# Patient Record
Sex: FEMALE | Race: WHITE | Employment: OTHER | URBAN - NONMETROPOLITAN AREA
[De-identification: names, ages, dates, MRNs, and addresses within clinical notes are randomized per-mention and may not be internally consistent; named-entity substitution may affect disease eponyms.]

---

## 2017-05-28 ENCOUNTER — HOSPITAL ENCOUNTER (INPATIENT)
Age: 82
LOS: 4 days | Discharge: HOME | DRG: 191 | End: 2017-06-01
Attending: EMERGENCY MEDICINE | Admitting: FAMILY MEDICINE
Payer: MEDICARE

## 2017-05-28 ENCOUNTER — APPOINTMENT (OUTPATIENT)
Dept: GENERAL RADIOLOGY | Age: 82
DRG: 191 | End: 2017-05-28
Payer: MEDICARE

## 2017-05-28 DIAGNOSIS — N18.9 CHRONIC RENAL INSUFFICIENCY, UNSPECIFIED STAGE: ICD-10-CM

## 2017-05-28 DIAGNOSIS — J44.1 COPD WITH ACUTE EXACERBATION (HCC): Primary | ICD-10-CM

## 2017-05-28 LAB
ALBUMIN SERPL-MCNC: 3.8 G/DL (ref 3.5–5.2)
ALP BLD-CCNC: 57 U/L (ref 35–104)
ALT SERPL-CCNC: 8 U/L (ref 5–33)
ANION GAP SERPL CALCULATED.3IONS-SCNC: 12 MMOL/L (ref 7–19)
AST SERPL-CCNC: 12 U/L (ref 5–32)
BASOPHILS ABSOLUTE: 0.1 K/UL (ref 0–0.2)
BASOPHILS RELATIVE PERCENT: 0.6 % (ref 0–1)
BILIRUB SERPL-MCNC: 0.6 MG/DL (ref 0.2–1.2)
BUN BLDV-MCNC: 25 MG/DL (ref 8–23)
CALCIUM SERPL-MCNC: 8.8 MG/DL (ref 8.8–10.2)
CHLORIDE BLD-SCNC: 103 MMOL/L (ref 98–111)
CO2: 25 MMOL/L (ref 22–29)
CREAT SERPL-MCNC: 2.4 MG/DL (ref 0.5–0.9)
EOSINOPHILS ABSOLUTE: 0.3 K/UL (ref 0–0.6)
EOSINOPHILS RELATIVE PERCENT: 3 % (ref 0–5)
GFR NON-AFRICAN AMERICAN: 19
GLUCOSE BLD-MCNC: 122 MG/DL (ref 74–109)
HCT VFR BLD CALC: 34.8 % (ref 37–47)
HEMOGLOBIN: 10.5 G/DL (ref 12–16)
INR BLD: 2.04 (ref 0.88–1.18)
LYMPHOCYTES ABSOLUTE: 1 K/UL (ref 1.1–4.5)
LYMPHOCYTES RELATIVE PERCENT: 11.5 % (ref 20–40)
MCH RBC QN AUTO: 29.7 PG (ref 27–31)
MCHC RBC AUTO-ENTMCNC: 30.2 G/DL (ref 33–37)
MCV RBC AUTO: 98.3 FL (ref 81–99)
MONOCYTES ABSOLUTE: 0.5 K/UL (ref 0–0.9)
MONOCYTES RELATIVE PERCENT: 6 % (ref 0–10)
NEUTROPHILS ABSOLUTE: 6.5 K/UL (ref 1.5–7.5)
NEUTROPHILS RELATIVE PERCENT: 78.5 % (ref 50–65)
PDW BLD-RTO: 15.1 % (ref 11.5–14.5)
PERFORMED ON: NORMAL
PLATELET # BLD: 105 K/UL (ref 130–400)
PLATELET SLIDE REVIEW: ADEQUATE
PMV BLD AUTO: 13.8 FL (ref 7.4–10.4)
POC TROPONIN I: 0 NG/ML (ref 0–0.08)
POTASSIUM SERPL-SCNC: 4.1 MMOL/L (ref 3.5–5)
PRO-BNP: 2498 PG/ML (ref 0–1800)
PROTHROMBIN TIME: 23.2 SEC (ref 12–14.6)
RBC # BLD: 3.54 M/UL (ref 4.2–5.4)
SODIUM BLD-SCNC: 140 MMOL/L (ref 136–145)
TOTAL PROTEIN: 7.5 G/DL (ref 6.6–8.7)
WBC # BLD: 8.2 K/UL (ref 4.8–10.8)

## 2017-05-28 PROCEDURE — 93005 ELECTROCARDIOGRAM TRACING: CPT

## 2017-05-28 PROCEDURE — 85610 PROTHROMBIN TIME: CPT

## 2017-05-28 PROCEDURE — 84484 ASSAY OF TROPONIN QUANT: CPT

## 2017-05-28 PROCEDURE — 94640 AIRWAY INHALATION TREATMENT: CPT

## 2017-05-28 PROCEDURE — 99285 EMERGENCY DEPT VISIT HI MDM: CPT

## 2017-05-28 PROCEDURE — 99999 PR OFFICE/OUTPT VISIT,PROCEDURE ONLY: CPT | Performed by: EMERGENCY MEDICINE

## 2017-05-28 PROCEDURE — 80053 COMPREHEN METABOLIC PANEL: CPT

## 2017-05-28 PROCEDURE — 85025 COMPLETE CBC W/AUTO DIFF WBC: CPT

## 2017-05-28 PROCEDURE — 83880 ASSAY OF NATRIURETIC PEPTIDE: CPT

## 2017-05-28 PROCEDURE — 1210000000 HC MED SURG R&B

## 2017-05-28 PROCEDURE — 6360000002 HC RX W HCPCS: Performed by: EMERGENCY MEDICINE

## 2017-05-28 PROCEDURE — 71010 XR CHEST PORTABLE: CPT

## 2017-05-28 PROCEDURE — 36415 COLL VENOUS BLD VENIPUNCTURE: CPT

## 2017-05-28 RX ORDER — ALBUTEROL SULFATE 2.5 MG/3ML
2.5 SOLUTION RESPIRATORY (INHALATION) EVERY 4 HOURS PRN
Status: DISCONTINUED | OUTPATIENT
Start: 2017-05-28 | End: 2017-05-29 | Stop reason: SDUPTHER

## 2017-05-28 RX ORDER — METHYLPREDNISOLONE SODIUM SUCCINATE 125 MG/2ML
125 INJECTION, POWDER, LYOPHILIZED, FOR SOLUTION INTRAMUSCULAR; INTRAVENOUS ONCE
Status: COMPLETED | OUTPATIENT
Start: 2017-05-28 | End: 2017-05-28

## 2017-05-28 RX ADMIN — IPRATROPIUM BROMIDE 0.5 MG: 0.5 SOLUTION RESPIRATORY (INHALATION) at 22:39

## 2017-05-28 RX ADMIN — METHYLPREDNISOLONE SODIUM SUCCINATE 125 MG: 125 INJECTION, POWDER, FOR SOLUTION INTRAMUSCULAR; INTRAVENOUS at 22:23

## 2017-05-28 RX ADMIN — ALBUTEROL SULFATE 2.5 MG: 2.5 SOLUTION RESPIRATORY (INHALATION) at 22:39

## 2017-05-28 ASSESSMENT — ENCOUNTER SYMPTOMS
COUGH: 0
SHORTNESS OF BREATH: 1

## 2017-05-29 PROBLEM — N18.4 CKD STAGE 4 SECONDARY TO HYPERTENSION (HCC): Status: ACTIVE | Noted: 2017-05-29

## 2017-05-29 PROBLEM — I12.9 CKD STAGE 4 SECONDARY TO HYPERTENSION (HCC): Status: ACTIVE | Noted: 2017-05-29

## 2017-05-29 PROBLEM — J96.11 CHRONIC RESPIRATORY FAILURE WITH HYPOXIA (HCC): Status: ACTIVE | Noted: 2017-05-29

## 2017-05-29 PROBLEM — J44.1 COPD EXACERBATION (HCC): Status: ACTIVE | Noted: 2017-05-29

## 2017-05-29 PROBLEM — I10 ESSENTIAL HYPERTENSION: Status: ACTIVE | Noted: 2017-05-29

## 2017-05-29 LAB
ANION GAP SERPL CALCULATED.3IONS-SCNC: 16 MMOL/L (ref 7–19)
BASOPHILS ABSOLUTE: 0 K/UL (ref 0–0.2)
BASOPHILS RELATIVE PERCENT: 0.3 % (ref 0–1)
BUN BLDV-MCNC: 26 MG/DL (ref 8–23)
CALCIUM SERPL-MCNC: 8.7 MG/DL (ref 8.8–10.2)
CHLORIDE BLD-SCNC: 100 MMOL/L (ref 98–111)
CO2: 23 MMOL/L (ref 22–29)
CREAT SERPL-MCNC: 2.3 MG/DL (ref 0.5–0.9)
EOSINOPHILS ABSOLUTE: 0 K/UL (ref 0–0.6)
EOSINOPHILS RELATIVE PERCENT: 0.5 % (ref 0–5)
GFR NON-AFRICAN AMERICAN: 20
GLUCOSE BLD-MCNC: 167 MG/DL (ref 74–109)
HCT VFR BLD CALC: 32.5 % (ref 37–47)
HEMOGLOBIN: 9.9 G/DL (ref 12–16)
INR BLD: 2.14 (ref 0.88–1.18)
LYMPHOCYTES ABSOLUTE: 0.5 K/UL (ref 1.1–4.5)
LYMPHOCYTES RELATIVE PERCENT: 8.1 % (ref 20–40)
MCH RBC QN AUTO: 29.3 PG (ref 27–31)
MCHC RBC AUTO-ENTMCNC: 30.5 G/DL (ref 33–37)
MCV RBC AUTO: 96.2 FL (ref 81–99)
MONOCYTES ABSOLUTE: 0.1 K/UL (ref 0–0.9)
MONOCYTES RELATIVE PERCENT: 1.5 % (ref 0–10)
NEUTROPHILS ABSOLUTE: 5.2 K/UL (ref 1.5–7.5)
NEUTROPHILS RELATIVE PERCENT: 89.1 % (ref 50–65)
PDW BLD-RTO: 14.9 % (ref 11.5–14.5)
PLATELET # BLD: 113 K/UL (ref 130–400)
PMV BLD AUTO: 12.6 FL (ref 7.4–10.4)
POTASSIUM SERPL-SCNC: 4.1 MMOL/L (ref 3.5–5)
PROTHROMBIN TIME: 24.1 SEC (ref 12–14.6)
RBC # BLD: 3.38 M/UL (ref 4.2–5.4)
SODIUM BLD-SCNC: 139 MMOL/L (ref 136–145)
WBC # BLD: 5.9 K/UL (ref 4.8–10.8)

## 2017-05-29 PROCEDURE — 6370000000 HC RX 637 (ALT 250 FOR IP): Performed by: FAMILY MEDICINE

## 2017-05-29 PROCEDURE — 2700000000 HC OXYGEN THERAPY PER DAY

## 2017-05-29 PROCEDURE — 80048 BASIC METABOLIC PNL TOTAL CA: CPT

## 2017-05-29 PROCEDURE — 6360000002 HC RX W HCPCS: Performed by: INTERNAL MEDICINE

## 2017-05-29 PROCEDURE — 99223 1ST HOSP IP/OBS HIGH 75: CPT | Performed by: FAMILY MEDICINE

## 2017-05-29 PROCEDURE — 94660 CPAP INITIATION&MGMT: CPT

## 2017-05-29 PROCEDURE — 85025 COMPLETE CBC W/AUTO DIFF WBC: CPT

## 2017-05-29 PROCEDURE — 6360000002 HC RX W HCPCS: Performed by: FAMILY MEDICINE

## 2017-05-29 PROCEDURE — 94640 AIRWAY INHALATION TREATMENT: CPT

## 2017-05-29 PROCEDURE — 36415 COLL VENOUS BLD VENIPUNCTURE: CPT

## 2017-05-29 PROCEDURE — 85610 PROTHROMBIN TIME: CPT

## 2017-05-29 PROCEDURE — 1210000000 HC MED SURG R&B

## 2017-05-29 PROCEDURE — 2580000003 HC RX 258: Performed by: FAMILY MEDICINE

## 2017-05-29 RX ORDER — AZITHROMYCIN 250 MG/1
500 TABLET, FILM COATED ORAL DAILY
Status: DISCONTINUED | OUTPATIENT
Start: 2017-05-29 | End: 2017-05-29

## 2017-05-29 RX ORDER — DOXYCYCLINE HYCLATE 100 MG/1
100 CAPSULE ORAL EVERY 12 HOURS SCHEDULED
Status: DISCONTINUED | OUTPATIENT
Start: 2017-05-29 | End: 2017-06-01 | Stop reason: HOSPADM

## 2017-05-29 RX ORDER — IPRATROPIUM BROMIDE AND ALBUTEROL SULFATE 2.5; .5 MG/3ML; MG/3ML
1 SOLUTION RESPIRATORY (INHALATION) 4 TIMES DAILY
Status: DISCONTINUED | OUTPATIENT
Start: 2017-05-29 | End: 2017-06-01 | Stop reason: HOSPADM

## 2017-05-29 RX ORDER — WARFARIN SODIUM 2 MG/1
2 TABLET ORAL DAILY
Status: DISCONTINUED | OUTPATIENT
Start: 2017-05-29 | End: 2017-06-01 | Stop reason: HOSPADM

## 2017-05-29 RX ORDER — ASPIRIN 81 MG/1
81 TABLET, CHEWABLE ORAL DAILY
Status: DISCONTINUED | OUTPATIENT
Start: 2017-05-29 | End: 2017-06-01 | Stop reason: HOSPADM

## 2017-05-29 RX ORDER — BUMETANIDE 1 MG/1
1 TABLET ORAL DAILY
Status: ON HOLD | COMMUNITY
End: 2017-06-01 | Stop reason: HOSPADM

## 2017-05-29 RX ORDER — BUMETANIDE 1 MG/1
0.5 TABLET ORAL 2 TIMES DAILY
Status: DISCONTINUED | OUTPATIENT
Start: 2017-05-29 | End: 2017-05-31

## 2017-05-29 RX ORDER — METHYLPREDNISOLONE SODIUM SUCCINATE 40 MG/ML
40 INJECTION, POWDER, LYOPHILIZED, FOR SOLUTION INTRAMUSCULAR; INTRAVENOUS EVERY 12 HOURS
Status: DISCONTINUED | OUTPATIENT
Start: 2017-05-29 | End: 2017-05-31

## 2017-05-29 RX ORDER — AMLODIPINE BESYLATE 10 MG/1
10 TABLET ORAL DAILY
Status: DISCONTINUED | OUTPATIENT
Start: 2017-05-29 | End: 2017-06-01 | Stop reason: HOSPADM

## 2017-05-29 RX ORDER — ALBUTEROL SULFATE 2.5 MG/3ML
2.5 SOLUTION RESPIRATORY (INHALATION)
Status: DISCONTINUED | OUTPATIENT
Start: 2017-05-29 | End: 2017-06-01 | Stop reason: HOSPADM

## 2017-05-29 RX ORDER — AZITHROMYCIN 250 MG/1
250 TABLET, FILM COATED ORAL DAILY
Status: DISCONTINUED | OUTPATIENT
Start: 2017-05-30 | End: 2017-05-29

## 2017-05-29 RX ORDER — CALCITRIOL 0.25 UG/1
0.25 CAPSULE, LIQUID FILLED ORAL DAILY
Status: DISCONTINUED | OUTPATIENT
Start: 2017-05-29 | End: 2017-06-01 | Stop reason: HOSPADM

## 2017-05-29 RX ORDER — LEVOTHYROXINE SODIUM 0.1 MG/1
200 TABLET ORAL DAILY
Status: DISCONTINUED | OUTPATIENT
Start: 2017-05-29 | End: 2017-06-01 | Stop reason: HOSPADM

## 2017-05-29 RX ORDER — FORMOTEROL FUMARATE 20 UG/2ML
20 SOLUTION RESPIRATORY (INHALATION) 2 TIMES DAILY
Status: DISCONTINUED | OUTPATIENT
Start: 2017-05-29 | End: 2017-06-01 | Stop reason: HOSPADM

## 2017-05-29 RX ORDER — SODIUM CHLORIDE 0.9 % (FLUSH) 0.9 %
10 SYRINGE (ML) INJECTION PRN
Status: DISCONTINUED | OUTPATIENT
Start: 2017-05-29 | End: 2017-06-01 | Stop reason: HOSPADM

## 2017-05-29 RX ORDER — ACETAMINOPHEN 325 MG/1
650 TABLET ORAL EVERY 4 HOURS PRN
Status: DISCONTINUED | OUTPATIENT
Start: 2017-05-29 | End: 2017-06-01 | Stop reason: HOSPADM

## 2017-05-29 RX ORDER — LOSARTAN POTASSIUM 50 MG/1
50 TABLET ORAL DAILY
Status: DISCONTINUED | OUTPATIENT
Start: 2017-05-29 | End: 2017-06-01 | Stop reason: HOSPADM

## 2017-05-29 RX ORDER — ONDANSETRON 2 MG/ML
4 INJECTION INTRAMUSCULAR; INTRAVENOUS EVERY 6 HOURS PRN
Status: DISCONTINUED | OUTPATIENT
Start: 2017-05-29 | End: 2017-06-01 | Stop reason: HOSPADM

## 2017-05-29 RX ORDER — SODIUM CHLORIDE 0.9 % (FLUSH) 0.9 %
10 SYRINGE (ML) INJECTION EVERY 12 HOURS SCHEDULED
Status: DISCONTINUED | OUTPATIENT
Start: 2017-05-29 | End: 2017-06-01 | Stop reason: HOSPADM

## 2017-05-29 RX ADMIN — Medication 10 ML: at 20:13

## 2017-05-29 RX ADMIN — CALCITRIOL 0.25 MCG: 0.25 CAPSULE, LIQUID FILLED ORAL at 09:29

## 2017-05-29 RX ADMIN — IPRATROPIUM BROMIDE AND ALBUTEROL SULFATE 1 AMPULE: .5; 3 SOLUTION RESPIRATORY (INHALATION) at 09:53

## 2017-05-29 RX ADMIN — BUMETANIDE 0.5 MG: 1 TABLET ORAL at 02:24

## 2017-05-29 RX ADMIN — LEVOTHYROXINE SODIUM 200 MCG: 100 TABLET ORAL at 06:47

## 2017-05-29 RX ADMIN — IPRATROPIUM BROMIDE AND ALBUTEROL SULFATE 1 AMPULE: .5; 3 SOLUTION RESPIRATORY (INHALATION) at 06:23

## 2017-05-29 RX ADMIN — Medication 10 ML: at 09:30

## 2017-05-29 RX ADMIN — METOPROLOL TARTRATE 25 MG: 25 TABLET, FILM COATED ORAL at 02:25

## 2017-05-29 RX ADMIN — DOXYCYCLINE HYCLATE 100 MG: 100 CAPSULE, GELATIN COATED ORAL at 20:13

## 2017-05-29 RX ADMIN — IPRATROPIUM BROMIDE AND ALBUTEROL SULFATE 1 AMPULE: .5; 3 SOLUTION RESPIRATORY (INHALATION) at 19:32

## 2017-05-29 RX ADMIN — METHYLPREDNISOLONE SODIUM SUCCINATE 40 MG: 40 INJECTION, POWDER, FOR SOLUTION INTRAMUSCULAR; INTRAVENOUS at 02:25

## 2017-05-29 RX ADMIN — METHYLPREDNISOLONE SODIUM SUCCINATE 40 MG: 40 INJECTION, POWDER, FOR SOLUTION INTRAMUSCULAR; INTRAVENOUS at 14:54

## 2017-05-29 RX ADMIN — METOPROLOL TARTRATE 25 MG: 25 TABLET, FILM COATED ORAL at 09:29

## 2017-05-29 RX ADMIN — BUMETANIDE 0.5 MG: 1 TABLET ORAL at 20:13

## 2017-05-29 RX ADMIN — METOPROLOL TARTRATE 25 MG: 25 TABLET, FILM COATED ORAL at 20:13

## 2017-05-29 RX ADMIN — ASPIRIN 81 MG CHEWABLE TABLET 81 MG: 81 TABLET CHEWABLE at 09:30

## 2017-05-29 RX ADMIN — AMLODIPINE BESYLATE 10 MG: 10 TABLET ORAL at 09:29

## 2017-05-29 RX ADMIN — FORMOTEROL FUMARATE DIHYDRATE 20 MCG: 20 SOLUTION RESPIRATORY (INHALATION) at 19:37

## 2017-05-29 RX ADMIN — DOXYCYCLINE HYCLATE 100 MG: 100 CAPSULE, GELATIN COATED ORAL at 09:29

## 2017-05-29 RX ADMIN — BUMETANIDE 0.5 MG: 1 TABLET ORAL at 09:29

## 2017-05-29 RX ADMIN — LOSARTAN POTASSIUM 50 MG: 50 TABLET ORAL at 09:30

## 2017-05-29 RX ADMIN — IPRATROPIUM BROMIDE AND ALBUTEROL SULFATE 1 AMPULE: .5; 3 SOLUTION RESPIRATORY (INHALATION) at 14:53

## 2017-05-30 LAB
LV EF: 58 %
LVEF MODALITY: NORMAL

## 2017-05-30 PROCEDURE — 6360000002 HC RX W HCPCS: Performed by: INTERNAL MEDICINE

## 2017-05-30 PROCEDURE — 6370000000 HC RX 637 (ALT 250 FOR IP): Performed by: FAMILY MEDICINE

## 2017-05-30 PROCEDURE — 2580000003 HC RX 258: Performed by: FAMILY MEDICINE

## 2017-05-30 PROCEDURE — 2700000000 HC OXYGEN THERAPY PER DAY

## 2017-05-30 PROCEDURE — 6360000002 HC RX W HCPCS: Performed by: FAMILY MEDICINE

## 2017-05-30 PROCEDURE — 1210000000 HC MED SURG R&B

## 2017-05-30 PROCEDURE — 94660 CPAP INITIATION&MGMT: CPT

## 2017-05-30 PROCEDURE — 93306 TTE W/DOPPLER COMPLETE: CPT

## 2017-05-30 PROCEDURE — 99232 SBSQ HOSP IP/OBS MODERATE 35: CPT | Performed by: HOSPITALIST

## 2017-05-30 PROCEDURE — 94640 AIRWAY INHALATION TREATMENT: CPT

## 2017-05-30 RX ADMIN — IPRATROPIUM BROMIDE AND ALBUTEROL SULFATE 1 AMPULE: .5; 3 SOLUTION RESPIRATORY (INHALATION) at 19:37

## 2017-05-30 RX ADMIN — CALCITRIOL 0.25 MCG: 0.25 CAPSULE, LIQUID FILLED ORAL at 08:14

## 2017-05-30 RX ADMIN — Medication 10 ML: at 08:17

## 2017-05-30 RX ADMIN — IPRATROPIUM BROMIDE AND ALBUTEROL SULFATE 1 AMPULE: .5; 3 SOLUTION RESPIRATORY (INHALATION) at 07:16

## 2017-05-30 RX ADMIN — METOPROLOL TARTRATE 25 MG: 25 TABLET, FILM COATED ORAL at 08:14

## 2017-05-30 RX ADMIN — WARFARIN SODIUM 2 MG: 2 TABLET ORAL at 18:16

## 2017-05-30 RX ADMIN — BUMETANIDE 0.5 MG: 1 TABLET ORAL at 21:20

## 2017-05-30 RX ADMIN — ACETAMINOPHEN 650 MG: 325 TABLET, FILM COATED ORAL at 15:17

## 2017-05-30 RX ADMIN — DOXYCYCLINE HYCLATE 100 MG: 100 CAPSULE, GELATIN COATED ORAL at 21:20

## 2017-05-30 RX ADMIN — ASPIRIN 81 MG CHEWABLE TABLET 81 MG: 81 TABLET CHEWABLE at 08:14

## 2017-05-30 RX ADMIN — IPRATROPIUM BROMIDE AND ALBUTEROL SULFATE 1 AMPULE: .5; 3 SOLUTION RESPIRATORY (INHALATION) at 10:54

## 2017-05-30 RX ADMIN — ACETAMINOPHEN 650 MG: 325 TABLET, FILM COATED ORAL at 21:23

## 2017-05-30 RX ADMIN — FORMOTEROL FUMARATE DIHYDRATE 20 MCG: 20 SOLUTION RESPIRATORY (INHALATION) at 19:43

## 2017-05-30 RX ADMIN — ALBUTEROL SULFATE 2.5 MG: 2.5 SOLUTION RESPIRATORY (INHALATION) at 15:19

## 2017-05-30 RX ADMIN — FORMOTEROL FUMARATE DIHYDRATE 20 MCG: 20 SOLUTION RESPIRATORY (INHALATION) at 07:24

## 2017-05-30 RX ADMIN — DOXYCYCLINE HYCLATE 100 MG: 100 CAPSULE, GELATIN COATED ORAL at 08:14

## 2017-05-30 RX ADMIN — Medication 10 ML: at 21:20

## 2017-05-30 RX ADMIN — METHYLPREDNISOLONE SODIUM SUCCINATE 40 MG: 40 INJECTION, POWDER, FOR SOLUTION INTRAMUSCULAR; INTRAVENOUS at 16:09

## 2017-05-30 RX ADMIN — AMLODIPINE BESYLATE 10 MG: 10 TABLET ORAL at 08:16

## 2017-05-30 RX ADMIN — METHYLPREDNISOLONE SODIUM SUCCINATE 40 MG: 40 INJECTION, POWDER, FOR SOLUTION INTRAMUSCULAR; INTRAVENOUS at 02:55

## 2017-05-30 RX ADMIN — LOSARTAN POTASSIUM 50 MG: 50 TABLET ORAL at 08:16

## 2017-05-30 RX ADMIN — BUMETANIDE 0.5 MG: 1 TABLET ORAL at 08:13

## 2017-05-30 RX ADMIN — METOPROLOL TARTRATE 25 MG: 25 TABLET, FILM COATED ORAL at 21:20

## 2017-05-30 RX ADMIN — LEVOTHYROXINE SODIUM 200 MCG: 100 TABLET ORAL at 06:02

## 2017-05-30 ASSESSMENT — ENCOUNTER SYMPTOMS
SHORTNESS OF BREATH: 1
EYES NEGATIVE: 1
COUGH: 1
GASTROINTESTINAL NEGATIVE: 1

## 2017-05-30 ASSESSMENT — PAIN SCALES - GENERAL
PAINLEVEL_OUTOF10: 8
PAINLEVEL_OUTOF10: 5

## 2017-05-31 LAB
ANION GAP SERPL CALCULATED.3IONS-SCNC: 15 MMOL/L (ref 7–19)
BUN BLDV-MCNC: 53 MG/DL (ref 8–23)
CALCIUM SERPL-MCNC: 9.4 MG/DL (ref 8.8–10.2)
CHLORIDE BLD-SCNC: 101 MMOL/L (ref 98–111)
CO2: 23 MMOL/L (ref 22–29)
CREAT SERPL-MCNC: 2.8 MG/DL (ref 0.5–0.9)
GFR NON-AFRICAN AMERICAN: 16
GLUCOSE BLD-MCNC: 167 MG/DL (ref 74–109)
HCT VFR BLD CALC: 34.1 % (ref 37–47)
HEMOGLOBIN: 10.4 G/DL (ref 12–16)
INR BLD: 3.48 (ref 0.88–1.18)
MCH RBC QN AUTO: 30 PG (ref 27–31)
MCHC RBC AUTO-ENTMCNC: 30.5 G/DL (ref 33–37)
MCV RBC AUTO: 98.3 FL (ref 81–99)
PDW BLD-RTO: 15.3 % (ref 11.5–14.5)
PLATELET # BLD: 143 K/UL (ref 130–400)
PMV BLD AUTO: 12.4 FL (ref 7.4–10.4)
POTASSIUM SERPL-SCNC: 4.7 MMOL/L (ref 3.5–5)
PROTHROMBIN TIME: 35.5 SEC (ref 12–14.6)
RBC # BLD: 3.47 M/UL (ref 4.2–5.4)
SODIUM BLD-SCNC: 139 MMOL/L (ref 136–145)
WBC # BLD: 8.7 K/UL (ref 4.8–10.8)

## 2017-05-31 PROCEDURE — 2700000000 HC OXYGEN THERAPY PER DAY

## 2017-05-31 PROCEDURE — 80048 BASIC METABOLIC PNL TOTAL CA: CPT

## 2017-05-31 PROCEDURE — 6360000002 HC RX W HCPCS: Performed by: FAMILY MEDICINE

## 2017-05-31 PROCEDURE — 99232 SBSQ HOSP IP/OBS MODERATE 35: CPT | Performed by: HOSPITALIST

## 2017-05-31 PROCEDURE — 36415 COLL VENOUS BLD VENIPUNCTURE: CPT

## 2017-05-31 PROCEDURE — 2580000003 HC RX 258: Performed by: FAMILY MEDICINE

## 2017-05-31 PROCEDURE — 2580000003 HC RX 258: Performed by: CLINICAL NURSE SPECIALIST

## 2017-05-31 PROCEDURE — 1210000000 HC MED SURG R&B

## 2017-05-31 PROCEDURE — 85610 PROTHROMBIN TIME: CPT

## 2017-05-31 PROCEDURE — 6360000002 HC RX W HCPCS: Performed by: INTERNAL MEDICINE

## 2017-05-31 PROCEDURE — 85027 COMPLETE CBC AUTOMATED: CPT

## 2017-05-31 PROCEDURE — 6370000000 HC RX 637 (ALT 250 FOR IP): Performed by: FAMILY MEDICINE

## 2017-05-31 PROCEDURE — 94640 AIRWAY INHALATION TREATMENT: CPT

## 2017-05-31 PROCEDURE — 94660 CPAP INITIATION&MGMT: CPT

## 2017-05-31 RX ORDER — SODIUM CHLORIDE 9 MG/ML
INJECTION, SOLUTION INTRAVENOUS CONTINUOUS
Status: DISCONTINUED | OUTPATIENT
Start: 2017-05-31 | End: 2017-06-01 | Stop reason: HOSPADM

## 2017-05-31 RX ORDER — METHYLPREDNISOLONE SODIUM SUCCINATE 40 MG/ML
40 INJECTION, POWDER, LYOPHILIZED, FOR SOLUTION INTRAMUSCULAR; INTRAVENOUS DAILY
Status: DISCONTINUED | OUTPATIENT
Start: 2017-06-01 | End: 2017-06-01 | Stop reason: HOSPADM

## 2017-05-31 RX ADMIN — Medication 10 ML: at 08:46

## 2017-05-31 RX ADMIN — AMLODIPINE BESYLATE 10 MG: 10 TABLET ORAL at 08:43

## 2017-05-31 RX ADMIN — CALCITRIOL 0.25 MCG: 0.25 CAPSULE, LIQUID FILLED ORAL at 08:44

## 2017-05-31 RX ADMIN — BUMETANIDE 0.5 MG: 1 TABLET ORAL at 08:44

## 2017-05-31 RX ADMIN — FORMOTEROL FUMARATE DIHYDRATE 20 MCG: 20 SOLUTION RESPIRATORY (INHALATION) at 19:38

## 2017-05-31 RX ADMIN — SODIUM CHLORIDE: 9 INJECTION, SOLUTION INTRAVENOUS at 13:09

## 2017-05-31 RX ADMIN — LOSARTAN POTASSIUM 50 MG: 50 TABLET ORAL at 08:44

## 2017-05-31 RX ADMIN — LEVOTHYROXINE SODIUM 200 MCG: 100 TABLET ORAL at 06:22

## 2017-05-31 RX ADMIN — DOXYCYCLINE HYCLATE 100 MG: 100 CAPSULE, GELATIN COATED ORAL at 08:44

## 2017-05-31 RX ADMIN — IPRATROPIUM BROMIDE AND ALBUTEROL SULFATE 1 AMPULE: .5; 3 SOLUTION RESPIRATORY (INHALATION) at 06:53

## 2017-05-31 RX ADMIN — ASPIRIN 81 MG CHEWABLE TABLET 81 MG: 81 TABLET CHEWABLE at 08:43

## 2017-05-31 RX ADMIN — METOPROLOL TARTRATE 25 MG: 25 TABLET, FILM COATED ORAL at 20:39

## 2017-05-31 RX ADMIN — METHYLPREDNISOLONE SODIUM SUCCINATE 40 MG: 40 INJECTION, POWDER, FOR SOLUTION INTRAMUSCULAR; INTRAVENOUS at 02:56

## 2017-05-31 RX ADMIN — FORMOTEROL FUMARATE DIHYDRATE 20 MCG: 20 SOLUTION RESPIRATORY (INHALATION) at 07:04

## 2017-05-31 RX ADMIN — ACETAMINOPHEN 650 MG: 325 TABLET, FILM COATED ORAL at 13:07

## 2017-05-31 RX ADMIN — IPRATROPIUM BROMIDE AND ALBUTEROL SULFATE 1 AMPULE: .5; 3 SOLUTION RESPIRATORY (INHALATION) at 19:28

## 2017-05-31 RX ADMIN — DOXYCYCLINE HYCLATE 100 MG: 100 CAPSULE, GELATIN COATED ORAL at 20:39

## 2017-05-31 RX ADMIN — IPRATROPIUM BROMIDE AND ALBUTEROL SULFATE 1 AMPULE: .5; 3 SOLUTION RESPIRATORY (INHALATION) at 15:06

## 2017-05-31 RX ADMIN — METOPROLOL TARTRATE 25 MG: 25 TABLET, FILM COATED ORAL at 08:44

## 2017-05-31 RX ADMIN — IPRATROPIUM BROMIDE AND ALBUTEROL SULFATE 1 AMPULE: .5; 3 SOLUTION RESPIRATORY (INHALATION) at 11:14

## 2017-05-31 ASSESSMENT — ENCOUNTER SYMPTOMS
SHORTNESS OF BREATH: 1
EYES NEGATIVE: 1
COUGH: 1
GASTROINTESTINAL NEGATIVE: 1

## 2017-05-31 ASSESSMENT — PAIN SCALES - GENERAL: PAINLEVEL_OUTOF10: 5

## 2017-06-01 VITALS
SYSTOLIC BLOOD PRESSURE: 123 MMHG | HEART RATE: 61 BPM | RESPIRATION RATE: 16 BRPM | DIASTOLIC BLOOD PRESSURE: 60 MMHG | WEIGHT: 248.2 LBS | TEMPERATURE: 97.2 F | OXYGEN SATURATION: 96 % | HEIGHT: 67 IN | BODY MASS INDEX: 38.96 KG/M2

## 2017-06-01 LAB
INR BLD: 3.71 (ref 0.88–1.18)
PROTHROMBIN TIME: 37.4 SEC (ref 12–14.6)

## 2017-06-01 PROCEDURE — 6370000000 HC RX 637 (ALT 250 FOR IP): Performed by: FAMILY MEDICINE

## 2017-06-01 PROCEDURE — G8978 MOBILITY CURRENT STATUS: HCPCS

## 2017-06-01 PROCEDURE — 85610 PROTHROMBIN TIME: CPT

## 2017-06-01 PROCEDURE — 6360000002 HC RX W HCPCS: Performed by: INTERNAL MEDICINE

## 2017-06-01 PROCEDURE — 97161 PT EVAL LOW COMPLEX 20 MIN: CPT

## 2017-06-01 PROCEDURE — 6360000002 HC RX W HCPCS: Performed by: PHYSICIAN ASSISTANT

## 2017-06-01 PROCEDURE — 36415 COLL VENOUS BLD VENIPUNCTURE: CPT

## 2017-06-01 PROCEDURE — 6360000002 HC RX W HCPCS: Performed by: CLINICAL NURSE SPECIALIST

## 2017-06-01 PROCEDURE — G8979 MOBILITY GOAL STATUS: HCPCS

## 2017-06-01 PROCEDURE — 2700000000 HC OXYGEN THERAPY PER DAY

## 2017-06-01 PROCEDURE — 2580000003 HC RX 258: Performed by: CLINICAL NURSE SPECIALIST

## 2017-06-01 PROCEDURE — 99239 HOSP IP/OBS DSCHRG MGMT >30: CPT | Performed by: HOSPITALIST

## 2017-06-01 PROCEDURE — 2580000003 HC RX 258: Performed by: FAMILY MEDICINE

## 2017-06-01 PROCEDURE — 94660 CPAP INITIATION&MGMT: CPT

## 2017-06-01 PROCEDURE — 94640 AIRWAY INHALATION TREATMENT: CPT

## 2017-06-01 RX ORDER — LEVALBUTEROL INHALATION SOLUTION 0.63 MG/3ML
0.63 SOLUTION RESPIRATORY (INHALATION) ONCE
Status: COMPLETED | OUTPATIENT
Start: 2017-06-01 | End: 2017-06-01

## 2017-06-01 RX ORDER — LEVALBUTEROL INHALATION SOLUTION 0.63 MG/3ML
0.63 SOLUTION RESPIRATORY (INHALATION) EVERY 8 HOURS PRN
Qty: 90 VIAL | Refills: 1 | Status: SHIPPED | OUTPATIENT
Start: 2017-06-01 | End: 2017-07-18 | Stop reason: SDUPTHER

## 2017-06-01 RX ORDER — METHYLPREDNISOLONE 4 MG/1
TABLET ORAL
Qty: 1 KIT | Refills: 0 | Status: SHIPPED | OUTPATIENT
Start: 2017-06-01 | End: 2017-06-07

## 2017-06-01 RX ORDER — AZITHROMYCIN 250 MG/1
TABLET, FILM COATED ORAL
Qty: 1 PACKET | Refills: 0 | Status: SHIPPED | OUTPATIENT
Start: 2017-06-01 | End: 2017-06-11

## 2017-06-01 RX ORDER — PANTOPRAZOLE SODIUM 40 MG/1
40 TABLET, DELAYED RELEASE ORAL DAILY
Qty: 30 TABLET | Refills: 0 | Status: SHIPPED | OUTPATIENT
Start: 2017-06-01 | End: 2017-06-27 | Stop reason: SDUPTHER

## 2017-06-01 RX ADMIN — LOSARTAN POTASSIUM 50 MG: 50 TABLET ORAL at 08:02

## 2017-06-01 RX ADMIN — LEVALBUTEROL 0.63 MG: 0.63 SOLUTION RESPIRATORY (INHALATION) at 15:37

## 2017-06-01 RX ADMIN — IPRATROPIUM BROMIDE AND ALBUTEROL SULFATE 1 AMPULE: .5; 3 SOLUTION RESPIRATORY (INHALATION) at 10:03

## 2017-06-01 RX ADMIN — CALCITRIOL 0.25 MCG: 0.25 CAPSULE, LIQUID FILLED ORAL at 08:01

## 2017-06-01 RX ADMIN — ASPIRIN 81 MG CHEWABLE TABLET 81 MG: 81 TABLET CHEWABLE at 08:02

## 2017-06-01 RX ADMIN — ACETAMINOPHEN 650 MG: 325 TABLET, FILM COATED ORAL at 12:17

## 2017-06-01 RX ADMIN — Medication 10 ML: at 08:03

## 2017-06-01 RX ADMIN — METHYLPREDNISOLONE SODIUM SUCCINATE 40 MG: 40 INJECTION, POWDER, FOR SOLUTION INTRAMUSCULAR; INTRAVENOUS at 08:01

## 2017-06-01 RX ADMIN — DOXYCYCLINE HYCLATE 100 MG: 100 CAPSULE, GELATIN COATED ORAL at 08:30

## 2017-06-01 RX ADMIN — METOPROLOL TARTRATE 25 MG: 25 TABLET, FILM COATED ORAL at 08:02

## 2017-06-01 RX ADMIN — LEVOTHYROXINE SODIUM 200 MCG: 100 TABLET ORAL at 06:31

## 2017-06-01 RX ADMIN — FORMOTEROL FUMARATE DIHYDRATE 20 MCG: 20 SOLUTION RESPIRATORY (INHALATION) at 07:29

## 2017-06-01 RX ADMIN — SODIUM CHLORIDE: 9 INJECTION, SOLUTION INTRAVENOUS at 12:17

## 2017-06-01 RX ADMIN — IPRATROPIUM BROMIDE AND ALBUTEROL SULFATE 1 AMPULE: .5; 3 SOLUTION RESPIRATORY (INHALATION) at 07:19

## 2017-06-01 RX ADMIN — AMLODIPINE BESYLATE 10 MG: 10 TABLET ORAL at 08:02

## 2017-06-01 ASSESSMENT — PAIN SCALES - GENERAL: PAINLEVEL_OUTOF10: 6

## 2017-06-02 LAB
EKG P AXIS: NORMAL DEGREES
EKG P-R INTERVAL: NORMAL MS
EKG Q-T INTERVAL: 446 MS
EKG QRS DURATION: 140 MS
EKG QTC CALCULATION (BAZETT): 470 MS
EKG T AXIS: 58 DEGREES

## 2017-06-09 ENCOUNTER — TELEPHONE (OUTPATIENT)
Dept: INTERNAL MEDICINE CLINIC | Age: 82
End: 2017-06-09

## 2017-06-12 ENCOUNTER — TELEPHONE (OUTPATIENT)
Dept: INTERNAL MEDICINE CLINIC | Age: 82
End: 2017-06-12

## 2017-06-13 LAB
ALBUMIN SERPL-MCNC: 3.4 G/DL (ref 3.5–5.2)
ALP BLD-CCNC: 49 U/L (ref 35–104)
ALT SERPL-CCNC: 19 U/L (ref 5–33)
ANION GAP SERPL CALCULATED.3IONS-SCNC: 17 MMOL/L (ref 7–19)
AST SERPL-CCNC: 16 U/L (ref 5–32)
BASOPHILS ABSOLUTE: 0 K/UL (ref 0–0.2)
BASOPHILS RELATIVE PERCENT: 0.3 % (ref 0–1)
BILIRUB SERPL-MCNC: 1.7 MG/DL (ref 0.2–1.2)
BUN BLDV-MCNC: 26 MG/DL (ref 8–23)
CALCIUM SERPL-MCNC: 8.6 MG/DL (ref 8.8–10.2)
CHLORIDE BLD-SCNC: 103 MMOL/L (ref 98–111)
CHOLESTEROL, TOTAL: 123 MG/DL (ref 160–199)
CO2: 22 MMOL/L (ref 22–29)
CREAT SERPL-MCNC: 2.5 MG/DL (ref 0.5–0.9)
EOSINOPHILS ABSOLUTE: 0.2 K/UL (ref 0–0.6)
EOSINOPHILS RELATIVE PERCENT: 2.5 % (ref 0–5)
GFR NON-AFRICAN AMERICAN: 18
GLUCOSE BLD-MCNC: 97 MG/DL (ref 74–109)
HCT VFR BLD CALC: 33.1 % (ref 37–47)
HDLC SERPL-MCNC: 49 MG/DL (ref 65–121)
HEMOGLOBIN: 9.9 G/DL (ref 12–16)
LDL CHOLESTEROL CALCULATED: 58 MG/DL
LYMPHOCYTES ABSOLUTE: 0.9 K/UL (ref 1.1–4.5)
LYMPHOCYTES RELATIVE PERCENT: 13.5 % (ref 20–40)
MCH RBC QN AUTO: 29.1 PG (ref 27–31)
MCHC RBC AUTO-ENTMCNC: 29.9 G/DL (ref 33–37)
MCV RBC AUTO: 97.4 FL (ref 81–99)
MONOCYTES ABSOLUTE: 0.6 K/UL (ref 0–0.9)
MONOCYTES RELATIVE PERCENT: 9.4 % (ref 0–10)
NEUTROPHILS ABSOLUTE: 5 K/UL (ref 1.5–7.5)
NEUTROPHILS RELATIVE PERCENT: 73.6 % (ref 50–65)
PDW BLD-RTO: 15.9 % (ref 11.5–14.5)
PLATELET # BLD: 57 K/UL (ref 130–400)
PLATELET SLIDE REVIEW: ABNORMAL
PMV BLD AUTO: 14.6 FL (ref 9.4–12.3)
POTASSIUM SERPL-SCNC: 4.4 MMOL/L (ref 3.5–5)
RBC # BLD: 3.4 M/UL (ref 4.2–5.4)
SODIUM BLD-SCNC: 142 MMOL/L (ref 136–145)
T4 FREE: 1.2 NG/ML (ref 0.9–1.7)
TOTAL PROTEIN: 6.3 G/DL (ref 6.6–8.7)
TRIGL SERPL-MCNC: 78 MG/DL (ref 150–199)
TSH SERPL DL<=0.05 MIU/L-ACNC: 6.73 UIU/ML (ref 0.27–4.2)
VITAMIN D 25-HYDROXY: 16.2 NG/ML
WBC # BLD: 6.8 K/UL (ref 4.8–10.8)

## 2017-06-13 RX ORDER — ERGOCALCIFEROL (VITAMIN D2) 1250 MCG
50000 CAPSULE ORAL WEEKLY
Qty: 4 CAPSULE | Refills: 1 | Status: ON HOLD | OUTPATIENT
Start: 2017-06-13 | End: 2019-03-07

## 2017-06-15 ENCOUNTER — TELEPHONE (OUTPATIENT)
Dept: INTERNAL MEDICINE CLINIC | Age: 82
End: 2017-06-15

## 2017-06-16 ENCOUNTER — TELEPHONE (OUTPATIENT)
Dept: INTERNAL MEDICINE | Age: 82
End: 2017-06-16

## 2017-06-21 ENCOUNTER — ANTI-COAG VISIT (OUTPATIENT)
Dept: INTERNAL MEDICINE | Age: 82
End: 2017-06-21

## 2017-06-27 RX ORDER — PANTOPRAZOLE SODIUM 40 MG/1
40 TABLET, DELAYED RELEASE ORAL DAILY
Qty: 90 TABLET | Refills: 3 | Status: SHIPPED | OUTPATIENT
Start: 2017-06-27 | End: 2017-07-18 | Stop reason: SDUPTHER

## 2017-06-28 ENCOUNTER — TELEPHONE (OUTPATIENT)
Dept: INTERNAL MEDICINE | Age: 82
End: 2017-06-28

## 2017-07-05 ENCOUNTER — TELEPHONE (OUTPATIENT)
Dept: INTERNAL MEDICINE | Age: 82
End: 2017-07-05

## 2017-07-06 ENCOUNTER — TELEPHONE (OUTPATIENT)
Dept: INTERNAL MEDICINE | Age: 82
End: 2017-07-06

## 2017-07-06 ENCOUNTER — TELEPHONE (OUTPATIENT)
Dept: INTERNAL MEDICINE CLINIC | Age: 82
End: 2017-07-06

## 2017-07-11 ENCOUNTER — OFFICE VISIT (OUTPATIENT)
Dept: INTERNAL MEDICINE | Age: 82
End: 2017-07-11
Payer: MEDICARE

## 2017-07-11 VITALS
HEIGHT: 67 IN | WEIGHT: 250.1 LBS | BODY MASS INDEX: 39.25 KG/M2 | HEART RATE: 72 BPM | OXYGEN SATURATION: 97 % | DIASTOLIC BLOOD PRESSURE: 74 MMHG | SYSTOLIC BLOOD PRESSURE: 136 MMHG

## 2017-07-11 DIAGNOSIS — I48.91 ATRIAL FIBRILLATION, UNSPECIFIED TYPE (HCC): ICD-10-CM

## 2017-07-11 DIAGNOSIS — R79.89 LOW VITAMIN D LEVEL: ICD-10-CM

## 2017-07-11 DIAGNOSIS — I50.9 CHRONIC CONGESTIVE HEART FAILURE, UNSPECIFIED CONGESTIVE HEART FAILURE TYPE: ICD-10-CM

## 2017-07-11 DIAGNOSIS — I10 ESSENTIAL HYPERTENSION: ICD-10-CM

## 2017-07-11 DIAGNOSIS — D64.9 ANEMIA, UNSPECIFIED TYPE: ICD-10-CM

## 2017-07-11 DIAGNOSIS — Z00.00 ROUTINE GENERAL MEDICAL EXAMINATION AT A HEALTH CARE FACILITY: ICD-10-CM

## 2017-07-11 DIAGNOSIS — Z00.00 ROUTINE ADULT HEALTH MAINTENANCE: Primary | ICD-10-CM

## 2017-07-11 DIAGNOSIS — E03.9 HYPOTHYROIDISM, UNSPECIFIED TYPE: ICD-10-CM

## 2017-07-11 DIAGNOSIS — E78.5 HYPERLIPIDEMIA, UNSPECIFIED HYPERLIPIDEMIA TYPE: ICD-10-CM

## 2017-07-11 DIAGNOSIS — Z12.11 COLON CANCER SCREENING: ICD-10-CM

## 2017-07-11 PROCEDURE — 1036F TOBACCO NON-USER: CPT | Performed by: INTERNAL MEDICINE

## 2017-07-11 PROCEDURE — G0439 PPPS, SUBSEQ VISIT: HCPCS | Performed by: INTERNAL MEDICINE

## 2017-07-11 PROCEDURE — 4040F PNEUMOC VAC/ADMIN/RCVD: CPT | Performed by: INTERNAL MEDICINE

## 2017-07-11 PROCEDURE — G8417 CALC BMI ABV UP PARAM F/U: HCPCS | Performed by: INTERNAL MEDICINE

## 2017-07-11 PROCEDURE — 99214 OFFICE O/P EST MOD 30 MIN: CPT | Performed by: INTERNAL MEDICINE

## 2017-07-11 PROCEDURE — G8427 DOCREV CUR MEDS BY ELIG CLIN: HCPCS | Performed by: INTERNAL MEDICINE

## 2017-07-11 PROCEDURE — 1090F PRES/ABSN URINE INCON ASSESS: CPT | Performed by: INTERNAL MEDICINE

## 2017-07-11 PROCEDURE — G8400 PT W/DXA NO RESULTS DOC: HCPCS | Performed by: INTERNAL MEDICINE

## 2017-07-11 PROCEDURE — 1123F ACP DISCUSS/DSCN MKR DOCD: CPT | Performed by: INTERNAL MEDICINE

## 2017-07-11 RX ORDER — ALLOPURINOL 100 MG/1
100 TABLET ORAL DAILY
COMMUNITY
End: 2017-07-18 | Stop reason: SDUPTHER

## 2017-07-11 RX ORDER — BUMETANIDE 1 MG/1
1 TABLET ORAL 2 TIMES DAILY
COMMUNITY
End: 2017-07-18 | Stop reason: SDUPTHER

## 2017-07-11 RX ORDER — LEVOTHYROXINE SODIUM 0.2 MG/1
200 TABLET ORAL DAILY
Qty: 60 TABLET | Refills: 3 | Status: SHIPPED | OUTPATIENT
Start: 2017-07-11 | End: 2017-07-18 | Stop reason: SDUPTHER

## 2017-07-11 RX ORDER — WARFARIN SODIUM 1 MG/1
1 TABLET ORAL
COMMUNITY
End: 2017-07-18 | Stop reason: SDUPTHER

## 2017-07-11 ASSESSMENT — ANXIETY QUESTIONNAIRES: GAD7 TOTAL SCORE: 2

## 2017-07-13 ENCOUNTER — TELEPHONE (OUTPATIENT)
Dept: INTERNAL MEDICINE | Age: 82
End: 2017-07-13

## 2017-07-13 RX ORDER — COLESEVELAM 180 1/1
TABLET ORAL
Qty: 180 TABLET | Refills: 5 | Status: SHIPPED | OUTPATIENT
Start: 2017-07-13 | End: 2017-07-18 | Stop reason: SDUPTHER

## 2017-07-14 ASSESSMENT — ENCOUNTER SYMPTOMS
EYE ITCHING: 0
ABDOMINAL DISTENTION: 0
EYE PAIN: 0
SHORTNESS OF BREATH: 1
WHEEZING: 0
VOMITING: 0
CONSTIPATION: 0
PHOTOPHOBIA: 0
SINUS PRESSURE: 0
NAUSEA: 0
EYE REDNESS: 0
DIARRHEA: 0
BACK PAIN: 0
ABDOMINAL PAIN: 0
SORE THROAT: 0
COLOR CHANGE: 0
COUGH: 1
RHINORRHEA: 0
BLOOD IN STOOL: 0
EYE DISCHARGE: 0
CHEST TIGHTNESS: 0

## 2017-07-18 ENCOUNTER — TELEPHONE (OUTPATIENT)
Dept: INTERNAL MEDICINE | Age: 82
End: 2017-07-18

## 2017-07-18 RX ORDER — PANTOPRAZOLE SODIUM 40 MG/1
40 TABLET, DELAYED RELEASE ORAL DAILY
Qty: 90 TABLET | Refills: 3 | Status: ON HOLD | OUTPATIENT
Start: 2017-07-18 | End: 2019-01-20

## 2017-07-18 RX ORDER — METOPROLOL TARTRATE 50 MG/1
50 TABLET, FILM COATED ORAL DAILY
Qty: 90 TABLET | Refills: 3 | Status: SHIPPED | OUTPATIENT
Start: 2017-07-18 | End: 2018-07-03 | Stop reason: SDUPTHER

## 2017-07-18 RX ORDER — COLESEVELAM 180 1/1
TABLET ORAL
Qty: 270 TABLET | Refills: 5 | Status: SHIPPED | OUTPATIENT
Start: 2017-07-18 | End: 2018-04-21 | Stop reason: SDUPTHER

## 2017-07-18 RX ORDER — ALLOPURINOL 100 MG/1
100 TABLET ORAL DAILY
Qty: 90 TABLET | Refills: 3 | Status: SHIPPED | OUTPATIENT
Start: 2017-07-18 | End: 2018-07-19 | Stop reason: SDUPTHER

## 2017-07-18 RX ORDER — LEVALBUTEROL INHALATION SOLUTION 0.63 MG/3ML
0.63 SOLUTION RESPIRATORY (INHALATION) EVERY 8 HOURS PRN
Qty: 270 VIAL | Refills: 1 | Status: ON HOLD | OUTPATIENT
Start: 2017-07-18 | End: 2019-01-20

## 2017-07-18 RX ORDER — WARFARIN SODIUM 1 MG/1
1 TABLET ORAL DAILY
Qty: 90 TABLET | Refills: 3 | Status: SHIPPED | OUTPATIENT
Start: 2017-07-18 | End: 2017-07-18 | Stop reason: SDUPTHER

## 2017-07-18 RX ORDER — LEVOTHYROXINE SODIUM 0.2 MG/1
200 TABLET ORAL DAILY
Qty: 180 TABLET | Refills: 3 | Status: SHIPPED | OUTPATIENT
Start: 2017-07-18 | End: 2017-09-29 | Stop reason: SDUPTHER

## 2017-07-18 RX ORDER — LOSARTAN POTASSIUM 100 MG/1
100 TABLET ORAL DAILY
Qty: 90 TABLET | Refills: 3 | Status: SHIPPED | OUTPATIENT
Start: 2017-07-18 | End: 2017-09-29 | Stop reason: SDUPTHER

## 2017-07-18 RX ORDER — BUMETANIDE 1 MG/1
1 TABLET ORAL 2 TIMES DAILY
Qty: 180 TABLET | Refills: 3 | Status: SHIPPED | OUTPATIENT
Start: 2017-07-18 | End: 2018-09-16 | Stop reason: SDUPTHER

## 2017-07-18 RX ORDER — WARFARIN SODIUM 1 MG/1
1 TABLET ORAL DAILY
Qty: 90 TABLET | Refills: 3 | Status: SHIPPED | OUTPATIENT
Start: 2017-07-18 | End: 2017-08-25 | Stop reason: SDUPTHER

## 2017-07-19 ENCOUNTER — APPOINTMENT (OUTPATIENT)
Dept: CT IMAGING | Facility: HOSPITAL | Age: 82
End: 2017-07-19

## 2017-07-19 ENCOUNTER — HOSPITAL ENCOUNTER (INPATIENT)
Facility: HOSPITAL | Age: 82
LOS: 4 days | Discharge: HOME HEALTH | End: 2017-07-23
Attending: EMERGENCY MEDICINE | Admitting: FAMILY MEDICINE

## 2017-07-19 DIAGNOSIS — Z74.09 IMPAIRED MOBILITY AND ENDURANCE: ICD-10-CM

## 2017-07-19 DIAGNOSIS — N18.4 CKD (CHRONIC KIDNEY DISEASE) STAGE 4, GFR 15-29 ML/MIN (HCC): ICD-10-CM

## 2017-07-19 DIAGNOSIS — K85.90 ACUTE PANCREATITIS WITHOUT INFECTION OR NECROSIS, UNSPECIFIED PANCREATITIS TYPE: Primary | ICD-10-CM

## 2017-07-19 PROBLEM — J44.9 COPD (CHRONIC OBSTRUCTIVE PULMONARY DISEASE) (HCC): Status: ACTIVE | Noted: 2017-07-19

## 2017-07-19 PROBLEM — G47.30 SLEEP APNEA: Status: ACTIVE | Noted: 2017-07-19

## 2017-07-19 PROBLEM — R93.429 ABNORMAL CT SCAN, KIDNEY: Status: ACTIVE | Noted: 2017-07-19

## 2017-07-19 PROBLEM — I48.20 CHRONIC ATRIAL FIBRILLATION (HCC): Status: ACTIVE | Noted: 2017-07-19

## 2017-07-19 LAB
ALBUMIN SERPL-MCNC: 4.3 G/DL (ref 3.5–5)
ALBUMIN/GLOB SERPL: 1.3 G/DL (ref 1.1–2.5)
ALP SERPL-CCNC: 77 U/L (ref 24–120)
ALT SERPL W P-5'-P-CCNC: 38 U/L (ref 0–54)
AMYLASE SERPL-CCNC: 2139 U/L (ref 30–110)
ANION GAP SERPL CALCULATED.3IONS-SCNC: 12 MMOL/L (ref 4–13)
AST SERPL-CCNC: 33 U/L (ref 7–45)
BACTERIA UR QL AUTO: ABNORMAL /HPF
BASOPHILS # BLD AUTO: 0.04 10*3/MM3 (ref 0–0.2)
BASOPHILS NFR BLD AUTO: 0.5 % (ref 0–2)
BILIRUB SERPL-MCNC: 1.1 MG/DL (ref 0.1–1)
BILIRUB UR QL STRIP: NEGATIVE
BUN BLD-MCNC: 34 MG/DL (ref 5–21)
BUN/CREAT SERPL: 13.2 (ref 7–25)
CALCIUM SPEC-SCNC: 10 MG/DL (ref 8.4–10.4)
CHLORIDE SERPL-SCNC: 106 MMOL/L (ref 98–110)
CLARITY UR: CLEAR
CO2 SERPL-SCNC: 25 MMOL/L (ref 24–31)
COLOR UR: YELLOW
CREAT BLD-MCNC: 2.57 MG/DL (ref 0.5–1.4)
DEPRECATED RDW RBC AUTO: 54.5 FL (ref 40–54)
EOSINOPHIL # BLD AUTO: 0.11 10*3/MM3 (ref 0–0.7)
EOSINOPHIL NFR BLD AUTO: 1.3 % (ref 0–4)
ERYTHROCYTE [DISTWIDTH] IN BLOOD BY AUTOMATED COUNT: 15.9 % (ref 12–15)
GFR SERPL CREATININE-BSD FRML MDRD: 18 ML/MIN/1.73
GLOBULIN UR ELPH-MCNC: 3.3 GM/DL
GLUCOSE BLD-MCNC: 172 MG/DL (ref 70–100)
GLUCOSE UR STRIP-MCNC: NEGATIVE MG/DL
HCT VFR BLD AUTO: 34 % (ref 37–47)
HGB BLD-MCNC: 10.5 G/DL (ref 12–16)
HGB UR QL STRIP.AUTO: NEGATIVE
HOLD SPECIMEN: NORMAL
HYALINE CASTS UR QL AUTO: ABNORMAL /LPF
IMM GRANULOCYTES # BLD: 0.03 10*3/MM3 (ref 0–0.03)
IMM GRANULOCYTES NFR BLD: 0.4 % (ref 0–5)
INR PPP: 2.06 (ref 0.91–1.09)
KETONES UR QL STRIP: NEGATIVE
LEUKOCYTE ESTERASE UR QL STRIP.AUTO: NEGATIVE
LIPASE SERPL-CCNC: ABNORMAL U/L (ref 23–203)
LYMPHOCYTES # BLD AUTO: 0.8 10*3/MM3 (ref 0.72–4.86)
LYMPHOCYTES NFR BLD AUTO: 9.5 % (ref 15–45)
MCH RBC QN AUTO: 28.5 PG (ref 28–32)
MCHC RBC AUTO-ENTMCNC: 30.9 G/DL (ref 33–36)
MCV RBC AUTO: 92.4 FL (ref 82–98)
MONOCYTES # BLD AUTO: 0.28 10*3/MM3 (ref 0.19–1.3)
MONOCYTES NFR BLD AUTO: 3.3 % (ref 4–12)
NEUTROPHILS # BLD AUTO: 7.16 10*3/MM3 (ref 1.87–8.4)
NEUTROPHILS NFR BLD AUTO: 85 % (ref 39–78)
NITRITE UR QL STRIP: NEGATIVE
PH UR STRIP.AUTO: 7.5 [PH] (ref 5–8)
PLATELET # BLD AUTO: 114 10*3/MM3 (ref 130–400)
PMV BLD AUTO: 13.7 FL (ref 6–12)
POTASSIUM BLD-SCNC: 4.4 MMOL/L (ref 3.5–5.3)
PROT SERPL-MCNC: 7.6 G/DL (ref 6.3–8.7)
PROT UR QL STRIP: ABNORMAL
PROTHROMBIN TIME: 24 SECONDS (ref 11.9–14.6)
RBC # BLD AUTO: 3.68 10*6/MM3 (ref 4.2–5.4)
RBC # UR: ABNORMAL /HPF
REF LAB TEST METHOD: ABNORMAL
SODIUM BLD-SCNC: 143 MMOL/L (ref 135–145)
SP GR UR STRIP: 1.01 (ref 1–1.03)
SQUAMOUS #/AREA URNS HPF: ABNORMAL /HPF
UROBILINOGEN UR QL STRIP: ABNORMAL
WBC NRBC COR # BLD: 8.42 10*3/MM3 (ref 4.8–10.8)
WBC UR QL AUTO: ABNORMAL /HPF

## 2017-07-19 PROCEDURE — 85610 PROTHROMBIN TIME: CPT | Performed by: EMERGENCY MEDICINE

## 2017-07-19 PROCEDURE — 83690 ASSAY OF LIPASE: CPT | Performed by: EMERGENCY MEDICINE

## 2017-07-19 PROCEDURE — 74176 CT ABD & PELVIS W/O CONTRAST: CPT

## 2017-07-19 PROCEDURE — 85025 COMPLETE CBC W/AUTO DIFF WBC: CPT | Performed by: EMERGENCY MEDICINE

## 2017-07-19 PROCEDURE — 25010000002 MORPHINE PER 10 MG: Performed by: EMERGENCY MEDICINE

## 2017-07-19 PROCEDURE — 99285 EMERGENCY DEPT VISIT HI MDM: CPT

## 2017-07-19 PROCEDURE — 80053 COMPREHEN METABOLIC PANEL: CPT | Performed by: EMERGENCY MEDICINE

## 2017-07-19 PROCEDURE — 81001 URINALYSIS AUTO W/SCOPE: CPT | Performed by: EMERGENCY MEDICINE

## 2017-07-19 PROCEDURE — 85610 PROTHROMBIN TIME: CPT | Performed by: INTERNAL MEDICINE

## 2017-07-19 PROCEDURE — 25010000002 HYDROMORPHONE PER 4 MG: Performed by: EMERGENCY MEDICINE

## 2017-07-19 PROCEDURE — 80061 LIPID PANEL: CPT | Performed by: INTERNAL MEDICINE

## 2017-07-19 PROCEDURE — 25010000002 ONDANSETRON PER 1 MG: Performed by: EMERGENCY MEDICINE

## 2017-07-19 PROCEDURE — 82150 ASSAY OF AMYLASE: CPT | Performed by: EMERGENCY MEDICINE

## 2017-07-19 RX ORDER — ONDANSETRON 2 MG/ML
4 INJECTION INTRAMUSCULAR; INTRAVENOUS ONCE
Status: COMPLETED | OUTPATIENT
Start: 2017-07-19 | End: 2017-07-19

## 2017-07-19 RX ORDER — SODIUM CHLORIDE 450 MG/100ML
50 INJECTION, SOLUTION INTRAVENOUS CONTINUOUS
Status: DISCONTINUED | OUTPATIENT
Start: 2017-07-19 | End: 2017-07-21

## 2017-07-19 RX ORDER — ONDANSETRON 2 MG/ML
4 INJECTION INTRAMUSCULAR; INTRAVENOUS EVERY 4 HOURS PRN
Status: DISCONTINUED | OUTPATIENT
Start: 2017-07-19 | End: 2017-07-23 | Stop reason: HOSPADM

## 2017-07-19 RX ORDER — WARFARIN SODIUM 2 MG/1
2 TABLET ORAL
Status: COMPLETED | OUTPATIENT
Start: 2017-07-19 | End: 2017-07-19

## 2017-07-19 RX ORDER — MORPHINE SULFATE 4 MG/ML
4 INJECTION, SOLUTION INTRAMUSCULAR; INTRAVENOUS ONCE
Status: COMPLETED | OUTPATIENT
Start: 2017-07-19 | End: 2017-07-19

## 2017-07-19 RX ORDER — SODIUM CHLORIDE 9 MG/ML
125 INJECTION, SOLUTION INTRAVENOUS CONTINUOUS
Status: DISCONTINUED | OUTPATIENT
Start: 2017-07-19 | End: 2017-07-20

## 2017-07-19 RX ORDER — SODIUM CHLORIDE 0.9 % (FLUSH) 0.9 %
1-10 SYRINGE (ML) INJECTION AS NEEDED
Status: DISCONTINUED | OUTPATIENT
Start: 2017-07-19 | End: 2017-07-23 | Stop reason: HOSPADM

## 2017-07-19 RX ORDER — WARFARIN SODIUM 1 MG/1
1 TABLET ORAL 3 TIMES WEEKLY
COMMUNITY
Start: 2017-07-18

## 2017-07-19 RX ORDER — WARFARIN SODIUM 2 MG/1
2 TABLET ORAL
Status: DISCONTINUED | OUTPATIENT
Start: 2017-07-20 | End: 2017-07-23 | Stop reason: HOSPADM

## 2017-07-19 RX ORDER — SODIUM CHLORIDE 0.9 % (FLUSH) 0.9 %
10 SYRINGE (ML) INJECTION AS NEEDED
Status: DISCONTINUED | OUTPATIENT
Start: 2017-07-19 | End: 2017-07-23 | Stop reason: HOSPADM

## 2017-07-19 RX ORDER — WARFARIN SODIUM 1 MG/1
1 TABLET ORAL
Status: DISCONTINUED | OUTPATIENT
Start: 2017-07-22 | End: 2017-07-23 | Stop reason: HOSPADM

## 2017-07-19 RX ADMIN — ONDANSETRON 4 MG: 2 INJECTION, SOLUTION INTRAMUSCULAR; INTRAVENOUS at 20:25

## 2017-07-19 RX ADMIN — MORPHINE SULFATE 4 MG: 4 INJECTION, SOLUTION INTRAMUSCULAR; INTRAVENOUS at 20:49

## 2017-07-19 RX ADMIN — HYDROMORPHONE HYDROCHLORIDE 1 MG: 1 INJECTION, SOLUTION INTRAMUSCULAR; INTRAVENOUS; SUBCUTANEOUS at 21:59

## 2017-07-19 RX ADMIN — WARFARIN SODIUM 2 MG: 2 TABLET ORAL at 23:42

## 2017-07-19 RX ADMIN — SODIUM CHLORIDE 500 ML: 9 INJECTION, SOLUTION INTRAVENOUS at 20:26

## 2017-07-19 RX ADMIN — SODIUM CHLORIDE 125 ML/HR: 9 INJECTION, SOLUTION INTRAVENOUS at 23:44

## 2017-07-20 ENCOUNTER — TELEPHONE (OUTPATIENT)
Dept: INTERNAL MEDICINE | Age: 82
End: 2017-07-20

## 2017-07-20 ENCOUNTER — APPOINTMENT (OUTPATIENT)
Dept: ULTRASOUND IMAGING | Facility: HOSPITAL | Age: 82
End: 2017-07-20

## 2017-07-20 LAB
ALBUMIN SERPL-MCNC: 3.8 G/DL (ref 3.5–5)
ALBUMIN/GLOB SERPL: 1.2 G/DL (ref 1.1–2.5)
ALP SERPL-CCNC: 61 U/L (ref 24–120)
ALT SERPL W P-5'-P-CCNC: 31 U/L (ref 0–54)
AMYLASE SERPL-CCNC: 975 U/L (ref 30–110)
ANION GAP SERPL CALCULATED.3IONS-SCNC: 9 MMOL/L (ref 4–13)
ARTICHOKE IGE QN: 72 MG/DL (ref 0–99)
AST SERPL-CCNC: 27 U/L (ref 7–45)
BASOPHILS # BLD AUTO: 0.04 10*3/MM3 (ref 0–0.2)
BASOPHILS NFR BLD AUTO: 0.5 % (ref 0–2)
BILIRUB SERPL-MCNC: 0.7 MG/DL (ref 0.1–1)
BUN BLD-MCNC: 30 MG/DL (ref 5–21)
BUN/CREAT SERPL: 12.3 (ref 7–25)
CALCIUM SPEC-SCNC: 9 MG/DL (ref 8.4–10.4)
CHLORIDE SERPL-SCNC: 110 MMOL/L (ref 98–110)
CHOLEST SERPL-MCNC: 132 MG/DL (ref 130–200)
CO2 SERPL-SCNC: 27 MMOL/L (ref 24–31)
CREAT BLD-MCNC: 2.43 MG/DL (ref 0.5–1.4)
DEPRECATED RDW RBC AUTO: 55.9 FL (ref 40–54)
EOSINOPHIL # BLD AUTO: 0.05 10*3/MM3 (ref 0–0.7)
EOSINOPHIL NFR BLD AUTO: 0.7 % (ref 0–4)
ERYTHROCYTE [DISTWIDTH] IN BLOOD BY AUTOMATED COUNT: 16.1 % (ref 12–15)
GFR SERPL CREATININE-BSD FRML MDRD: 19 ML/MIN/1.73
GLOBULIN UR ELPH-MCNC: 3.2 GM/DL
GLUCOSE BLD-MCNC: 111 MG/DL (ref 70–100)
HCT VFR BLD AUTO: 33.4 % (ref 37–47)
HDLC SERPL-MCNC: 33 MG/DL
HGB BLD-MCNC: 9.7 G/DL (ref 12–16)
IMM GRANULOCYTES # BLD: 0.02 10*3/MM3 (ref 0–0.03)
IMM GRANULOCYTES NFR BLD: 0.3 % (ref 0–5)
INR PPP: 2.24 (ref 0.91–1.09)
INR PPP: 2.25 (ref 0.91–1.09)
LDLC/HDLC SERPL: 2.39 {RATIO}
LIPASE SERPL-CCNC: 8724 U/L (ref 23–203)
LYMPHOCYTES # BLD AUTO: 1 10*3/MM3 (ref 0.72–4.86)
LYMPHOCYTES NFR BLD AUTO: 13.5 % (ref 15–45)
MCH RBC QN AUTO: 27.4 PG (ref 28–32)
MCHC RBC AUTO-ENTMCNC: 29 G/DL (ref 33–36)
MCV RBC AUTO: 94.4 FL (ref 82–98)
MONOCYTES # BLD AUTO: 0.42 10*3/MM3 (ref 0.19–1.3)
MONOCYTES NFR BLD AUTO: 5.7 % (ref 4–12)
NEUTROPHILS # BLD AUTO: 5.87 10*3/MM3 (ref 1.87–8.4)
NEUTROPHILS NFR BLD AUTO: 79.3 % (ref 39–78)
PLATELET # BLD AUTO: 116 10*3/MM3 (ref 130–400)
PMV BLD AUTO: 14 FL (ref 6–12)
POTASSIUM BLD-SCNC: 4.2 MMOL/L (ref 3.5–5.3)
PROT SERPL-MCNC: 7 G/DL (ref 6.3–8.7)
PROTHROMBIN TIME: 25.6 SECONDS (ref 11.9–14.6)
PROTHROMBIN TIME: 25.7 SECONDS (ref 11.9–14.6)
RBC # BLD AUTO: 3.54 10*6/MM3 (ref 4.2–5.4)
SODIUM BLD-SCNC: 146 MMOL/L (ref 135–145)
TRIGL SERPL-MCNC: 100 MG/DL (ref 0–149)
TSH SERPL DL<=0.05 MIU/L-ACNC: 3.3 MIU/ML (ref 0.47–4.68)
WBC NRBC COR # BLD: 7.4 10*3/MM3 (ref 4.8–10.8)

## 2017-07-20 PROCEDURE — 25010000002 HYDROMORPHONE PER 4 MG: Performed by: INTERNAL MEDICINE

## 2017-07-20 PROCEDURE — 85610 PROTHROMBIN TIME: CPT | Performed by: INTERNAL MEDICINE

## 2017-07-20 PROCEDURE — 25010000002 ONDANSETRON PER 1 MG: Performed by: INTERNAL MEDICINE

## 2017-07-20 PROCEDURE — 85025 COMPLETE CBC W/AUTO DIFF WBC: CPT | Performed by: INTERNAL MEDICINE

## 2017-07-20 PROCEDURE — 82150 ASSAY OF AMYLASE: CPT | Performed by: NURSE PRACTITIONER

## 2017-07-20 PROCEDURE — G8978 MOBILITY CURRENT STATUS: HCPCS

## 2017-07-20 PROCEDURE — 80053 COMPREHEN METABOLIC PANEL: CPT | Performed by: INTERNAL MEDICINE

## 2017-07-20 PROCEDURE — 76775 US EXAM ABDO BACK WALL LIM: CPT

## 2017-07-20 PROCEDURE — 84443 ASSAY THYROID STIM HORMONE: CPT | Performed by: INTERNAL MEDICINE

## 2017-07-20 PROCEDURE — 97161 PT EVAL LOW COMPLEX 20 MIN: CPT

## 2017-07-20 PROCEDURE — G8979 MOBILITY GOAL STATUS: HCPCS

## 2017-07-20 PROCEDURE — 25010000002 LEVALBUTEROL PER 0.5 MG: Performed by: INTERNAL MEDICINE

## 2017-07-20 PROCEDURE — 94760 N-INVAS EAR/PLS OXIMETRY 1: CPT

## 2017-07-20 PROCEDURE — 94640 AIRWAY INHALATION TREATMENT: CPT

## 2017-07-20 PROCEDURE — 83690 ASSAY OF LIPASE: CPT | Performed by: INTERNAL MEDICINE

## 2017-07-20 PROCEDURE — 94799 UNLISTED PULMONARY SVC/PX: CPT

## 2017-07-20 RX ORDER — LEVALBUTEROL INHALATION SOLUTION 1.25 MG/3ML
1.25 SOLUTION RESPIRATORY (INHALATION) EVERY 6 HOURS PRN
Status: DISCONTINUED | OUTPATIENT
Start: 2017-07-20 | End: 2017-07-23 | Stop reason: HOSPADM

## 2017-07-20 RX ORDER — BUMETANIDE 1 MG/1
1 TABLET ORAL EVERY 12 HOURS
Status: ON HOLD | COMMUNITY
End: 2017-07-23

## 2017-07-20 RX ORDER — LOSARTAN POTASSIUM 100 MG/1
50 TABLET ORAL DAILY
COMMUNITY
End: 2017-07-23 | Stop reason: HOSPADM

## 2017-07-20 RX ORDER — CYANOCOBALAMIN (VITAMIN B-12) 500 MCG
500 TABLET ORAL DAILY
COMMUNITY
End: 2019-02-12

## 2017-07-20 RX ORDER — ALLOPURINOL 100 MG/1
100 TABLET ORAL DAILY
Status: DISCONTINUED | OUTPATIENT
Start: 2017-07-20 | End: 2017-07-23 | Stop reason: HOSPADM

## 2017-07-20 RX ORDER — LEVOTHYROXINE SODIUM 0.2 MG/1
400 TABLET ORAL 3 TIMES WEEKLY
COMMUNITY

## 2017-07-20 RX ORDER — COLESEVELAM 180 1/1
625 TABLET ORAL DAILY
Status: ON HOLD | COMMUNITY
End: 2017-07-23

## 2017-07-20 RX ORDER — ERGOCALCIFEROL 1.25 MG/1
50000 CAPSULE ORAL
COMMUNITY
End: 2019-02-12

## 2017-07-20 RX ORDER — PANTOPRAZOLE SODIUM 40 MG/1
40 TABLET, DELAYED RELEASE ORAL DAILY
COMMUNITY
End: 2018-11-29

## 2017-07-20 RX ORDER — PANTOPRAZOLE SODIUM 40 MG/1
40 TABLET, DELAYED RELEASE ORAL
Status: DISCONTINUED | OUTPATIENT
Start: 2017-07-20 | End: 2017-07-23 | Stop reason: HOSPADM

## 2017-07-20 RX ORDER — LEVALBUTEROL INHALATION SOLUTION 0.63 MG/3ML
1 SOLUTION RESPIRATORY (INHALATION) EVERY 8 HOURS SCHEDULED
COMMUNITY
End: 2018-11-29

## 2017-07-20 RX ORDER — ALLOPURINOL 100 MG/1
100 TABLET ORAL DAILY
COMMUNITY

## 2017-07-20 RX ORDER — AMLODIPINE BESYLATE 10 MG/1
10 TABLET ORAL DAILY
COMMUNITY

## 2017-07-20 RX ORDER — CHOLECALCIFEROL (VITAMIN D3) 125 MCG
1000 CAPSULE ORAL DAILY
Status: DISCONTINUED | OUTPATIENT
Start: 2017-07-20 | End: 2017-07-23 | Stop reason: HOSPADM

## 2017-07-20 RX ORDER — LEVOTHYROXINE SODIUM 0.2 MG/1
200 TABLET ORAL
Status: DISCONTINUED | OUTPATIENT
Start: 2017-07-20 | End: 2017-07-23 | Stop reason: HOSPADM

## 2017-07-20 RX ORDER — LEVOTHYROXINE SODIUM 0.2 MG/1
200 TABLET ORAL
COMMUNITY
End: 2018-12-03

## 2017-07-20 RX ORDER — METOPROLOL TARTRATE 50 MG/1
50 TABLET, FILM COATED ORAL EVERY 12 HOURS SCHEDULED
Status: DISCONTINUED | OUTPATIENT
Start: 2017-07-20 | End: 2017-07-23 | Stop reason: HOSPADM

## 2017-07-20 RX ORDER — WARFARIN SODIUM 2 MG/1
2 TABLET ORAL
COMMUNITY
End: 2018-11-29

## 2017-07-20 RX ORDER — AMLODIPINE BESYLATE 10 MG/1
10 TABLET ORAL
Status: DISCONTINUED | OUTPATIENT
Start: 2017-07-20 | End: 2017-07-23 | Stop reason: HOSPADM

## 2017-07-20 RX ORDER — METOPROLOL TARTRATE 50 MG/1
50 TABLET, FILM COATED ORAL DAILY
COMMUNITY

## 2017-07-20 RX ADMIN — PANTOPRAZOLE SODIUM 40 MG: 40 TABLET, DELAYED RELEASE ORAL at 09:26

## 2017-07-20 RX ADMIN — SODIUM CHLORIDE 75 ML/HR: 4.5 INJECTION, SOLUTION INTRAVENOUS at 14:16

## 2017-07-20 RX ADMIN — SODIUM CHLORIDE 100 ML/HR: 4.5 INJECTION, SOLUTION INTRAVENOUS at 07:15

## 2017-07-20 RX ADMIN — LEVALBUTEROL HYDROCHLORIDE 1.25 MG: 1.25 SOLUTION RESPIRATORY (INHALATION) at 09:52

## 2017-07-20 RX ADMIN — HYDROMORPHONE HYDROCHLORIDE 1 MG: 1 INJECTION, SOLUTION INTRAMUSCULAR; INTRAVENOUS; SUBCUTANEOUS at 03:40

## 2017-07-20 RX ADMIN — ALLOPURINOL 100 MG: 100 TABLET ORAL at 09:26

## 2017-07-20 RX ADMIN — LEVOTHYROXINE SODIUM 200 MCG: 200 TABLET ORAL at 09:26

## 2017-07-20 RX ADMIN — Medication 1000 MCG: at 09:26

## 2017-07-20 RX ADMIN — ONDANSETRON 4 MG: 2 INJECTION INTRAMUSCULAR; INTRAVENOUS at 03:40

## 2017-07-20 RX ADMIN — WARFARIN SODIUM 2 MG: 2 TABLET ORAL at 20:04

## 2017-07-20 RX ADMIN — METOPROLOL TARTRATE 50 MG: 50 TABLET, FILM COATED ORAL at 21:17

## 2017-07-20 RX ADMIN — METOPROLOL TARTRATE 50 MG: 50 TABLET, FILM COATED ORAL at 09:26

## 2017-07-20 RX ADMIN — AMLODIPINE BESYLATE 10 MG: 10 TABLET ORAL at 09:26

## 2017-07-20 NOTE — PLAN OF CARE
Problem: Patient Care Overview (Adult)  Goal: Plan of Care Review  Outcome: Ongoing (interventions implemented as appropriate)    07/20/17 1407   Coping/Psychosocial Response Interventions   Plan Of Care Reviewed With patient;son   Outcome Evaluation   Outcome Summary/Follow up Plan PT IE complete. Pt. ambulated ~ 20 feet cga x1 w/ RW. Strength is good in all 4 extremities, but activity tolerance is poor. O2 sat drops to 75% on 2L. Post treatment w/ 1L per NC and CPAP-O2 sat 91% in sitting. Pt. to receive skilled PT to address functional endurance/mobility. Recommend home health at [facility]. Thank you for referral.         Problem: Inpatient Physical Therapy  Goal: Bed Mobility Goal LTG- PT  Outcome: Ongoing (interventions implemented as appropriate)    07/20/17 1407   Bed Mobility PT LTG   Bed Mobility PT LTG, Date Established 07/20/17   Bed Mobility PT LTG, Time to Achieve by discharge   Bed Mobility PT LTG, Activity Type all bed mobility   Bed Mobility PT LTG, [place] Level independent       Goal: Transfer Training Goal 1 LTG- PT  Outcome: Ongoing (interventions implemented as appropriate)    07/20/17 1407   Transfer Training PT LTG   Transfer Training PT LTG, Date Established 07/20/17   Transfer Training PT LTG, Time to Achieve by discharge   Transfer Training PT LTG, Activity Type bed to chair /chair to bed;sit to stand/stand to sit   Transfer Training PT LTG, [place] Level supervision required       Goal: Gait Training Goal LTG- PT  Outcome: Ongoing (interventions implemented as appropriate)    07/20/17 1407   Gait Training PT LTG   Gait Training Goal PT LTG, Date Established 07/20/17   Gait Training Goal PT LTG, Time to Achieve by discharge   Gait Training Goal PT LTG, [place] Level supervision required   Gait Training Goal PT LTG, Distance to Achieve 100       Goal: Stair Training Goal LTG- PT  Outcome: Ongoing (interventions implemented as appropriate)    07/20/17 1407   Stair Training PT LTG    Stair Training Goal PT LTG, Date Established 07/20/17   Stair Training Goal PT LTG, Time to Achieve by discharge   Stair Training Goal PT LTG, Number of Steps 3   Stair Training Goal PT LTG, [place] Level minimum assist (75% patient effort)   Stair Training Goal PT LTG, Assist Device cane, straight

## 2017-07-20 NOTE — PLAN OF CARE
Problem: Patient Care Overview (Adult)  Goal: Plan of Care Review  Outcome: Ongoing (interventions implemented as appropriate)    07/20/17 1445   Outcome Evaluation   Outcome Summary/Follow up Plan patient tolerating clear diet. will cont to moniter.       Goal: Adult Individualization and Mutuality  Outcome: Ongoing (interventions implemented as appropriate)  Goal: Discharge Needs Assessment  Outcome: Ongoing (interventions implemented as appropriate)    Problem: Pancreatitis, Acute/Chronic (Adult)  Goal: Signs and Symptoms of Listed Potential Problems Will be Absent or Manageable (Pancreatitis, Acute/Chronic)  Outcome: Ongoing (interventions implemented as appropriate)

## 2017-07-20 NOTE — PROGRESS NOTES
Continued Stay Note   [facility]     Patient Name: Barb Connors  MRN: [id number]  Today's Date: 7/20/2017    Admit Date: 7/19/2017          Discharge Plan       07/20/17 1206    Case Management/Social Work Plan    Additional Comments ATTEMPTED TO SEE PT. FOR ADMIT ASSESSMENT, HOWEVER SHE WAS SOUNDLY SLEEPING.  WILL TRY AGAIN IN AM.                Discharge Codes     None            ANNA Hallman

## 2017-07-20 NOTE — ED NOTES
PT SON AT [facility]ES [facility]ATION AND STATES PT PAIN HAS INCREASED.      Charmaine Jarrell RN  07/19/17 5017

## 2017-07-20 NOTE — PLAN OF CARE
Problem: Pancreatitis, Acute/Chronic (Adult)  Goal: Signs and Symptoms of Listed Potential Problems Will be Absent or Manageable (Pancreatitis, Acute/Chronic)    07/20/17 0236   Pancreatitis, Acute/Chronic   Problems Assessed (Pancreatitis) all   Problems Present (Pancreatitis) pain;nausea and vomiting   Patient states she began feeling ill yesterday afternoon after eating a fried pie. Shante 2139. Lipase 38,987.

## 2017-07-20 NOTE — H&P
"4           [facility] Medicine Services  HISTORY AND PHYSICAL    Date of Admission: 7/19/2017  Primary Care Physician: Britney Martinez MD    Subjective     Chief Complaint:    Abdominal pain and vomiting   History of Present Illness    81-year-old  woman presenting with vomiting, abdominal pain and low back pain that started early this afternoon.  In evaluation, she has been found with contrast elevation in lipase and amylase and a CT scan finding consistent with acute pancreatitis.  She has prior history of cholecystectomy.  In this imaging study she had, there has not been found any intra-or extrahepatic bile duct dilatation.  Instead, an incidental finding concerning for left renal mass is described.Her serum creatinine is elevated at 2.5.  She has no history of alcohol abuse, her lipid panel is not known to her.    Ate about 1030 this morning fruit pie and about 1300H started to feel abdominal pain, nauseated and an hour later she threw up.  Since then, this has been the  course of her afternoon. No other household member has similar symptoms.  She had the pie from the store.  Back pain between shoulder and waist.  + abdominal pain 'upper part of belly\"  10/10 intensity; radiating to her back and up around her shoulder  No fever but claims chills and feel cold all the time  + memory impairment \" not the way it used to be\"  Denies any constipation  + swelling of legs -  which she ralates she has stage 4 CKD     Review of Systems     Otherwise complete ROS reviewed and negative except as mentioned in the HPI.      Past Medical History:   Past Medical History:   Diagnosis Date   • A-fib - on  Chronic anticoagulant (coumadin 2 mg 4 days of the week while 1 mg MWS.  She self monitor her PT/INR - 2.5 last Tuesday    • COPD (chronic obstructive pulmonary disease); oxygen dependent on 2.lpm continuously  Personal history of sleep apnea on CPAP during sleep    • " "Hypertension    • Kidney failure (Chronic stage 4) - follow with Dr. Pinto        Past Surgical History:  Past Surgical History:   Procedure Laterality Date   • CARDIAC CATHETERIZATION     • CHOLECYSTECTOMY     • HYSTERECTOMY     bilateral cataract surgery     Social History:  reports that she has quit smoking. She quit after 5.00 years of use. She does not have any smokeless tobacco history on file. She reports that she does not drink alcohol or use illicit drugs.  DNR aggressive   DPOA: Negro - son  Family History: sisters had skin cancer; daughter had breast cancer    Allergies:  No Known Allergies    Medications:yest to be obtained  Prior to Admission medications    Medication Sig Start Date End Date Taking? Authorizing Provider   warfarin (COUMADIN) 1 MG tablet Take  by mouth. 7/18/17  Yes Historical Provider, MD       Objective     Vital Signs: /58  Pulse 72  Temp 98.6 °F (37 °C) (Oral)   Resp 19  Ht 67\" (170.2 cm)  Wt 250 lb (113 kg)  LMP  (LMP Unknown)  SpO2 95%  BMI 39.16 kg/m2  Physical Exam   Constitutional: She is oriented to person, place, and time. She appears well-developed and well-nourished.   Pleasant woman, NAD, periorbital hyperpigmentation    HENT:   Head: Normocephalic and atraumatic.   Nose: Nose normal.   Mouth/Throat: Oropharynx is clear and moist. No oropharyngeal exudate.   Eyes: Conjunctivae and EOM are normal. Pupils are equal, round, and reactive to light. No scleral icterus.   Neck: Normal range of motion. Neck supple. No JVD present. No tracheal deviation present. No thyromegaly present.   Cardiovascular: Normal rate and regular rhythm.  Exam reveals no friction rub.    No murmur heard.  Pulmonary/Chest: Effort normal and breath sounds normal. No respiratory distress. She has no wheezes. She has no rales.   Abdominal: Soft. Bowel sounds are normal. She exhibits no distension and no mass. There is tenderness (some tenderness left flank and midepigastric area). There is no " rebound and no guarding. A hernia (ventral hernia) is present.   Obese, no guarding   Musculoskeletal: Normal range of motion. She exhibits edema (trace to 1+ edema of BLE). She exhibits no tenderness.   Lymphadenopathy:     She has no cervical adenopathy.   Neurological: She is alert and oriented to person, place, and time. No cranial nerve deficit. She exhibits normal muscle tone.   Skin: Skin is warm and dry. No rash noted. No erythema.   Psychiatric: She has a normal mood and affect. Her behavior is normal. Judgment and thought content normal.   Vitals reviewed.          Results Reviewed:  Lab Results (last 24 hours)     Procedure Component Value Units Date/Time    CBC & Differential [[id number]] Collected:  07/19/17 2020    Specimen:  Blood Updated:  07/19/17 2039    Narrative:       The following orders were created for panel order CBC & Differential.  Procedure                               Abnormality         Status                     ---------                               -----------         ------                     CBC Auto Differential[[id number]]         Abnormal            Final result                 Please view results for these tests on the individual orders.    CBC Auto Differential [[id number]]  (Abnormal) Collected:  07/19/17 2020    Specimen:  Blood from Arm, Right Updated:  07/19/17 2039     WBC 8.42 10*3/mm3      RBC 3.68 (L) 10*6/mm3      Hemoglobin 10.5 (L) g/dL      Hematocrit 34.0 (L) %      MCV 92.4 fL      MCH 28.5 pg      MCHC 30.9 (L) g/dL      RDW 15.9 (H) %      RDW-SD 54.5 (H) fl      MPV 13.7 (H) fL      Platelets 114 (L) 10*3/mm3      Neutrophil % 85.0 (H) %      Lymphocyte % 9.5 (L) %      Monocyte % 3.3 (L) %      Eosinophil % 1.3 %      Basophil % 0.5 %      Immature Grans % 0.4 %      Neutrophils, Absolute 7.16 10*3/mm3      Lymphocytes, Absolute 0.80 10*3/mm3      Monocytes, Absolute 0.28 10*3/mm3      Eosinophils, Absolute 0.11 10*3/mm3      Basophils, Absolute 0.04 10*3/mm3         Immature Grans, Absolute 0.03 10*3/mm3     Comprehensive Metabolic Panel [[id number]]  (Abnormal) Collected:  07/19/17 2020    Specimen:  Blood from Arm, Right Updated:  07/19/17 2050     Glucose 172 (H) mg/dL      BUN 34 (H) mg/dL      Creatinine 2.57 (H) mg/dL      Sodium 143 mmol/L      Potassium 4.4 mmol/L      Chloride 106 mmol/L      CO2 25.0 mmol/L      Calcium 10.0 mg/dL      Total Protein 7.6 g/dL      Albumin 4.30 g/dL      ALT (SGPT) 38 U/L      AST (SGOT) 33 U/L      Alkaline Phosphatase 77 U/L      Total Bilirubin 1.1 (H) mg/dL      eGFR Non African Amer 18 (L) mL/min/1.73      Globulin 3.3 gm/dL      A/G Ratio 1.3 g/dL      BUN/Creatinine Ratio 13.2     Anion Gap 12.0 mmol/L     Narrative:       The MDRD GFR formula is only valid for adults with stable renal function between ages 18 and 70.    Amylase [[id number]]  (Abnormal) Collected:  07/19/17 2020    Specimen:  Blood from Arm, Right Updated:  07/19/17 2102     Amylase 2139 (H) U/L     Lipase [[id number]]  (Abnormal) Collected:  07/19/17 2020    Specimen:  Blood from Arm, Right Updated:  07/19/17 2144     Lipase 84882 (H) U/L     Extra Tubes [[id number]] Collected:  07/19/17 2020    Specimen:  Blood from Blood, Venous Line Updated:  07/19/17 2201    Narrative:       The following orders were created for panel order Extra Tubes.  Procedure                               Abnormality         Status                     ---------                               -----------         ------                     Red Top[[id number]]                                           Final result                 Please view results for these tests on the individual orders.    Red Top [[id number]] Collected:  07/19/17 2020    Specimen:  Blood Updated:  07/19/17 2201     Extra Tube Hold for add-ons.      Auto resulted.           Imaging Results (last 24 hours)     Procedure Component Value Units Date/Time    CT Abdomen Pelvis Without Contrast [[id number]] Collected:  07/19/17 2154      Updated:  07/19/17 2207    Narrative:       EXAMINATION: CT ABDOMEN PELVIS WO CONTRAST- 7/19/2017 9:32 PM CDT     HISTORY: Abdominal pain, vomiting, renal failure.     COMPARISON: None      DLP: 637 mGy cm     TECHNIQUE: Noncontrast enhanced images of the abdomen and pelvis  obtained without oral contrast. Sagittal and coronal reformations were  made from the original source data and reviewed.     FINDINGS:   The visualized heart appears mildly enlarged. There is trace pericardial  fluid. Atelectasis is noted at the lung bases.     Evaluation is limited by the lack of IV contrast. Allowing for these  limitations, the liver, spleen, and right adrenal gland are normal in  appearance. A mass is seen within the left adrenal gland with some  peripheral calcifications, compatible with an adrenal adenoma. The  kidneys appear atrophic bilaterally. A small amount of fluid is seen  within the right perinephric space. There is contour irregularity of the  mid left kidney, concerning for underlying mass. There is fatty atrophy  of the pancreas. The gallbladder is surgically absent. The infrarenal  abdominal aorta is dilated, measuring up to 3.0 cm in size. There are  scattered atherosclerotic calcifications of the aorta and its branch  vessels. There is fatty atrophy of the pancreas. There is subtle  inflammatory stranding around the pancreas.     There is no appreciable mesenteric or retroperitoneal lymphadenopathy. A  small hiatal hernia is suspected. The proximal portion of the stomach is  normal in appearance. Within the distal aspect of the stomach and  proximal duodenum, there is surrounding inflammatory stranding. A  hyperdense lesion is seen in the expected region of the ampulla, though  this is difficult to evaluate secondary to streak artifact from surgical  clips and lack of IV contrast. This could represent a distal common bile  duct stone, though there is no significant intra or extrahepatic biliary  ductal  dilatation to support this. The small bowel is not overly  dilated. There are scattered colonic diverticula without evidence of  diverticulitis. The large bowel is otherwise grossly normal in  appearance. No free air is seen in the abdomen. There is a  fat-containing umbilical hernia. There is some skin thickening of the  ventral abdominal wall. A midline abdominal wall scar is present.     There is mild inflammatory stranding around the wall of the urinary  bladder, extending into the lower quadrants bilaterally. There has been  previous hysterectomy. No pelvic mass or free fluid is identified. No  pelvic or inguinal lymphadenopathy is appreciated.     Review of the visualized osseous structures demonstrates no acute or  aggressive lesions. Somewhat heterogeneous appearance of the bones may  be related to osteopenia.       Impression:       1. Subtle inflammatory stranding around the pancreas and proximal  duodenum, concerning for acute pancreatitis. A calcification is noted in  the region of the ampulla, though this may be within small bowel, as  there is no appreciable intra or extrahepatic biliary ductal dilatation.  2. There is mild inflammatory stranding around the urinary bladder,  suggesting cystitis. Correlate with urinalysis.  3. Infrarenal abdominal aortic aneurysm, measuring up to 3.0 cm in size.  4. Possible left renal mass. Nonemergent renal ultrasound could be  performed for further evaluation.  This report was finalized on 07/19/2017 22:04 by Dr. Bryant Chandler MD.          I have personally reviewed and interpreted the radiology studies and ECG obtained at time of admission.     Assessment / Plan     Assessment & Plan    Acute pancreatitis; nausea/vomiting and clinical dehydration   Abnormal CT of abdomen and pelvis with concern for left renal mass   acute on chronic vs chronic kidney injury  Chronic atrial fibrillation rate controlled on chronic anticoagulation - therapeutic  COPD  Not in  exacerbation; chronic O2 dependent  Sleep apnea  On cpap    Obesity   Hypothyroidism      NPO  IV fluid  IV Pain med  Check lipid panel   Renal US; holf off diuretic  Instruct son to bring home CPAP  Oxygen   Other plan per orders  Code Status: DNR aggressive     I discussed the patients findings and my recommendations with patient and son  Estimated length of stay To be determined  Ángel Kahn MD   07/19/17   10:13 PM

## 2017-07-20 NOTE — ED PROVIDER NOTES
Subjective   HPI Comments: The patient complains of onset of recurrent vomiting about 1 PM today.  She had some vomiting couple of weeks ago but was only once and then stopped.  However this time and has not stopped is been recurrent since it started.  She says she had a bowel movement this morning.  She is complaining of pain diffusely in her abdomen also.  She had a previous hysterectomy and cholecystectomy.    Patient is a 81 y.o. female presenting with vomiting.   History provided by:  Patient   used: No    Vomiting   The primary symptoms include abdominal pain, nausea and vomiting. The illness began today. The problem has not changed since onset.  The illness is also significant for chills. The illness does not include anorexia, dysphagia, odynophagia, bloating, constipation, tenesmus, back pain or itching. Associated medical issues do not include inflammatory bowel disease, GERD, gallstones, liver disease, alcohol abuse, PUD, gastric bypass, bowel resection, irritable bowel syndrome, hemorrhoids or diverticulitis. Risk factors for a gastrointestinal illness include travel to endemic areas, high risk sexual activity, intravenous drug use and alcohol use.       Review of Systems   Constitutional: Positive for chills.   HENT: Negative.    Respiratory: Negative.    Cardiovascular: Negative.    Gastrointestinal: Positive for abdominal pain, nausea and vomiting. Negative for anorexia, bloating, constipation and dysphagia.   Genitourinary: Negative.    Musculoskeletal: Negative.  Negative for back pain.   Skin: Negative for itching.   Neurological: Negative.    Hematological: Negative.    Psychiatric/Behavioral: Negative.    All other systems reviewed and are negative.      Past Medical History:   Diagnosis Date   • A-fib    • COPD (chronic obstructive pulmonary disease)    • Hypertension    • Kidney failure        No Known Allergies    Past Surgical History:   Procedure Laterality Date   •  CARDIAC CATHETERIZATION     • CHOLECYSTECTOMY     • HYSTERECTOMY         History reviewed. No pertinent family history.    Social History     Social History   • Marital status:      Spouse name: N/A   • Number of children: N/A   • Years of education: N/A     Social History Main Topics   • Smoking status: Former Smoker     Years: 5.00   • Smokeless tobacco: None   • Alcohol use No   • Drug use: No   • Sexual activity: Defer     Other Topics Concern   • None     Social History Narrative   • None       Prior to Admission medications    Not on File       Medications   sodium chloride 0.9 % flush 10 mL (not administered)   sodium chloride 0.9 % infusion (125 mL/hr Intravenous Rate/Dose Change 7/19/17 2017)   sodium chloride 0.9 % bolus 500 mL (0 mL Intravenous Stopped 7/19/17 2119)   ondansetron (ZOFRAN) injection 4 mg (4 mg Intravenous Given 7/19/17 2025)   Morphine sulfate (PF) injection 4 mg (4 mg Intravenous Given 7/19/17 2049)   HYDROmorphone (DILAUDID) injection 1 mg (1 mg Intravenous Given 7/19/17 2159)       Vitals:    07/19/17 2207   BP:    Pulse: 72   Resp:    Temp:    SpO2: 95%         Objective   Physical Exam   Constitutional: She is oriented to person, place, and time. She appears well-developed and well-nourished.   HENT:   Head: Normocephalic and atraumatic.   Eyes: EOM are normal. Pupils are equal, round, and reactive to light.   Neck: Normal range of motion. Neck supple.   Cardiovascular: Normal rate and regular rhythm.    Pulmonary/Chest: Effort normal and breath sounds normal.   Abdominal: Soft. She exhibits distension.   Bowel sounds seem hypoactive to me.  She is tender diffusely but mostly across the upper abdomen and a little more to the right side.  There is no rebound or percussion tenderness noted.   Musculoskeletal: Normal range of motion.   Neurological: She is alert and oriented to person, place, and time.   Skin: Skin is warm and dry.   Psychiatric: She has a normal mood and affect.  Her behavior is normal.   Vitals reviewed.      Procedures         Lab Results (last 24 hours)     Procedure Component Value Units Date/Time    CBC & Differential [[id number]] Collected:  07/19/17 2020    Specimen:  Blood Updated:  07/19/17 2039    Narrative:       The following orders were created for panel order CBC & Differential.  Procedure                               Abnormality         Status                     ---------                               -----------         ------                     CBC Auto Differential[[id number]]         Abnormal            Final result                 Please view results for these tests on the individual orders.    Comprehensive Metabolic Panel [[id number]]  (Abnormal) Collected:  07/19/17 2020    Specimen:  Blood from Arm, Right Updated:  07/19/17 2050     Glucose 172 (H) mg/dL      BUN 34 (H) mg/dL      Creatinine 2.57 (H) mg/dL      Sodium 143 mmol/L      Potassium 4.4 mmol/L      Chloride 106 mmol/L      CO2 25.0 mmol/L      Calcium 10.0 mg/dL      Total Protein 7.6 g/dL      Albumin 4.30 g/dL      ALT (SGPT) 38 U/L      AST (SGOT) 33 U/L      Alkaline Phosphatase 77 U/L      Total Bilirubin 1.1 (H) mg/dL      eGFR Non African Amer 18 (L) mL/min/1.73      Globulin 3.3 gm/dL      A/G Ratio 1.3 g/dL      BUN/Creatinine Ratio 13.2     Anion Gap 12.0 mmol/L     Narrative:       The MDRD GFR formula is only valid for adults with stable renal function between ages 18 and 70.    Amylase [[id number]]  (Abnormal) Collected:  07/19/17 2020    Specimen:  Blood from Arm, Right Updated:  07/19/17 2102     Amylase 2139 (H) U/L     Lipase [[id number]]  (Abnormal) Collected:  07/19/17 2020    Specimen:  Blood from Arm, Right Updated:  07/19/17 2144     Lipase 07705 (H) U/L     CBC Auto Differential [[id number]]  (Abnormal) Collected:  07/19/17 2020    Specimen:  Blood from Arm, Right Updated:  07/19/17 2039     WBC 8.42 10*3/mm3      RBC 3.68 (L) 10*6/mm3      Hemoglobin 10.5 (L) g/dL       Hematocrit 34.0 (L) %      MCV 92.4 fL      MCH 28.5 pg      MCHC 30.9 (L) g/dL      RDW 15.9 (H) %      RDW-SD 54.5 (H) fl      MPV 13.7 (H) fL      Platelets 114 (L) 10*3/mm3      Neutrophil % 85.0 (H) %      Lymphocyte % 9.5 (L) %      Monocyte % 3.3 (L) %      Eosinophil % 1.3 %      Basophil % 0.5 %      Immature Grans % 0.4 %      Neutrophils, Absolute 7.16 10*3/mm3      Lymphocytes, Absolute 0.80 10*3/mm3      Monocytes, Absolute 0.28 10*3/mm3      Eosinophils, Absolute 0.11 10*3/mm3      Basophils, Absolute 0.04 10*3/mm3      Immature Grans, Absolute 0.03 10*3/mm3           CT Abdomen Pelvis Without Contrast   Final Result   1. Subtle inflammatory stranding around the pancreas and proximal   duodenum, concerning for acute pancreatitis. A calcification is noted in   the region of the ampulla, though this may be within small bowel, as   there is no appreciable intra or extrahepatic biliary ductal dilatation.   2. There is mild inflammatory stranding around the urinary bladder,   suggesting cystitis. Correlate with urinalysis.   3. Infrarenal abdominal aortic aneurysm, measuring up to 3.0 cm in size.   4. Possible left renal mass. Nonemergent renal ultrasound could be   performed for further evaluation.   This report was finalized on 07/19/2017 22:04 by Dr. Bryant Chandler MD.          ED Course  ED Course          MDM  Number of Diagnoses or Management Options  Acute pancreatitis without infection or necrosis, unspecified pancreatitis type: new and requires workup     Amount and/or Complexity of Data Reviewed  Clinical lab tests: ordered and reviewed  Tests in the radiology section of CPT®: ordered and reviewed  Tests in the medicine section of CPT®: ordered and reviewed    Risk of Complications, Morbidity, and/or Mortality  Presenting problems: moderate  Diagnostic procedures: moderate  Management options: moderate    Patient Progress  Patient progress: stable      Final diagnoses:   Acute pancreatitis without  infection or necrosis, unspecified pancreatitis type          Jozef Dye Jr., MD  07/19/17 8256

## 2017-07-20 NOTE — ED NOTES
PHARMACY CALLED AT THIS TIME FOR COUMADIN ORDERED. [facility]ENNIE STATES THEY ARE AWAITING INR TO RESULT. MED TO BE SENT TO  WHERE PT IS ADMITTED.      Charmaine Jarrell RN  07/19/17 6563

## 2017-07-20 NOTE — PLAN OF CARE
Problem: Patient Care Overview (Adult)  Goal: Plan of Care Review  Outcome: Ongoing (interventions implemented as appropriate)    07/20/17 1413   Coping/Psychosocial Response Interventions   Plan Of Care Reviewed With patient   Outcome Evaluation   Outcome Summary/Follow up Plan Initial nutrition assessment. Pt is NPO at present r/t acute pancreatitis. Will follow for progress and diet initiation/tolerance.   Patient Care Overview   Progress no change         Problem: Pancreatitis, Acute/Chronic (Adult)  Goal: Signs and Symptoms of Listed Potential Problems Will be Absent or Manageable (Pancreatitis, Acute/Chronic)  Outcome: Ongoing (interventions implemented as appropriate)

## 2017-07-20 NOTE — PROGRESS NOTES
[facility]  INPATIENT PROGRESS NOTE    Length of Stay: 1  Date of Admission: 7/19/2017  Primary Care Physician: Britney Martinez MD    Subjective   Chief Complaint: Follow-up epigastric pain, nausea and vomiting  HPI   Lying in bed.  No family members present.  She reports onset of nausea with vomiting 5-6 episodes that started around 1 PM yesterday.  She had eaten apple fruit pie earlier in the day.  She complained of epigastric pain, sharp in nature that radiated to the back.  She complains the pain was 10 out of 10.  At present she reports epigastric pain that is more dull in nature.  Pain rated at 5 out of 10.  Epigastric pain improved with Dilaudid IV.  She also received Zofran IV.  She wears oxygen at 3 L continuously at home.  She is followed by Dr. Morris.  She denies chest pain or palpitations.  She denies increased shortness of breath.  She denies cough or congestion.    Review of Systems   Constitutional: Positive for appetite change. Negative for unexpected weight change.   HENT: Negative for congestion.    Eyes: Negative for visual disturbance.   Respiratory: Negative for cough and wheezing.    Cardiovascular: Negative for chest pain, palpitations and leg swelling.   Gastrointestinal: Positive for abdominal pain (Epigastric pain), nausea and vomiting. Negative for diarrhea.   Endocrine: Negative for polyuria.   Genitourinary: Negative for dysuria.   Musculoskeletal: Negative for myalgias.   Skin: Negative for wound.   Neurological: Positive for weakness.   Hematological: Does not bruise/bleed easily.   Psychiatric/Behavioral: Negative for agitation.      All pertinent negatives and positives are as above. All other systems have been reviewed and are negative unless otherwise stated.     Objective    Temp:  [98.3 °F (36.8 °C)-98.6 °F (37 °C)] 98.4 °F (36.9 °C)  Heart Rate:  [69-87] 69  Resp:  [15-21] 18  BP: (120-175)/(41-87) 147/83  Physical  Exam   Constitutional: She is oriented to person, place, and time.   obese   HENT:   Head: Normocephalic and atraumatic.   Eyes: EOM are normal. Pupils are equal, round, and reactive to light.   Neck: Normal range of motion. Neck supple. No tracheal deviation present.   Cardiovascular: Normal rate and normal heart sounds.  Exam reveals no gallop and no friction rub.    No murmur heard.  Irregular rhythm   Pulmonary/Chest: No respiratory distress.   Oxygen at 3 L, rhonchi scattered posteriorly   Abdominal: Soft. There is tenderness (Epigastric tenderness). A hernia (Ventral hernia noted) is present.   Genitourinary:   Genitourinary Comments: Deferred   Musculoskeletal: She exhibits edema (Trace pedal edema bilaterally).   Neurological: She is alert and oriented to person, place, and time.   Skin: Skin is warm and dry.   Psychiatric: She has a normal mood and affect.     Results Review:  I have reviewed the labs, radiology results, and diagnostic studies.    Laboratory Data:     Results from last 7 days  Lab Units 07/20/17 0533 07/19/17 2020   WBC 10*3/mm3 7.40 8.42   HEMOGLOBIN g/dL 9.7* 10.5*   HEMATOCRIT % 33.4* 34.0*   PLATELETS 10*3/mm3 116* 114*          Results from last 7 days  Lab Units 07/20/17 0533 07/19/17 2020   SODIUM mmol/L 146* 143   POTASSIUM mmol/L 4.2 4.4   CHLORIDE mmol/L 110 106   CO2 mmol/L 27.0 25.0   BUN mg/dL 30* 34*   CREATININE mg/dL 2.43* 2.57*   CALCIUM mg/dL 9.0 10.0   BILIRUBIN mg/dL 0.7 1.1*   ALK PHOS U/L 61 77   ALT (SGPT) U/L 31 38   AST (SGOT) U/L 27 33   GLUCOSE mg/dL 111* 172*       0  Lab Value Date/Time   LIPASE 82870 (H) 07/19/2017 2020       Results from last 7 days  Lab Units 07/20/17 0533 07/19/17 2020   AMYLASE U/L 975* 2139*       Results from last 7 days  Lab Units 07/20/17 0533 07/19/17  2334 07/19/17 2020   INR  2.25* 2.24* 2.06*     Radiology Data:   Imaging Results (last 7 days)     Procedure Component Value Units Date/Time    CT Abdomen Pelvis Without  Contrast [[id number]] Collected:  07/19/17 2154     Updated:  07/19/17 2207    Narrative:       EXAMINATION: CT ABDOMEN PELVIS WO CONTRAST- 7/19/2017 9:32 PM CDT     HISTORY: Abdominal pain, vomiting, renal failure.     COMPARISON: None      DLP: 637 mGy cm     TECHNIQUE: Noncontrast enhanced images of the abdomen and pelvis  obtained without oral contrast. Sagittal and coronal reformations were  made from the original source data and reviewed.     FINDINGS:   The visualized heart appears mildly enlarged. There is trace pericardial  fluid. Atelectasis is noted at the lung bases.     Evaluation is limited by the lack of IV contrast. Allowing for these  limitations, the liver, spleen, and right adrenal gland are normal in  appearance. A mass is seen within the left adrenal gland with some  peripheral calcifications, compatible with an adrenal adenoma. The  kidneys appear atrophic bilaterally. A small amount of fluid is seen  within the right perinephric space. There is contour irregularity of the  mid left kidney, concerning for underlying mass. There is fatty atrophy  of the pancreas. The gallbladder is surgically absent. The infrarenal  abdominal aorta is dilated, measuring up to 3.0 cm in size. There are  scattered atherosclerotic calcifications of the aorta and its branch  vessels. There is fatty atrophy of the pancreas. There is subtle  inflammatory stranding around the pancreas.     There is no appreciable mesenteric or retroperitoneal lymphadenopathy. A  small hiatal hernia is suspected. The proximal portion of the stomach is  normal in appearance. Within the distal aspect of the stomach and  proximal duodenum, there is surrounding inflammatory stranding. A  hyperdense lesion is seen in the expected region of the ampulla, though  this is difficult to evaluate secondary to streak artifact from surgical  clips and lack of IV contrast. This could represent a distal common bile  duct stone, though there is no  significant intra or extrahepatic biliary  ductal dilatation to support this. The small bowel is not overly  dilated. There are scattered colonic diverticula without evidence of  diverticulitis. The large bowel is otherwise grossly normal in  appearance. No free air is seen in the abdomen. There is a  fat-containing umbilical hernia. There is some skin thickening of the  ventral abdominal wall. A midline abdominal wall scar is present.     There is mild inflammatory stranding around the wall of the urinary  bladder, extending into the lower quadrants bilaterally. There has been  previous hysterectomy. No pelvic mass or free fluid is identified. No  pelvic or inguinal lymphadenopathy is appreciated.     Review of the visualized osseous structures demonstrates no acute or  aggressive lesions. Somewhat heterogeneous appearance of the bones may  be related to osteopenia.       Impression:       1. Subtle inflammatory stranding around the pancreas and proximal  duodenum, concerning for acute pancreatitis. A calcification is noted in  the region of the ampulla, though this may be within small bowel, as  there is no appreciable intra or extrahepatic biliary ductal dilatation.  2. There is mild inflammatory stranding around the urinary bladder,  suggesting cystitis. Correlate with urinalysis.  3. Infrarenal abdominal aortic aneurysm, measuring up to 3.0 cm in size.  4. Possible left renal mass. Nonemergent renal ultrasound could be  performed for further evaluation.  This report was finalized on 07/19/2017 22:04 by Dr. Bryant Chandler MD.        Scheduled Meds    allopurinol 100 mg Oral Daily   amLODIPine 10 mg Oral Q24H   levothyroxine 200 mcg Oral Q AM   metoprolol tartrate 50 mg Oral Q12H   pantoprazole 40 mg Oral Q AM   vitamin B-12 1,000 mcg Oral Daily   [START ON 7/22/2017] warfarin 1 mg Oral Once per day on Mon Wed Sat   warfarin 2 mg Oral Once per day on Sun Tue Thu Fri     I have reviewed the patient current  medications.     Assessment/Plan   Assessment:  1.  Acute pancreatitis without infection or necrosis  2.  Epigastric pain secondary to #1  3.  Nausea with vomiting  4.  Acute kidney injury versus acute on chronic kidney disease stage IV  5.  Abnormal CT of abdomen and pelvis with concern for left renal mass  6.  Chronic atrial fibrillation, rate controlled, on anticoagulation with Coumadin  7.  Chronic obstructive pulmonary disease, no exacerbation.  Chronic oxygen at 2-3 liters  8.  Obstructive sleep apnea, wears CPAP  9.  Hypothyroidism    Plan:  1.  Nothing by mouth  2.  IV fluids 0.45 normal saline at 100 mL per hour  3.  Dilaudid 1 mg IV every 4 hours as needed for epigastric pain  4.  Zofran 4 mg IV as needed for nausea and vomiting  5.  Renal ultrasound to assess irregularity left kidney  6.  Continue Coumadin home dose, therapeutic INR 2.2  7.  Family to bring home CPAP machine.  8.  Hold Bumex and losartan  9.  CMP, lipase, amylase tomorrow  10.  Physical therapy consultation  11.  Her son, Negro Wynne, is her surrogate decision maker    The above documentation resulted from a face-to-face encounter by me Lizbeth DHALIWAL, [facility].    Discharge Planning: I expect patient to be discharged to home in 3-4 days.    MADHURI Potts   07/20/17   7:38 AM     I personally evaluated and examined the patient in conjunction with MADHURI Castaneda and agree with the assessment, treatment plan, and disposition of the patient as recorded by her. My history, exam, and further recommendations are:     Seen and discussed with her son and her nurse, Angelita.     Acute pancreatitis that has improving amylase and lipase.  Her lipase was admitted yesterday was 38,987.  It is now 8724.  Amylase is now 975 (2139).  She had a fried pie yesterday afternoon.    Her serum creatinine has improved some on fluids.  She does have a prior history of CKD stage IV followed by [facility].  Her serum  creatinine was 2.4 in May 2017 at [facility].  I also would like to go ahead and consult Dr. Montes.  There is some concern about a possible left renal mass on her CT and ultrasound of the kidneys.  Radiology is recommending a dedicated CT using the renal mass protocol.  However, the patient absolutely does not need to have IV contrast given.  They may also know something about this abnormality from having seen her in the past.  It is possible perhaps it has even been worked up before.    Continue present treatment for now. I'll let her have clear liquids for now too.  She no longer current complains of pain or vomiting.    Yash Ceron,   07/20/17  1:54 PM

## 2017-07-20 NOTE — THERAPY EVALUATION
Acute Care - Physical Therapy Initial Evaluation  [facility]     Patient Name: Barb Connors  : 1935  MRN: [id number]  Today's Date: 2017   Onset of Illness/Injury or Date of Surgery Date: 17  Date of Referral to PT: 17  Referring Physician: MADHURI Castaneda      Admit Date: 2017     Visit Dx:    ICD-10-CM ICD-9-CM   1. Acute pancreatitis without infection or necrosis, unspecified pancreatitis type K85.90 577.0   2. Impaired mobility and endurance Z74.09 V49.89     Patient Active Problem List   Diagnosis   • Acute pancreatitis without infection or necrosis   • CKD (chronic kidney disease) stage 4, GFR 15-29 ml/min   • Abnormal CT scan, kidney; suspected  left renal mass    • Chronic atrial fibrillation on chronic anticoagulation    • Sleep apnea   • COPD (chronic obstructive pulmonary disease); not in exacerbation; O2 dependent     Past Medical History:   Diagnosis Date   • A-fib    • COPD (chronic obstructive pulmonary disease)    • Hypertension    • Kidney failure      Past Surgical History:   Procedure Laterality Date   • CARDIAC CATHETERIZATION     • CHOLECYSTECTOMY     • HYSTERECTOMY            PT ASSESSMENT (last 72 hours)      PT Evaluation       17 1320 17 0700    Rehab Evaluation    Document Type evaluation  -     Subjective Information agree to therapy;complains of;fatigue;dyspnea  -     General Information    Patient Profile Review yes  -     Onset of Illness/Injury or Date of Surgery Date 17  -     Referring Physician MADHURI Castaneda  -     General Observations awake, alert, L sidelying on CPAP  -     Pertinent History Of Current Problem admit w/ acute pancreatitis  -     Precautions/Limitations oxygen therapy device and L/min;fall precautions  -     Prior Level of Function independent:;all household mobility;ADL's;dependent:;driving  -     Equipment Currently Used at Home cane, straight;oxygen;grab bar;wheelchair;walker,  rolling  - none  -    Plans/Goals Discussed With patient;agreed upon  -     Risks Reviewed patient:;LOB;nausea/vomiting;dizziness;increased discomfort;change in vital signs  -     Benefits Reviewed patient:;improve function;increase independence;increase strength;increase balance  -     Barriers to Rehab previous functional deficit  -     Living Environment    Lives With alone  -     Living Arrangements house  -     Home Accessibility bed and bath on same level;grab bars present (toilet);stairs to enter home  -     Number of Stairs to Enter Home 3  -     Stair Railings at Home none  -     Clinical Impression    Date of Referral to PT 07/20/17  -     PT Diagnosis impaired endurance, mobility  -     Patient/Family Goals Statement return home  -     Criteria for Skilled Therapeutic Interventions Met yes;treatment indicated  -     Rehab Potential good, to achieve stated therapy goals  -     Predicted Duration of Therapy Intervention (days/wks) until d/c  -     Vital Signs    Pre SpO2 (%) 86  -LH     O2 Delivery Pre Treatment supplemental O2   1L NC and CPAP  -     Intra SpO2 (%) 75  -LH     O2 Delivery Intra Treatment supplemental O2   2L NC  -LH     Post SpO2 (%) 92  -LH     O2 Delivery Post Treatment supplemental O2   1L NC and CPAP  -LH     Pre Patient Position Supine  -LH     Intra Patient Position Sitting  -LH     Post Patient Position Sitting  -     Pain Assessment    Pain Assessment No/denies pain  -     Cognitive Assessment/Intervention    Current Cognitive/Communication Assessment functional  -     Orientation Status oriented x 4;person;place;time;situation  -     Follows Commands/Answers Questions able to follow multi-step instructions;100% of the time  -     Personal Safety WNL/WFL  -     Personal Safety Interventions fall prevention program maintained;gait belt;nonskid shoes/slippers when out of bed  -     ROM (Range of Motion)    General ROM no range of  motion deficits identified  -     MMT (Manual Muscle Testing)    General MMT Assessment no strength deficits identified  -     Bed Mobility, Assessment/Treatment    Bed Mobility, Assistive Device bed rails  -     Bed Mob, Supine to Sit, Dolores verbal cues required;minimum assist (75% patient effort)  -     Bed Mob, Sit to Supine, Dolores not tested;other (see comments)   sitting EOB w/ son in room  -     Transfer Assessment/Treatment    Transfers, Sit-Stand Dolores verbal cues required;contact guard assist  -     Transfers, Stand-Sit Dolores verbal cues required;contact guard assist  -     Gait Assessment/Treatment    Gait, Dolores Level verbal cues required;contact guard assist  -     Gait, Assistive Device rolling walker  -     Gait, Distance (Feet) 20  -     Gait, Safety Issues supplemental O2  -     Gait, Comment could not tolerate anymore distance due to endurance  -     Positioning and Restraints    Pre-Treatment Position in bed  -     Post Treatment Position bed  -     In Bed sitting EOB;call light within reach;encouraged to call for assist;with family/caregiver;side rails up x2  -       07/19/17 5314       Living Environment    Lives With alone  -MM     Living Arrangements house  -MM     Home Accessibility stairs to enter home  -MM     Number of Stairs to Enter Home 3  -MM     Type of Financial/Environmental Concern none  -MM     Transportation Available car;family or friend will provide  -MM       User Key  (r) = Recorded By, (t) = Taken By, (c) = Cosigned By    Initials Name Provider Type     Eugene Garsia, PT Physical Therapist    MM Nicole Morelos, RN Registered Nurse     Nusrat Cole, RN Registered Nurse          Physical Therapy Education     Title: PT OT SLP Therapies (Done)     Topic: Physical Therapy (Done)     Point: Mobility training (Done)    Learning Progress Summary    Learner Readiness Method Response Comment Documented by  Status   Patient Acceptance JERED HARRIS DU benefits of PT and POC, call for assist OOB  07/20/17 1406 Done               Point: Precautions (Done)    Learning Progress Summary    Learner Readiness Method Response Comment Documented by Status   Patient Acceptance JERED HARRIS DU benefits of PT and POC, call for assist OOB  07/20/17 1406 Done                      User Key     Initials Effective Dates Name Provider Type Discipline     08/02/16 -  Eugene Garsia, PT Physical Therapist PT                PT Recommendation and Plan  Anticipated Discharge Disposition: home with home health  Planned Therapy Interventions: bed mobility training, gait training, home exercise program, patient/family education, stair training, strengthening, transfer training  PT Frequency: daily, per priority policy  Plan of Care Review  Plan Of Care Reviewed With: patient, son  Outcome Summary/Follow up Plan: PT IE complete.  Pt. ambulated ~ 20 feet cga x1 w/ RW.  Strength is good in all 4 extremities, but activity tolerance is poor.  O2 sat drops to 75% on 2L.  Post treatment w/ 1L per NC and CPAP-O2 sat 91% in sitting.  Pt. to receive skilled PT to address functional endurance/mobility.  Recommend home health at D/C.  Thank you for referral.          IP PT Goals       07/20/17 1407          Bed Mobility PT LTG    Bed Mobility PT LTG, Date Established 07/20/17  -      Bed Mobility PT LTG, Time to Achieve by discharge  -      Bed Mobility PT LTG, Activity Type all bed mobility  -      Bed Mobility PT LTG, [place] Level independent  -      Transfer Training PT LTG    Transfer Training PT LTG, Date Established 07/20/17  -      Transfer Training PT LTG, Time to Achieve by discharge  -      Transfer Training PT LTG, Activity Type bed to chair /chair to bed;sit to stand/stand to sit  -      Transfer Training PT LTG, [place] Level supervision required  -      Gait Training PT LTG    Gait Training Goal PT LTG, Date Established  07/20/17  -      Gait Training Goal PT LTG, Time to Achieve by discharge  -      Gait Training Goal PT LTG, [place] Level supervision required  -      Gait Training Goal PT LTG, Distance to Achieve 100  -      Stair Training PT LTG    Stair Training Goal PT LTG, Date Established 07/20/17  -      Stair Training Goal PT LTG, Time to Achieve by discharge  -      Stair Training Goal PT LTG, Number of Steps 3  -      Stair Training Goal PT LTG, [place] Level minimum assist (75% patient effort)  -      Stair Training Goal PT LTG, Assist Device cane, straight  -        User Key  (r) = Recorded By, (t) = Taken By, (c) = Cosigned By    Initials Name Provider Type     Eugene Garsia PT Physical Therapist                Outcome Measures       07/20/17 1403          How much help from another person do you currently need...    Turning from your back to your side while in flat bed without using bedrails? 4  -LH      Moving from lying on back to sitting on the side of a flat bed without bedrails? 3  -LH      Moving to and from a bed to a chair (including a wheelchair)? 3  -LH      Standing up from a chair using your arms (e.g., wheelchair, bedside chair)? 3  -LH      Climbing 3-5 steps with a railing? 3  -LH      To walk in hospital room? 3  -      AM-[facility] 6 Clicks Score 19  -      Functional Assessment    Outcome Measure Options AM-PAC 6 Clicks Basic Mobility (PT)  -        User Key  (r) = Recorded By, (t) = Taken By, (c) = Cosigned By    Initials Name Provider Type     Eugene Garsia PT Physical Therapist           Time Calculation:         PT Charges       07/20/17 1404          Time Calculation    Start Time 1320  -      Stop Time 1404  -      Time Calculation (min) 44 min  -      PT Received On 07/20/17  -      PT Goal Re-Cert Due Date 07/28/17  -        User Key  (r) = Recorded By, (t) = Taken By, (c) = Cosigned By    Initials Name Provider Type    RULA Garsia PT Physical  Therapist          Therapy Charges for Today     Code Description Service Date Service Provider Modifiers Qty    77078092011 HC PT MOBILITY CURRENT 7/20/2017 Eugene Garsia, PT GP, CJ 1    76038673406 HC PT MOBILITY PROJECTED 7/20/2017 Eugene Garsia PT GP, CI 1    88061904391 HC PT EVAL LOW COMPLEXITY 3 7/20/2017 Eugene Garsia PT GP 1          PT G-Codes  Outcome Measure Options: AM-PAC 6 Clicks Basic Mobility (PT)  Score: 19  Functional Limitation: Mobility: Walking and moving around  Mobility: Walking and Moving Around Current Status (): At least 20 percent but less than 40 percent impaired, limited or restricted  Mobility: Walking and Moving Around Goal Status (): At least 1 percent but less than 20 percent impaired, limited or restricted      Eugene Garsia, PT  7/20/2017

## 2017-07-21 ENCOUNTER — APPOINTMENT (OUTPATIENT)
Dept: MRI IMAGING | Facility: HOSPITAL | Age: 82
End: 2017-07-21

## 2017-07-21 LAB
25(OH)D3 SERPL-MCNC: 42.3 NG/ML (ref 30–100)
ALBUMIN SERPL-MCNC: 3.1 G/DL (ref 3.5–5)
ALBUMIN/GLOB SERPL: 1.1 G/DL (ref 1.1–2.5)
ALP SERPL-CCNC: 50 U/L (ref 24–120)
ALT SERPL W P-5'-P-CCNC: 33 U/L (ref 0–54)
AMYLASE SERPL-CCNC: 174 U/L (ref 30–110)
ANION GAP SERPL CALCULATED.3IONS-SCNC: 6 MMOL/L (ref 4–13)
AST SERPL-CCNC: 21 U/L (ref 7–45)
BILIRUB SERPL-MCNC: 0.7 MG/DL (ref 0.1–1)
BUN BLD-MCNC: 34 MG/DL (ref 5–21)
BUN/CREAT SERPL: 13.7 (ref 7–25)
CALCIUM SPEC-SCNC: 8.6 MG/DL (ref 8.4–10.4)
CHLORIDE SERPL-SCNC: 109 MMOL/L (ref 98–110)
CO2 SERPL-SCNC: 26 MMOL/L (ref 24–31)
CREAT BLD-MCNC: 2.48 MG/DL (ref 0.5–1.4)
GFR SERPL CREATININE-BSD FRML MDRD: 19 ML/MIN/1.73
GLOBULIN UR ELPH-MCNC: 2.9 GM/DL
GLUCOSE BLD-MCNC: 81 MG/DL (ref 70–100)
INR PPP: 3.43 (ref 0.91–1.09)
LIPASE SERPL-CCNC: 326 U/L (ref 23–203)
MAGNESIUM SERPL-MCNC: 1.6 MG/DL (ref 1.4–2.2)
PHOSPHATE SERPL-MCNC: 3.8 MG/DL (ref 2.5–4.5)
POTASSIUM BLD-SCNC: 4.1 MMOL/L (ref 3.5–5.3)
PROT SERPL-MCNC: 6 G/DL (ref 6.3–8.7)
PROTHROMBIN TIME: 35.9 SECONDS (ref 11.9–14.6)
PTH-INTACT SERPL-MCNC: 137.6 PG/ML (ref 7.5–53.5)
SODIUM BLD-SCNC: 141 MMOL/L (ref 135–145)

## 2017-07-21 PROCEDURE — 97116 GAIT TRAINING THERAPY: CPT

## 2017-07-21 PROCEDURE — 25010000002 ONDANSETRON PER 1 MG: Performed by: INTERNAL MEDICINE

## 2017-07-21 PROCEDURE — 82150 ASSAY OF AMYLASE: CPT | Performed by: NURSE PRACTITIONER

## 2017-07-21 PROCEDURE — 74181 MRI ABDOMEN W/O CONTRAST: CPT

## 2017-07-21 PROCEDURE — 83735 ASSAY OF MAGNESIUM: CPT | Performed by: INTERNAL MEDICINE

## 2017-07-21 PROCEDURE — 82306 VITAMIN D 25 HYDROXY: CPT | Performed by: INTERNAL MEDICINE

## 2017-07-21 PROCEDURE — 83690 ASSAY OF LIPASE: CPT | Performed by: NURSE PRACTITIONER

## 2017-07-21 PROCEDURE — 83970 ASSAY OF PARATHORMONE: CPT | Performed by: INTERNAL MEDICINE

## 2017-07-21 PROCEDURE — 80053 COMPREHEN METABOLIC PANEL: CPT | Performed by: NURSE PRACTITIONER

## 2017-07-21 PROCEDURE — 85610 PROTHROMBIN TIME: CPT | Performed by: INTERNAL MEDICINE

## 2017-07-21 PROCEDURE — 84100 ASSAY OF PHOSPHORUS: CPT | Performed by: INTERNAL MEDICINE

## 2017-07-21 RX ORDER — WARFARIN SODIUM 2 MG/1
2 TABLET ORAL
Status: DISCONTINUED | OUTPATIENT
Start: 2017-07-23 | End: 2017-07-21

## 2017-07-21 RX ORDER — CALCITRIOL 0.25 UG/1
0.25 CAPSULE, LIQUID FILLED ORAL EVERY OTHER DAY
Status: DISCONTINUED | OUTPATIENT
Start: 2017-07-21 | End: 2017-07-23 | Stop reason: HOSPADM

## 2017-07-21 RX ADMIN — SODIUM CHLORIDE 75 ML/HR: 4.5 INJECTION, SOLUTION INTRAVENOUS at 04:07

## 2017-07-21 RX ADMIN — LEVOTHYROXINE SODIUM 200 MCG: 200 TABLET ORAL at 06:22

## 2017-07-21 RX ADMIN — ALLOPURINOL 100 MG: 100 TABLET ORAL at 08:26

## 2017-07-21 RX ADMIN — CALCITRIOL 0.25 MCG: 0.25 CAPSULE ORAL at 22:15

## 2017-07-21 RX ADMIN — PANTOPRAZOLE SODIUM 40 MG: 40 TABLET, DELAYED RELEASE ORAL at 05:52

## 2017-07-21 RX ADMIN — METOPROLOL TARTRATE 50 MG: 50 TABLET, FILM COATED ORAL at 20:18

## 2017-07-21 RX ADMIN — METOPROLOL TARTRATE 50 MG: 50 TABLET, FILM COATED ORAL at 08:26

## 2017-07-21 RX ADMIN — AMLODIPINE BESYLATE 10 MG: 10 TABLET ORAL at 08:26

## 2017-07-21 RX ADMIN — ONDANSETRON 4 MG: 2 INJECTION INTRAMUSCULAR; INTRAVENOUS at 15:56

## 2017-07-21 RX ADMIN — Medication 1000 MCG: at 08:26

## 2017-07-21 NOTE — PROGRESS NOTES
Discharge Planning Assessment  [facility]     Patient Name: Barb Connors  MRN: [id number]  Today's Date: 7/21/2017    Admit Date: 7/19/2017          Discharge Needs Assessment       07/21/17 1520    Living Environment    Lives With alone    Living Arrangements house    Type of Financial/Environmental Concern none    Transportation Available car;family or friend will provide    Living Environment    Provides Primary Care For no one    Quality Of Family Relationships supportive;helpful;involved    Able to Return to Prior Living Arrangements yes    Discharge Needs Assessment    Concerns To Be Addressed denies needs/concerns at this time    Readmission Within The Last 30 Days no previous admission in last 30 days    Equipment Currently Used at Home cane, straight;wheelchair;walker, rolling;oxygen    Equipment Needed After Discharge none            Discharge Plan       07/21/17 1522    Case Management/Social Work Plan    Plan HOME    Patient/Family In Agreement With Plan yes    Additional Comments PT. STATES SHE LIVES INDEPENDENTLY AT HOME ALONE AND HAS  SUPPORTIVE FAMILY CLOSE BY.  PT. DENIES ANY IDENTIFIED D/C PLANNING NEEDS AT PRESENT.  IF SITUATION CHANGES, PLEASE ADVISE.  THANKS.         Discharge Placement     No information found                Demographic Summary     None            Functional Status     None            Psychosocial     None            Abuse/Neglect     None            Legal     None            Substance Abuse     None            Patient Forms     None          ANNA Hallman

## 2017-07-21 NOTE — PROGRESS NOTES
Continued Stay Note  [facility]     Patient Name: Barb Connors  MRN: [id number]  Today's Date: 7/21/2017    Admit Date: 7/19/2017          Discharge Plan       07/21/17 1522    Case Management/Social Work Plan    Plan HOME    Patient/Family In Agreement With Plan yes    Additional Comments REVIEWED THERAPY EVAL AND NOTED PT'S POOR ENDURANCE AND RECOMMENDATION FOR HOME HEALTH.  WILL FOLLOW FOR PT'S PROGRESS AND ORDERS TO SET UP HOME HEALTH.  PLEASE ADVISE.  THANKS.               Discharge Codes     None            ANNA Hallman

## 2017-07-21 NOTE — PLAN OF CARE
Problem: Patient Care Overview (Adult)  Goal: Plan of Care Review  Outcome: Ongoing (interventions implemented as appropriate)    07/21/17 1432   Coping/Psychosocial Response Interventions   Plan Of Care Reviewed With patient   Outcome Evaluation   Outcome Summary/Follow up Plan Patient has had no c/o of pain. Voiding per bathroom. Up with walker and assist. O2 at 2L with CPAP. Will cont. to monitor. MRI ordered   Patient Care Overview   Progress no change       Goal: Adult Individualization and Mutuality  Outcome: Ongoing (interventions implemented as appropriate)  Goal: Discharge Needs Assessment  Outcome: Ongoing (interventions implemented as appropriate)    Problem: Pancreatitis, Acute/Chronic (Adult)  Goal: Signs and Symptoms of Listed Potential Problems Will be Absent or Manageable (Pancreatitis, Acute/Chronic)  Outcome: Ongoing (interventions implemented as appropriate)

## 2017-07-21 NOTE — PROGRESS NOTES
[facility] Medicine Services  INPATIENT PROGRESS NOTE    Length of Stay: 2  Date of Admission: 7/19/2017  Primary Care Physician: Britney Martinez MD    Subjective   Chief Complaint: follow up epigastric pain  HPI   Lying in bed. No family present. She feels better today.  Reports dull epigastric pain.  No longer sharp epigastric pain.  She has tolerated clear liquid diet without increased nausea or abdominal pain.  She does not want regular food at this time.  She denies increased shortness of breath, chest pain or palpitations.  She denies cough or congestion.  She was seen by Dr. Montes from nephrology regarding abnormal CT scan.    Review of Systems   Constitutional: Positive for appetite change. Negative for unexpected weight change.   HENT: Negative for congestion.    Eyes: Negative for visual disturbance.   Respiratory: Negative for cough and wheezing.    Cardiovascular: Negative for chest pain, palpitations and leg swelling.   Gastrointestinal: Positive for abdominal pain (Epigastric pain) Negative for diarrhea.   Endocrine: Negative for polyuria.   Genitourinary: Negative for dysuria.   Musculoskeletal: Negative for myalgias.   Skin: Negative for wound.   Neurological: Positive for weakness.   Hematological: Does not bruise/bleed easily.   Psychiatric/Behavioral: Negative for agitation.   All pertinent negatives and positives are as above. All other systems have been reviewed and are negative unless otherwise stated.     Objective    Temp:  [97.5 °F (36.4 °C)-98.9 °F (37.2 °C)] 98 °F (36.7 °C)  Heart Rate:  [] 67  Resp:  [16] 16  BP: (103-128)/(41-63) 123/55  Physical Exam  Constitutional: She is oriented to person, place, and time.   obese   HENT:   Head: Normocephalic and atraumatic.   Eyes: EOM are normal. Pupils are equal, round, and reactive to light.   Neck: Normal range of motion. Neck supple. No tracheal deviation present.   Cardiovascular: Normal  rate and normal heart sounds.  Exam reveals no gallop and no friction rub.    No murmur heard.  Irregular rhythm   Pulmonary/Chest: No respiratory distress.   Wearing CPAP at present.  Otherwise Oxygen at 3 L, lungs clear today.  No rhonchi.  Abdominal: Soft. There is tenderness (Minimal epigastric tenderness). A hernia (Ventral hernia noted) is present.   Genitourinary:   Genitourinary Comments: Deferred   Musculoskeletal: She exhibits edema (Trace pedal edema bilaterally).   Neurological: She is alert and oriented to person, place, and time.   Skin: Skin is warm and dry.   Psychiatric: She has a normal mood and affect.     Results Review:  I have reviewed the labs, radiology results, and diagnostic studies.    Laboratory Data:     Results from last 7 days  Lab Units 07/20/17 0533 07/19/17 2020   WBC 10*3/mm3 7.40 8.42   HEMOGLOBIN g/dL 9.7* 10.5*   HEMATOCRIT % 33.4* 34.0*   PLATELETS 10*3/mm3 116* 114*          Results from last 7 days  Lab Units 07/21/17 0516 07/20/17 0533 07/19/17 2020   SODIUM mmol/L 141 146* 143   POTASSIUM mmol/L 4.1 4.2 4.4   CHLORIDE mmol/L 109 110 106   CO2 mmol/L 26.0 27.0 25.0   BUN mg/dL 34* 30* 34*   CREATININE mg/dL 2.48* 2.43* 2.57*   CALCIUM mg/dL 8.6 9.0 10.0   BILIRUBIN mg/dL 0.7 0.7 1.1*   ALK PHOS U/L 50 61 77   ALT (SGPT) U/L 33 31 38   AST (SGOT) U/L 21 27 33   GLUCOSE mg/dL 81 111* 172*         Results from last 7 days  Lab Units 07/21/17 0516 07/20/17 0533 07/19/17  2334   INR  3.43* 2.25* 2.24*       0  Lab Value Date/Time   LIPASE 326 (H) 07/21/2017 0516   LIPASE 8724 (H) 07/20/2017 0533   LIPASE 26476 (H) 07/19/2017 2020       Results from last 7 days  Lab Units 07/21/17 0516 07/20/17 0533 07/19/17 2020   AMYLASE U/L 174* 975* 2139*       Radiology Data:   Imaging Results (last 7 days)     Procedure Component Value Units Date/Time    CT Abdomen Pelvis Without Contrast [[id number]] Collected:  07/19/17 2154     Updated:  07/19/17 2207    Narrative:        EXAMINATION: CT ABDOMEN PELVIS WO CONTRAST- 7/19/2017 9:32 PM CDT     HISTORY: Abdominal pain, vomiting, renal failure.     COMPARISON: None      DLP: 637 mGy cm     TECHNIQUE: Noncontrast enhanced images of the abdomen and pelvis  obtained without oral contrast. Sagittal and coronal reformations were  made from the original source data and reviewed.     FINDINGS:   The visualized heart appears mildly enlarged. There is trace pericardial  fluid. Atelectasis is noted at the lung bases.     Evaluation is limited by the lack of IV contrast. Allowing for these  limitations, the liver, spleen, and right adrenal gland are normal in  appearance. A mass is seen within the left adrenal gland with some  peripheral calcifications, compatible with an adrenal adenoma. The  kidneys appear atrophic bilaterally. A small amount of fluid is seen  within the right perinephric space. There is contour irregularity of the  mid left kidney, concerning for underlying mass. There is fatty atrophy  of the pancreas. The gallbladder is surgically absent. The infrarenal  abdominal aorta is dilated, measuring up to 3.0 cm in size. There are  scattered atherosclerotic calcifications of the aorta and its branch  vessels. There is fatty atrophy of the pancreas. There is subtle  inflammatory stranding around the pancreas.     There is no appreciable mesenteric or retroperitoneal lymphadenopathy. A  small hiatal hernia is suspected. The proximal portion of the stomach is  normal in appearance. Within the distal aspect of the stomach and  proximal duodenum, there is surrounding inflammatory stranding. A  hyperdense lesion is seen in the expected region of the ampulla, though  this is difficult to evaluate secondary to streak artifact from surgical  clips and lack of IV contrast. This could represent a distal common bile  duct stone, though there is no significant intra or extrahepatic biliary  ductal dilatation to support this. The small bowel is not  overly  dilated. There are scattered colonic diverticula without evidence of  diverticulitis. The large bowel is otherwise grossly normal in  appearance. No free air is seen in the abdomen. There is a  fat-containing umbilical hernia. There is some skin thickening of the  ventral abdominal wall. A midline abdominal wall scar is present.     There is mild inflammatory stranding around the wall of the urinary  bladder, extending into the lower quadrants bilaterally. There has been  previous hysterectomy. No pelvic mass or free fluid is identified. No  pelvic or inguinal lymphadenopathy is appreciated.     Review of the visualized osseous structures demonstrates no acute or  aggressive lesions. Somewhat heterogeneous appearance of the bones may  be related to osteopenia.       Impression:       1. Subtle inflammatory stranding around the pancreas and proximal  duodenum, concerning for acute pancreatitis. A calcification is noted in  the region of the ampulla, though this may be within small bowel, as  there is no appreciable intra or extrahepatic biliary ductal dilatation.  2. There is mild inflammatory stranding around the urinary bladder,  suggesting cystitis. Correlate with urinalysis.  3. Infrarenal abdominal aortic aneurysm, measuring up to 3.0 cm in size.  4. Possible left renal mass. Nonemergent renal ultrasound could be  performed for further evaluation.  This report was finalized on 07/19/2017 22:04 by Dr. Bryant Chandler MD.    US Renal Bilateral [[id number]] Collected:  07/20/17 0843     Updated:  07/20/17 0858    Narrative:       EXAMINATION: US RENAL BILATERAL-  7/20/2017 9:43 AM EDT     HISTORY: Left renal mass     COMPARISON:CT study dated 07/19/2017     TECHNIQUE:Bilateral renal ultrasound examination was performed.     FINDINGS:     The ultrasound evaluation was very challenging due to the patient's body  habitus.     In the interpolar region of the left kidney there is focal contour  change with  question of underlying solid mass however the examination is  equivocal. Further imaging characterization with CT renal cell protocol  suggested when clinically feasible.     The right kidney measures 8.4 cm in [id number] length. The left kidney  measures 11.1 cm in [id number] length.     There is no pelvocaliectasis or evidence of obstruction. There are no  perinephric abnormalities.       Impression:       1. Challenging ultrasound-guided evaluation of the kidneys due to the  patient's body habitus. Equivocal mass interpolar region left kidney.  Further imaging characterization with CT using renal mass protocol  recommended.  This report was finalized on 07/20/2017 08:55 by Dr. Jung Wynne MD.        Scheduled Meds    allopurinol 100 mg Oral Daily   amLODIPine 10 mg Oral Q24H   levothyroxine 200 mcg Oral Q AM   metoprolol tartrate 50 mg Oral Q12H   pantoprazole 40 mg Oral Q AM   vitamin B-12 1,000 mcg Oral Daily   [START ON 7/22/2017] warfarin 1 mg Oral Once per day on Mon Wed Sat   warfarin 2 mg Oral Once per day on Sun Tue Thu Fri       I have reviewed the patient current medications.     Assessment/Plan   Assessment:  1.  Acute pancreatitis without infection or necrosis  2.  Epigastric pain secondary to #1  3.  Nausea with vomiting  4.  Chronic kidney disease stage IV  5.  Abnormal CT of abdomen and pelvis with concern for left renal mass  6.  Chronic atrial fibrillation, rate controlled, on anticoagulation with Coumadin  7.  Chronic obstructive pulmonary disease, no exacerbation.  Chronic oxygen at 2-3 liters  8.  Obstructive sleep apnea, wears CPAP  9.  Hypothyroidism    Plan:  1.  Tolerating clear liquid diet.  We will advance to full liquid diet.  She is not interested in regular diet at present.  2.  IV fluids at 75 mL per hour.  Will decrease rate to 50 mL per hour.  3.  Dilaudid 1 mg IV every 4 hours as needed for epigastric pain.  4.  Renal ultrasound equivocal mass interpolar region left kidney.   Challenging due to patient's body habitus.  5.  Dr. Montes from nephrology has been consulted and seen.  He is reviewing office records to look for prior imaging studies to assess possible renal mass.  6.  Per nephrology record renal ultrasound from 2014 notable for in adequate/incomplete study with no mention of renal mass.  7.  MRI recommended per nephrology  8.  INR today 3.43.  We will hold today's dose of Coumadin.  Patient self test INR at home and reports results to Dr. Martinez's office for recommendations.  9.  CPAP at night and during naps.  Otherwise, oxygen 2-3 L.  10.  Physical therapy consultation and will see today.  11.  Continue to hold Bumex and losartan at present.  Systolic blood pressure stable off medications.      The above documentation resulted from a face-to-face encounter by me Lizbeth DHALIWAL, [facility].    Discharge Planning: I expect patient to be discharged to home in 1-2 days.    MADHURI Potts   07/21/17   12:16 PM     I personally evaluated and examined the patient in conjunction with MADHURI Castaneda and agree with the assessment, treatment plan, and disposition of the patient as recorded by her. My history, exam, and further recommendations are:     I discussed the case with Dr. Montes.  The possible renal lesion is not known to them from the past.  She cannot have a CT scan with contrast to better delineate this.  I discussed the case with her son.  He seems very much to want to work it up.  I doubt that if this represents a problem that she would be able to have or tolerate surgery. Nevertheless, we can proceed with MRI.     The process that brought her into the hospital is much improved.  Her lipase has basically normalized.  She has started advancing her diet.    Discontinue IV fluids.  Her serum creatinine is stable.    Yash Ceron,   07/21/17  12:57 PM

## 2017-07-21 NOTE — PLAN OF CARE
Problem: Patient Care Overview (Adult)  Goal: Plan of Care Review  Outcome: Ongoing (interventions implemented as appropriate)    07/21/17 1440   Coping/Psychosocial Response Interventions   Plan Of Care Reviewed With patient;son   Outcome Evaluation   Outcome Summary/Follow up Plan Pt and son anxiously awaiting MRI but agrees to therapy. Pt SBA with bed mobility but became dizzy upon sitting EOB, with 4 minute recovery time. Pt report being on 2.5 L at home and was on 3L in the room, bumped pt to 3 L Since O2 was reading 87%. Pt required CGA for transfers and gait. Pt ambulated 25 feet x2 with one standing rest due to SOA. Pt also performed 20 reps of BLE exercise sitting EOB. Pt will continue to benefit from skilled Pt to improve endurance and strength.    Patient Care Overview   Progress improving

## 2017-07-21 NOTE — CONSULTS
Nephrology ([facility]) Consult Note      Patient:  Barb Connors  YOB: 1935  Date of Service: 7/20/2017  MRN: [id number]   Acct: [id number]   Primary Care Physician: Britney Martinez MD  Advance Directive: Conditional Code  Admit Date: 7/19/2017       Hospital Day: 1  Referring Provider: Ángel Kahn*      Patient personally seen and examined.  Complete chart including Consults, Notes, Operative Reports, Labs, Cardiology, and Radiology studies reviewed as able.        Subjective:  Barb Connors is a 81 y.o. female  whom we were consulted for CKD 4 and renal mass.  Pt of Dr. Pinto with CKD.  Pt doesn't recall many details of her CKD or any prior renal imaging.  Admitted with abdominal pain/n/v.  Symptoms have been improving, taking small po now.  Dx with pancreatitis.  Also noted was indeterminate renal mass unable to be characterized on CT (noncontrast) and US.  Denies nsaids/rash/hematuria.      Allergies:  Review of patient's allergies indicates no known allergies.    Home Meds:  Prescriptions Prior to Admission   Medication Sig Dispense Refill Last Dose   • allopurinol (ZYLOPRIM) 100 MG tablet Take 100 mg by mouth Daily.   Past Week at Unknown time   • amLODIPine (NORVASC) 10 MG tablet Take 10 mg by mouth Daily.   Past Week at Unknown time   • bumetanide (BUMEX) 1 MG tablet Take 1 mg by mouth Every 12 (Twelve) Hours.   Past Week at Unknown time   • colesevelam (WELCHOL) 625 MG tablet Take 625 mg by mouth Daily.   Past Week at Unknown time   • cyanocobalamin (VITAMIN B-12) 500 MCG tablet Take 500 mcg by mouth Daily.   Past Week at Unknown time   • levalbuterol (XOPENEX) 0.63 MG/3ML nebulizer solution Take 1 ampule by nebulization Every 8 (Eight) Hours.   Past Week at Unknown time   • levothyroxine (SYNTHROID, LEVOTHROID) 200 MCG tablet Take 200 mcg by mouth 4 (Four) Times a Week. Sunday, Tuesday, Thursday, Saturday.   Past Week at Unknown time    • levothyroxine (SYNTHROID, LEVOTHROID) 200 MCG tablet Take 400 mcg by mouth 3 (Three) Times a Week. Monday, Wednesday, and Friday.   Past Week at Unknown time   • losartan (COZAAR) 100 MG tablet Take 50 mg by mouth Daily.   Past Week at Unknown time   • metoprolol tartrate (LOPRESSOR) 50 MG tablet Take 50 mg by mouth Every 12 (Twelve) Hours.   Past Week at Unknown time   • O2 (OXYGEN) Inhale 2.5 L/min Continuous.   Past Week at Unknown time   • pantoprazole (PROTONIX) 40 MG EC tablet Take 40 mg by mouth Daily.   Past Week at Unknown time   • vitamin D (ERGOCALCIFEROL) 57373 UNITS capsule capsule Take 50,000 Units by mouth Every 7 (Seven) Days. Sunday.   Past Week at Unknown time   • warfarin (COUMADIN) 1 MG tablet Take 1 mg by mouth 3 (Three) Times a Week. Monday, Wednesday, and Saturday.   Past Week at Unknown time   • warfarin (COUMADIN) 2 MG tablet Take 2 mg by mouth 4 (Four) Times a Week. Sunday, Tuesday, Thursday, and Friday.   Past Week at Unknown time       Medicines:  Current Facility-Administered Medications   Medication Dose Route Frequency Provider Last Rate Last Dose   • allopurinol (ZYLOPRIM) tablet 100 mg  100 mg Oral Daily Ángel Kahn MD   100 mg at 07/20/17 0926   • amLODIPine (NORVASC) tablet 10 mg  10 mg Oral Q24H Ángel Kahn MD   10 mg at 07/20/17 0926   • HYDROmorphone (DILAUDID) injection 1 mg  1 mg Intravenous Q4H PRN Ángel Kahn MD   1 mg at 07/20/17 0340   • levalbuterol (XOPENEX) nebulizer solution 1.25 mg  1.25 mg Nebulization Q6H PRN Ángel Kahn MD   1.25 mg at 07/20/17 0952   • levothyroxine (SYNTHROID, LEVOTHROID) tablet 200 mcg  200 mcg Oral Q AM Ángel Kahn MD   200 mcg at 07/20/17 0926   • metoprolol tartrate (LOPRESSOR) tablet 50 mg  50 mg Oral Q12H Ángel Kahn MD   50 mg at 07/20/17 1445   • ondansetron (ZOFRAN) injection 4 mg  4 mg Intravenous Q4H PRN Ángel Kahn MD   4 mg at 07/20/17 0477    • pantoprazole (PROTONIX) EC tablet 40 mg  40 mg Oral Q AM Ángel Kahn MD   40 mg at 07/20/17 0926   • pneumococcal polysaccharide 23-valent (PNEUMOVAX-23) vaccine 0.5 mL  0.5 mL Intramuscular During Hospitalization Ángel Kahn MD       • sodium chloride 0.45 % infusion  75 mL/hr Intravenous Continuous Yash Ceron DO   Stopped at 07/20/17 1419   • sodium chloride 0.9 % flush 1-10 mL  1-10 mL Intravenous PRN Ángel Kahn MD       • sodium chloride 0.9 % flush 10 mL  10 mL Intravenous PRN Jozef Dye Jr., MD       • vitamin B-12 (CYANOCOBALAMIN) tablet 1,000 mcg  1,000 mcg Oral Daily Ángel Kahn MD   1,000 mcg at 07/20/17 0926   • [START ON 7/22/2017] warfarin (COUMADIN) tablet 1 mg  1 mg Oral Once per day on Mon Wed Sat Ángel Kahn MD       • warfarin (COUMADIN) tablet 2 mg  2 mg Oral Once per day on Sun Tue Thu Fri Ángel Kahn MD   2 mg at 07/20/17 2004       Past Medical History:  Past Medical History:   Diagnosis Date   • A-fib    • COPD (chronic obstructive pulmonary disease)    • Hypertension    • Kidney failure        Past Surgical History:  Past Surgical History:   Procedure Laterality Date   • CARDIAC CATHETERIZATION     • CHOLECYSTECTOMY     • HYSTERECTOMY         Family History  History reviewed. No pertinent family history.    Social History  Social History     Social History   • Marital status:      Spouse name: N/A   • Number of children: N/A   • Years of education: N/A     Occupational History   • Not on file.     Social History Main Topics   • Smoking status: Former Smoker     Years: 5.00   • Smokeless tobacco: Not on file   • Alcohol use No   • Drug use: No   • Sexual activity: Defer     Other Topics Concern   • Not on file     Social History Narrative   • No narrative on file         Review of Systems:  History obtained from chart review and the patient  General ROS: No fever or chills  Respiratory ROS: No  "cough, shortness of breath, wheezing  Cardiovascular ROS: no chest pain or dyspnea on exertion  Gastrointestinal ROS: + abdominal pain - melena  Genito-Urinary ROS: No dysuria or hematuria  14 point ROS reviewed with the patient and negative except as noted above and in the HPI unless unable to obtain.    Objective:  /63 (BP Location: Left arm, Patient Position: Lying)  Pulse 67  Temp 98.9 °F (37.2 °C) (Oral)   Resp 16  Ht 67\" (170.2 cm)  Wt 259 lb (117 kg)  LMP  (LMP Unknown)  SpO2 91%  BMI 40.57 kg/m2    Intake/Output Summary (Last 24 hours) at 07/20/17 2135  Last data filed at 07/20/17 1416   Gross per 24 hour   Intake           2072.5 ml   Output                0 ml   Net           2072.5 ml     General: awake/alert   Chest:  clear to auscultation bilaterally without respiratory distress  CVS: regular rate and rhythm  Abdominal: soft, ttp greatest in epigastric  Extremities: tr ble edema  Skin: warm and dry without rash      Labs:    Results from last 7 days  Lab Units 07/20/17 0533 07/19/17 2020   WBC 10*3/mm3 7.40 8.42   HEMOGLOBIN g/dL 9.7* 10.5*   HEMATOCRIT % 33.4* 34.0*   PLATELETS 10*3/mm3 116* 114*           Results from last 7 days  Lab Units 07/20/17 0533 07/19/17 2020   SODIUM mmol/L 146* 143   POTASSIUM mmol/L 4.2 4.4   CHLORIDE mmol/L 110 106   CO2 mmol/L 27.0 25.0   BUN mg/dL 30* 34*   CREATININE mg/dL 2.43* 2.57*   CALCIUM mg/dL 9.0 10.0   BILIRUBIN mg/dL 0.7 1.1*   ALK PHOS U/L 61 77   ALT (SGPT) U/L 31 38   AST (SGOT) U/L 27 33   GLUCOSE mg/dL 111* 172*       Radiology:   Imaging Results (last 72 hours)     Procedure Component Value Units Date/Time    CT Abdomen Pelvis Without Contrast [[id number]] Collected:  07/19/17 2154     Updated:  07/19/17 2207    Narrative:       EXAMINATION: CT ABDOMEN PELVIS WO CONTRAST- 7/19/2017 9:32 PM CDT     HISTORY: Abdominal pain, vomiting, renal failure.     COMPARISON: None      DLP: 637 mGy cm     TECHNIQUE: Noncontrast enhanced images of the " abdomen and pelvis  obtained without oral contrast. Sagittal and coronal reformations were  made from the original source data and reviewed.     FINDINGS:   The visualized heart appears mildly enlarged. There is trace pericardial  fluid. Atelectasis is noted at the lung bases.     Evaluation is limited by the lack of IV contrast. Allowing for these  limitations, the liver, spleen, and right adrenal gland are normal in  appearance. A mass is seen within the left adrenal gland with some  peripheral calcifications, compatible with an adrenal adenoma. The  kidneys appear atrophic bilaterally. A small amount of fluid is seen  within the right perinephric space. There is contour irregularity of the  mid left kidney, concerning for underlying mass. There is fatty atrophy  of the pancreas. The gallbladder is surgically absent. The infrarenal  abdominal aorta is dilated, measuring up to 3.0 cm in size. There are  scattered atherosclerotic calcifications of the aorta and its branch  vessels. There is fatty atrophy of the pancreas. There is subtle  inflammatory stranding around the pancreas.     There is no appreciable mesenteric or retroperitoneal lymphadenopathy. A  small hiatal hernia is suspected. The proximal portion of the stomach is  normal in appearance. Within the distal aspect of the stomach and  proximal duodenum, there is surrounding inflammatory stranding. A  hyperdense lesion is seen in the expected region of the ampulla, though  this is difficult to evaluate secondary to streak artifact from surgical  clips and lack of IV contrast. This could represent a distal common bile  duct stone, though there is no significant intra or extrahepatic biliary  ductal dilatation to support this. The small bowel is not overly  dilated. There are scattered colonic diverticula without evidence of  diverticulitis. The large bowel is otherwise grossly normal in  appearance. No free air is seen in the abdomen. There is  a  fat-containing umbilical hernia. There is some skin thickening of the  ventral abdominal wall. A midline abdominal wall scar is present.     There is mild inflammatory stranding around the wall of the urinary  bladder, extending into the lower quadrants bilaterally. There has been  previous hysterectomy. No pelvic mass or free fluid is identified. No  pelvic or inguinal lymphadenopathy is appreciated.     Review of the visualized osseous structures demonstrates no acute or  aggressive lesions. Somewhat heterogeneous appearance of the bones may  be related to osteopenia.       Impression:       1. Subtle inflammatory stranding around the pancreas and proximal  duodenum, concerning for acute pancreatitis. A calcification is noted in  the region of the ampulla, though this may be within small bowel, as  there is no appreciable intra or extrahepatic biliary ductal dilatation.  2. There is mild inflammatory stranding around the urinary bladder,  suggesting cystitis. Correlate with urinalysis.  3. Infrarenal abdominal aortic aneurysm, measuring up to 3.0 cm in size.  4. Possible left renal mass. Nonemergent renal ultrasound could be  performed for further evaluation.  This report was finalized on 07/19/2017 22:04 by Dr. Bryant Chandler MD.    US Renal Bilateral [[id number]] Collected:  07/20/17 0843     Updated:  07/20/17 0858    Narrative:       EXAMINATION: US RENAL BILATERAL-  7/20/2017 9:43 AM EDT     HISTORY: Left renal mass     COMPARISON:CT study dated 07/19/2017     TECHNIQUE:Bilateral renal ultrasound examination was performed.     FINDINGS:     The ultrasound evaluation was very challenging due to the patient's body  habitus.     In the interpolar region of the left kidney there is focal contour  change with question of underlying solid mass however the examination is  equivocal. Further imaging characterization with CT renal cell protocol  suggested when clinically feasible.     The right kidney measures 8.4  cm in rqfc-vr-afdt length. The left kidney  measures 11.1 cm in lsev-jc-hiyj length.     There is no pelvocaliectasis or evidence of obstruction. There are no  perinephric abnormalities.       Impression:       1. Challenging ultrasound-guided evaluation of the kidneys due to the  patient's body habitus. Equivocal mass interpolar region left kidney.  Further imaging characterization with CT using renal mass protocol  recommended.  This report was finalized on 07/20/2017 08:55 by Dr. Jung Wynne MD.          Culture:         Assessment   CKD 4  Left indeterminate renal mass  Acute pancreatitis  Hypertension  Low Vitamin D  Secondary Hyperparathyroidism    Plan:  D/w Dr. Ceron  Will check with office records tomorrow to look for any prior imaging as I unable to access them remotely due to [facility] IT settings  Lipase improving  Monitor labs  IVF    Thank you for the consult, we appreciate the opportunity to provide care to your patients.  Feel free to contact me if I can be of any further assistance.      Ilan Montes MD  7/20/2017  9:35 PM

## 2017-07-21 NOTE — THERAPY TREATMENT NOTE
Acute Care - Physical Therapy Treatment Note  [facility]     Patient Name: Barb Connors  : 1935  MRN: [id number]  Today's Date: 2017  Onset of Illness/Injury or Date of Surgery Date: 17  Date of Referral to PT: 17  Referring Physician: MADHURI Castaneda    Admit Date: 2017    Visit Dx:    ICD-10-CM ICD-9-CM   1. Acute pancreatitis without infection or necrosis, unspecified pancreatitis type K85.90 577.0   2. Impaired mobility and endurance Z74.09 V49.89     Patient Active Problem List   Diagnosis   • Acute pancreatitis without infection or necrosis   • CKD (chronic kidney disease) stage 4, GFR 15-29 ml/min   • Abnormal CT scan, kidney; suspected  left renal mass    • Chronic atrial fibrillation on chronic anticoagulation    • Sleep apnea   • COPD (chronic obstructive pulmonary disease); not in exacerbation; O2 dependent               Adult Rehabilitation Note       17 1440          Rehab Assessment/Intervention    Discipline physical therapy assistant  -TR      Document Type therapy note (daily note)  -TR      Subjective Information agree to therapy;complains of;fatigue  -TR      Patient Effort, Rehab Treatment adequate  -TR      Precautions/Limitations fall precautions;oxygen therapy device and L/min  -TR      Recorded by [TR] Carmel Ballard PTA      Vital Signs    Pre SpO2 (%) 87  -TR      O2 Delivery Pre Treatment supplemental O2   2 L O2/NC (pts reports 2.5 L at home, bumped to 3 L   -TR      Intra SpO2 (%) 79  -TR      O2 Delivery Intra Treatment supplemental O2   3 L O2/NC  -TR      Post SpO2 (%) 90  -TR      O2 Delivery Post Treatment supplemental O2   3 L O2/NC  -TR      Pre Patient Position Supine  -TR      Intra Patient Position Sitting  -TR      Post Patient Position Sitting  -TR      Recorded by [TR] Carmel Ballard PTA      Pain Assessment    Pain Assessment No/denies pain  -TR      Recorded by [TR] Carmel Ballard PTA      Cognitive  Assessment/Intervention    Current Cognitive/Communication Assessment functional  -TR      Orientation Status oriented x 4  -TR      Follows Commands/Answers Questions able to follow multi-step instructions;100% of the time  -TR      Personal Safety WNL/WFL  -TR      Personal Safety Interventions fall prevention program maintained;gait belt;nonskid shoes/slippers when out of bed  -TR      Recorded by [TR] Carmel Ballard PTA      Bed Mobility, Assessment/Treatment    Bed Mobility, Assistive Device bed rails  -TR      Bed Mob, Supine to Sit, [place] supervision required  -TR      Bed Mob, Sit to Supine, [place] --   not tested onstretcher with transport   -TR      Bed Mobility, Impairments strength decreased   decreased endurance   -TR      Recorded by [TR] Carmel Ballard PTA      Transfer Assessment/Treatment    Transfers, Sit-Stand [place] verbal cues required;stand by assist  -TR      Transfers, Stand-Sit [place] verbal cues required;stand by assist  -TR      Recorded by [TR] Carmel Ballard PTA      Gait Assessment/Treatment    Gait, [place] Level verbal cues required;contact guard assist  -TR      Gait, Assistive Device rolling walker  -TR      Gait, Distance (Feet) 25   x2 with one standing rest in between '  -TR      Gait, Safety Issues supplemental O2  -TR      Gait, Impairments strength decreased   decreased endurance   -TR      Recorded by [TR] Carmel Ballard PTA      Therapy Exercises    Bilateral Lower Extremities AROM:;20 reps;sitting  -TR      Recorded by [TR] Carmel Ballard PTA      Positioning and Restraints    Pre-Treatment Position in bed  -TR      Post Treatment Position other  -TR      Other Position with other staff   with transport on stretcher  -TR      Recorded by [TR] Carmel Ballard PTA        User Key  (r) = Recorded By, (t) = Taken By, (c) = Cosigned By    Initials Name Effective Dates    TR Carmel Ballard PTA 08/02/16 -                  IP PT Goals       07/20/17 1407          Bed Mobility PT LTG    Bed Mobility PT LTG, Date Established 07/20/17  -      Bed Mobility PT LTG, Time to Achieve by discharge  -      Bed Mobility PT LTG, Activity Type all bed mobility  -      Bed Mobility PT LTG, [place] Level independent  -      Transfer Training PT LTG    Transfer Training PT LTG, Date Established 07/20/17  -      Transfer Training PT LTG, Time to Achieve by discharge  -      Transfer Training PT LTG, Activity Type bed to chair /chair to bed;sit to stand/stand to sit  -      Transfer Training PT LTG, [place] Level supervision required  -      Gait Training PT LTG    Gait Training Goal PT LTG, Date Established 07/20/17  -      Gait Training Goal PT LTG, Time to Achieve by discharge  -      Gait Training Goal PT LTG, [place] Level supervision required  -      Gait Training Goal PT LTG, Distance to Achieve 100  -      Stair Training PT LTG    Stair Training Goal PT LTG, Date Established 07/20/17  -      Stair Training Goal PT LTG, Time to Achieve by discharge  -      Stair Training Goal PT LTG, Number of Steps 3  -      Stair Training Goal PT LTG, [place] Level minimum assist (75% patient effort)  -      Stair Training Goal PT LTG, Assist Device cane, straight  -        User Key  (r) = Recorded By, (t) = Taken By, (c) = Cosigned By    Initials Name Provider Type     Eugene Garsia PT Physical Therapist          Physical Therapy Education     Title: PT OT SLP Therapies (Done)     Topic: Physical Therapy (Done)     Point: Mobility training (Done)    Learning Progress Summary    Learner Readiness Method Response Comment Documented by Status   Patient Acceptance E VU Benefits of PT, Enduraance training, PLB TR 07/21/17 1509 Done    Acceptance E,D VU,DU benefits of PT and POC, call for assist OOB  07/20/17 1406 Done   Family Acceptance E VU Benefits of PT, Enduraance training, PLB TR 07/21/17  1509 Done               Point: Precautions (Done)    Learning Progress Summary    Learner Readiness Method Response Comment Documented by Status   Patient Acceptance E VU Benefits of PT, Enduraance training, PLB  07/21/17 1509 Done    Acceptance E,D VU,DU benefits of PT and POC, call for assist OOB  07/20/17 1406 Done   Family Acceptance E VU Benefits of PT, Enduraance training, PLB  07/21/17 1509 Done                      User Key     Initials Effective Dates Name Provider Type Discipline     08/02/16 -  Eugene Garsia, PT Physical Therapist PT     08/02/16 -  Carmel Ballard PTA Physical Therapy Assistant PT                    PT Recommendation and Plan  Anticipated Discharge Disposition: home with home health  Planned Therapy Interventions: bed mobility training, gait training, home exercise program, patient/family education, stair training, strengthening, transfer training  PT Frequency: daily, per priority policy  Plan of Care Review  Plan Of Care Reviewed With: patient, son  Progress: improving  Outcome Summary/Follow up Plan: Pt and son anxiously awaiting MRI but agrees to therapy. Pt SBA with bed mobility but became dizzy upon sitting EOB, with 4 minute recovery time. Pt report being on 2.5 L at home and was on 2L in the room, bumped pt to 3 L Sincce O2 was reading 87%. Pt reequired CGA for transfers and gait. Pt ambualted 25 feet x2 with one standing rest due to SOA. Pt also perfromed 20 reps of BLE exercise sittign EOB. Pt will cotnonue to benefit from skilled Pt to improve enndurance and strength.           Outcome Measures       07/21/17 1440 07/20/17 1403       How much help from another person do you currently need...    Turning from your back to your side while in flat bed without using bedrails? 4  -TR 4  -LH     Moving from lying on back to sitting on the side of a flat bed without bedrails? 3  -TR 3  -LH     Moving to and from a bed to a chair (including a wheelchair)? 3  -TR 3  -LH      Standing up from a chair using your arms (e.g., wheelchair, bedside chair)? 3  -TR 3  -LH     Climbing 3-5 steps with a railing? 3  -TR 3  -LH     To walk in hospital room? 3  -TR 3  -LH     AM-PAC 6 Clicks Score 19  -TR 19  -LH     Functional Assessment    Outcome Measure Options AM-PAC 6 Clicks Basic Mobility (PT)  -TR AM-PAC 6 Clicks Basic Mobility (PT)  -       User Key  (r) = Recorded By, (t) = Taken By, (c) = Cosigned By    Initials Name Provider Type     Eugene Garsia, PT Physical Therapist    GREY Ballard PTA Physical Therapy Assistant           Time Calculation:         PT Charges       07/21/17 1440          Time Calculation    Start Time 1440  -TR      Stop Time 1503  -TR      Time Calculation (min) 23 min  -TR      PT Received On 07/20/17  -TR      PT Goal Re-Cert Due Date 07/28/17  -TR      Time Calculation- PT    Total Timed Code Minutes- PT 23 minute(s)  -TR        User Key  (r) = Recorded By, (t) = Taken By, (c) = Cosigned By    Initials Name Provider Type    GREY Ballard PTA Physical Therapy Assistant          Therapy Charges for Today     Code Description Service Date Service Provider Modifiers Qty    [id number] HC GAIT TRAINING EA 15 MIN 7/21/2017 Carmel Ballard PTA GP 2          PT G-Codes  Outcome Measure Options: AM-PAC 6 Clicks Basic Mobility (PT)  Score: 19  Functional Limitation: Mobility: Walking and moving around  Mobility: Walking and Moving Around Current Status (): At least 20 percent but less than 40 percent impaired, limited or restricted  Mobility: Walking and Moving Around Goal Status (): At least 1 percent but less than 20 percent impaired, limited or restricted    Carmel Ballard PTA  7/21/2017

## 2017-07-21 NOTE — PLAN OF CARE
Problem: Patient Care Overview (Adult)  Goal: Plan of Care Review  Outcome: Ongoing (interventions implemented as appropriate)    07/21/17 0444   Coping/Psychosocial Response Interventions   Plan Of Care Reviewed With patient   Outcome Evaluation   Outcome Summary/Follow up Plan Patient has had no complaints of pain. IV fluids continued. O2 at 2L with CPAP in place. Will continue to monitor.    Patient Care Overview   Progress no change       Goal: Adult Individualization and Mutuality  Outcome: Ongoing (interventions implemented as appropriate)    07/21/17 0444   Individualization   Patient Specific Preferences No specific preferences at this time.    Patient Specific Goals No specific goals at this time.    Patient Specific Interventions Patient requires CPap.   Mutuality/Individual Preferences   What Anxieties, Fears or Concerns Do You Have About Your Health or Care? None at this time.    What Questions Do You Have About Your Health or Care? None at this time.    What Information Would Help Us Give You More Personalized Care? None at this time.        Goal: Discharge Needs Assessment  Outcome: Ongoing (interventions implemented as appropriate)    07/21/17 0444   Discharge Needs Assessment   Readmission Within The Last 30 Days no previous admission in last 30 days   Discharge Disposition still a patient         Problem: Pancreatitis, Acute/Chronic (Adult)  Goal: Signs and Symptoms of Listed Potential Problems Will be Absent or Manageable (Pancreatitis, Acute/Chronic)  Outcome: Ongoing (interventions implemented as appropriate)    07/21/17 0444   Pancreatitis, Acute/Chronic   Problems Assessed (Pancreatitis) all   Problems Present (Pancreatitis) none

## 2017-07-21 NOTE — PROGRESS NOTES
Nephrology ([facility]) Progress Note      Patient:  Barb Connors  YOB: 1935  Date of Service: 7/21/2017  MRN: [id number]   Acct: [id number]   Primary Care Physician: Britney Martinez MD  Advance Directive: Conditional Code  Admit Date: 7/19/2017       Hospital Day: 2  Referring Provider: Ángel Kahn*      Patient personally seen and examined.  Complete chart including Consults, Notes, Operative Reports, Labs, Cardiology, and Radiology studies reviewed as able.        Subjective:  Barb Connors is a 81 y.o. female  whom we were consulted for chronic kidney disease stage 4 and renal mass.  Follows with Dr Pinto.  Baseline creatinine about 2.4 mg.  Admitted with abdominal pain, nausea/vomiting.  Diagnosed with acute pancreatitis.  Imaging reveals indeterminate renal mass.  Feeling better today; symptoms improving.  Planning for MRI later today.  Review of renal ultrasound from 2014 notable for inadequate/incomplete study with no mention of renal mass.    Allergies:  Review of patient's allergies indicates no known allergies.    Home Meds:  Prescriptions Prior to Admission   Medication Sig Dispense Refill Last Dose   • allopurinol (ZYLOPRIM) 100 MG tablet Take 100 mg by mouth Daily.   Past Week at Unknown time   • amLODIPine (NORVASC) 10 MG tablet Take 10 mg by mouth Daily.   Past Week at Unknown time   • bumetanide (BUMEX) 1 MG tablet Take 1 mg by mouth Every 12 (Twelve) Hours.   Past Week at Unknown time   • colesevelam (WELCHOL) 625 MG tablet Take 625 mg by mouth Daily.   Past Week at Unknown time   • cyanocobalamin (VITAMIN B-12) 500 MCG tablet Take 500 mcg by mouth Daily.   Past Week at Unknown time   • levalbuterol (XOPENEX) 0.63 MG/3ML nebulizer solution Take 1 ampule by nebulization Every 8 (Eight) Hours.   Past Week at Unknown time   • levothyroxine (SYNTHROID, LEVOTHROID) 200 MCG tablet Take 200 mcg by mouth 4 (Four) Times a Week. Sunday,  Tuesday, Thursday, Saturday.   Past Week at Unknown time   • levothyroxine (SYNTHROID, LEVOTHROID) 200 MCG tablet Take 400 mcg by mouth 3 (Three) Times a Week. Monday, Wednesday, and Friday.   Past Week at Unknown time   • losartan (COZAAR) 100 MG tablet Take 50 mg by mouth Daily.   Past Week at Unknown time   • metoprolol tartrate (LOPRESSOR) 50 MG tablet Take 50 mg by mouth Every 12 (Twelve) Hours.   Past Week at Unknown time   • O2 (OXYGEN) Inhale 2.5 L/min Continuous.   Past Week at Unknown time   • pantoprazole (PROTONIX) 40 MG EC tablet Take 40 mg by mouth Daily.   Past Week at Unknown time   • vitamin D (ERGOCALCIFEROL) 68066 UNITS capsule capsule Take 50,000 Units by mouth Every 7 (Seven) Days. Sunday.   Past Week at Unknown time   • warfarin (COUMADIN) 1 MG tablet Take 1 mg by mouth 3 (Three) Times a Week. Monday, Wednesday, and Saturday.   Past Week at Unknown time   • warfarin (COUMADIN) 2 MG tablet Take 2 mg by mouth 4 (Four) Times a Week. Sunday, Tuesday, Thursday, and Friday.   Past Week at Unknown time       Medicines:  Current Facility-Administered Medications   Medication Dose Route Frequency Provider Last Rate Last Dose   • allopurinol (ZYLOPRIM) tablet 100 mg  100 mg Oral Daily Ángel Kahn MD   100 mg at 07/21/17 0826   • amLODIPine (NORVASC) tablet 10 mg  10 mg Oral Q24H Ángel Kahn MD   10 mg at 07/21/17 0826   • HYDROmorphone (DILAUDID) injection 1 mg  1 mg Intravenous Q4H PRN Ángel Kahn MD   1 mg at 07/20/17 0340   • levalbuterol (XOPENEX) nebulizer solution 1.25 mg  1.25 mg Nebulization Q6H PRN Ángel Kahn MD   1.25 mg at 07/20/17 0952   • levothyroxine (SYNTHROID, LEVOTHROID) tablet 200 mcg  200 mcg Oral Q AM Ángel Kahn MD   200 mcg at 07/21/17 0622   • metoprolol tartrate (LOPRESSOR) tablet 50 mg  50 mg Oral Q12H Ángel Kahn MD   50 mg at 07/21/17 0848   • ondansetron (ZOFRAN) injection 4 mg  4 mg Intravenous  Q4H PRN Ángel Kahn MD   4 mg at 07/20/17 0340   • pantoprazole (PROTONIX) EC tablet 40 mg  40 mg Oral Q AM Ángel Kahn MD   40 mg at 07/21/17 0552   • pneumococcal polysaccharide 23-valent (PNEUMOVAX-23) vaccine 0.5 mL  0.5 mL Intramuscular During Hospitalization Ángel Kahn MD       • sodium chloride 0.45 % infusion  75 mL/hr Intravenous Continuous Yash Ceron DO 75 mL/hr at 07/21/17 0407 75 mL/hr at 07/21/17 0407   • sodium chloride 0.9 % flush 1-10 mL  1-10 mL Intravenous PRN Ángel Kahn MD       • sodium chloride 0.9 % flush 10 mL  10 mL Intravenous PRN Jozef Dye Jr., MD       • vitamin B-12 (CYANOCOBALAMIN) tablet 1,000 mcg  1,000 mcg Oral Daily Ángel Kahn MD   1,000 mcg at 07/21/17 0826   • [START ON 7/22/2017] warfarin (COUMADIN) tablet 1 mg  1 mg Oral Once per day on Mon Wed Sat Ángel Kahn MD       • warfarin (COUMADIN) tablet 2 mg  2 mg Oral Once per day on Sun Tue Thu Fri Ángel Kahn MD   2 mg at 07/20/17 2004       Past Medical History:  Past Medical History:   Diagnosis Date   • A-fib    • COPD (chronic obstructive pulmonary disease)    • Hypertension    • Kidney failure        Past Surgical History:  Past Surgical History:   Procedure Laterality Date   • CARDIAC CATHETERIZATION     • CHOLECYSTECTOMY     • HYSTERECTOMY         Family History  History reviewed. No pertinent family history.    Social History  Social History     Social History   • Marital status:      Spouse name: N/A   • Number of children: N/A   • Years of education: N/A     Occupational History   • Not on file.     Social History Main Topics   • Smoking status: Former Smoker     Years: 5.00   • Smokeless tobacco: Not on file   • Alcohol use No   • Drug use: No   • Sexual activity: Defer     Other Topics Concern   • Not on file     Social History Narrative   • No narrative on file         Review of Systems:  History obtained  "from chart review and the patient  General ROS: No fever or chills  Respiratory ROS: No cough, shortness of breath, wheezing  Cardiovascular ROS: no chest pain or dyspnea on exertion  Gastrointestinal ROS: positive for - abdominal pain  Genito-Urinary ROS: No dysuria or hematuria  14 point ROS reviewed with the patient and negative except as noted above and in the HPI unless unable to obtain.    Objective:  /63 (BP Location: Left arm, Patient Position: Lying)  Pulse (!) 128 Comment: rn notified  Temp 97.5 °F (36.4 °C) (Oral)   Resp 16  Ht 67\" (170.2 cm)  Wt 259 lb (117 kg)  LMP  (LMP Unknown)  SpO2 97%  BMI 40.57 kg/m2    Intake/Output Summary (Last 24 hours) at 07/21/17 1048  Last data filed at 07/21/17 1029   Gross per 24 hour   Intake          3513.02 ml   Output              500 ml   Net          3013.02 ml     General: awake/alert   Chest:  clear to auscultation bilaterally without respiratory distress  CVS: regular rate and rhythm  Abdominal: soft, mild diffuse tenderness  Extremities: trace bilateral edema  Skin: warm and dry without rash      Labs:    Results from last 7 days  Lab Units 07/20/17  0533 07/19/17 2020   WBC 10*3/mm3 7.40 8.42   HEMOGLOBIN g/dL 9.7* 10.5*   HEMATOCRIT % 33.4* 34.0*   PLATELETS 10*3/mm3 116* 114*           Results from last 7 days  Lab Units 07/21/17  0516 07/20/17 0533 07/19/17 2020   SODIUM mmol/L 141 146* 143   POTASSIUM mmol/L 4.1 4.2 4.4   CHLORIDE mmol/L 109 110 106   CO2 mmol/L 26.0 27.0 25.0   BUN mg/dL 34* 30* 34*   CREATININE mg/dL 2.48* 2.43* 2.57*   CALCIUM mg/dL 8.6 9.0 10.0   BILIRUBIN mg/dL 0.7 0.7 1.1*   ALK PHOS U/L 50 61 77   ALT (SGPT) U/L 33 31 38   AST (SGOT) U/L 21 27 33   GLUCOSE mg/dL 81 111* 172*       Radiology:   Imaging Results (last 72 hours)     Procedure Component Value Units Date/Time    CT Abdomen Pelvis Without Contrast [[id number]] Collected:  07/19/17 2154     Updated:  07/19/17 2207    Narrative:       EXAMINATION: CT ABDOMEN " PELVIS WO CONTRAST- 7/19/2017 9:32 PM CDT     HISTORY: Abdominal pain, vomiting, renal failure.     COMPARISON: None      DLP: 637 mGy cm     TECHNIQUE: Noncontrast enhanced images of the abdomen and pelvis  obtained without oral contrast. Sagittal and coronal reformations were  made from the original source data and reviewed.     FINDINGS:   The visualized heart appears mildly enlarged. There is trace pericardial  fluid. Atelectasis is noted at the lung bases.     Evaluation is limited by the lack of IV contrast. Allowing for these  limitations, the liver, spleen, and right adrenal gland are normal in  appearance. A mass is seen within the left adrenal gland with some  peripheral calcifications, compatible with an adrenal adenoma. The  kidneys appear atrophic bilaterally. A small amount of fluid is seen  within the right perinephric space. There is contour irregularity of the  mid left kidney, concerning for underlying mass. There is fatty atrophy  of the pancreas. The gallbladder is surgically absent. The infrarenal  abdominal aorta is dilated, measuring up to 3.0 cm in size. There are  scattered atherosclerotic calcifications of the aorta and its branch  vessels. There is fatty atrophy of the pancreas. There is subtle  inflammatory stranding around the pancreas.     There is no appreciable mesenteric or retroperitoneal lymphadenopathy. A  small hiatal hernia is suspected. The proximal portion of the stomach is  normal in appearance. Within the distal aspect of the stomach and  proximal duodenum, there is surrounding inflammatory stranding. A  hyperdense lesion is seen in the expected region of the ampulla, though  this is difficult to evaluate secondary to streak artifact from surgical  clips and lack of IV contrast. This could represent a distal common bile  duct stone, though there is no significant intra or extrahepatic biliary  ductal dilatation to support this. The small bowel is not overly  dilated. There  are scattered colonic diverticula without evidence of  diverticulitis. The large bowel is otherwise grossly normal in  appearance. No free air is seen in the abdomen. There is a  fat-containing umbilical hernia. There is some skin thickening of the  ventral abdominal wall. A midline abdominal wall scar is present.     There is mild inflammatory stranding around the wall of the urinary  bladder, extending into the lower quadrants bilaterally. There has been  previous hysterectomy. No pelvic mass or free fluid is identified. No  pelvic or inguinal lymphadenopathy is appreciated.     Review of the visualized osseous structures demonstrates no acute or  aggressive lesions. Somewhat heterogeneous appearance of the bones may  be related to osteopenia.       Impression:       1. Subtle inflammatory stranding around the pancreas and proximal  duodenum, concerning for acute pancreatitis. A calcification is noted in  the region of the ampulla, though this may be within small bowel, as  there is no appreciable intra or extrahepatic biliary ductal dilatation.  2. There is mild inflammatory stranding around the urinary bladder,  suggesting cystitis. Correlate with urinalysis.  3. Infrarenal abdominal aortic aneurysm, measuring up to 3.0 cm in size.  4. Possible left renal mass. Nonemergent renal ultrasound could be  performed for further evaluation.  This report was finalized on 07/19/2017 22:04 by Dr. Bryant Chandler MD.    US Renal Bilateral [[id number]] Collected:  07/20/17 0843     Updated:  07/20/17 0858    Narrative:       EXAMINATION: US RENAL BILATERAL-  7/20/2017 9:43 AM EDT     HISTORY: Left renal mass     COMPARISON:CT study dated 07/19/2017     TECHNIQUE:Bilateral renal ultrasound examination was performed.     FINDINGS:     The ultrasound evaluation was very challenging due to the patient's body  habitus.     In the interpolar region of the left kidney there is focal contour  change with question of underlying solid  mass however the examination is  equivocal. Further imaging characterization with CT renal cell protocol  suggested when clinically feasible.     The right kidney measures 8.4 cm in lswa-[id number] length. The left kidney  measures 11.1 cm in pfvb-be-jluh length.     There is no pelvocaliectasis or evidence of obstruction. There are no  perinephric abnormalities.       Impression:       1. Challenging ultrasound-guided evaluation of the kidneys due to the  patient's body habitus. Equivocal mass interpolar region left kidney.  Further imaging characterization with CT using renal mass protocol  recommended.  This report was finalized on 07/20/2017 08:55 by Dr. Jung Wynne MD.          Culture:         Assessment   1.  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  Left renal mass  3.  Acute pancreatitis  4.  Hypertension  5.  Secondary hyperparathyroidism  6.  Vitamin D deficiency    Plan:  1.  Workup ongoing; MRI planned for today  2.  Continue to follow renal function and electrolytes      Adarsh Martin, MADHURI  7/21/2017  10:48 AM

## 2017-07-22 LAB
ANION GAP SERPL CALCULATED.3IONS-SCNC: 7 MMOL/L (ref 4–13)
BUN BLD-MCNC: 36 MG/DL (ref 5–21)
BUN/CREAT SERPL: 14 (ref 7–25)
CALCIUM SPEC-SCNC: 8.7 MG/DL (ref 8.4–10.4)
CHLORIDE SERPL-SCNC: 107 MMOL/L (ref 98–110)
CO2 SERPL-SCNC: 25 MMOL/L (ref 24–31)
CREAT BLD-MCNC: 2.58 MG/DL (ref 0.5–1.4)
GFR SERPL CREATININE-BSD FRML MDRD: 18 ML/MIN/1.73
GLUCOSE BLD-MCNC: 83 MG/DL (ref 70–100)
INR PPP: 4.57 (ref 0.91–1.09)
POTASSIUM BLD-SCNC: 3.9 MMOL/L (ref 3.5–5.3)
PROTHROMBIN TIME: 45.1 SECONDS (ref 11.9–14.6)
SODIUM BLD-SCNC: 139 MMOL/L (ref 135–145)

## 2017-07-22 PROCEDURE — 80048 BASIC METABOLIC PNL TOTAL CA: CPT | Performed by: NURSE PRACTITIONER

## 2017-07-22 PROCEDURE — 85610 PROTHROMBIN TIME: CPT | Performed by: INTERNAL MEDICINE

## 2017-07-22 PROCEDURE — 97110 THERAPEUTIC EXERCISES: CPT

## 2017-07-22 RX ADMIN — PANTOPRAZOLE SODIUM 40 MG: 40 TABLET, DELAYED RELEASE ORAL at 05:47

## 2017-07-22 RX ADMIN — METOPROLOL TARTRATE 50 MG: 50 TABLET, FILM COATED ORAL at 09:38

## 2017-07-22 RX ADMIN — AMLODIPINE BESYLATE 10 MG: 10 TABLET ORAL at 09:38

## 2017-07-22 RX ADMIN — ALLOPURINOL 100 MG: 100 TABLET ORAL at 09:38

## 2017-07-22 RX ADMIN — LEVOTHYROXINE SODIUM 200 MCG: 200 TABLET ORAL at 05:47

## 2017-07-22 RX ADMIN — Medication 1000 MCG: at 09:38

## 2017-07-22 NOTE — PLAN OF CARE
Problem: Patient Care Overview (Adult)  Goal: Plan of Care Review  Outcome: Ongoing (interventions implemented as appropriate)    07/22/17 1454   Coping/Psychosocial Response Interventions   Plan Of Care Reviewed With patient   Outcome Evaluation   Outcome Summary/Follow up Plan Patient denies pain/nausea. Patient ambulated well w/ walker and assist x1 more for standby assist. Patient staying another night because of elevated PT/INR. Coumadin to be held this afternoon also. Tolerating diet. Will cont to monitor. Anticipates d/c tomorrow.    Patient Care Overview   Progress improving       Goal: Adult Individualization and Mutuality  Outcome: Ongoing (interventions implemented as appropriate)  Goal: Discharge Needs Assessment  Outcome: Ongoing (interventions implemented as appropriate)    Problem: Pancreatitis, Acute/Chronic (Adult)  Goal: Signs and Symptoms of Listed Potential Problems Will be Absent or Manageable (Pancreatitis, Acute/Chronic)  Outcome: Ongoing (interventions implemented as appropriate)    07/22/17 1454   Pancreatitis, Acute/Chronic   Problems Assessed (Pancreatitis) all   Problems Present (Pancreatitis) none

## 2017-07-22 NOTE — PROGRESS NOTES
Nephrology ([facility]) Progress Note      Patient:  Barb Connors  YOB: 1935  Date of Service: 7/22/2017  MRN: [id number]   Acct: [id number]   Primary Care Physician: Britney Martinez MD  Advance Directive: Conditional Code  Admit Date: 7/19/2017       Hospital Day: 3  Referring Provider: Ángel Kahn*      Patient personally seen and examined.  Complete chart including Consults, Notes, Operative Reports, Labs, Cardiology, and Radiology studies reviewed as able.        Subjective:  Barb Connors is a 81 y.o. female  whom we were consulted for CKD 4 and renal mass.  Pt of Dr. Pinto with CKD.  Pt doesn't recall many details of her CKD or any prior renal imaging.  Admitted with abdominal pain/n/v.  Symptoms have been improving, taking small po now.  Dx with pancreatitis.  Also noted was indeterminate renal mass unable to be characterized on CT (noncontrast) and US.  Denies nsaids/rash/hematuria.       Improved today.  abd pain controlled.  Tolerating liquid diet.    Allergies:  Review of patient's allergies indicates no known allergies.    Home Meds:  Prescriptions Prior to Admission   Medication Sig Dispense Refill Last Dose   • allopurinol (ZYLOPRIM) 100 MG tablet Take 100 mg by mouth Daily.   Past Week at Unknown time   • amLODIPine (NORVASC) 10 MG tablet Take 10 mg by mouth Daily.   Past Week at Unknown time   • bumetanide (BUMEX) 1 MG tablet Take 1 mg by mouth Every 12 (Twelve) Hours.   Past Week at Unknown time   • colesevelam (WELCHOL) 625 MG tablet Take 625 mg by mouth Daily.   Past Week at Unknown time   • cyanocobalamin (VITAMIN B-12) 500 MCG tablet Take 500 mcg by mouth Daily.   Past Week at Unknown time   • levalbuterol (XOPENEX) 0.63 MG/3ML nebulizer solution Take 1 ampule by nebulization Every 8 (Eight) Hours.   Past Week at Unknown time   • levothyroxine (SYNTHROID, LEVOTHROID) 200 MCG tablet Take 200 mcg by mouth 4 (Four) Times a Week.  Sunday, Tuesday, Thursday, Saturday.   Past Week at Unknown time   • levothyroxine (SYNTHROID, LEVOTHROID) 200 MCG tablet Take 400 mcg by mouth 3 (Three) Times a Week. Monday, Wednesday, and Friday.   Past Week at Unknown time   • losartan (COZAAR) 100 MG tablet Take 50 mg by mouth Daily.   Past Week at Unknown time   • metoprolol tartrate (LOPRESSOR) 50 MG tablet Take 50 mg by mouth Every 12 (Twelve) Hours.   Past Week at Unknown time   • O2 (OXYGEN) Inhale 2.5 L/min Continuous.   Past Week at Unknown time   • pantoprazole (PROTONIX) 40 MG EC tablet Take 40 mg by mouth Daily.   Past Week at Unknown time   • vitamin D (ERGOCALCIFEROL) 72562 UNITS capsule capsule Take 50,000 Units by mouth Every 7 (Seven) Days. Sunday.   Past Week at Unknown time   • warfarin (COUMADIN) 1 MG tablet Take 1 mg by mouth 3 (Three) Times a Week. Monday, Wednesday, and Saturday.   Past Week at Unknown time   • warfarin (COUMADIN) 2 MG tablet Take 2 mg by mouth 4 (Four) Times a Week. Sunday, Tuesday, Thursday, and Friday.   Past Week at Unknown time       Medicines:  Current Facility-Administered Medications   Medication Dose Route Frequency Provider Last Rate Last Dose   • allopurinol (ZYLOPRIM) tablet 100 mg  100 mg Oral Daily Ángel Kahn MD   100 mg at 07/22/17 0938   • amLODIPine (NORVASC) tablet 10 mg  10 mg Oral Q24H Ángel Kahn MD   10 mg at 07/22/17 0938   • calcitriol (ROCALTROL) capsule 0.25 mcg  0.25 mcg Oral Every Other Day Ilan Montes MD   0.25 mcg at 07/21/17 2215   • HYDROmorphone (DILAUDID) injection 1 mg  1 mg Intravenous Q4H PRN Ángel Kahn MD   1 mg at 07/20/17 0340   • levalbuterol (XOPENEX) nebulizer solution 1.25 mg  1.25 mg Nebulization Q6H PRN Ángel Kahn MD   1.25 mg at 07/20/17 0952   • levothyroxine (SYNTHROID, LEVOTHROID) tablet 200 mcg  200 mcg Oral Q AM Ángel Kahn MD   200 mcg at 07/22/17 0547   • metoprolol tartrate (LOPRESSOR)  tablet 50 mg  50 mg Oral Q12H Ángel Kahn MD   50 mg at 07/22/17 0938   • ondansetron (ZOFRAN) injection 4 mg  4 mg Intravenous Q4H PRN Ángel Kahn MD   4 mg at 07/21/17 1556   • pantoprazole (PROTONIX) EC tablet 40 mg  40 mg Oral Q AM Ángel Kahn MD   40 mg at 07/22/17 0547   • pneumococcal polysaccharide 23-valent (PNEUMOVAX-23) vaccine 0.5 mL  0.5 mL Intramuscular During Hospitalization Ángel Kahn MD       • sodium chloride 0.9 % flush 1-10 mL  1-10 mL Intravenous PRN Ángel Kahn MD       • sodium chloride 0.9 % flush 10 mL  10 mL Intravenous PRN Jozef Dye Jr., MD       • vitamin B-12 (CYANOCOBALAMIN) tablet 1,000 mcg  1,000 mcg Oral Daily Ángel Kahn MD   1,000 mcg at 07/22/17 0938   • warfarin (COUMADIN) tablet 1 mg  1 mg Oral Once per day on Mon Wed Sat Ángel Kahn MD       • warfarin (COUMADIN) tablet 2 mg  2 mg Oral Once per day on Sun Tue Thu Fri Ángel Kahn MD   2 mg at 07/20/17 2004       Past Medical History:  Past Medical History:   Diagnosis Date   • A-fib    • COPD (chronic obstructive pulmonary disease)    • Hypertension    • Kidney failure        Past Surgical History:  Past Surgical History:   Procedure Laterality Date   • CARDIAC CATHETERIZATION     • CHOLECYSTECTOMY     • HYSTERECTOMY         Family History  History reviewed. No pertinent family history.    Social History  Social History     Social History   • Marital status:      Spouse name: N/A   • Number of children: N/A   • Years of education: N/A     Occupational History   • Not on file.     Social History Main Topics   • Smoking status: Former Smoker     Years: 5.00   • Smokeless tobacco: Not on file   • Alcohol use No   • Drug use: No   • Sexual activity: Defer     Other Topics Concern   • Not on file     Social History Narrative   • No narrative on file         Review of Systems:  History obtained from chart review and  "the patient  General ROS: No fever or chills  Respiratory ROS: No cough, shortness of breath, wheezing  Cardiovascular ROS: no chest pain or dyspnea on exertion  Gastrointestinal ROS: No abdominal pain or melena  Genito-Urinary ROS: No dysuria or hematuria  14 point ROS reviewed with the patient and negative except as noted above and in the HPI unless unable to obtain.    Objective:  /56 (BP Location: Left arm)  Pulse 64  Temp 97.3 °F (36.3 °C) (Oral)   Resp 16  Ht 67\" (170.2 cm)  Wt 263 lb 11.2 oz (120 kg)  LMP  (LMP Unknown)  SpO2 95%  BMI 41.3 kg/m2    Intake/Output Summary (Last 24 hours) at 07/22/17 1455  Last data filed at 07/22/17 1411   Gross per 24 hour   Intake              480 ml   Output              650 ml   Net             -170 ml     General: awake/alert   Chest:  clear to auscultation bilaterally without respiratory distress  CVS: regular rate and rhythm  Abdominal: soft, nontender, normal bowel sounds  Extremities: tr ble edema  Skin: warm and dry without rash      Labs:    Results from last 7 days  Lab Units 07/20/17 0533 07/19/17 2020   WBC 10*3/mm3 7.40 8.42   HEMOGLOBIN g/dL 9.7* 10.5*   HEMATOCRIT % 33.4* 34.0*   PLATELETS 10*3/mm3 116* 114*           Results from last 7 days  Lab Units 07/22/17 0518 07/21/17 0516 07/20/17 0533 07/19/17 2020   SODIUM mmol/L 139 141 146* 143   POTASSIUM mmol/L 3.9 4.1 4.2 4.4   CHLORIDE mmol/L 107 109 110 106   CO2 mmol/L 25.0 26.0 27.0 25.0   BUN mg/dL 36* 34* 30* 34*   CREATININE mg/dL 2.58* 2.48* 2.43* 2.57*   CALCIUM mg/dL 8.7 8.6 9.0 10.0   BILIRUBIN mg/dL  --  0.7 0.7 1.1*   ALK PHOS U/L  --  50 61 77   ALT (SGPT) U/L  --  33 31 38   AST (SGOT) U/L  --  21 27 33   GLUCOSE mg/dL 83 81 111* 172*       Radiology:   Imaging Results (last 72 hours)     Procedure Component Value Units Date/Time    CT Abdomen Pelvis Without Contrast [[id number]] Collected:  07/19/17 2154     Updated:  07/19/17 2207    Narrative:       EXAMINATION: CT ABDOMEN " PELVIS WO CONTRAST- 7/19/2017 9:32 PM CDT     HISTORY: Abdominal pain, vomiting, renal failure.     COMPARISON: None      DLP: 637 mGy cm     TECHNIQUE: Noncontrast enhanced images of the abdomen and pelvis  obtained without oral contrast. Sagittal and coronal reformations were  made from the original source data and reviewed.     FINDINGS:   The visualized heart appears mildly enlarged. There is trace pericardial  fluid. Atelectasis is noted at the lung bases.     Evaluation is limited by the lack of IV contrast. Allowing for these  limitations, the liver, spleen, and right adrenal gland are normal in  appearance. A mass is seen within the left adrenal gland with some  peripheral calcifications, compatible with an adrenal adenoma. The  kidneys appear atrophic bilaterally. A small amount of fluid is seen  within the right perinephric space. There is contour irregularity of the  mid left kidney, concerning for underlying mass. There is fatty atrophy  of the pancreas. The gallbladder is surgically absent. The infrarenal  abdominal aorta is dilated, measuring up to 3.0 cm in size. There are  scattered atherosclerotic calcifications of the aorta and its branch  vessels. There is fatty atrophy of the pancreas. There is subtle  inflammatory stranding around the pancreas.     There is no appreciable mesenteric or retroperitoneal lymphadenopathy. A  small hiatal hernia is suspected. The proximal portion of the stomach is  normal in appearance. Within the distal aspect of the stomach and  proximal duodenum, there is surrounding inflammatory stranding. A  hyperdense lesion is seen in the expected region of the ampulla, though  this is difficult to evaluate secondary to streak artifact from surgical  clips and lack of IV contrast. This could represent a distal common bile  duct stone, though there is no significant intra or extrahepatic biliary  ductal dilatation to support this. The small bowel is not overly  dilated. There  are scattered colonic diverticula without evidence of  diverticulitis. The large bowel is otherwise grossly normal in  appearance. No free air is seen in the abdomen. There is a  fat-containing umbilical hernia. There is some skin thickening of the  ventral abdominal wall. A midline abdominal wall scar is present.     There is mild inflammatory stranding around the wall of the urinary  bladder, extending into the lower quadrants bilaterally. There has been  previous hysterectomy. No pelvic mass or free fluid is identified. No  pelvic or inguinal lymphadenopathy is appreciated.     Review of the visualized osseous structures demonstrates no acute or  aggressive lesions. Somewhat heterogeneous appearance of the bones may  be related to osteopenia.       Impression:       1. Subtle inflammatory stranding around the pancreas and proximal  duodenum, concerning for acute pancreatitis. A calcification is noted in  the region of the ampulla, though this may be within small bowel, as  there is no appreciable intra or extrahepatic biliary ductal dilatation.  2. There is mild inflammatory stranding around the urinary bladder,  suggesting cystitis. Correlate with urinalysis.  3. Infrarenal abdominal aortic aneurysm, measuring up to 3.0 cm in size.  4. Possible left renal mass. Nonemergent renal ultrasound could be  performed for further evaluation.  This report was finalized on 07/19/2017 22:04 by Dr. Bryant Chandler MD.    US Renal Bilateral [[id number]] Collected:  07/20/17 0843     Updated:  07/20/17 0858    Narrative:       EXAMINATION: US RENAL BILATERAL-  7/20/2017 9:43 AM EDT     HISTORY: Left renal mass     COMPARISON:CT study dated 07/19/2017     TECHNIQUE:Bilateral renal ultrasound examination was performed.     FINDINGS:     The ultrasound evaluation was very challenging due to the patient's body  habitus.     In the interpolar region of the left kidney there is focal contour  change with question of underlying solid  mass however the examination is  equivocal. Further imaging characterization with CT renal cell protocol  suggested when clinically feasible.     The right kidney measures 8.4 cm in lyeo-[id number] length. The left kidney  measures 11.1 cm in hdvt-cr-[id number] length.     There is no pelvocaliectasis or evidence of obstruction. There are no  perinephric abnormalities.       Impression:       1. Challenging ultrasound-guided evaluation of the kidneys due to the  patient's body habitus. Equivocal mass interpolar region left kidney.  Further imaging characterization with CT using renal mass protocol  recommended.  This report was finalized on 07/20/2017 08:55 by Dr. Jung Wynne MD.    MRI Abdomen Without Contrast [[id number]] Collected:  07/21/17 1609     Updated:  07/21/17 1641    Narrative:       MRI OF ABDOMEN WITH AND WITHOUT CONTRAST     HISTORY: possible left renal mass, can't have contrast because of  chronic kidney disease; K85.90-Acute pancreatitis without necrosis or  infection, unspecified; Z74.09-Other reduced mobility      COMPARISON: CT scan dated 07/19/2017      TECHNIQUE: Multiplanar, multisequence MR imaging of the abdomen was  performed without intravenous administration of contrast.     FINDINGS:  The liver is normal in size and does not appear overtly cirrhotic. No  focal liver lesions are identified on the T2-weighted images or  diffusion images. The gallbladder appears surgically absent. There is  prominence of the common bile duct, especially distally at the level of  the pancreatic head (series 301-image 31). The duct measures 7.7 mm in  this location. The previously identified stone from the noncontrast CT  is not well-visualized on the MRI. Additionally, the main pancreatic  duct at the level of the pancreatic head and neck appears dilated with  the duct measuring up to 1.4 cm at the level of the pancreatic head near  the ampulla (series 301-image 30). No definite mass lesions are  identifiable on  this noncontrast exam. At the level of the pancreatic  body and tail the main pancreatic duct is normal in caliber. The  pancreas does appear diffusely atrophic.     Unremarkable appearance of the spleen. A 3.4 cm oval circumscribed fluid  bright T2 hyperintense lesion is seen in the area of the left adrenal  gland (series 41-image 33). The in and out of phase T1 images are  uniformly low there is no associated diffusion restriction. The ADC  values are uniformly high in this thought to reflect a cyst, possibly  within the adrenal gland. It does not appear to reflect a solid lesion.     The right kidney is diffusely atrophic. Several small cysts are seen  arising from the right kidney. The left kidney is also atrophic although  not as severely atrophic as the right kidney. The left kidney does  display an irregular contour although the signal characteristics of the  left kidney are uniform throughout. No focal lesion is identified to  raise high suspicion for a left-sided renal malignancy. There does not  appear to be hydronephrosis.     Short segment aneurysmal dilation of the infrarenal abdominal aorta is  identified, measuring up to 3.4 cm in greatest axial diameter. Small  volume ascites in the abdomen. No mesenteric or retroperitoneal  adenopathy is identified. The visualized bony structures of the spine  are unremarkable. There is a small right-sided pleural effusion.       Impression:       1. Bilateral renal atrophy. Although there is an irregular surface  contour of the left kidney, no discrete mass lesion is identified to  suggest renal malignancy.  2. There is dilation of the main pancreatic duct in the pancreatic head  up to 1.4 cm. The recent noncontrast CT showed a calcification/stone in  the area of the pancreatic head and it is possible that there was/is a  stone within the pancreatic duct near the ampulla. No stone is  identified on the MRI although CT is better for evaluation of stones.  You may  consider a repeat noncontrast CT scan of the abdomen to see if a  stone is still present in this area.  3. No adenopathy is identified in abdomen.  4. Small volume ascites with small layering right pleural effusion.           This report was finalized on 07/21/2017 16:38 by Dr Franco Hernandez, .          Culture:         Assessment   CKD 4  Left indeterminate renal mass - not seen on renal mass  Acute pancreatitis  Hypertension  Low Vitamin D  Secondary Hyperparathyroidism  High INR      Plan:  MRI showed no renal mass  Lipase improved  Monitor labs  IVF  calcitriol      Ilan Montes MD  7/22/2017  2:55 PM

## 2017-07-22 NOTE — PLAN OF CARE
Problem: Patient Care Overview (Adult)  Goal: Plan of Care Review  Outcome: Ongoing (interventions implemented as appropriate)    07/21/17 1440 07/21/17 2200   Coping/Psychosocial Response Interventions   Plan Of Care Reviewed With --  patient   Patient Care Overview   Progress improving --        Goal: Adult Individualization and Mutuality  Outcome: Ongoing (interventions implemented as appropriate)  Goal: Discharge Needs Assessment  Outcome: Ongoing (interventions implemented as appropriate)    Problem: Pancreatitis, Acute/Chronic (Adult)  Goal: Signs and Symptoms of Listed Potential Problems Will be Absent or Manageable (Pancreatitis, Acute/Chronic)  Outcome: Ongoing (interventions implemented as appropriate)

## 2017-07-22 NOTE — PLAN OF CARE
Problem: Patient Care Overview (Adult)  Goal: Plan of Care Review  Outcome: Ongoing (interventions implemented as appropriate)    07/22/17 1131   Coping/Psychosocial Response Interventions   Plan Of Care Reviewed With patient   Outcome Evaluation   Outcome Summary/Follow up Plan Pt performed AROM sitting and supine BLE exercises x 20. C/O increased dizziness this morning and deferred ambulation. Pt will continue to benefit from therapy to increase strength and activity tolerance.   Patient Care Overview   Progress progress toward functional goals as expected

## 2017-07-22 NOTE — DISCHARGE SUMMARY
[facility] Medicine Services  DISCHARGE SUMMARY       Date of Admission: 7/19/2017  Date of Discharge:  7/23/2017  Primary Care Physician: Britney Martinez MD    Discharge Diagnoses:  Hospital Problem List     Acute pancreatitis without infection or necrosis    CKD (chronic kidney disease) stage 4, GFR 15-29 ml/min    Abnormal CT scan, kidney, no mass lesion to suggest renal malignancy per MRI abdomen    Chronic atrial fibrillation on chronic anticoagulation     Sleep apnea    COPD (chronic obstructive pulmonary disease); not in exacerbation; O2 dependent        Discharge diagnoses   1.  Acute pancreatitis without infection or necrosis  2.  Epigastric pain secondary to #1  3.  Nausea with vomiting, resolved  4.  Chronic kidney disease stage IV, stable  5.  Abnormal CT of abdomen and pelvis but no mass lesion identified to suggest renal malignancy per MRI abdomen  6.  Chronic atrial fibrillation, rate controlled, on anticoagulation with Coumadin  7.  Chronic obstructive pulmonary disease, no exacerbation.  Chronic oxygen at 2-3 liters  8.  Obstructive sleep apnea, wears CPAP  9.  Hypothyroidism  10.  Supratherapeutic INR     Presenting Problem/History of Present Illness:  Acute pancreatitis without infection or necrosis, unspecified pancreatitis type [K85.90]   Chief Complaint on Day of Discharge: feels better  History of Present Illness on Day of Discharge:  Lying in bed.  Son present in room.  She is tolerating regular diet without nausea, vomiting or abdominal pain.  Epigastric pain has resolved.  She denies chest pain or palpitations.  She reports baseline shortness of breath.  She is wearing CPAP at present as she is napping.  She self test INR at home.  She plans to test again on 7/25 with results to Dr. Martinez.  I discussed with patient and son that she needs to hold Coumadin ×2 days 7/23 and 7/24.  She will resume per Dr. Martinez's recommendations.  Both expressed  understanding.    Consults:   1.  Dr. Ilan Montes, nephrology    Procedures Performed: None    Pertinent Test Results:  Laboratory Data:      Results from last 7 days  Lab Units 07/20/17  0533 07/19/17 2020   WBC 10*3/mm3 7.40 8.42   HEMOGLOBIN g/dL 9.7* 10.5*   HEMATOCRIT % 33.4* 34.0*   PLATELETS 10*3/mm3 116* 114*          Results from last 7 days  Lab Units 07/23/17  0523 07/22/17  0518 07/21/17  0516 07/20/17  0533 07/19/17 2020   SODIUM mmol/L 140 139 141 146* 143   POTASSIUM mmol/L 3.8 3.9 4.1 4.2 4.4   CHLORIDE mmol/L 108 107 109 110 106   CO2 mmol/L 23.0* 25.0 26.0 27.0 25.0   BUN mg/dL 40* 36* 34* 30* 34*   CREATININE mg/dL 2.71* 2.58* 2.48* 2.43* 2.57*   CALCIUM mg/dL 9.0 8.7 8.6 9.0 10.0   BILIRUBIN mg/dL  --   --  0.7 0.7 1.1*   ALK PHOS U/L  --   --  50 61 77   ALT (SGPT) U/L  --   --  33 31 38   AST (SGOT) U/L  --   --  21 27 33   GLUCOSE mg/dL 91 83 81 111* 172*       0  Lab Value Date/Time   LIPASE 326 (H) 07/21/2017 0516   LIPASE 8724 (H) 07/20/2017 0533   LIPASE 58428 (H) 07/19/2017 2020       0  Lab Value Date/Time   AMYLASE 174 (H) 07/21/2017 0516   AMYLASE 975 (H) 07/20/2017 0533   AMYLASE 2139 (H) 07/19/2017 2020       0  Lab Value Date/Time   INR 3.73 (H) 07/23/2017 0523   INR 4.57 (C) 07/22/2017 0518   INR 3.43 (H) 07/21/2017 0516   INR 2.25 (H) 07/20/2017 0533   INR 2.24 (H) 07/19/2017 2334   INR 2.06 (H) 07/19/2017 2020        Specimen: Blood Updated: 07/21/17 0619       25 Hydroxy, Vitamin D 42.3 ng/ml      PTH, Intact [[id number]] (Abnormal) Collected: 07/21/17 0516     Lab Status: Final result Specimen: Blood Updated: 07/21/17 0616      PTH, Intact 137.6 (H) pg/mL      Magnesium [[id number]] (Normal) Collected: 07/21/17 0516     Lab Status: Final result Specimen: Blood Updated: 07/21/17 0556      Magnesium 1.6 mg/dL      Phosphorus [[id number]] (Normal) Collected: 07/21/17 0516     Lab Status: Final result Specimen: Blood Updated: 07/21/17 0556      Phosphorus 3.8 mg/dL        Specimen: Blood Updated: 07/20/17 0053       TSH 3.300 mIU/mL      Lipid Panel [[id number]] (Abnormal) Collected: 07/19/17 2334     Lab Status: Final result Specimen: Blood Updated: 07/20/17 0025      Total Cholesterol 132 mg/dL       Triglycerides 100 mg/dL       HDL Cholesterol 33 (L) mg/dL       LDL Cholesterol  72 mg/dL       LDL/HDL Ratio 2.39     Color, UA Yellow        Appearance, UA Clear      pH, UA 7.5      Specific Gravity, UA 1.014      Glucose, UA Negative      Ketones, UA Negative      Bilirubin, UA Negative      Blood, UA Negative      Protein,  mg/dL (2+) (A)      Leuk Esterase, UA Negative      Nitrite, UA Negative      Urobilinogen, UA 1.0 E.U./dL     Urinalysis, Microscopic Only [[id number]] (Abnormal) Collected: 07/19/17 2216     Lab Status: Final result Specimen: Urine from Urine, Clean Catch Updated: 07/19/17 2231      RBC, UA 0-2 (A) /HPF       WBC, UA 0-2 (A) /HPF       Bacteria, UA None Seen /HPF       Squamous Epithelial Cells, UA 3-6 (A) /HPF       Hyaline Casts, UA None Seen /LPF       Methodology Automated Microscopy     Radiology data:  Imaging Results (last 7 days)     Procedure Component Value Units Date/Time    CT Abdomen Pelvis Without Contrast [[id number]] Collected:  07/19/17 2154     Updated:  07/19/17 2207    Narrative:       EXAMINATION: CT ABDOMEN PELVIS WO CONTRAST- 7/19/2017 9:32 PM CDT     HISTORY: Abdominal pain, vomiting, renal failure.     COMPARISON: None      DLP: 637 mGy cm     TECHNIQUE: Noncontrast enhanced images of the abdomen and pelvis  obtained without oral contrast. Sagittal and coronal reformations were  made from the original source data and reviewed.     FINDINGS:   The visualized heart appears mildly enlarged. There is trace pericardial  fluid. Atelectasis is noted at the lung bases.     Evaluation is limited by the lack of IV contrast. Allowing for these  limitations, the liver, spleen, and right adrenal gland are normal in  appearance. A mass is seen  within the left adrenal gland with some  peripheral calcifications, compatible with an adrenal adenoma. The  kidneys appear atrophic bilaterally. A small amount of fluid is seen  within the right perinephric space. There is contour irregularity of the  mid left kidney, concerning for underlying mass. There is fatty atrophy  of the pancreas. The gallbladder is surgically absent. The infrarenal  abdominal aorta is dilated, measuring up to 3.0 cm in size. There are  scattered atherosclerotic calcifications of the aorta and its branch  vessels. There is fatty atrophy of the pancreas. There is subtle  inflammatory stranding around the pancreas.     There is no appreciable mesenteric or retroperitoneal lymphadenopathy. A  small hiatal hernia is suspected. The proximal portion of the stomach is  normal in appearance. Within the distal aspect of the stomach and  proximal duodenum, there is surrounding inflammatory stranding. A  hyperdense lesion is seen in the expected region of the ampulla, though  this is difficult to evaluate secondary to streak artifact from surgical  clips and lack of IV contrast. This could represent a distal common bile  duct stone, though there is no significant intra or extrahepatic biliary  ductal dilatation to support this. The small bowel is not overly  dilated. There are scattered colonic diverticula without evidence of  diverticulitis. The large bowel is otherwise grossly normal in  appearance. No free air is seen in the abdomen. There is a  fat-containing umbilical hernia. There is some skin thickening of the  ventral abdominal wall. A midline abdominal wall scar is present.     There is mild inflammatory stranding around the wall of the urinary  bladder, extending into the lower quadrants bilaterally. There has been  previous hysterectomy. No pelvic mass or free fluid is identified. No  pelvic or inguinal lymphadenopathy is appreciated.     Review of the visualized osseous structures  demonstrates no acute or  aggressive lesions. Somewhat heterogeneous appearance of the bones may  be related to osteopenia.       Impression:       1. Subtle inflammatory stranding around the pancreas and proximal  duodenum, concerning for acute pancreatitis. A calcification is noted in  the region of the ampulla, though this may be within small bowel, as  there is no appreciable intra or extrahepatic biliary ductal dilatation.  2. There is mild inflammatory stranding around the urinary bladder,  suggesting cystitis. Correlate with urinalysis.  3. Infrarenal abdominal aortic aneurysm, measuring up to 3.0 cm in size.  4. Possible left renal mass. Nonemergent renal ultrasound could be  performed for further evaluation.  This report was finalized on 07/19/2017 22:04 by Dr. Bryant Chandler MD.    US Renal Bilateral [[id number]] Collected:  07/20/17 0843     Updated:  07/20/17 0858    Narrative:       EXAMINATION: US RENAL BILATERAL-  7/20/2017 9:43 AM EDT     HISTORY: Left renal mass     COMPARISON:CT study dated 07/19/2017     TECHNIQUE:Bilateral renal ultrasound examination was performed.     FINDINGS:     The ultrasound evaluation was very challenging due to the patient's body  habitus.     In the interpolar region of the left kidney there is focal contour  change with question of underlying solid mass however the examination is  equivocal. Further imaging characterization with CT renal cell protocol  suggested when clinically feasible.     The right kidney measures 8.4 cm in qayp-ml-apzn length. The left kidney  measures 11.1 cm in xwpe-jn-cmgr length.     There is no pelvocaliectasis or evidence of obstruction. There are no  perinephric abnormalities.       Impression:       1. Challenging ultrasound-guided evaluation of the kidneys due to the  patient's body habitus. Equivocal mass interpolar region left kidney.  Further imaging characterization with CT using renal mass protocol  recommended.  This report was  finalized on 07/20/2017 08:55 by Dr. Jung Wynne MD.    MRI Abdomen Without Contrast [[id number]] Collected:  07/21/17 1609     Updated:  07/21/17 1641    Narrative:       MRI OF ABDOMEN WITH AND WITHOUT CONTRAST     HISTORY: possible left renal mass, can't have contrast because of  chronic kidney disease; K85.90-Acute pancreatitis without necrosis or  infection, unspecified; Z74.09-Other reduced mobility      COMPARISON: CT scan dated 07/19/2017      TECHNIQUE: Multiplanar, multisequence MR imaging of the abdomen was  performed without intravenous administration of contrast.     FINDINGS:  The liver is normal in size and does not appear overtly cirrhotic. No  focal liver lesions are identified on the T2-weighted images or  diffusion images. The gallbladder appears surgically absent. There is  prominence of the common bile duct, especially distally at the level of  the pancreatic head (series 301-image 31). The duct measures 7.7 mm in  this location. The previously identified stone from the noncontrast CT  is not well-visualized on the MRI. Additionally, the main pancreatic  duct at the level of the pancreatic head and neck appears dilated with  the duct measuring up to 1.4 cm at the level of the pancreatic head near  the ampulla (series 301-image 30). No definite mass lesions are  identifiable on this noncontrast exam. At the level of the pancreatic  body and tail the main pancreatic duct is normal in caliber. The  pancreas does appear diffusely atrophic.     Unremarkable appearance of the spleen. A 3.4 cm oval circumscribed fluid  bright T2 hyperintense lesion is seen in the area of the left adrenal  gland (series 41-image 33). The in and out of phase T1 images are  uniformly low there is no associated diffusion restriction. The ADC  values are uniformly high in this thought to reflect a cyst, possibly  within the adrenal gland. It does not appear to reflect a solid lesion.     The right kidney is diffusely  atrophic. Several small cysts are seen  arising from the right kidney. The left kidney is also atrophic although  not as severely atrophic as the right kidney. The left kidney does  display an irregular contour although the signal characteristics of the  left kidney are uniform throughout. No focal lesion is identified to  raise high suspicion for a left-sided renal malignancy. There does not  appear to be hydronephrosis.     Short segment aneurysmal dilation of the infrarenal abdominal aorta is  identified, measuring up to 3.4 cm in greatest axial diameter. Small  volume ascites in the abdomen. No mesenteric or retroperitoneal  adenopathy is identified. The visualized bony structures of the spine  are unremarkable. There is a small right-sided pleural effusion.       Impression:       1. Bilateral renal atrophy. Although there is an irregular surface  contour of the left kidney, no discrete mass lesion is identified to  suggest renal malignancy.  2. There is dilation of the main pancreatic duct in the pancreatic head  up to 1.4 cm. The recent noncontrast CT showed a calcification/stone in  the area of the pancreatic head and it is possible that there was/is a  stone within the pancreatic duct near the ampulla. No stone is  identified on the MRI although CT is better for evaluation of stones.  You may consider a repeat noncontrast CT scan of the abdomen to see if a  stone is still present in this area.  3. No adenopathy is identified in abdomen.  4. Small volume ascites with small layering right pleural effusion.    This report was finalized on 07/21/2017 16:38 by Dr Franco Hernandez, .        Hospital Course  Patient is a 81 y.o. female presented to [facility] emergency room 7/19/17 with complaints of  nausea, vomiting, and abdominal pain that started at 1 PM on the day of admission.  Around 10:30 in the morning she ate a fruit pie.  Later, she became nauseated and vomited.  She reported upper abdominal  epigastric pain that radiated to her back.  Pain intensity 10 out of 10.  She denied fever but reported feeling chilling.  Admission lab work creatinine 2.57, white blood cell count 8.42, lipase 38,987, amylase 2139.  CT scan of the abdomen showed inflammatory stranding around the pancreas and proximal duodenum concerning for acute pancreatitis.  Mild inflammatory stranding around the urinary bladder suggesting cystitis.  Infra renal abdominal aortic aneurysm.  Possible left renal mass.  She received normal saline fluid bolus, morphine IV, Dilaudid IV, Zofran IV in the emergency room.    She was admitted to the medical floor under the hospitalist service with acute pancreatitis, nausea and vomiting, abnormal CT scan of abdomen with concern for left renal mass, acute versus chronic kidney injury and chronic atrial fibrillation rate controlled on anticoagulation.  She was made nothing by mouth.  She was hydrated with IV fluids.  She was given Dilaudid IV for pain and Zofran IV for nausea.  Serial lipase and amylase were monitored.  Lipase trended down quickly to 8724 and 326.  Amylase improved to 975 then 174.  She was started on clear liquid diet which was advanced as tolerated.  Epigastric pain improved with Dilaudid and resolved and she was no longer requiring pain medication.  Lipid panel within normal limits.  She denies alcohol consumption.  She has had cholecystectomy.    Creatinine was 2.57 on admission.  She was hydrated with IV fluids.  Creatinine improved to 2.4.  She has a history of chronic kidney disease stage IV and is followed by nephrology.  Creatinine was 2.4 in May 2017 at [facility].  Renal ultrasound impression challenging ultrasound evaluation of the kidneys due to the patient's body habitus.  Equivocal mass interpolar region left kidney.  Further imaging with CT recommended.  Nephrology was consulted as there was concern about a possible left renal mass on CT scan and ultrasound of the  kidneys.  She was seen by Dr. Montes who reviewed renal ultrasound from 2014 and there was no mention of renal mass.  Dr. Montes recommended MRI of the abdomen without contrast.  Impression no discrete mass lesion identified to suggest renal malignancy.  Creatinine 2.7 on date of discharge.  Recommend follow-up basic metabolic panel 7/28/17 with return appointment with Dr. Martinez.    She has history of chronic atrial fibrillation rate controlled and is on anticoagulation with Coumadin.  Patient performs self testing pro time, INR at home.  Results reported to Dr. Martinez who regulates Coumadin.  INR was 2.06 on admission.  INR increased to 4.57.  Coumadin was held for 2 days.  INR 3.7 on 7/23/17. She has been advised to hold Coumadin 7/23/17 and 7/24/17.  She will check INR 7/25/17 and report to Dr. Martinez for Coumadin adjustment.    She has a history of chronic obstructive pulmonary disease.  No exacerbation during this admission.  She wears oxygen at 2-3 L chronically.  She also has obstructive sleep apnea and wears CPAP at night and during naps.  She has hypothyroidism on supplemental Synthroid.  Dose of Synthroid was recently adjusted by her primary care provider.  She has history of hypertension.  Systolic blood pressure has remained 120 or less. Losartan was not resumed during hospitalization or at time of discharge.  Dr. Martinez will follow blood pressure with office visit and may resume medications as indicated.    Physical therapy was consulted to evaluate and treat.  Recommendations for ongoing physical therapy after discharge.   was consulted to arrange home health and physical therapy at discharge.  On 7/23/17 she is suitable for discharge.  Specific instructions to include holding Coumadin 7/23 and 7/24 have been discussed with both patient and son.     Physical Exam on Discharge:  /46 (BP Location: Left arm, Patient Position: Lying) Comment: nurse notified  Pulse 68  Temp  "98.9 °F (37.2 °C) (Axillary)   Resp 18  Ht 67\" (170.2 cm)  Wt 263 lb 11.2 oz (120 kg)  LMP  (LMP Unknown)  SpO2 91%  BMI 41.3 kg/m2  Physical Exam  Physical Exam  Constitutional: She is oriented to person, place, and time.   obese   HENT:   Head: Normocephalic and atraumatic.   Eyes: EOM are normal. Pupils are equal, round, and reactive to light.   Neck: Normal range of motion. Neck supple. No tracheal deviation present.   Cardiovascular: Normal rate and normal heart sounds.  Exam reveals no gallop and no friction rub.    No murmur heard.  Irregular rhythm   Pulmonary/Chest: No respiratory distress.   Wearing CPAP at present.  Otherwise Oxygen at 3 L, lungs clear today.   Abdominal: Soft. There is no tenderness. A hernia (Ventral hernia noted) is present.   Genitourinary:   Genitourinary Comments: Deferred   Musculoskeletal: She exhibits edema (Trace pedal edema bilaterally).   Neurological: She is alert and oriented to person, place, and time.   Skin: Skin is warm and dry.   Psychiatric: She has a normal mood and affect.     Condition on Discharge:  Stable and improved    Discharge Disposition: Home with son.  Home health and physical therapy ordered at discharge.    Discharge Diet:   Diet Instructions     Advance Diet As Tolerated                  as tolerated    Activity at Discharge:   Activity Instructions     Activity as Tolerated                  as tolerated    Discharge Care Plan / Instructions:   1.  Should she have worsening, returning symptoms of epigastric pain she should seek medical attention.  2.  Hold Coumadin 7/23/17 and 7/24/17.  3.  Pro time INR,  7/25/17 with results to Dr. Martinez for coumadin adjustment.  Patient performs self testing.  4.  Losartan was not continued at time of admission or at discharge.  5.  Recommend basic metabolic panel 7/28/17 with return appointment to see Dr. Martinez.    Discharge Medications:   Barb Connors   Home Medication Instructions KELLY:[id number]    " Printed on:07/23/17 [id number]   Medication Information                      allopurinol (ZYLOPRIM) 100 MG tablet  Take 100 mg by mouth Daily.             amLODIPine (NORVASC) 10 MG tablet  Take 10 mg by mouth Daily.             bumetanide (BUMEX) 1 MG tablet  Take 1 tablet by mouth Daily.             colesevelam (WELCHOL) 625 MG tablet  Hold x 1 week, may resume 7/30/17             cyanocobalamin (VITAMIN B-12) 500 MCG tablet  Take 500 mcg by mouth Daily.             levalbuterol (XOPENEX) 0.63 MG/3ML nebulizer solution  Take 1 ampule by nebulization Every 8 (Eight) Hours.             levothyroxine (SYNTHROID, LEVOTHROID) 200 MCG tablet  Take 200 mcg by mouth 4 (Four) Times a Week. Sunday, Tuesday, Thursday, Saturday.             levothyroxine (SYNTHROID, LEVOTHROID) 200 MCG tablet  Take 400 mcg by mouth 3 (Three) Times a Week. Monday, Wednesday, and Friday.             metoprolol tartrate (LOPRESSOR) 50 MG tablet  Take 50 mg by mouth Every 12 (Twelve) Hours.             O2 (OXYGEN)  Inhale 2.5 L/min Continuous.             pantoprazole (PROTONIX) 40 MG EC tablet  Take 40 mg by mouth Daily.             pneumococcal polysaccharide 23-valent (PNEUMOVAX-23) 25 MCG/0.5ML vaccine  Inject 0.5 mL into the shoulder, thigh, or buttocks 1 (One) Time for 1 dose.             vitamin D (ERGOCALCIFEROL) 13175 UNITS capsule capsule  Take 50,000 Units by mouth Every 7 (Seven) Days. Sunday.             warfarin (COUMADIN) 1 MG tablet  Take 1 mg by mouth 3 (Three) Times a Week. Monday, Wednesday, and Saturday.  Hold 7/24/17.            warfarin (COUMADIN) 2 MG tablet  Take 2 mg by mouth 4 (Four) Times a Week. Sunday, Tuesday, Thursday, and Friday.  Hold 7/23/17             Follow-up Appointments:   Follow-up Information     Follow up with Britney Martinez MD .    Specialty:  Internal Medicine    Why:  July 28, 2017 at 1:30PM. Needs BMP with follow up appointment    Contact information:    [address] [place] [address] [address] DRIVER  [facility]  [address]  [phone]          Test Results Pending at Discharge: none    The above documentation resulted from a face-to-face encounter by me Lizbeth DHALIWAL, [facility].    MADHURI Potts  07/23/17  10:39 AM    Time:  This discharge process required 45 minutes for completion.     Plan discussed with Dr. Cali Monique, patient, and son.    Time spent in face-to-face evaluation, chart review, planning and education 45 minutes.  Please note that portions of this note may have been completed with a voice recognition program. Efforts were made to edit the dictations, but occasionally words are mistranscribed.

## 2017-07-22 NOTE — THERAPY TREATMENT NOTE
Acute Care - Physical Therapy Treatment Note  [facility]     Patient Name: Barb Connors  : 1935  MRN: [id number]  Today's Date: 2017  Onset of Illness/Injury or Date of Surgery Date: 17  Date of Referral to PT: 17  Referring Physician: MADHURI Castaneda    Admit Date: 2017    Visit Dx:    ICD-10-CM ICD-9-CM   1. Acute pancreatitis without infection or necrosis, unspecified pancreatitis type K85.90 577.0   2. Impaired mobility and endurance Z74.09 V49.89     Patient Active Problem List   Diagnosis   • Acute pancreatitis without infection or necrosis   • CKD (chronic kidney disease) stage 4, GFR 15-29 ml/min   • Abnormal CT scan, kidney, no mass lesion to suggest renal malignancy per MRI abdomen   • Chronic atrial fibrillation on chronic anticoagulation    • Sleep apnea   • COPD (chronic obstructive pulmonary disease); not in exacerbation; O2 dependent               Adult Rehabilitation Note       17 1043 17 1440       Rehab Assessment/Intervention    Discipline physical therapy assistant  -CW physical therapy assistant  -TR     Document Type therapy note (daily note)  -CW therapy note (daily note)  -TR     Subjective Information agree to therapy;complains of;dizziness  -CW agree to therapy;complains of;fatigue  -TR     Patient Effort, Rehab Treatment adequate  -CW adequate  -TR     Precautions/Limitations fall precautions;oxygen therapy device and L/min  -CW fall precautions;oxygen therapy device and L/min  -TR     Recorded by [CW] Regi Cr PTA [TR] Carmel Ballard PTA     Vital Signs    Pre SpO2 (%) 92  -CW 87  -TR     O2 Delivery Pre Treatment --   CPAP  -CW supplemental O2   2 L O2/NC (pts reports 2.5 L at home, bumped to 3 L   -TR     Intra SpO2 (%)  79  -TR     O2 Delivery Intra Treatment  supplemental O2   3 L O2/NC  -TR     Post SpO2 (%)  90  -TR     O2 Delivery Post Treatment  supplemental O2   3 L O2/NC  -TR     Pre Patient Position  Supine   -TR     Intra Patient Position  Sitting  -TR     Post Patient Position  Sitting  -TR     Recorded by [CW] Regi Cr PTA [TR] Carmel Ballard PTA     Pain Assessment    Pain Assessment No/denies pain  -CW No/denies pain  -TR     Recorded by [CW] Regi Cr PTA [TR] Carmel Ballard PTA     Cognitive Assessment/Intervention    Current Cognitive/Communication Assessment  functional  -TR     Orientation Status  oriented x 4  -TR     Follows Commands/Answers Questions  able to follow multi-step instructions;100% of the time  -TR     Personal Safety  WNL/WFL  -TR     Personal Safety Interventions fall prevention program maintained;gait belt;nonskid shoes/slippers when out of bed  -CW fall prevention program maintained;gait belt;nonskid shoes/slippers when out of bed  -TR     Recorded by [CW] Regi Cr PTA [TR] Carmel Ballard PTA     Bed Mobility, Assessment/Treatment    Bed Mobility, Assistive Device bed rails  -CW bed rails  -TR     Bed Mob, Supine to Sit, [place] conditional independence  -CW supervision required  -TR     Bed Mob, Sit to Supine, [place] conditional independence  -CW --   not tested onstretcher with transport   -TR     Bed Mobility, Impairments  strength decreased   decreased endurance   -TR     Recorded by [CW] Regi Cr PTA [TR] Carmel Ballard PTA     Transfer Assessment/Treatment    Transfers, Sit-Stand [place] stand by assist  -CW verbal cues required;stand by assist  -TR     Transfers, Stand-Sit [place] stand by assist  -CW verbal cues required;stand by assist  -TR     Transfers, Sit-Stand-Sit, Assist Device bed rails  -CW      Recorded by [CW] Regi Cr PTA [TR] Carmel Ballard PTA     Gait Assessment/Treatment    Gait, [place] Level stand by assist  -CW verbal cues required;contact guard assist  -TR     Gait, Assistive Device  rolling walker  -TR     Gait, Distance (Feet) --   side steps at EOB  -CW  25   x2 with one standing rest in between '  -TR     Gait, Safety Issues supplemental O2  -CW supplemental O2  -TR     Gait, Impairments strength decreased  -CW strength decreased   decreased endurance   -TR     Gait, Comment Pt deferred gait due to dizzinness  -CW      Recorded by [CW] Regi Cr PTA [TR] Carmel Ballard PTA     Motor Skills/Interventions    Additional Documentation Balance Skills Training (Group)  -CW      Recorded by [CW] Regi Cr PTA      Balance Skills Training    Sitting-Level of Assistance Independent  -CW      Sitting-Balance Support Feet supported  -CW      Sitting # of Minutes 15  -CW      Standing-Level of Assistance Close supervision  -CW      Gait Balance-Level of Assistance Close supervision  -CW      Gait Balance Activities side-stepping  -CW      Recorded by [CW] Regi rC PTA      Therapy Exercises    Bilateral Lower Extremities AROM:;20 reps;supine;sitting;ankle pumps/circles;heel slides;hip abduction/adduction;hip flexion;LAQ;quad sets;SLR  -CW AROM:;20 reps;sitting  -TR     Recorded by [CW] Regi Cr PTA [TR] Carmel Ballard PTA     Positioning and Restraints    Pre-Treatment Position in bed  -CW in bed  -TR     Post Treatment Position bed  -CW other  -TR     In Bed fowlers;call light within reach;with family/caregiver  -CW      Other Position  with other staff   with transport on stretcher  -TR     Recorded by [CW] Regi Cr PTA [TR] Carmel Ballard PTA       User Key  (r) = Recorded By, (t) = Taken By, (c) = Cosigned By    Initials Name Effective Dates    CW Regi Cr PTA 06/22/15 -     TR Carmel Ballard PTA 08/02/16 -                 IP PT Goals       07/20/17 1407          Bed Mobility PT LTG    Bed Mobility PT LTG, Date Established 07/20/17  -      Bed Mobility PT LTG, Time to Achieve by discharge  -      Bed Mobility PT LTG, Activity Type all bed mobility  -      Bed Mobility PT LTG,  [place] Level independent  -      Transfer Training PT LTG    Transfer Training PT LTG, Date Established 07/20/17  -      Transfer Training PT LTG, Time to Achieve by discharge  -      Transfer Training PT LTG, Activity Type bed to chair /chair to bed;sit to stand/stand to sit  -      Transfer Training PT LTG, [place] Level supervision required  -      Gait Training PT LTG    Gait Training Goal PT LTG, Date Established 07/20/17  -      Gait Training Goal PT LTG, Time to Achieve by discharge  -      Gait Training Goal PT LTG, [place] Level supervision required  -      Gait Training Goal PT LTG, Distance to Achieve 100  -      Stair Training PT LTG    Stair Training Goal PT LTG, Date Established 07/20/17  -      Stair Training Goal PT LTG, Time to Achieve by discharge  -      Stair Training Goal PT LTG, Number of Steps 3  -      Stair Training Goal PT LTG, [place] Level minimum assist (75% patient effort)  -      Stair Training Goal PT LTG, Assist Device cane, straight  -        User Key  (r) = Recorded By, (t) = Taken By, (c) = Cosigned By    Initials Name Provider Type     Eugene Garsia PT Physical Therapist          Physical Therapy Education     Title: PT OT SLP Therapies (Done)     Topic: Physical Therapy (Done)     Point: Mobility training (Done)    Learning Progress Summary    Learner Readiness Method Response Comment Documented by Status   Patient Acceptance E,D VU,DU Exercise, benefit of activity  07/22/17 1131 Done    Acceptance E VU Benefits of PT, Enduraance training, PLB TR 07/21/17 1509 Done    Acceptance E,D VU,DU benefits of PT and POC, call for assist OOB  07/20/17 1406 Done   Family Acceptance E,D VU,DU Exercise, benefit of activity  07/22/17 1131 Done    Acceptance E VU Benefits of PT, Enduraance training, PLB TR 07/21/17 1509 Done               Point: Precautions (Done)    Learning Progress Summary    Learner Readiness Method Response Comment  Documented by Status   Patient Acceptance E,D VU,DU Exercise, benefit of activity  07/22/17 1131 Done    Acceptance E VU Benefits of PT, Enduraance training, PLB  07/21/17 1509 Done    Acceptance E,D VU,DU benefits of PT and POC, call for assist OOB  07/20/17 1406 Done   Family Acceptance E,D VU,DU Exercise, benefit of activity  07/22/17 1131 Done    Acceptance E VU Benefits of PT, Enduraance training, PLB  07/21/17 1509 Done                      User Key     Initials Effective Dates Name Provider Type Discipline     08/02/16 -  Eugene Garsia, PT Physical Therapist PT     06/22/15 -  Regi Cr, PTA Physical Therapy Assistant PT     08/02/16 -  Carmel Ballard PTA Physical Therapy Assistant PT                    PT Recommendation and Plan  Anticipated Discharge Disposition: home with home health  Planned Therapy Interventions: bed mobility training, gait training, home exercise program, patient/family education, stair training, strengthening, transfer training  PT Frequency: daily, per priority policy  Plan of Care Review  Plan Of Care Reviewed With: patient  Progress: progress toward functional goals as expected  Outcome Summary/Follow up Plan: Pt performed AROM sitting and supine BLE exercises x 20. C/O increased dizziness this morning and deferred ambulation.  Pt will continue to benefit from therapy to increase strength and activity tolerance.          Outcome Measures       07/22/17 1100 07/21/17 1440 07/20/17 1403    How much help from another person do you currently need...    Turning from your back to your side while in flat bed without using bedrails? 4  -CW 4  -TR 4  -LH    Moving from lying on back to sitting on the side of a flat bed without bedrails? 4  -CW 3  -TR 3  -LH    Moving to and from a bed to a chair (including a wheelchair)? 3  -CW 3  -TR 3  -LH    Standing up from a chair using your arms (e.g., wheelchair, bedside chair)? 3  -CW 3  -TR 3  -LH    Climbing 3-5 steps  with a railing? 3  -CW 3  -TR 3  -LH    To walk in hospital room? 3  -CW 3  -TR 3  -LH    AM-PAC 6 Clicks Score 20  -CW 19  -TR 19  -LH    Functional Assessment    Outcome Measure Options AM-PAC 6 Clicks Basic Mobility (PT)  -CW AM-PAC 6 Clicks Basic Mobility (PT)  -TR AM-PAC 6 Clicks Basic Mobility (PT)  -LH      User Key  (r) = Recorded By, (t) = Taken By, (c) = Cosigned By    Initials Name Provider Type     Eugene Garsia, PT Physical Therapist    CW Regi Cr PTA Physical Therapy Assistant    GREY Ballard PTA Physical Therapy Assistant           Time Calculation:         PT Charges       07/22/17 1134          Time Calculation    Start Time 1043  -CW      Stop Time 1107  -CW      Time Calculation (min) 24 min  -CW      PT Received On 07/22/17  -CW      PT Goal Re-Cert Due Date 07/28/17  -CW      Time Calculation- PT    Total Timed Code Minutes- PT 24 minute(s)  -CW        User Key  (r) = Recorded By, (t) = Taken By, (c) = Cosigned By    Initials Name Provider Type     Regi Cr PTA Physical Therapy Assistant          Therapy Charges for Today     Code Description Service Date Service Provider Modifiers Qty    [id number] HC PT THER PROC EA 15 MIN 7/22/2017 Regi Cr PTA GP 2          PT G-Codes  Outcome Measure Options: AM-PAC 6 Clicks Basic Mobility (PT)  Score: 19  Functional Limitation: Mobility: Walking and moving around  Mobility: Walking and Moving Around Current Status (): At least 20 percent but less than 40 percent impaired, limited or restricted  Mobility: Walking and Moving Around Goal Status (): At least 1 percent but less than 20 percent impaired, limited or restricted    Regi Cr PTA  7/22/2017

## 2017-07-22 NOTE — PROGRESS NOTES
[facility] Medicine Services  INPATIENT PROGRESS NOTE    Length of Stay: 3  Date of Admission: 7/19/2017  Primary Care Physician: Britney Martinez MD    Subjective   Chief Complaint: follow up epigastric pain  HPI   She is lying in bed with son present in room.  She denies nausea, vomiting or abdominal pain.  She tolerated advanced diet with regular food today.  She denies chest pain or palpitations.  She reports baseline shortness of breath.  She is currently wearing CPAP.  Otherwise, she wears oxygen continuously.  She ambulated to the doorway today.  INR elevated today.  She self tests her INR at home.  Coumadin dose was recently adjusted.  MRI abdomen no discrete mass lesion to suggest renal malignancy.  Results of MRI discussed with patient and son.    Review of Systems   Constitutional: Positive for appetite change. Negative for unexpected weight change.   HENT: Negative for congestion.    Eyes: Negative for visual disturbance.   Respiratory: Negative for cough and wheezing.    Cardiovascular: Negative for chest pain, palpitations and leg swelling.   Gastrointestinal: Positive for abdominal pain (Epigastric pain improved) Negative for diarrhea.   Endocrine: Negative for polyuria.   Genitourinary: Negative for dysuria.   Musculoskeletal: Negative for myalgias.   Skin: Negative for wound.   Neurological: Positive for weakness.   Hematological: Does not bruise/bleed easily.   Psychiatric/Behavioral: Negative for agitation.   All pertinent negatives and positives are as above. All other systems have been reviewed and are negative unless otherwise stated.     Objective    Temp:  [98 °F (36.7 °C)-98.9 °F (37.2 °C)] 98.3 °F (36.8 °C)  Heart Rate:  [60-72] 61  Resp:  [16-20] 20  BP: (106-126)/(40-67) 117/51  Physical Exam  Constitutional: She is oriented to person, place, and time.   obese   HENT:   Head: Normocephalic and atraumatic.   Eyes: EOM are normal. Pupils are equal,  round, and reactive to light.   Neck: Normal range of motion. Neck supple. No tracheal deviation present.   Cardiovascular: Normal rate and normal heart sounds.  Exam reveals no gallop and no friction rub.    No murmur heard.  Irregular rhythm   Pulmonary/Chest: No respiratory distress.   Wearing CPAP at present.  Otherwise Oxygen at 3 L, lungs clear today.   Abdominal: Soft. There is no tenderness. A hernia (Ventral hernia noted) is present.   Genitourinary:   Genitourinary Comments: Deferred   Musculoskeletal: She exhibits edema (Trace pedal edema bilaterally).   Neurological: She is alert and oriented to person, place, and time.   Skin: Skin is warm and dry.   Psychiatric: She has a normal mood and affect.     Results Review:  I have reviewed the labs, radiology results, and diagnostic studies.    Laboratory Data:     Results from last 7 days  Lab Units 07/20/17 0533 07/19/17 2020   WBC 10*3/mm3 7.40 8.42   HEMOGLOBIN g/dL 9.7* 10.5*   HEMATOCRIT % 33.4* 34.0*   PLATELETS 10*3/mm3 116* 114*          Results from last 7 days  Lab Units 07/22/17 0518 07/21/17 0516 07/20/17 0533 07/19/17 2020   SODIUM mmol/L 139 141 146* 143   POTASSIUM mmol/L 3.9 4.1 4.2 4.4   CHLORIDE mmol/L 107 109 110 106   CO2 mmol/L 25.0 26.0 27.0 25.0   BUN mg/dL 36* 34* 30* 34*   CREATININE mg/dL 2.58* 2.48* 2.43* 2.57*   CALCIUM mg/dL 8.7 8.6 9.0 10.0   BILIRUBIN mg/dL  --  0.7 0.7 1.1*   ALK PHOS U/L  --  50 61 77   ALT (SGPT) U/L  --  33 31 38   AST (SGOT) U/L  --  21 27 33   GLUCOSE mg/dL 83 81 111* 172*       0  Lab Value Date/Time   INR 4.57 (C) 07/22/2017 0518   INR 3.43 (H) 07/21/2017 0516   INR 2.25 (H) 07/20/2017 0533   INR 2.24 (H) 07/19/2017 2334   INR 2.06 (H) 07/19/2017 2020         0  Lab Value Date/Time   LIPASE 326 (H) 07/21/2017 0516   LIPASE 8724 (H) 07/20/2017 0533   LIPASE 34599 (H) 07/19/2017 2020       0  Lab Value Date/Time   AMYLASE 174 (H) 07/21/2017 0516   AMYLASE 975 (H) 07/20/2017 0533   AMYLASE 2139 (H)  07/19/2017 2020     Radiology Data:   Imaging Results (last 7 days)     Procedure Component Value Units Date/Time    CT Abdomen Pelvis Without Contrast [[id number]] Collected:  07/19/17 2154     Updated:  07/19/17 2207    Narrative:       EXAMINATION: CT ABDOMEN PELVIS WO CONTRAST- 7/19/2017 9:32 PM CDT     HISTORY: Abdominal pain, vomiting, renal failure.     COMPARISON: None      DLP: 637 mGy cm     TECHNIQUE: Noncontrast enhanced images of the abdomen and pelvis  obtained without oral contrast. Sagittal and coronal reformations were  made from the original source data and reviewed.     FINDINGS:   The visualized heart appears mildly enlarged. There is trace pericardial  fluid. Atelectasis is noted at the lung bases.     Evaluation is limited by the lack of IV contrast. Allowing for these  limitations, the liver, spleen, and right adrenal gland are normal in  appearance. A mass is seen within the left adrenal gland with some  peripheral calcifications, compatible with an adrenal adenoma. The  kidneys appear atrophic bilaterally. A small amount of fluid is seen  within the right perinephric space. There is contour irregularity of the  mid left kidney, concerning for underlying mass. There is fatty atrophy  of the pancreas. The gallbladder is surgically absent. The infrarenal  abdominal aorta is dilated, measuring up to 3.0 cm in size. There are  scattered atherosclerotic calcifications of the aorta and its branch  vessels. There is fatty atrophy of the pancreas. There is subtle  inflammatory stranding around the pancreas.     There is no appreciable mesenteric or retroperitoneal lymphadenopathy. A  small hiatal hernia is suspected. The proximal portion of the stomach is  normal in appearance. Within the distal aspect of the stomach and  proximal duodenum, there is surrounding inflammatory stranding. A  hyperdense lesion is seen in the expected region of the ampulla, though  this is difficult to evaluate secondary to  streak artifact from surgical  clips and lack of IV contrast. This could represent a distal common bile  duct stone, though there is no significant intra or extrahepatic biliary  ductal dilatation to support this. The small bowel is not overly  dilated. There are scattered colonic diverticula without evidence of  diverticulitis. The large bowel is otherwise grossly normal in  appearance. No free air is seen in the abdomen. There is a  fat-containing umbilical hernia. There is some skin thickening of the  ventral abdominal wall. A midline abdominal wall scar is present.     There is mild inflammatory stranding around the wall of the urinary  bladder, extending into the lower quadrants bilaterally. There has been  previous hysterectomy. No pelvic mass or free fluid is identified. No  pelvic or inguinal lymphadenopathy is appreciated.     Review of the visualized osseous structures demonstrates no acute or  aggressive lesions. Somewhat heterogeneous appearance of the bones may  be related to osteopenia.       Impression:       1. Subtle inflammatory stranding around the pancreas and proximal  duodenum, concerning for acute pancreatitis. A calcification is noted in  the region of the ampulla, though this may be within small bowel, as  there is no appreciable intra or extrahepatic biliary ductal dilatation.  2. There is mild inflammatory stranding around the urinary bladder,  suggesting cystitis. Correlate with urinalysis.  3. Infrarenal abdominal aortic aneurysm, measuring up to 3.0 cm in size.  4. Possible left renal mass. Nonemergent renal ultrasound could be  performed for further evaluation.  This report was finalized on 07/19/2017 22:04 by Dr. Bryant Chandler MD.    US Renal Bilateral [[id number]] Collected:  07/20/17 0843     Updated:  07/20/17 0858    Narrative:       EXAMINATION: US RENAL BILATERAL-  7/20/2017 9:43 AM EDT     HISTORY: Left renal mass     COMPARISON:CT study dated 07/19/2017     TECHNIQUE:Bilateral  renal ultrasound examination was performed.     FINDINGS:     The ultrasound evaluation was very challenging due to the patient's body  habitus.     In the interpolar region of the left kidney there is focal contour  change with question of underlying solid mass however the examination is  equivocal. Further imaging characterization with CT renal cell protocol  suggested when clinically feasible.     The right kidney measures 8.4 cm in [id number] length. The left kidney  measures 11.1 cm in [id number] length.     There is no pelvocaliectasis or evidence of obstruction. There are no  perinephric abnormalities.       Impression:       1. Challenging ultrasound-guided evaluation of the kidneys due to the  patient's body habitus. Equivocal mass interpolar region left kidney.  Further imaging characterization with CT using renal mass protocol  recommended.  This report was finalized on 07/20/2017 08:55 by Dr. Jung Wynne MD.    MRI Abdomen Without Contrast [[id number]] Collected:  07/21/17 1609     Updated:  07/21/17 1641    Narrative:       MRI OF ABDOMEN WITH AND WITHOUT CONTRAST     HISTORY: possible left renal mass, can't have contrast because of  chronic kidney disease; K85.90-Acute pancreatitis without necrosis or  infection, unspecified; Z74.09-Other reduced mobility      COMPARISON: CT scan dated 07/19/2017      TECHNIQUE: Multiplanar, multisequence MR imaging of the abdomen was  performed without intravenous administration of contrast.     FINDINGS:  The liver is normal in size and does not appear overtly cirrhotic. No  focal liver lesions are identified on the T2-weighted images or  diffusion images. The gallbladder appears surgically absent. There is  prominence of the common bile duct, especially distally at the level of  the pancreatic head (series 301-image 31). The duct measures 7.7 mm in  this location. The previously identified stone from the noncontrast CT  is not well-visualized on the MRI.  Additionally, the main pancreatic  duct at the level of the pancreatic head and neck appears dilated with  the duct measuring up to 1.4 cm at the level of the pancreatic head near  the ampulla (series 301-image 30). No definite mass lesions are  identifiable on this noncontrast exam. At the level of the pancreatic  body and tail the main pancreatic duct is normal in caliber. The  pancreas does appear diffusely atrophic.     Unremarkable appearance of the spleen. A 3.4 cm oval circumscribed fluid  bright T2 hyperintense lesion is seen in the area of the left adrenal  gland (series 41-image 33). The in and out of phase T1 images are  uniformly low there is no associated diffusion restriction. The ADC  values are uniformly high in this thought to reflect a cyst, possibly  within the adrenal gland. It does not appear to reflect a solid lesion.     The right kidney is diffusely atrophic. Several small cysts are seen  arising from the right kidney. The left kidney is also atrophic although  not as severely atrophic as the right kidney. The left kidney does  display an irregular contour although the signal characteristics of the  left kidney are uniform throughout. No focal lesion is identified to  raise high suspicion for a left-sided renal malignancy. There does not  appear to be hydronephrosis.     Short segment aneurysmal dilation of the infrarenal abdominal aorta is  identified, measuring up to 3.4 cm in greatest axial diameter. Small  volume ascites in the abdomen. No mesenteric or retroperitoneal  adenopathy is identified. The visualized bony structures of the spine  are unremarkable. There is a small right-sided pleural effusion.       Impression:       1. Bilateral renal atrophy. Although there is an irregular surface  contour of the left kidney, no discrete mass lesion is identified to  suggest renal malignancy.  2. There is dilation of the main pancreatic duct in the pancreatic head  up to 1.4 cm. The recent  noncontrast CT showed a calcification/stone in  the area of the pancreatic head and it is possible that there was/is a  stone within the pancreatic duct near the ampulla. No stone is  identified on the MRI although CT is better for evaluation of stones.  You may consider a repeat noncontrast CT scan of the abdomen to see if a  stone is still present in this area.  3. No adenopathy is identified in abdomen.  4. Small volume ascites with small layering right pleural effusion.           This report was finalized on 07/21/2017 16:38 by Dr Franco Hernandez, .        Scheduled Meds    allopurinol 100 mg Oral Daily   amLODIPine 10 mg Oral Q24H   calcitriol 0.25 mcg Oral Every Other Day   levothyroxine 200 mcg Oral Q AM   metoprolol tartrate 50 mg Oral Q12H   pantoprazole 40 mg Oral Q AM   vitamin B-12 1,000 mcg Oral Daily   warfarin 1 mg Oral Once per day on Mon Wed Sat   warfarin 2 mg Oral Once per day on Sun Tue Thu Fri       I have reviewed the patient current medications.     Assessment/Plan     Hospital Problem List     Acute pancreatitis without infection or necrosis    CKD (chronic kidney disease) stage 4, GFR 15-29 ml/min    Abnormal CT scan, kidney, no mass lesion to suggest renal malignancy per MRI abdomen    Chronic atrial fibrillation on chronic anticoagulation     Sleep apnea    COPD (chronic obstructive pulmonary disease); not in exacerbation; O2 dependent        Assessment:  1.  Acute pancreatitis without infection or necrosis  2.  Epigastric pain secondary to #1  3.  Nausea with vomiting, resolved  4.  Chronic kidney disease stage IV, stable  5.  Abnormal CT of abdomen and pelvis but no mass lesion identified to suggest renal malignancy per MRI abdomen  6.  Chronic atrial fibrillation, rate controlled, on anticoagulation with Coumadin  7.  Chronic obstructive pulmonary disease, no exacerbation.  Chronic oxygen at 2-3 liters  8.  Obstructive sleep apnea, wears CPAP  9.  Hypothyroidism  10.  Supratherapeutic  INR    Plan:  1.  Tolerated full liquid diet.  Advance to regular diet today.  2.  IV fluids discontinued.  Creatinine stable 2.5  3.  Continue to hold Bumex and losartan.  Systolic blood pressure 111-126  4.  Hold Coumadin today as INR 4.57.  Repeat pro time, INR tomorrow.  Patient self test INR at home and reports results to Dr. Martinez's office for Coumadin adjustment.  5.  MRI abdomen 7/21 no discrete mass lesion identified to suggest renal malignancy.  6.  CPAP at night and during naps.  Oxygen 2-3 L continuously.  7.  Physical therapy.  Patient ambulated 25 feet ×2 yesterday.  Physical therapy recommended skilled physical therapy after discharge.  8.  Basic metabolic panel tomorrow to monitor renal function.  9.  Will recommend home health at discharge for blood pressure monitoring and physical therapy.      The above documentation resulted from a face-to-face encounter by me Lizbeth DHALIWAL, [facility].      Discharge Planning: I expect patient to be discharged to home with home health in 1-2 days.    MADHURI Potts   07/22/17   10:56 AM

## 2017-07-23 VITALS
SYSTOLIC BLOOD PRESSURE: 120 MMHG | OXYGEN SATURATION: 91 % | DIASTOLIC BLOOD PRESSURE: 46 MMHG | HEART RATE: 68 BPM | HEIGHT: 67 IN | WEIGHT: 263.7 LBS | RESPIRATION RATE: 18 BRPM | TEMPERATURE: 98.9 F | BODY MASS INDEX: 41.39 KG/M2

## 2017-07-23 LAB
ANION GAP SERPL CALCULATED.3IONS-SCNC: 9 MMOL/L (ref 4–13)
BUN BLD-MCNC: 40 MG/DL (ref 5–21)
BUN/CREAT SERPL: 14.8 (ref 7–25)
CALCIUM SPEC-SCNC: 9 MG/DL (ref 8.4–10.4)
CHLORIDE SERPL-SCNC: 108 MMOL/L (ref 98–110)
CO2 SERPL-SCNC: 23 MMOL/L (ref 24–31)
CREAT BLD-MCNC: 2.71 MG/DL (ref 0.5–1.4)
GFR SERPL CREATININE-BSD FRML MDRD: 17 ML/MIN/1.73
GLUCOSE BLD-MCNC: 91 MG/DL (ref 70–100)
INR PPP: 3.73 (ref 0.91–1.09)
POTASSIUM BLD-SCNC: 3.8 MMOL/L (ref 3.5–5.3)
PROTHROMBIN TIME: 38.4 SECONDS (ref 11.9–14.6)
SODIUM BLD-SCNC: 140 MMOL/L (ref 135–145)

## 2017-07-23 PROCEDURE — 85610 PROTHROMBIN TIME: CPT | Performed by: INTERNAL MEDICINE

## 2017-07-23 PROCEDURE — 80048 BASIC METABOLIC PNL TOTAL CA: CPT | Performed by: NURSE PRACTITIONER

## 2017-07-23 RX ORDER — COLESEVELAM 180 1/1
TABLET ORAL
Start: 2017-07-23 | End: 2018-11-29

## 2017-07-23 RX ORDER — BUMETANIDE 1 MG/1
1 TABLET ORAL DAILY
Start: 2017-07-23

## 2017-07-23 RX ADMIN — Medication 1000 MCG: at 08:54

## 2017-07-23 RX ADMIN — ALLOPURINOL 100 MG: 100 TABLET ORAL at 08:54

## 2017-07-23 RX ADMIN — CALCITRIOL 0.25 MCG: 0.25 CAPSULE ORAL at 08:54

## 2017-07-23 RX ADMIN — METOPROLOL TARTRATE 50 MG: 50 TABLET, FILM COATED ORAL at 08:55

## 2017-07-23 RX ADMIN — LEVOTHYROXINE SODIUM 200 MCG: 200 TABLET ORAL at 05:45

## 2017-07-23 RX ADMIN — AMLODIPINE BESYLATE 10 MG: 10 TABLET ORAL at 08:54

## 2017-07-23 RX ADMIN — PANTOPRAZOLE SODIUM 40 MG: 40 TABLET, DELAYED RELEASE ORAL at 05:45

## 2017-07-23 NOTE — DISCHARGE PLACEMENT REQUEST
"To:  [facility]  From:  CHASE Horne  [phone]    OK today, spoke with [employer].    Barb Frazier (81 y.o. Female)     Date of Birth Social Security Number Address Home Phone MRN    1935  [id number] [facility] [address] [phone] [id number]    Jainism Marital Status          Unknown        Admission Date Admission Type Admitting Provider Attending Provider Department, Room/Bed    7/19/17 Emergency Cali Monique DO Robinson, Maurice S, DO [address], 386/1    Discharge Date Discharge Disposition Discharge Destination         Home or Self Care             Attending Provider: Cali Monique DO     Allergies:  No Known Allergies    Isolation:  None   Infection:  None   Code Status:  Conditional    Ht:  67\" (170.2 cm)   Wt:  263 lb 11.2 oz (120 kg)    Admission Cmt:  None   Principal Problem:  None                Active Insurance as of 7/19/2017     Primary Coverage     Payor Plan Insurance Group Employer/Plan Group    MEDICARE MEDICARE A & B      Payor Plan Address Payor Plan Phone Number Effective From Effective To    [address] [phone] 8/1/2001     [place] [id number]       Subscriber Name Subscriber Birth Date Member ID       BARB FRAZIER 1935 [id number]                 Emergency Contacts      (Rel.) Home Phone Work Phone Mobile Phone    Negro Frazier (Son) [phone] -- --        Inpatient Consult to Case Management  [[id number]] (Order [id number])   Consult   Date: 7/23/2017 Department: [address] Released By: Germaine Tripathi RN (auto-released) Authorizing: MADHURI Mckeon   Order History   Inpatient   Date/Time Action Taken User Additional Information   07/23/17 1105 Release Germaine Tripathi RN (auto-released) From Order: [id number]   Order Details   Frequency Duration Priority Order Class   Once 1  occurrence Routine Hospital Performed   Order Questions   Question Answer " Comment   Reason for Consult? arrange home health for skilled nsg needs BP checks, physical therapy and BMP on july 28th    Verbal Order Info   Action Created on Order Mode Entered by Responsible Provider Signed by Signed on   Ordering 07/23/17 1104 Telephone with readback AIMEE Mcclain APRN     Consult Order Info   ID Description Priority Start Date Start Time   [id number] Inpatient Consult to [facility]  Routine 07/23/2017 11:04 AM   Provider Specialty Referred to   ______________________________________ _____________________________________   Acknowledgement Info   For At Acknowledged By Acknowledged On   Placing Order 07/23/17 1104 Erica Murdock RN 07/23/17 1108   Reprint Order Requisition   Inpatient Consult to [facility] (Order #[id number]) on 7/23/17   Encounter   View Encounter       Order Provider Info       Office phone Pager E-mail   Ordering User Germaine Tripathi RN -- -- --   Authorizing Provider MADHURI Mckeon [phone] -- --   Attending Provider Cali Monique [phone] -- --   Order Transmittal Tracking   Inpatient Consult to [facility] (Order #[id number]) on 7/23/17            History & Physical      Ángel Kahn MD at 7/19/2017 10:13 PM          4           [facility]  HISTORY AND PHYSICAL    Date of Admission: 7/19/2017  Primary Care Physician: Britney Martinez MD    Subjective     Chief Complaint:    Abdominal pain and vomiting   History of Present Illness    81-year-old  woman presenting with vomiting, abdominal pain and low back pain that started early this afternoon.  In evaluation, she has been found with contrast elevation in lipase and amylase and a CT scan finding consistent with acute pancreatitis.  She has prior history of cholecystectomy.  In this imaging study she had, there has not been found any  "intra-or extrahepatic bile duct dilatation.  Instead, an incidental finding concerning for left renal mass is described.Her serum creatinine is elevated at 2.5.  She has no history of alcohol abuse, her lipid panel is not known to her.    Ate about 1030 this morning fruit pie and about 1300H started to feel abdominal pain, nauseated and an hour later she threw up.  Since then, this has been the  course of her afternoon. No other household member has similar symptoms.  She had the pie from the store.  Back pain between shoulder and waist.  + abdominal pain 'upper part of belly\"  10/10 intensity; radiating to her back and up around her shoulder  No fever but claims chills and feel cold all the time  + memory impairment \" not the way it used to be\"  Denies any constipation  + swelling of legs -  which she ralates she has stage 4 CKD     Review of Systems     Otherwise complete ROS reviewed and negative except as mentioned in the HPI.      Past Medical History:   Past Medical History:   Diagnosis Date   • A-fib - on  Chronic anticoagulant (coumadin 2 mg 4 days of the week while 1 mg MWS.  She self monitor her PT/INR - 2.5 last Tuesday    • COPD (chronic obstructive pulmonary disease); oxygen dependent on 2.lpm continuously  Personal history of sleep apnea on CPAP during sleep    • Hypertension    • Kidney failure (Chronic stage 4) - follow with Dr. Pinto        Past Surgical History:  Past Surgical History:   Procedure Laterality Date   • CARDIAC CATHETERIZATION     • CHOLECYSTECTOMY     • HYSTERECTOMY     bilateral cataract surgery     Social History:  reports that she has quit smoking. She quit after 5.00 years of use. She does not have any smokeless tobacco history on file. She reports that she does not drink alcohol or use illicit drugs.  DNR aggressive   DPOA: Negro - son  Family History: sisters had skin cancer; daughter had breast cancer    Allergies:  No Known Allergies    Medications:yest to be obtained  Prior to " "Admission medications    Medication Sig Start Date End Date Taking? Authorizing Provider   warfarin (COUMADIN) 1 MG tablet Take  by mouth. 7/18/17  Yes Historical Provider, MD       Objective     Vital Signs: /58  Pulse 72  Temp 98.6 °F (37 °C) (Oral)   Resp 19  Ht 67\" (170.2 cm)  Wt 250 lb (113 kg)  LMP  (LMP Unknown)  SpO2 95%  BMI 39.16 kg/m2  Physical Exam   Constitutional: She is oriented to person, place, and time. She appears well-developed and well-nourished.   Pleasant woman, NAD, periorbital hyperpigmentation    HENT:   Head: Normocephalic and atraumatic.   Nose: Nose normal.   Mouth/Throat: Oropharynx is clear and moist. No oropharyngeal exudate.   Eyes: Conjunctivae and EOM are normal. Pupils are equal, round, and reactive to light. No scleral icterus.   Neck: Normal range of motion. Neck supple. No JVD present. No tracheal deviation present. No thyromegaly present.   Cardiovascular: Normal rate and regular rhythm.  Exam reveals no friction rub.    No murmur heard.  Pulmonary/Chest: Effort normal and breath sounds normal. No respiratory distress. She has no wheezes. She has no rales.   Abdominal: Soft. Bowel sounds are normal. She exhibits no distension and no mass. There is tenderness (some tenderness left flank and midepigastric area). There is no rebound and no guarding. A hernia (ventral hernia) is present.   Obese, no guarding   Musculoskeletal: Normal range of motion. She exhibits edema (trace to 1+ edema of BLE). She exhibits no tenderness.   Lymphadenopathy:     She has no cervical adenopathy.   Neurological: She is alert and oriented to person, place, and time. No cranial nerve deficit. She exhibits normal muscle tone.   Skin: Skin is warm and dry. No rash noted. No erythema.   Psychiatric: She has a normal mood and affect. Her behavior is normal. Judgment and thought content normal.   Vitals reviewed.          Results Reviewed:  Lab Results (last 24 hours)     Procedure Component " Value Units Date/Time    CBC & Differential [[id number]] Collected:  07/19/17 2020    Specimen:  Blood Updated:  07/19/17 2039    Narrative:       The following orders were created for panel order CBC & Differential.  Procedure                               Abnormality         Status                     ---------                               -----------         ------                     CBC Auto Differential[[id number]]         Abnormal            Final result                 Please view results for these tests on the individual orders.    CBC Auto Differential [[id number]]  (Abnormal) Collected:  07/19/17 2020    Specimen:  Blood from Arm, Right Updated:  07/19/17 2039     WBC 8.42 10*3/mm3      RBC 3.68 (L) 10*6/mm3      Hemoglobin 10.5 (L) g/dL      Hematocrit 34.0 (L) %      MCV 92.4 fL      MCH 28.5 pg      MCHC 30.9 (L) g/dL      RDW 15.9 (H) %      RDW-SD 54.5 (H) fl      MPV 13.7 (H) fL      Platelets 114 (L) 10*3/mm3      Neutrophil % 85.0 (H) %      Lymphocyte % 9.5 (L) %      Monocyte % 3.3 (L) %      Eosinophil % 1.3 %      Basophil % 0.5 %      Immature Grans % 0.4 %      Neutrophils, Absolute 7.16 10*3/mm3      Lymphocytes, Absolute 0.80 10*3/mm3      Monocytes, Absolute 0.28 10*3/mm3      Eosinophils, Absolute 0.11 10*3/mm3      Basophils, Absolute 0.04 10*3/mm3      Immature Grans, Absolute 0.03 10*3/mm3     Comprehensive Metabolic Panel [[id number]]  (Abnormal) Collected:  07/19/17 2020    Specimen:  Blood from Arm, Right Updated:  07/19/17 2050     Glucose 172 (H) mg/dL      BUN 34 (H) mg/dL      Creatinine 2.57 (H) mg/dL      Sodium 143 mmol/L      Potassium 4.4 mmol/L      Chloride 106 mmol/L      CO2 25.0 mmol/L      Calcium 10.0 mg/dL      Total Protein 7.6 g/dL      Albumin 4.30 g/dL      ALT (SGPT) 38 U/L      AST (SGOT) 33 U/L      Alkaline Phosphatase 77 U/L      Total Bilirubin 1.1 (H) mg/dL      eGFR Non African Amer 18 (L) mL/min/1.73      Globulin 3.3 gm/dL      A/G Ratio 1.3 g/dL       BUN/Creatinine Ratio 13.2     Anion Gap 12.0 mmol/L     Narrative:       The MDRD GFR formula is only valid for adults with stable renal function between ages 18 and 70.    Amylase [[id number]]  (Abnormal) Collected:  07/19/17 2020    Specimen:  Blood from Arm, Right Updated:  07/19/17 2102     Amylase 2139 (H) U/L     Lipase [[id number]]  (Abnormal) Collected:  07/19/17 2020    Specimen:  Blood from Arm, Right Updated:  07/19/17 2144     Lipase 85871 (H) U/L     Extra Tubes [[id number]] Collected:  07/19/17 2020    Specimen:  Blood from Blood, Venous Line Updated:  07/19/17 2201    Narrative:       The following orders were created for panel order Extra Tubes.  Procedure                               Abnormality         Status                     ---------                               -----------         ------                     Red Top[[id number]]                                           Final result                 Please view results for these tests on the individual orders.    Red Top [[id number]] Collected:  07/19/17 2020    Specimen:  Blood Updated:  07/19/17 2201     Extra Tube Hold for add-ons.      Auto resulted.           Imaging Results (last 24 hours)     Procedure Component Value Units Date/Time    CT Abdomen Pelvis Without Contrast [[id number]] Collected:  07/19/17 2154     Updated:  07/19/17 2207    Narrative:       EXAMINATION: CT ABDOMEN PELVIS WO CONTRAST- 7/19/2017 9:32 PM CDT     HISTORY: Abdominal pain, vomiting, renal failure.     COMPARISON: None      DLP: 637 mGy cm     TECHNIQUE: Noncontrast enhanced images of the abdomen and pelvis  obtained without oral contrast. Sagittal and coronal reformations were  made from the original source data and reviewed.     FINDINGS:   The visualized heart appears mildly enlarged. There is trace pericardial  fluid. Atelectasis is noted at the lung bases.     Evaluation is limited by the lack of IV contrast. Allowing for these  limitations, the liver, spleen, and  right adrenal gland are normal in  appearance. A mass is seen within the left adrenal gland with some  peripheral calcifications, compatible with an adrenal adenoma. The  kidneys appear atrophic bilaterally. A small amount of fluid is seen  within the right perinephric space. There is contour irregularity of the  mid left kidney, concerning for underlying mass. There is fatty atrophy  of the pancreas. The gallbladder is surgically absent. The infrarenal  abdominal aorta is dilated, measuring up to 3.0 cm in size. There are  scattered atherosclerotic calcifications of the aorta and its branch  vessels. There is fatty atrophy of the pancreas. There is subtle  inflammatory stranding around the pancreas.     There is no appreciable mesenteric or retroperitoneal lymphadenopathy. A  small hiatal hernia is suspected. The proximal portion of the stomach is  normal in appearance. Within the distal aspect of the stomach and  proximal duodenum, there is surrounding inflammatory stranding. A  hyperdense lesion is seen in the expected region of the ampulla, though  this is difficult to evaluate secondary to streak artifact from surgical  clips and lack of IV contrast. This could represent a distal common bile  duct stone, though there is no significant intra or extrahepatic biliary  ductal dilatation to support this. The small bowel is not overly  dilated. There are scattered colonic diverticula without evidence of  diverticulitis. The large bowel is otherwise grossly normal in  appearance. No free air is seen in the abdomen. There is a  fat-containing umbilical hernia. There is some skin thickening of the  ventral abdominal wall. A midline abdominal wall scar is present.     There is mild inflammatory stranding around the wall of the urinary  bladder, extending into the lower quadrants bilaterally. There has been  previous hysterectomy. No pelvic mass or free fluid is identified. No  pelvic or inguinal lymphadenopathy is  appreciated.     Review of the visualized osseous structures demonstrates no acute or  aggressive lesions. Somewhat heterogeneous appearance of the bones may  be related to osteopenia.       Impression:       1. Subtle inflammatory stranding around the pancreas and proximal  duodenum, concerning for acute pancreatitis. A calcification is noted in  the region of the ampulla, though this may be within small bowel, as  there is no appreciable intra or extrahepatic biliary ductal dilatation.  2. There is mild inflammatory stranding around the urinary bladder,  suggesting cystitis. Correlate with urinalysis.  3. Infrarenal abdominal aortic aneurysm, measuring up to 3.0 cm in size.  4. Possible left renal mass. Nonemergent renal ultrasound could be  performed for further evaluation.  This report was finalized on 07/19/2017 22:04 by Dr. Bryant Chandler MD.          I have personally reviewed and interpreted the radiology studies and ECG obtained at time of admission.     Assessment / Plan     Assessment & Plan    Acute pancreatitis; nausea/vomiting and clinical dehydration   Abnormal CT of abdomen and pelvis with concern for left renal mass   acute on chronic vs chronic kidney injury  Chronic atrial fibrillation rate controlled on chronic anticoagulation - therapeutic  COPD  Not in exacerbation; chronic O2 dependent  Sleep apnea  On cpap    Obesity   Hypothyroidism      NPO  IV fluid  IV Pain med  Check lipid panel   Renal US; holf off diuretic  Instruct son to bring home CPAP  Oxygen   Other plan per orders  Code Status: DNR aggressive     I discussed the patients findings and my recommendations with patient and son  Estimated length of stay To be determined  Ángel Kahn MD   07/19/17   10:13 PM               Electronically signed by Ángel Kahn MD at 7/19/2017 11:19 PM        Operative/Procedure Notes (most recent note)     No notes of this type exist for this encounter.           Physician  Progress Notes (most recent note)      Ilan Montes MD at 7/22/2017  2:55 PM  Version 1 of 1         Nephrology ([facility]) Progress Note      Patient:  Barb Connors  YOB: 1935  Date of Service: 7/22/2017  MRN: [id number]   Acct: [id number]   Primary Care Physician: Britney Martinez MD  Advance Directive: Conditional Code  Admit Date: 7/19/2017       Hospital Day: 3  Referring Provider: Ángel Kahn*      Patient personally seen and examined.  Complete chart including Consults, Notes, Operative Reports, Labs, Cardiology, and Radiology studies reviewed as able.        Subjective:  Barb Connors is a 81 y.o. female  whom we were consulted for CKD 4 and renal mass.  Pt of Dr. Pinto with CKD.  Pt doesn't recall many details of her CKD or any prior renal imaging.  Admitted with abdominal pain/n/v.  Symptoms have been improving, taking small po now.  Dx with pancreatitis.  Also noted was indeterminate renal mass unable to be characterized on CT (noncontrast) and US.  Denies nsaids/rash/hematuria.       Improved today.  abd pain controlled.  Tolerating liquid diet.    Allergies:  Review of patient's allergies indicates no known allergies.    Home Meds:  Prescriptions Prior to Admission   Medication Sig Dispense Refill Last Dose   • allopurinol (ZYLOPRIM) 100 MG tablet Take 100 mg by mouth Daily.   Past Week at Unknown time   • amLODIPine (NORVASC) 10 MG tablet Take 10 mg by mouth Daily.   Past Week at Unknown time   • bumetanide (BUMEX) 1 MG tablet Take 1 mg by mouth Every 12 (Twelve) Hours.   Past Week at Unknown time   • colesevelam (WELCHOL) 625 MG tablet Take 625 mg by mouth Daily.   Past Week at Unknown time   • cyanocobalamin (VITAMIN B-12) 500 MCG tablet Take 500 mcg by mouth Daily.   Past Week at Unknown time   • levalbuterol (XOPENEX) 0.63 MG/3ML nebulizer solution Take 1 ampule by nebulization Every 8 (Eight) Hours.   Past Week at  Unknown time   • levothyroxine (SYNTHROID, LEVOTHROID) 200 MCG tablet Take 200 mcg by mouth 4 (Four) Times a Week. Sunday, Tuesday, Thursday, Saturday.   Past Week at Unknown time   • levothyroxine (SYNTHROID, LEVOTHROID) 200 MCG tablet Take 400 mcg by mouth 3 (Three) Times a Week. Monday, Wednesday, and Friday.   Past Week at Unknown time   • losartan (COZAAR) 100 MG tablet Take 50 mg by mouth Daily.   Past Week at Unknown time   • metoprolol tartrate (LOPRESSOR) 50 MG tablet Take 50 mg by mouth Every 12 (Twelve) Hours.   Past Week at Unknown time   • O2 (OXYGEN) Inhale 2.5 L/min Continuous.   Past Week at Unknown time   • pantoprazole (PROTONIX) 40 MG EC tablet Take 40 mg by mouth Daily.   Past Week at Unknown time   • vitamin D (ERGOCALCIFEROL) 53461 UNITS capsule capsule Take 50,000 Units by mouth Every 7 (Seven) Days. Sunday.   Past Week at Unknown time   • warfarin (COUMADIN) 1 MG tablet Take 1 mg by mouth 3 (Three) Times a Week. Monday, Wednesday, and Saturday.   Past Week at Unknown time   • warfarin (COUMADIN) 2 MG tablet Take 2 mg by mouth 4 (Four) Times a Week. Sunday, Tuesday, Thursday, and Friday.   Past Week at Unknown time       Medicines:  Current Facility-Administered Medications   Medication Dose Route Frequency Provider Last Rate Last Dose   • allopurinol (ZYLOPRIM) tablet 100 mg  100 mg Oral Daily Ángel Kahn MD   100 mg at 07/22/17 0938   • amLODIPine (NORVASC) tablet 10 mg  10 mg Oral Q24H Ángel Kahn MD   10 mg at 07/22/17 0938   • calcitriol (ROCALTROL) capsule 0.25 mcg  0.25 mcg Oral Every Other Day Ilan Montes MD   0.25 mcg at 07/21/17 2215   • HYDROmorphone (DILAUDID) injection 1 mg  1 mg Intravenous Q4H PRN Ángel Kahn MD   1 mg at 07/20/17 0340   • levalbuterol (XOPENEX) nebulizer solution 1.25 mg  1.25 mg Nebulization Q6H PRN Ángel Kahn MD   1.25 mg at 07/20/17 0952   • levothyroxine (SYNTHROID, LEVOTHROID) tablet 200  mcg  200 mcg Oral Q AM Ángel Kahn MD   200 mcg at 07/22/17 0547   • metoprolol tartrate (LOPRESSOR) tablet 50 mg  50 mg Oral Q12H Ángel Kahn MD   50 mg at 07/22/17 0938   • ondansetron (ZOFRAN) injection 4 mg  4 mg Intravenous Q4H PRN Ángel Kahn MD   4 mg at 07/21/17 1556   • pantoprazole (PROTONIX) EC tablet 40 mg  40 mg Oral Q AM Ángel Kahn MD   40 mg at 07/22/17 0547   • pneumococcal polysaccharide 23-valent (PNEUMOVAX-23) vaccine 0.5 mL  0.5 mL Intramuscular During Hospitalization Ángel Kahn MD       • sodium chloride 0.9 % flush 1-10 mL  1-10 mL Intravenous PRN Ángel Kahn MD       • sodium chloride 0.9 % flush 10 mL  10 mL Intravenous PRN Jozef Dye Jr., MD       • vitamin B-12 (CYANOCOBALAMIN) tablet 1,000 mcg  1,000 mcg Oral Daily Ángel Kahn MD   1,000 mcg at 07/22/17 0938   • warfarin (COUMADIN) tablet 1 mg  1 mg Oral Once per day on Mon Wed Sat Ángel Kahn MD       • warfarin (COUMADIN) tablet 2 mg  2 mg Oral Once per day on Sun Tue Thu Fri Ángel Kahn MD   2 mg at 07/20/17 2004       Past Medical History:  Past Medical History:   Diagnosis Date   • A-fib    • COPD (chronic obstructive pulmonary disease)    • Hypertension    • Kidney failure        Past Surgical History:  Past Surgical History:   Procedure Laterality Date   • CARDIAC CATHETERIZATION     • CHOLECYSTECTOMY     • HYSTERECTOMY         Family History  History reviewed. No pertinent family history.    Social History  Social History     Social History   • Marital status:      Spouse name: N/A   • Number of children: N/A   • Years of education: N/A     Occupational History   • Not on file.     Social History Main Topics   • Smoking status: Former Smoker     Years: 5.00   • Smokeless tobacco: Not on file   • Alcohol use No   • Drug use: No   • Sexual activity: Defer     Other Topics Concern   • Not on file      "    Social History Narrative   • No narrative on file         Review of Systems:  History obtained from chart review and the patient  General ROS: No fever or chills  Respiratory ROS: No cough, shortness of breath, wheezing  Cardiovascular ROS: no chest pain or dyspnea on exertion  Gastrointestinal ROS: No abdominal pain or melena  Genito-Urinary ROS: No dysuria or hematuria  14 point ROS reviewed with the patient and negative except as noted above and in the HPI unless unable to obtain.    Objective:  /56 (BP Location: Left arm)  Pulse 64  Temp 97.3 °F (36.3 °C) (Oral)   Resp 16  Ht 67\" (170.2 cm)  Wt 263 lb 11.2 oz (120 kg)  LMP  (LMP Unknown)  SpO2 95%  BMI 41.3 kg/m2    Intake/Output Summary (Last 24 hours) at 07/22/17 1455  Last data filed at 07/22/17 1411   Gross per 24 hour   Intake              480 ml   Output              650 ml   Net             -170 ml     General: awake/alert   Chest:  clear to auscultation bilaterally without respiratory distress  CVS: regular rate and rhythm  Abdominal: soft, nontender, normal bowel sounds  Extremities: tr ble edema  Skin: warm and dry without rash      Labs:    Results from last 7 days  Lab Units 07/20/17  0533 07/19/17 2020   WBC 10*3/mm3 7.40 8.42   HEMOGLOBIN g/dL 9.7* 10.5*   HEMATOCRIT % 33.4* 34.0*   PLATELETS 10*3/mm3 116* 114*           Results from last 7 days  Lab Units 07/22/17  0518 07/21/17 0516 07/20/17 0533 07/19/17 2020   SODIUM mmol/L 139 141 146* 143   POTASSIUM mmol/L 3.9 4.1 4.2 4.4   CHLORIDE mmol/L 107 109 110 106   CO2 mmol/L 25.0 26.0 27.0 25.0   BUN mg/dL 36* 34* 30* 34*   CREATININE mg/dL 2.58* 2.48* 2.43* 2.57*   CALCIUM mg/dL 8.7 8.6 9.0 10.0   BILIRUBIN mg/dL  --  0.7 0.7 1.1*   ALK PHOS U/L  --  50 61 77   ALT (SGPT) U/L  --  33 31 38   AST (SGOT) U/L  --  21 27 33   GLUCOSE mg/dL 83 81 111* 172*       Radiology:   Imaging Results (last 72 hours)     Procedure Component Value Units Date/Time    CT Abdomen Pelvis Without " Contrast [[id number]] Collected:  07/19/17 2154     Updated:  07/19/17 2207    Narrative:       EXAMINATION: CT ABDOMEN PELVIS WO CONTRAST- 7/19/2017 9:32 PM CDT     HISTORY: Abdominal pain, vomiting, renal failure.     COMPARISON: None      DLP: 637 mGy cm     TECHNIQUE: Noncontrast enhanced images of the abdomen and pelvis  obtained without oral contrast. Sagittal and coronal reformations were  made from the original source data and reviewed.     FINDINGS:   The visualized heart appears mildly enlarged. There is trace pericardial  fluid. Atelectasis is noted at the lung bases.     Evaluation is limited by the lack of IV contrast. Allowing for these  limitations, the liver, spleen, and right adrenal gland are normal in  appearance. A mass is seen within the left adrenal gland with some  peripheral calcifications, compatible with an adrenal adenoma. The  kidneys appear atrophic bilaterally. A small amount of fluid is seen  within the right perinephric space. There is contour irregularity of the  mid left kidney, concerning for underlying mass. There is fatty atrophy  of the pancreas. The gallbladder is surgically absent. The infrarenal  abdominal aorta is dilated, measuring up to 3.0 cm in size. There are  scattered atherosclerotic calcifications of the aorta and its branch  vessels. There is fatty atrophy of the pancreas. There is subtle  inflammatory stranding around the pancreas.     There is no appreciable mesenteric or retroperitoneal lymphadenopathy. A  small hiatal hernia is suspected. The proximal portion of the stomach is  normal in appearance. Within the distal aspect of the stomach and  proximal duodenum, there is surrounding inflammatory stranding. A  hyperdense lesion is seen in the expected region of the ampulla, though  this is difficult to evaluate secondary to streak artifact from surgical  clips and lack of IV contrast. This could represent a distal common bile  duct stone, though there is no  significant intra or extrahepatic biliary  ductal dilatation to support this. The small bowel is not overly  dilated. There are scattered colonic diverticula without evidence of  diverticulitis. The large bowel is otherwise grossly normal in  appearance. No free air is seen in the abdomen. There is a  fat-containing umbilical hernia. There is some skin thickening of the  ventral abdominal wall. A midline abdominal wall scar is present.     There is mild inflammatory stranding around the wall of the urinary  bladder, extending into the lower quadrants bilaterally. There has been  previous hysterectomy. No pelvic mass or free fluid is identified. No  pelvic or inguinal lymphadenopathy is appreciated.     Review of the visualized osseous structures demonstrates no acute or  aggressive lesions. Somewhat heterogeneous appearance of the bones may  be related to osteopenia.       Impression:       1. Subtle inflammatory stranding around the pancreas and proximal  duodenum, concerning for acute pancreatitis. A calcification is noted in  the region of the ampulla, though this may be within small bowel, as  there is no appreciable intra or extrahepatic biliary ductal dilatation.  2. There is mild inflammatory stranding around the urinary bladder,  suggesting cystitis. Correlate with urinalysis.  3. Infrarenal abdominal aortic aneurysm, measuring up to 3.0 cm in size.  4. Possible left renal mass. Nonemergent renal ultrasound could be  performed for further evaluation.  This report was finalized on 07/19/2017 22:04 by Dr. Bryant Chandler MD.    US Renal Bilateral [[id number]] Collected:  07/20/17 0843     Updated:  07/20/17 0858    Narrative:       EXAMINATION: US RENAL BILATERAL-  7/20/2017 9:43 AM EDT     HISTORY: Left renal mass     COMPARISON:CT study dated 07/19/2017     TECHNIQUE:Bilateral renal ultrasound examination was performed.     FINDINGS:     The ultrasound evaluation was very challenging due to the patient's  body  habitus.     In the interpolar region of the left kidney there is focal contour  change with question of underlying solid mass however the examination is  equivocal. Further imaging characterization with CT renal cell protocol  suggested when clinically feasible.     The right kidney measures 8.4 cm in ieua-eq-ecup length. The left kidney  measures 11.1 cm in ecai-qs-jvee length.     There is no pelvocaliectasis or evidence of obstruction. There are no  perinephric abnormalities.       Impression:       1. Challenging ultrasound-guided evaluation of the kidneys due to the  patient's body habitus. Equivocal mass interpolar region left kidney.  Further imaging characterization with CT using renal mass protocol  recommended.  This report was finalized on 07/20/2017 08:55 by Dr. Jung Wynne MD.    MRI Abdomen Without Contrast [[id number]] Collected:  07/21/17 1609     Updated:  07/21/17 1641    Narrative:       MRI OF ABDOMEN WITH AND WITHOUT CONTRAST     HISTORY: possible left renal mass, can't have contrast because of  chronic kidney disease; K85.90-Acute pancreatitis without necrosis or  infection, unspecified; Z74.09-Other reduced mobility      COMPARISON: CT scan dated 07/19/2017      TECHNIQUE: Multiplanar, multisequence MR imaging of the abdomen was  performed without intravenous administration of contrast.     FINDINGS:  The liver is normal in size and does not appear overtly cirrhotic. No  focal liver lesions are identified on the T2-weighted images or  diffusion images. The gallbladder appears surgically absent. There is  prominence of the common bile duct, especially distally at the level of  the pancreatic head (series 301-image 31). The duct measures 7.7 mm in  this location. The previously identified stone from the noncontrast CT  is not well-visualized on the MRI. Additionally, the main pancreatic  duct at the level of the pancreatic head and neck appears dilated with  the duct measuring up to 1.4  cm at the level of the pancreatic head near  the ampulla (series 301-image 30). No definite mass lesions are  identifiable on this noncontrast exam. At the level of the pancreatic  body and tail the main pancreatic duct is normal in caliber. The  pancreas does appear diffusely atrophic.     Unremarkable appearance of the spleen. A 3.4 cm oval circumscribed fluid  bright T2 hyperintense lesion is seen in the area of the left adrenal  gland (series 41-image 33). The in and out of phase T1 images are  uniformly low there is no associated diffusion restriction. The ADC  values are uniformly high in this thought to reflect a cyst, possibly  within the adrenal gland. It does not appear to reflect a solid lesion.     The right kidney is diffusely atrophic. Several small cysts are seen  arising from the right kidney. The left kidney is also atrophic although  not as severely atrophic as the right kidney. The left kidney does  display an irregular contour although the signal characteristics of the  left kidney are uniform throughout. No focal lesion is identified to  raise high suspicion for a left-sided renal malignancy. There does not  appear to be hydronephrosis.     Short segment aneurysmal dilation of the infrarenal abdominal aorta is  identified, measuring up to 3.4 cm in greatest axial diameter. Small  volume ascites in the abdomen. No mesenteric or retroperitoneal  adenopathy is identified. The visualized bony structures of the spine  are unremarkable. There is a small right-sided pleural effusion.       Impression:       1. Bilateral renal atrophy. Although there is an irregular surface  contour of the left kidney, no discrete mass lesion is identified to  suggest renal malignancy.  2. There is dilation of the main pancreatic duct in the pancreatic head  up to 1.4 cm. The recent noncontrast CT showed a calcification/stone in  the area of the pancreatic head and it is possible that there was/is a  stone within the  pancreatic duct near the ampulla. No stone is  identified on the MRI although CT is better for evaluation of stones.  You may consider a repeat noncontrast CT scan of the abdomen to see if a  stone is still present in this area.  3. No adenopathy is identified in abdomen.  4. Small volume ascites with small layering right pleural effusion.           This report was finalized on 07/21/2017 16:38 by Dr Franco Hernandez, .          Culture:         Assessment   CKD 4  Left indeterminate renal mass - not seen on renal mass  Acute pancreatitis  Hypertension  Low Vitamin D  Secondary Hyperparathyroidism  High INR      Plan:  MRI showed no renal mass  Lipase improved  Monitor labs  IVF  calcitriol      Ilan Montes MD  7/22/2017  2:55 PM         Electronically signed by Ilan Montes MD at 7/22/2017  3:01 PM           Consult Notes (most recent note)      Ilan Montes MD at 7/20/2017  2:25 PM  Version 2 of 2     Consult Orders:    1. Inpatient Consult to Nephrology [[id number]] ordered by Yash Ceron DO at 07/20/17 1406                Nephrology ([facility]) Consult Note      Patient:  Barb Connors  YOB: 1935  Date of Service: 7/20/2017  MRN: [id number]   Acct: [id number]   Primary Care Physician: Britney Martinez MD  Advance Directive: Conditional Code  Admit Date: 7/19/2017       Hospital Day: 1  Referring Provider: Ángel Kahn*      Patient personally seen and examined.  Complete chart including Consults, Notes, Operative Reports, Labs, Cardiology, and Radiology studies reviewed as able.        Subjective:  Barb Connors is a 81 y.o. female  whom we were consulted for CKD 4 and renal mass.  Pt of Dr. Pinto with CKD.  Pt doesn't recall many details of her CKD or any prior renal imaging.  Admitted with abdominal pain/n/v.  Symptoms have been improving, taking small po now.  Dx with pancreatitis.  Also noted was  indeterminate renal mass unable to be characterized on CT (noncontrast) and US.  Denies nsaids/rash/hematuria.      Allergies:  Review of patient's allergies indicates no known allergies.    Home Meds:  Prescriptions Prior to Admission   Medication Sig Dispense Refill Last Dose   • allopurinol (ZYLOPRIM) 100 MG tablet Take 100 mg by mouth Daily.   Past Week at Unknown time   • amLODIPine (NORVASC) 10 MG tablet Take 10 mg by mouth Daily.   Past Week at Unknown time   • bumetanide (BUMEX) 1 MG tablet Take 1 mg by mouth Every 12 (Twelve) Hours.   Past Week at Unknown time   • colesevelam (WELCHOL) 625 MG tablet Take 625 mg by mouth Daily.   Past Week at Unknown time   • cyanocobalamin (VITAMIN B-12) 500 MCG tablet Take 500 mcg by mouth Daily.   Past Week at Unknown time   • levalbuterol (XOPENEX) 0.63 MG/3ML nebulizer solution Take 1 ampule by nebulization Every 8 (Eight) Hours.   Past Week at Unknown time   • levothyroxine (SYNTHROID, LEVOTHROID) 200 MCG tablet Take 200 mcg by mouth 4 (Four) Times a Week. Sunday, Tuesday, Thursday, Saturday.   Past Week at Unknown time   • levothyroxine (SYNTHROID, LEVOTHROID) 200 MCG tablet Take 400 mcg by mouth 3 (Three) Times a Week. Monday, Wednesday, and Friday.   Past Week at Unknown time   • losartan (COZAAR) 100 MG tablet Take 50 mg by mouth Daily.   Past Week at Unknown time   • metoprolol tartrate (LOPRESSOR) 50 MG tablet Take 50 mg by mouth Every 12 (Twelve) Hours.   Past Week at Unknown time   • O2 (OXYGEN) Inhale 2.5 L/min Continuous.   Past Week at Unknown time   • pantoprazole (PROTONIX) 40 MG EC tablet Take 40 mg by mouth Daily.   Past Week at Unknown time   • vitamin D (ERGOCALCIFEROL) 72276 UNITS capsule capsule Take 50,000 Units by mouth Every 7 (Seven) Days. Sunday.   Past Week at Unknown time   • warfarin (COUMADIN) 1 MG tablet Take 1 mg by mouth 3 (Three) Times a Week. Monday, Wednesday, and Saturday.   Past Week at Unknown time   • warfarin (COUMADIN) 2 MG  tablet Take 2 mg by mouth 4 (Four) Times a Week. Sunday, Tuesday, Thursday, and Friday.   Past Week at Unknown time       Medicines:  Current Facility-Administered Medications   Medication Dose Route Frequency Provider Last Rate Last Dose   • allopurinol (ZYLOPRIM) tablet 100 mg  100 mg Oral Daily Ángel Kahn MD   100 mg at 07/20/17 0926   • amLODIPine (NORVASC) tablet 10 mg  10 mg Oral Q24H Ángel Kahn MD   10 mg at 07/20/17 0926   • HYDROmorphone (DILAUDID) injection 1 mg  1 mg Intravenous Q4H PRN Ángel Kahn MD   1 mg at 07/20/17 0340   • levalbuterol (XOPENEX) nebulizer solution 1.25 mg  1.25 mg Nebulization Q6H PRN Ángel Kahn MD   1.25 mg at 07/20/17 0952   • levothyroxine (SYNTHROID, LEVOTHROID) tablet 200 mcg  200 mcg Oral Q AM Ángel Kahn MD   200 mcg at 07/20/17 0926   • metoprolol tartrate (LOPRESSOR) tablet 50 mg  50 mg Oral Q12H Ángel Kahn MD   50 mg at 07/20/17 2117   • ondansetron (ZOFRAN) injection 4 mg  4 mg Intravenous Q4H PRN Ángel Kahn MD   4 mg at 07/20/17 0340   • pantoprazole (PROTONIX) EC tablet 40 mg  40 mg Oral Q AM Ángel Kahn MD   40 mg at 07/20/17 0926   • pneumococcal polysaccharide 23-valent (PNEUMOVAX-23) vaccine 0.5 mL  0.5 mL Intramuscular During Hospitalization Ángel Kahn MD       • sodium chloride 0.45 % infusion  75 mL/hr Intravenous Continuous Yash Ceron DO   Stopped at 07/20/17 1419   • sodium chloride 0.9 % flush 1-10 mL  1-10 mL Intravenous PRN Ángel Kahn MD       • sodium chloride 0.9 % flush 10 mL  10 mL Intravenous PRN Jozef Dye Jr., MD       • vitamin B-12 (CYANOCOBALAMIN) tablet 1,000 mcg  1,000 mcg Oral Daily Ángel Kahn MD   1,000 mcg at 07/20/17 0926   • [START ON 7/22/2017] warfarin (COUMADIN) tablet 1 mg  1 mg Oral Once per day on Mon Wed Sat Ángel Kahn MD       • warfarin (COUMADIN) tablet 2 mg   "2 mg Oral Once per day on Sun Tue Thu Fri Ángel Kahn MD   2 mg at 07/20/17 2004       Past Medical History:  Past Medical History:   Diagnosis Date   • A-fib    • COPD (chronic obstructive pulmonary disease)    • Hypertension    • Kidney failure        Past Surgical History:  Past Surgical History:   Procedure Laterality Date   • CARDIAC CATHETERIZATION     • CHOLECYSTECTOMY     • HYSTERECTOMY         Family History  History reviewed. No pertinent family history.    Social History  Social History     Social History   • Marital status:      Spouse name: N/A   • Number of children: N/A   • Years of education: N/A     Occupational History   • Not on file.     Social History Main Topics   • Smoking status: Former Smoker     Years: 5.00   • Smokeless tobacco: Not on file   • Alcohol use No   • Drug use: No   • Sexual activity: Defer     Other Topics Concern   • Not on file     Social History Narrative   • No narrative on file         Review of Systems:  History obtained from chart review and the patient  General ROS: No fever or chills  Respiratory ROS: No cough, shortness of breath, wheezing  Cardiovascular ROS: no chest pain or dyspnea on exertion  Gastrointestinal ROS: + abdominal pain - melena  Genito-Urinary ROS: No dysuria or hematuria  14 point ROS reviewed with the patient and negative except as noted above and in the HPI unless unable to obtain.    Objective:  /63 (BP Location: Left arm, Patient Position: Lying)  Pulse 67  Temp 98.9 °F (37.2 °C) (Oral)   Resp 16  Ht 67\" (170.2 cm)  Wt 259 lb (117 kg)  LMP  (LMP Unknown)  SpO2 91%  BMI 40.57 kg/m2    Intake/Output Summary (Last 24 hours) at 07/20/17 2135  Last data filed at 07/20/17 1416   Gross per 24 hour   Intake           2072.5 ml   Output                0 ml   Net           2072.5 ml     General: awake/alert   Chest:  clear to auscultation bilaterally without respiratory distress  CVS: regular rate and rhythm  Abdominal: " soft, ttp greatest in epigastric  Extremities: tr ble edema  Skin: warm and dry without rash      Labs:    Results from last 7 days  Lab Units 07/20/17  0533 07/19/17 2020   WBC 10*3/mm3 7.40 8.42   HEMOGLOBIN g/dL 9.7* 10.5*   HEMATOCRIT % 33.4* 34.0*   PLATELETS 10*3/mm3 116* 114*           Results from last 7 days  Lab Units 07/20/17 0533 07/19/17 2020   SODIUM mmol/L 146* 143   POTASSIUM mmol/L 4.2 4.4   CHLORIDE mmol/L 110 106   CO2 mmol/L 27.0 25.0   BUN mg/dL 30* 34*   CREATININE mg/dL 2.43* 2.57*   CALCIUM mg/dL 9.0 10.0   BILIRUBIN mg/dL 0.7 1.1*   ALK PHOS U/L 61 77   ALT (SGPT) U/L 31 38   AST (SGOT) U/L 27 33   GLUCOSE mg/dL 111* 172*       Radiology:   Imaging Results (last 72 hours)     Procedure Component Value Units Date/Time    CT Abdomen Pelvis Without Contrast [[id number]] Collected:  07/19/17 2154     Updated:  07/19/17 2207    Narrative:       EXAMINATION: CT ABDOMEN PELVIS WO CONTRAST- 7/19/2017 9:32 PM CDT     HISTORY: Abdominal pain, vomiting, renal failure.     COMPARISON: None      DLP: 637 mGy cm     TECHNIQUE: Noncontrast enhanced images of the abdomen and pelvis  obtained without oral contrast. Sagittal and coronal reformations were  made from the original source data and reviewed.     FINDINGS:   The visualized heart appears mildly enlarged. There is trace pericardial  fluid. Atelectasis is noted at the lung bases.     Evaluation is limited by the lack of IV contrast. Allowing for these  limitations, the liver, spleen, and right adrenal gland are normal in  appearance. A mass is seen within the left adrenal gland with some  peripheral calcifications, compatible with an adrenal adenoma. The  kidneys appear atrophic bilaterally. A small amount of fluid is seen  within the right perinephric space. There is contour irregularity of the  mid left kidney, concerning for underlying mass. There is fatty atrophy  of the pancreas. The gallbladder is surgically absent. The infrarenal  abdominal  aorta is dilated, measuring up to 3.0 cm in size. There are  scattered atherosclerotic calcifications of the aorta and its branch  vessels. There is fatty atrophy of the pancreas. There is subtle  inflammatory stranding around the pancreas.     There is no appreciable mesenteric or retroperitoneal lymphadenopathy. A  small hiatal hernia is suspected. The proximal portion of the stomach is  normal in appearance. Within the distal aspect of the stomach and  proximal duodenum, there is surrounding inflammatory stranding. A  hyperdense lesion is seen in the expected region of the ampulla, though  this is difficult to evaluate secondary to streak artifact from surgical  clips and lack of IV contrast. This could represent a distal common bile  duct stone, though there is no significant intra or extrahepatic biliary  ductal dilatation to support this. The small bowel is not overly  dilated. There are scattered colonic diverticula without evidence of  diverticulitis. The large bowel is otherwise grossly normal in  appearance. No free air is seen in the abdomen. There is a  fat-containing umbilical hernia. There is some skin thickening of the  ventral abdominal wall. A midline abdominal wall scar is present.     There is mild inflammatory stranding around the wall of the urinary  bladder, extending into the lower quadrants bilaterally. There has been  previous hysterectomy. No pelvic mass or free fluid is identified. No  pelvic or inguinal lymphadenopathy is appreciated.     Review of the visualized osseous structures demonstrates no acute or  aggressive lesions. Somewhat heterogeneous appearance of the bones may  be related to osteopenia.       Impression:       1. Subtle inflammatory stranding around the pancreas and proximal  duodenum, concerning for acute pancreatitis. A calcification is noted in  the region of the ampulla, though this may be within small bowel, as  there is no appreciable intra or extrahepatic biliary  ductal dilatation.  2. There is mild inflammatory stranding around the urinary bladder,  suggesting cystitis. Correlate with urinalysis.  3. Infrarenal abdominal aortic aneurysm, measuring up to 3.0 cm in size.  4. Possible left renal mass. Nonemergent renal ultrasound could be  performed for further evaluation.  This report was finalized on 07/19/2017 22:04 by Dr. Bryant Chandler MD.    US Renal Bilateral [[id number]] Collected:  07/20/17 0843     Updated:  07/20/17 0858    Narrative:       EXAMINATION: US RENAL BILATERAL-  7/20/2017 9:43 AM EDT     HISTORY: Left renal mass     COMPARISON:CT study dated 07/19/2017     TECHNIQUE:Bilateral renal ultrasound examination was performed.     FINDINGS:     The ultrasound evaluation was very challenging due to the patient's body  habitus.     In the interpolar region of the left kidney there is focal contour  change with question of underlying solid mass however the examination is  equivocal. Further imaging characterization with CT renal cell protocol  suggested when clinically feasible.     The right kidney measures 8.4 cm in jclt-kf-flqe length. The left kidney  measures 11.1 cm in wudt-vc-kwaa length.     There is no pelvocaliectasis or evidence of obstruction. There are no  perinephric abnormalities.       Impression:       1. Challenging ultrasound-guided evaluation of the kidneys due to the  patient's body habitus. Equivocal mass interpolar region left kidney.  Further imaging characterization with CT using renal mass protocol  recommended.  This report was finalized on 07/20/2017 08:55 by Dr. Jung Wynne MD.          Culture:         Assessment   CKD 4  Left indeterminate renal mass  Acute pancreatitis  Hypertension  Low Vitamin D  Secondary Hyperparathyroidism    Plan:  D/w Dr. Ceron  Will check with office records tomorrow to look for any prior imaging as I unable to access them remotely due to [facility] IT settings  Lipase improving  Monitor labs  IVF    Thank you  for the consult, we appreciate the opportunity to provide care to your patients.  Feel free to contact me if I can be of any further assistance.      Ilan Montes MD  7/20/2017  9:35 PM       Electronically signed by Ilan Montes MD at 7/20/2017  9:48 PM      Ilan Montes MD at 7/20/2017  2:25 PM  Version 1 of 2         Nephrology ([facility]) Consult Note      Patient:  Barb Connors  YOB: 1935  Date of Service: 7/20/2017  MRN: [id number]   Acct: [id number]   Primary Care Physician: Britney Martinez MD  Advance Directive: Conditional Code  Admit Date: 7/19/2017       Hospital Day: 1  Referring Provider: Ángel Kahn*      Patient personally seen and examined.  Complete chart including Consults, Notes, Operative Reports, Labs, Cardiology, and Radiology studies reviewed as able.        Subjective:  Barb Connors is a 81 y.o. female  whom we were consulted for CKD 4 and renal mass.  Pt of Dr. Pinto with CKD.  Pt doesn't recall many details of her CKD or any prior renal imaging.  Admitted with abdominal pain/n/v.  Symptoms have been improving, taking small po now.  Dx with pancreatitis.  Also noted was indeterminate renal mass unable to be characterized on CT (noncontrast) and US.  Denies nsaids/rash/hematuria.      Allergies:  Review of patient's allergies indicates no known allergies.    Home Meds:  Prescriptions Prior to Admission   Medication Sig Dispense Refill Last Dose   • allopurinol (ZYLOPRIM) 100 MG tablet Take 100 mg by mouth Daily.   Past Week at Unknown time   • amLODIPine (NORVASC) 10 MG tablet Take 10 mg by mouth Daily.   Past Week at Unknown time   • bumetanide (BUMEX) 1 MG tablet Take 1 mg by mouth Every 12 (Twelve) Hours.   Past Week at Unknown time   • colesevelam (WELCHOL) 625 MG tablet Take 625 mg by mouth Daily.   Past Week at Unknown time   • cyanocobalamin (VITAMIN B-12) 500 MCG tablet Take  500 mcg by mouth Daily.   Past Week at Unknown time   • levalbuterol (XOPENEX) 0.63 MG/3ML nebulizer solution Take 1 ampule by nebulization Every 8 (Eight) Hours.   Past Week at Unknown time   • levothyroxine (SYNTHROID, LEVOTHROID) 200 MCG tablet Take 200 mcg by mouth 4 (Four) Times a Week. Sunday, Tuesday, Thursday, Saturday.   Past Week at Unknown time   • levothyroxine (SYNTHROID, LEVOTHROID) 200 MCG tablet Take 400 mcg by mouth 3 (Three) Times a Week. Monday, Wednesday, and Friday.   Past Week at Unknown time   • losartan (COZAAR) 100 MG tablet Take 50 mg by mouth Daily.   Past Week at Unknown time   • metoprolol tartrate (LOPRESSOR) 50 MG tablet Take 50 mg by mouth Every 12 (Twelve) Hours.   Past Week at Unknown time   • O2 (OXYGEN) Inhale 2.5 L/min Continuous.   Past Week at Unknown time   • pantoprazole (PROTONIX) 40 MG EC tablet Take 40 mg by mouth Daily.   Past Week at Unknown time   • vitamin D (ERGOCALCIFEROL) 30686 UNITS capsule capsule Take 50,000 Units by mouth Every 7 (Seven) Days. Sunday.   Past Week at Unknown time   • warfarin (COUMADIN) 1 MG tablet Take 1 mg by mouth 3 (Three) Times a Week. Monday, Wednesday, and Saturday.   Past Week at Unknown time   • warfarin (COUMADIN) 2 MG tablet Take 2 mg by mouth 4 (Four) Times a Week. Sunday, Tuesday, Thursday, and Friday.   Past Week at Unknown time       Medicines:  Current Facility-Administered Medications   Medication Dose Route Frequency Provider Last Rate Last Dose   • allopurinol (ZYLOPRIM) tablet 100 mg  100 mg Oral Daily Ángel Kahn MD   100 mg at 07/20/17 0926   • amLODIPine (NORVASC) tablet 10 mg  10 mg Oral Q24H Ángel Kahn MD   10 mg at 07/20/17 0926   • HYDROmorphone (DILAUDID) injection 1 mg  1 mg Intravenous Q4H PRN Ángel Kahn MD   1 mg at 07/20/17 0340   • levalbuterol (XOPENEX) nebulizer solution 1.25 mg  1.25 mg Nebulization Q6H PRN Ángel Kahn MD   1.25 mg at 07/20/17 0952   •  levothyroxine (SYNTHROID, LEVOTHROID) tablet 200 mcg  200 mcg Oral Q AM Ángel Kahn MD   200 mcg at 07/20/17 0926   • metoprolol tartrate (LOPRESSOR) tablet 50 mg  50 mg Oral Q12H Ángel Kahn MD   50 mg at 07/20/17 2117   • ondansetron (ZOFRAN) injection 4 mg  4 mg Intravenous Q4H PRN Ángel Kahn MD   4 mg at 07/20/17 0340   • pantoprazole (PROTONIX) EC tablet 40 mg  40 mg Oral Q AM Ángel Kahn MD   40 mg at 07/20/17 0926   • pneumococcal polysaccharide 23-valent (PNEUMOVAX-23) vaccine 0.5 mL  0.5 mL Intramuscular During Hospitalization Ángel Kahn MD       • sodium chloride 0.45 % infusion  75 mL/hr Intravenous Continuous Yash Ceron DO   Stopped at 07/20/17 1419   • sodium chloride 0.9 % flush 1-10 mL  1-10 mL Intravenous PRN Ángel Kahn MD       • sodium chloride 0.9 % flush 10 mL  10 mL Intravenous PRN Jozef Dye Jr., MD       • vitamin B-12 (CYANOCOBALAMIN) tablet 1,000 mcg  1,000 mcg Oral Daily Ángel Kahn MD   1,000 mcg at 07/20/17 0926   • [START ON 7/22/2017] warfarin (COUMADIN) tablet 1 mg  1 mg Oral Once per day on Mon Wed Sat Ángel Kahn MD       • warfarin (COUMADIN) tablet 2 mg  2 mg Oral Once per day on Sun Tue Thu Fri Ángel Kahn MD   2 mg at 07/20/17 2004       Past Medical History:  Past Medical History:   Diagnosis Date   • A-fib    • COPD (chronic obstructive pulmonary disease)    • Hypertension    • Kidney failure        Past Surgical History:  Past Surgical History:   Procedure Laterality Date   • CARDIAC CATHETERIZATION     • CHOLECYSTECTOMY     • HYSTERECTOMY         Family History  History reviewed. No pertinent family history.    Social History  Social History     Social History   • Marital status:      Spouse name: N/A   • Number of children: N/A   • Years of education: N/A     Occupational History   • Not on file.     Social History Main Topics   • Smoking  "status: Former Smoker     Years: 5.00   • Smokeless tobacco: Not on file   • Alcohol use No   • Drug use: No   • Sexual activity: Defer     Other Topics Concern   • Not on file     Social History Narrative   • No narrative on file         Review of Systems:  History obtained from chart review and the patient  General ROS: No fever or chills  Respiratory ROS: No cough, shortness of breath, wheezing  Cardiovascular ROS: no chest pain or dyspnea on exertion  Gastrointestinal ROS: + abdominal pain - melena  Genito-Urinary ROS: No dysuria or hematuria  14 point ROS reviewed with the patient and negative except as noted above and in the HPI unless unable to obtain.    Objective:  /63 (BP Location: Left arm, Patient Position: Lying)  Pulse 67  Temp 98.9 °F (37.2 °C) (Oral)   Resp 16  Ht 67\" (170.2 cm)  Wt 259 lb (117 kg)  LMP  (LMP Unknown)  SpO2 91%  BMI 40.57 kg/m2    Intake/Output Summary (Last 24 hours) at 07/20/17 2135  Last data filed at 07/20/17 1416   Gross per 24 hour   Intake           2072.5 ml   Output                0 ml   Net           2072.5 ml     General: awake/alert   Chest:  clear to auscultation bilaterally without respiratory distress  CVS: regular rate and rhythm  Abdominal: soft, ttp greatest in epigastric  Extremities: tr ble edema  Skin: warm and dry without rash      Labs:    Results from last 7 days  Lab Units 07/20/17 0533 07/19/17 2020   WBC 10*3/mm3 7.40 8.42   HEMOGLOBIN g/dL 9.7* 10.5*   HEMATOCRIT % 33.4* 34.0*   PLATELETS 10*3/mm3 116* 114*           Results from last 7 days  Lab Units 07/20/17 0533 07/19/17 2020   SODIUM mmol/L 146* 143   POTASSIUM mmol/L 4.2 4.4   CHLORIDE mmol/L 110 106   CO2 mmol/L 27.0 25.0   BUN mg/dL 30* 34*   CREATININE mg/dL 2.43* 2.57*   CALCIUM mg/dL 9.0 10.0   BILIRUBIN mg/dL 0.7 1.1*   ALK PHOS U/L 61 77   ALT (SGPT) U/L 31 38   AST (SGOT) U/L 27 33   GLUCOSE mg/dL 111* 172*       Radiology:   Imaging Results (last 72 hours)     Procedure " Component Value Units Date/Time    CT Abdomen Pelvis Without Contrast [[id number]] Collected:  07/19/17 2154     Updated:  07/19/17 2207    Narrative:       EXAMINATION: CT ABDOMEN PELVIS WO CONTRAST- 7/19/2017 9:32 PM CDT     HISTORY: Abdominal pain, vomiting, renal failure.     COMPARISON: None      DLP: 637 mGy cm     TECHNIQUE: Noncontrast enhanced images of the abdomen and pelvis  obtained without oral contrast. Sagittal and coronal reformations were  made from the original source data and reviewed.     FINDINGS:   The visualized heart appears mildly enlarged. There is trace pericardial  fluid. Atelectasis is noted at the lung bases.     Evaluation is limited by the lack of IV contrast. Allowing for these  limitations, the liver, spleen, and right adrenal gland are normal in  appearance. A mass is seen within the left adrenal gland with some  peripheral calcifications, compatible with an adrenal adenoma. The  kidneys appear atrophic bilaterally. A small amount of fluid is seen  within the right perinephric space. There is contour irregularity of the  mid left kidney, concerning for underlying mass. There is fatty atrophy  of the pancreas. The gallbladder is surgically absent. The infrarenal  abdominal aorta is dilated, measuring up to 3.0 cm in size. There are  scattered atherosclerotic calcifications of the aorta and its branch  vessels. There is fatty atrophy of the pancreas. There is subtle  inflammatory stranding around the pancreas.     There is no appreciable mesenteric or retroperitoneal lymphadenopathy. A  small hiatal hernia is suspected. The proximal portion of the stomach is  normal in appearance. Within the distal aspect of the stomach and  proximal duodenum, there is surrounding inflammatory stranding. A  hyperdense lesion is seen in the expected region of the ampulla, though  this is difficult to evaluate secondary to streak artifact from surgical  clips and lack of IV contrast. This could represent  a distal common bile  duct stone, though there is no significant intra or extrahepatic biliary  ductal dilatation to support this. The small bowel is not overly  dilated. There are scattered colonic diverticula without evidence of  diverticulitis. The large bowel is otherwise grossly normal in  appearance. No free air is seen in the abdomen. There is a  fat-containing umbilical hernia. There is some skin thickening of the  ventral abdominal wall. A midline abdominal wall scar is present.     There is mild inflammatory stranding around the wall of the urinary  bladder, extending into the lower quadrants bilaterally. There has been  previous hysterectomy. No pelvic mass or free fluid is identified. No  pelvic or inguinal lymphadenopathy is appreciated.     Review of the visualized osseous structures demonstrates no acute or  aggressive lesions. Somewhat heterogeneous appearance of the bones may  be related to osteopenia.       Impression:       1. Subtle inflammatory stranding around the pancreas and proximal  duodenum, concerning for acute pancreatitis. A calcification is noted in  the region of the ampulla, though this may be within small bowel, as  there is no appreciable intra or extrahepatic biliary ductal dilatation.  2. There is mild inflammatory stranding around the urinary bladder,  suggesting cystitis. Correlate with urinalysis.  3. Infrarenal abdominal aortic aneurysm, measuring up to 3.0 cm in size.  4. Possible left renal mass. Nonemergent renal ultrasound could be  performed for further evaluation.  This report was finalized on 07/19/2017 22:04 by Dr. Bryant Chandler MD.    US Renal Bilateral [[id number]] Collected:  07/20/17 0843     Updated:  07/20/17 0858    Narrative:       EXAMINATION: US RENAL BILATERAL-  7/20/2017 9:43 AM EDT     HISTORY: Left renal mass     COMPARISON:CT study dated 07/19/2017     TECHNIQUE:Bilateral renal ultrasound examination was performed.     FINDINGS:     The ultrasound  evaluation was very challenging due to the patient's body  habitus.     In the interpolar region of the left kidney there is focal contour  change with question of underlying solid mass however the examination is  equivocal. Further imaging characterization with CT renal cell protocol  suggested when clinically feasible.     The right kidney measures 8.4 cm in ktwv-ny-hzvf length. The left kidney  measures 11.1 cm in wzzs-bv-wyqn length.     There is no pelvocaliectasis or evidence of obstruction. There are no  perinephric abnormalities.       Impression:       1. Challenging ultrasound-guided evaluation of the kidneys due to the  patient's body habitus. Equivocal mass interpolar region left kidney.  Further imaging characterization with CT using renal mass protocol  recommended.  This report was finalized on 2017 08:55 by Dr. Jung Wynne MD.          Culture:         Assessment   CKD 4  Left indeterminate renal mass  Acute pancreatitis  Hypertension  Low Vitamin D  Secondary Hyperparathyroidism    Plan:  D/w Dr. Ceron  Will check with office records tomorrow to look for any prior imaging as I unable to access them remotely due to [facility] IT settings  Lipase improving  Monitor labs      Thank you for the consult, we appreciate the opportunity to provide care to your patients.  Feel free to contact me if I can be of any further assistance.      Ilan Montes MD  2017  9:35 PM       Electronically signed by Ilan Montes MD at 2017  9:47 PM           Physical Therapy Notes (most recent note)      Regi Cr PTA at 2017 11:35 AM  Version 1 of 1         Acute Care - Physical Therapy Treatment Note  [facility]     Patient Name: Barb Connors  : 1935  MRN: [id number]  Today's Date: 2017  Onset of Illness/Injury or Date of Surgery Date: 17  Date of Referral to PT: 17  Referring Physician: MADHURI Castaneda    Admit Date:  7/19/2017    Visit Dx:    ICD-10-CM ICD-9-CM   1. Acute pancreatitis without infection or necrosis, unspecified pancreatitis type K85.90 577.0   2. Impaired mobility and endurance Z74.09 V49.89     Patient Active Problem List   Diagnosis   • Acute pancreatitis without infection or necrosis   • CKD (chronic kidney disease) stage 4, GFR 15-29 ml/min   • Abnormal CT scan, kidney, no mass lesion to suggest renal malignancy per MRI abdomen   • Chronic atrial fibrillation on chronic anticoagulation    • Sleep apnea   • COPD (chronic obstructive pulmonary disease); not in exacerbation; O2 dependent               Adult Rehabilitation Note       07/22/17 1043 07/21/17 1440       Rehab Assessment/Intervention    Discipline physical therapy assistant  -CW physical therapy assistant  -TR     Document Type therapy note (daily note)  -CW therapy note (daily note)  -TR     Subjective Information agree to therapy;complains of;dizziness  -CW agree to therapy;complains of;fatigue  -TR     Patient Effort, Rehab Treatment adequate  -CW adequate  -TR     Precautions/Limitations fall precautions;oxygen therapy device and L/min  -CW fall precautions;oxygen therapy device and L/min  -TR     Recorded by [CW] Regi Cr PTA [TR] Carmel Ballard PTA     Vital Signs    Pre SpO2 (%) 92  -CW 87  -TR     O2 Delivery Pre Treatment --   CPAP  -CW supplemental O2   2 L O2/NC (pts reports 2.5 L at home, bumped to 3 L   -TR     Intra SpO2 (%)  79  -TR     O2 Delivery Intra Treatment  supplemental O2   3 L O2/NC  -TR     Post SpO2 (%)  90  -TR     O2 Delivery Post Treatment  supplemental O2   3 L O2/NC  -TR     Pre Patient Position  Supine  -TR     Intra Patient Position  Sitting  -TR     Post Patient Position  Sitting  -TR     Recorded by [CW] Regi Cr PTA [TR] Carmel Ballard PTA     Pain Assessment    Pain Assessment No/denies pain  -CW No/denies pain  -TR     Recorded by [CW] Regi Cr PTA [TR] Carmel Dodson  MAGDALENA Ballard     Cognitive Assessment/Intervention    Current Cognitive/Communication Assessment  functional  -TR     Orientation Status  oriented x 4  -TR     Follows Commands/Answers Questions  able to follow multi-step instructions;100% of the time  -TR     Personal Safety  WNL/WFL  -TR     Personal Safety Interventions fall prevention program maintained;gait belt;nonskid shoes/slippers when out of bed  -CW fall prevention program maintained;gait belt;nonskid shoes/slippers when out of bed  -TR     Recorded by [CW] Regi Cr PTA [TR] Carmel Ballard PTA     Bed Mobility, Assessment/Treatment    Bed Mobility, Assistive Device bed rails  -CW bed rails  -TR     Bed Mob, Supine to Sit, [place] conditional independence  -CW supervision required  -TR     Bed Mob, Sit to Supine, [place] conditional independence  -CW --   not tested onstretcher with transport   -TR     Bed Mobility, Impairments  strength decreased   decreased endurance   -TR     Recorded by [CW] Regi Cr PTA [TR] Carmel Ballard PTA     Transfer Assessment/Treatment    Transfers, Sit-Stand [place] stand by assist  -CW verbal cues required;stand by assist  -TR     Transfers, Stand-Sit [place] stand by assist  -CW verbal cues required;stand by assist  -TR     Transfers, Sit-Stand-Sit, Assist Device bed rails  -CW      Recorded by [CW] Regi Cr PTA [TR] Carmel Ballard PTA     Gait Assessment/Treatment    Gait, [place] Level stand by assist  -CW verbal cues required;contact guard assist  -TR     Gait, Assistive Device  rolling walker  -TR     Gait, Distance (Feet) --   side steps at EOB  -CW 25   x2 with one standing rest in between '  -TR     Gait, Safety Issues supplemental O2  -CW supplemental O2  -TR     Gait, Impairments strength decreased  -CW strength decreased   decreased endurance   -TR     Gait, Comment Pt deferred gait due to dizzinness  -CW      Recorded by [CW] Regi PARHAM  MAGDALENA Cr [TR] Carmel Ballard PTA     Motor Skills/Interventions    Additional Documentation Balance Skills Training (Group)  -CW      Recorded by [CW] Regi Cr PTA      Balance Skills Training    Sitting-Level of Assistance Independent  -      Sitting-Balance Support Feet supported  -CW      Sitting # of Minutes 15  -CW      Standing-Level of Assistance Close supervision  -CW      Gait Balance-Level of Assistance Close supervision  -CW      Gait Balance Activities side-stepping  -CW      Recorded by [CW] Regi Cr PTA      Therapy Exercises    Bilateral Lower Extremities AROM:;20 reps;supine;sitting;ankle pumps/circles;heel slides;hip abduction/adduction;hip flexion;LAQ;quad sets;SLR  -CW AROM:;20 reps;sitting  -TR     Recorded by [CW] Regi Cr PTA [TR] Carmel Ballard PTA     Positioning and Restraints    Pre-Treatment Position in bed  -CW in bed  -TR     Post Treatment Position bed  -CW other  -TR     In Bed fowlers;call light within reach;with family/caregiver  -CW      Other Position  with other staff   with transport on stretcher  -TR     Recorded by [CW] Regi Cr PTA [TR] Carmel Ballard PTA       User Key  (r) = Recorded By, (t) = Taken By, (c) = Cosigned By    Initials Name Effective Dates     Regi Cr PTA 06/22/15 -     TR Carmel Ballard PTA 08/02/16 -                 IP PT Goals       07/20/17 1407          Bed Mobility PT LTG    Bed Mobility PT LTG, Date Established 07/20/17  -      Bed Mobility PT LTG, Time to Achieve by discharge  -      Bed Mobility PT LTG, Activity Type all bed mobility  -      Bed Mobility PT LTG, [place] Level independent  -      Transfer Training PT LTG    Transfer Training PT LTG, Date Established 07/20/17  -      Transfer Training PT LTG, Time to Achieve by discharge  -      Transfer Training PT LTG, Activity Type bed to chair /chair to bed;sit to stand/stand to sit  -       Transfer Training PT LTG, [place] Level supervision required  -      Gait Training PT LTG    Gait Training Goal PT LTG, Date Established 07/20/17  -      Gait Training Goal PT LTG, Time to Achieve by discharge  -      Gait Training Goal PT LTG, [place] Level supervision required  -      Gait Training Goal PT LTG, Distance to Achieve 100  -      Stair Training PT LTG    Stair Training Goal PT LTG, Date Established 07/20/17  -      Stair Training Goal PT LTG, Time to Achieve by discharge  -      Stair Training Goal PT LTG, Number of Steps 3  -      Stair Training Goal PT LTG, [place] Level minimum assist (75% patient effort)  -      Stair Training Goal PT LTG, Assist Device cane, straight  -        User Key  (r) = Recorded By, (t) = Taken By, (c) = Cosigned By    Initials Name Provider Type     Eugene Garsia PT Physical Therapist          Physical Therapy Education     Title: PT OT SLP Therapies (Done)     Topic: Physical Therapy (Done)     Point: Mobility training (Done)    Learning Progress Summary    Learner Readiness Method Response Comment Documented by Status   Patient Acceptance E,D VU,DU Exercise, benefit of activity  07/22/17 1131 Done    Acceptance E VU Benefits of PT, Enduraance training, PLB TR 07/21/17 1509 Done    Acceptance E,D VU,DU benefits of PT and POC, call for assist OOB  07/20/17 1406 Done   Family Acceptance E,D VU,DU Exercise, benefit of activity  07/22/17 1131 Done    Acceptance E VU Benefits of PT, Enduraance training, PLB TR 07/21/17 1509 Done               Point: Precautions (Done)    Learning Progress Summary    Learner Readiness Method Response Comment Documented by Status   Patient Acceptance E,D VU,DU Exercise, benefit of activity  07/22/17 1131 Done    Acceptance E VU Benefits of PT, Enduraance training, PLB TR 07/21/17 1509 Done    Acceptance E,D VU,DU benefits of PT and POC, call for assist OOB  07/20/17 1406 Done   Family Acceptance E,D  VU,DU Exercise, benefit of activity  07/22/17 1131 Done    Acceptance E VU Benefits of PT, Enduraance training, PLB TR 07/21/17 1509 Done                      User Key     Initials Effective Dates Name Provider Type Discipline     08/02/16 -  Eugene Garsia, PT Physical Therapist PT     06/22/15 -  Regi Cr, PTA Physical Therapy Assistant PT     08/02/16 -  Carmel Ballard, MAGDALENA Physical Therapy Assistant PT                    PT Recommendation and Plan  Anticipated Discharge Disposition: home with home health  Planned Therapy Interventions: bed mobility training, gait training, home exercise program, patient/family education, stair training, strengthening, transfer training  PT Frequency: daily, per priority policy  Plan of Care Review  Plan Of Care Reviewed With: patient  Progress: progress toward functional goals as expected  Outcome Summary/Follow up Plan: Pt performed AROM sitting and supine BLE exercises x 20. C/O increased dizziness this morning and deferred ambulation.  Pt will continue to benefit from therapy to increase strength and activity tolerance.          Outcome Measures       07/22/17 1100 07/21/17 1440 07/20/17 1403    How much help from another person do you currently need...    Turning from your back to your side while in flat bed without using bedrails? 4  -CW 4  -TR 4  -LH    Moving from lying on back to sitting on the side of a flat bed without bedrails? 4  -CW 3  -TR 3  -LH    Moving to and from a bed to a chair (including a wheelchair)? 3  -CW 3  -TR 3  -LH    Standing up from a chair using your arms (e.g., wheelchair, bedside chair)? 3  -CW 3  -TR 3  -LH    Climbing 3-5 steps with a railing? 3  -CW 3  -TR 3  -LH    To walk in hospital room? 3  -CW 3  -TR 3  -LH    AM-PAC 6 Clicks Score 20  -CW 19  -TR 19  -LH    Functional Assessment    Outcome Measure Options AM-PAC 6 Clicks Basic Mobility (PT)  -CW AM-PAC 6 Clicks Basic Mobility (PT)  -TR AM-PAC 6 Clicks Basic Mobility  (PT)  -      User Key  (r) = Recorded By, (t) = Taken By, (c) = Cosigned By    Initials Name Provider Type     Eugene Garsia, PT Physical Therapist    CW Regi Cr PTA Physical Therapy Assistant    GREY Ballard PTA Physical Therapy Assistant           Time Calculation:         PT Charges       07/22/17 1134          Time Calculation    Start Time 1043  -CW      Stop Time 1107  -CW      Time Calculation (min) 24 min  -CW      PT Received On 07/22/17  -CW      PT Goal Re-Cert Due Date 07/28/17  -CW      Time Calculation- PT    Total Timed Code Minutes- PT 24 minute(s)  -CW        User Key  (r) = Recorded By, (t) = Taken By, (c) = Cosigned By    Initials Name Provider Type    CW Regi Cr PTA Physical Therapy Assistant          Therapy Charges for Today     Code Description Service Date Service Provider Modifiers Qty    [id number] HC PT THER PROC EA 15 MIN 7/22/2017 Regi Cr PTA GP 2          PT G-Codes  Outcome Measure Options: AM-PAC 6 Clicks Basic Mobility (PT)  Score: 19  Functional Limitation: Mobility: Walking and moving around  Mobility: Walking and Moving Around Current Status (): At least 20 percent but less than 40 percent impaired, limited or restricted  Mobility: Walking and Moving Around Goal Status (): At least 1 percent but less than 20 percent impaired, limited or restricted    Regi Cr PTA  7/22/2017            Electronically signed by Regi Cr PTA at 7/22/2017 11:35 AM             Discharge Summary      MADHURI Mckeon at 7/23/2017  9:45 AM              [facility] Medicine Services  DISCHARGE SUMMARY       Date of Admission: 7/19/2017  Date of Discharge:  7/23/2017  Primary Care Physician: Britney Martinez MD    Discharge Diagnoses:  Hospital Problem List     Acute pancreatitis without infection or necrosis    CKD (chronic kidney disease) stage 4, GFR 15-29 ml/min    Abnormal CT  scan, kidney, no mass lesion to suggest renal malignancy per MRI abdomen    Chronic atrial fibrillation on chronic anticoagulation     Sleep apnea    COPD (chronic obstructive pulmonary disease); not in exacerbation; O2 dependent        Discharge diagnoses   1.  Acute pancreatitis without infection or necrosis  2.  Epigastric pain secondary to #1  3.  Nausea with vomiting, resolved  4.  Chronic kidney disease stage IV, stable  5.  Abnormal CT of abdomen and pelvis but no mass lesion identified to suggest renal malignancy per MRI abdomen  6.  Chronic atrial fibrillation, rate controlled, on anticoagulation with Coumadin  7.  Chronic obstructive pulmonary disease, no exacerbation.  Chronic oxygen at 2-3 liters  8.  Obstructive sleep apnea, wears CPAP  9.  Hypothyroidism  10.  Supratherapeutic INR     Presenting Problem/History of Present Illness:  Acute pancreatitis without infection or necrosis, unspecified pancreatitis type [K85.90]   Chief Complaint on Day of Discharge: feels better  History of Present Illness on Day of Discharge:  Lying in bed.  Son present in room.  She is tolerating regular diet without nausea, vomiting or abdominal pain.  Epigastric pain has resolved.  She denies chest pain or palpitations.  She reports baseline shortness of breath.  She is wearing CPAP at present as she is napping.  She self test INR at home.  She plans to test again on 7/25 with results to Dr. Martinez.  I discussed with patient and son that she needs to hold Coumadin ×2 days 7/23 and 7/24.  She will resume per Dr. Martinez's recommendations.  Both expressed understanding.    Consults:   1.  Dr. Ilan Montes, nephrology    Procedures Performed: None    Pertinent Test Results:  Laboratory Data:      Results from last 7 days  Lab Units 07/20/17  0533 07/19/17 2020   WBC 10*3/mm3 7.40 8.42   HEMOGLOBIN g/dL 9.7* 10.5*   HEMATOCRIT % 33.4* 34.0*   PLATELETS 10*3/mm3 116* 114*          Results from last 7 days  Lab Units  07/23/17  0523 07/22/17  0518 07/21/17  0516 07/20/17  0533 07/19/17 2020   SODIUM mmol/L 140 139 141 146* 143   POTASSIUM mmol/L 3.8 3.9 4.1 4.2 4.4   CHLORIDE mmol/L 108 107 109 110 106   CO2 mmol/L 23.0* 25.0 26.0 27.0 25.0   BUN mg/dL 40* 36* 34* 30* 34*   CREATININE mg/dL 2.71* 2.58* 2.48* 2.43* 2.57*   CALCIUM mg/dL 9.0 8.7 8.6 9.0 10.0   BILIRUBIN mg/dL  --   --  0.7 0.7 1.1*   ALK PHOS U/L  --   --  50 61 77   ALT (SGPT) U/L  --   --  33 31 38   AST (SGOT) U/L  --   --  21 27 33   GLUCOSE mg/dL 91 83 81 111* 172*       0  Lab Value Date/Time   LIPASE 326 (H) 07/21/2017 0516   LIPASE 8724 (H) 07/20/2017 0533   LIPASE 94597 (H) 07/19/2017 2020       0  Lab Value Date/Time   AMYLASE 174 (H) 07/21/2017 0516   AMYLASE 975 (H) 07/20/2017 0533   AMYLASE 2139 (H) 07/19/2017 2020       0  Lab Value Date/Time   INR 3.73 (H) 07/23/2017 0523   INR 4.57 (C) 07/22/2017 0518   INR 3.43 (H) 07/21/2017 0516   INR 2.25 (H) 07/20/2017 0533   INR 2.24 (H) 07/19/2017 2334   INR 2.06 (H) 07/19/2017 2020        Specimen: Blood Updated: 07/21/17 0619       25 Hydroxy, Vitamin D 42.3 ng/ml      PTH, Intact [[id number]] (Abnormal) Collected: 07/21/17 0516     Lab Status: Final result Specimen: Blood Updated: 07/21/17 0616      PTH, Intact 137.6 (H) pg/mL      Magnesium [[id number]] (Normal) Collected: 07/21/17 0516     Lab Status: Final result Specimen: Blood Updated: 07/21/17 0556      Magnesium 1.6 mg/dL      Phosphorus [[id number]] (Normal) Collected: 07/21/17 0516     Lab Status: Final result Specimen: Blood Updated: 07/21/17 0556      Phosphorus 3.8 mg/dL       Specimen: Blood Updated: 07/20/17 0053       TSH 3.300 mIU/mL      Lipid Panel [[id number]] (Abnormal) Collected: 07/19/17 7996     Lab Status: Final result Specimen: Blood Updated: 07/20/17 0025      Total Cholesterol 132 mg/dL       Triglycerides 100 mg/dL       HDL Cholesterol 33 (L) mg/dL       LDL Cholesterol  72 mg/dL       LDL/HDL Ratio 2.39     Color, UA Yellow         Appearance, UA Clear      pH, UA 7.5      Specific Gravity, UA 1.014      Glucose, UA Negative      Ketones, UA Negative      Bilirubin, UA Negative      Blood, UA Negative      Protein,  mg/dL (2+) (A)      Leuk Esterase, UA Negative      Nitrite, UA Negative      Urobilinogen, UA 1.0 E.U./dL     Urinalysis, Microscopic Only [[id number]] (Abnormal) Collected: 07/19/17 2216     Lab Status: Final result Specimen: Urine from Urine, Clean Catch Updated: 07/19/17 2231      RBC, UA 0-2 (A) /HPF       WBC, UA 0-2 (A) /HPF       Bacteria, UA None Seen /HPF       Squamous Epithelial Cells, UA 3-6 (A) /HPF       Hyaline Casts, UA None Seen /LPF       Methodology Automated Microscopy     Radiology data:  Imaging Results (last 7 days)     Procedure Component Value Units Date/Time    CT Abdomen Pelvis Without Contrast [[id number]] Collected:  07/19/17 2154     Updated:  07/19/17 2207    Narrative:       EXAMINATION: CT ABDOMEN PELVIS WO CONTRAST- 7/19/2017 9:32 PM CDT     HISTORY: Abdominal pain, vomiting, renal failure.     COMPARISON: None      DLP: 637 mGy cm     TECHNIQUE: Noncontrast enhanced images of the abdomen and pelvis  obtained without oral contrast. Sagittal and coronal reformations were  made from the original source data and reviewed.     FINDINGS:   The visualized heart appears mildly enlarged. There is trace pericardial  fluid. Atelectasis is noted at the lung bases.     Evaluation is limited by the lack of IV contrast. Allowing for these  limitations, the liver, spleen, and right adrenal gland are normal in  appearance. A mass is seen within the left adrenal gland with some  peripheral calcifications, compatible with an adrenal adenoma. The  kidneys appear atrophic bilaterally. A small amount of fluid is seen  within the right perinephric space. There is contour irregularity of the  mid left kidney, concerning for underlying mass. There is fatty atrophy  of the pancreas. The gallbladder is surgically  absent. The infrarenal  abdominal aorta is dilated, measuring up to 3.0 cm in size. There are  scattered atherosclerotic calcifications of the aorta and its branch  vessels. There is fatty atrophy of the pancreas. There is subtle  inflammatory stranding around the pancreas.     There is no appreciable mesenteric or retroperitoneal lymphadenopathy. A  small hiatal hernia is suspected. The proximal portion of the stomach is  normal in appearance. Within the distal aspect of the stomach and  proximal duodenum, there is surrounding inflammatory stranding. A  hyperdense lesion is seen in the expected region of the ampulla, though  this is difficult to evaluate secondary to streak artifact from surgical  clips and lack of IV contrast. This could represent a distal common bile  duct stone, though there is no significant intra or extrahepatic biliary  ductal dilatation to support this. The small bowel is not overly  dilated. There are scattered colonic diverticula without evidence of  diverticulitis. The large bowel is otherwise grossly normal in  appearance. No free air is seen in the abdomen. There is a  fat-containing umbilical hernia. There is some skin thickening of the  ventral abdominal wall. A midline abdominal wall scar is present.     There is mild inflammatory stranding around the wall of the urinary  bladder, extending into the lower quadrants bilaterally. There has been  previous hysterectomy. No pelvic mass or free fluid is identified. No  pelvic or inguinal lymphadenopathy is appreciated.     Review of the visualized osseous structures demonstrates no acute or  aggressive lesions. Somewhat heterogeneous appearance of the bones may  be related to osteopenia.       Impression:       1. Subtle inflammatory stranding around the pancreas and proximal  duodenum, concerning for acute pancreatitis. A calcification is noted in  the region of the ampulla, though this may be within small bowel, as  there is no  appreciable intra or extrahepatic biliary ductal dilatation.  2. There is mild inflammatory stranding around the urinary bladder,  suggesting cystitis. Correlate with urinalysis.  3. Infrarenal abdominal aortic aneurysm, measuring up to 3.0 cm in size.  4. Possible left renal mass. Nonemergent renal ultrasound could be  performed for further evaluation.  This report was finalized on 07/19/2017 22:04 by Dr. Bryant Chandler MD.    US Renal Bilateral [[id number]] Collected:  07/20/17 0843     Updated:  07/20/17 0858    Narrative:       EXAMINATION: US RENAL BILATERAL-  7/20/2017 9:43 AM EDT     HISTORY: Left renal mass     COMPARISON:CT study dated 07/19/2017     TECHNIQUE:Bilateral renal ultrasound examination was performed.     FINDINGS:     The ultrasound evaluation was very challenging due to the patient's body  habitus.     In the interpolar region of the left kidney there is focal contour  change with question of underlying solid mass however the examination is  equivocal. Further imaging characterization with CT renal cell protocol  suggested when clinically feasible.     The right kidney measures 8.4 cm in [id number]-gg-[id number] length. The left kidney  measures 11.1 cm in ypfb-xp-[id number] length.     There is no pelvocaliectasis or evidence of obstruction. There are no  perinephric abnormalities.       Impression:       1. Challenging ultrasound-guided evaluation of the kidneys due to the  patient's body habitus. Equivocal mass interpolar region left kidney.  Further imaging characterization with CT using renal mass protocol  recommended.  This report was finalized on 07/20/2017 08:55 by Dr. Jung Wynne MD.    MRI Abdomen Without Contrast [[id number]] Collected:  07/21/17 1609     Updated:  07/21/17 1641    Narrative:       MRI OF ABDOMEN WITH AND WITHOUT CONTRAST     HISTORY: possible left renal mass, can't have contrast because of  chronic kidney disease; K85.90-Acute pancreatitis without necrosis or  infection,  unspecified; Z74.09-Other reduced mobility      COMPARISON: CT scan dated 07/19/2017      TECHNIQUE: Multiplanar, multisequence MR imaging of the abdomen was  performed without intravenous administration of contrast.     FINDINGS:  The liver is normal in size and does not appear overtly cirrhotic. No  focal liver lesions are identified on the T2-weighted images or  diffusion images. The gallbladder appears surgically absent. There is  prominence of the common bile duct, especially distally at the level of  the pancreatic head (series 301-image 31). The duct measures 7.7 mm in  this location. The previously identified stone from the noncontrast CT  is not well-visualized on the MRI. Additionally, the main pancreatic  duct at the level of the pancreatic head and neck appears dilated with  the duct measuring up to 1.4 cm at the level of the pancreatic head near  the ampulla (series 301-image 30). No definite mass lesions are  identifiable on this noncontrast exam. At the level of the pancreatic  body and tail the main pancreatic duct is normal in caliber. The  pancreas does appear diffusely atrophic.     Unremarkable appearance of the spleen. A 3.4 cm oval circumscribed fluid  bright T2 hyperintense lesion is seen in the area of the left adrenal  gland (series 41-image 33). The in and out of phase T1 images are  uniformly low there is no associated diffusion restriction. The ADC  values are uniformly high in this thought to reflect a cyst, possibly  within the adrenal gland. It does not appear to reflect a solid lesion.     The right kidney is diffusely atrophic. Several small cysts are seen  arising from the right kidney. The left kidney is also atrophic although  not as severely atrophic as the right kidney. The left kidney does  display an irregular contour although the signal characteristics of the  left kidney are uniform throughout. No focal lesion is identified to  raise high suspicion for a left-sided renal  malignancy. There does not  appear to be hydronephrosis.     Short segment aneurysmal dilation of the infrarenal abdominal aorta is  identified, measuring up to 3.4 cm in greatest axial diameter. Small  volume ascites in the abdomen. No mesenteric or retroperitoneal  adenopathy is identified. The visualized bony structures of the spine  are unremarkable. There is a small right-sided pleural effusion.       Impression:       1. Bilateral renal atrophy. Although there is an irregular surface  contour of the left kidney, no discrete mass lesion is identified to  suggest renal malignancy.  2. There is dilation of the main pancreatic duct in the pancreatic head  up to 1.4 cm. The recent noncontrast CT showed a calcification/stone in  the area of the pancreatic head and it is possible that there was/is a  stone within the pancreatic duct near the ampulla. No stone is  identified on the MRI although CT is better for evaluation of stones.  You may consider a repeat noncontrast CT scan of the abdomen to see if a  stone is still present in this area.  3. No adenopathy is identified in abdomen.  4. Small volume ascites with small layering right pleural effusion.    This report was finalized on 07/21/2017 16:38 by Dr Franco Hernandez, .        Hospital Course  Patient is a 81 y.o. female presented to [facility] emergency room 7/19/17 with complaints of  nausea, vomiting, and abdominal pain that started at 1 PM on the day of admission.  Around 10:30 in the morning she ate a fruit pie.  Later, she became nauseated and vomited.  She reported upper abdominal epigastric pain that radiated to her back.  Pain intensity 10 out of 10.  She denied fever but reported feeling chilling.  Admission lab work creatinine 2.57, white blood cell count 8.42, lipase 38,987, amylase 2139.  CT scan of the abdomen showed inflammatory stranding around the pancreas and proximal duodenum concerning for acute pancreatitis.  Mild inflammatory  stranding around the urinary bladder suggesting cystitis.  Infra renal abdominal aortic aneurysm.  Possible left renal mass.  She received normal saline fluid bolus, morphine IV, Dilaudid IV, Zofran IV in the emergency room.    She was admitted to the medical floor under the hospitalist service with acute pancreatitis, nausea and vomiting, abnormal CT scan of abdomen with concern for left renal mass, acute versus chronic kidney injury and chronic atrial fibrillation rate controlled on anticoagulation.  She was made nothing by mouth.  She was hydrated with IV fluids.  She was given Dilaudid IV for pain and Zofran IV for nausea.  Serial lipase and amylase were monitored.  Lipase trended down quickly to 8724 and 326.  Amylase improved to 975 then 174.  She was started on clear liquid diet which was advanced as tolerated.  Epigastric pain improved with Dilaudid and resolved and she was no longer requiring pain medication.  Lipid panel within normal limits.  She denies alcohol consumption.  She has had cholecystectomy.    Creatinine was 2.57 on admission.  She was hydrated with IV fluids.  Creatinine improved to 2.4.  She has a history of chronic kidney disease stage IV and is followed by nephrology.  Creatinine was 2.4 in May 2017 at [facility].  Renal ultrasound impression challenging ultrasound evaluation of the kidneys due to the patient's body habitus.  Equivocal mass interpolar region left kidney.  Further imaging with CT recommended.  Nephrology was consulted as there was concern about a possible left renal mass on CT scan and ultrasound of the kidneys.  She was seen by Dr. Montes who reviewed renal ultrasound from 2014 and there was no mention of renal mass.  Dr. Montes recommended MRI of the abdomen without contrast.  Impression no discrete mass lesion identified to suggest renal malignancy.  Creatinine 2.7 on date of discharge.  Recommend follow-up basic metabolic panel 7/28/17 with return  "appointment with Dr. Martinez.    She has history of chronic atrial fibrillation rate controlled and is on anticoagulation with Coumadin.  Patient performs self testing pro time, INR at home.  Results reported to Dr. Martinez who regulates Coumadin.  INR was 2.06 on admission.  INR increased to 4.57.  Coumadin was held for 2 days.  INR 3.7 on 7/23/17. She has been advised to hold Coumadin 7/23/17 and 7/24/17.  She will check INR 7/25/17 and report to Dr. Martinez for Coumadin adjustment.    She has a history of chronic obstructive pulmonary disease.  No exacerbation during this admission.  She wears oxygen at 2-3 L chronically.  She also has obstructive sleep apnea and wears CPAP at night and during naps.  She has hypothyroidism on supplemental Synthroid.  Dose of Synthroid was recently adjusted by her primary care provider.  She has history of hypertension.  Systolic blood pressure has remained 120 or less. Losartan was not resumed during hospitalization or at time of discharge.  Dr. Martinez will follow blood pressure with office visit and may resume medications as indicated.    Physical therapy was consulted to evaluate and treat.  Recommendations for ongoing physical therapy after discharge.   was consulted to arrange home health and physical therapy at discharge.  On 7/23/17 she is suitable for discharge.  Specific instructions to include holding Coumadin 7/23 and 7/24 have been discussed with both patient and son.     Physical Exam on Discharge:  /46 (BP Location: Left arm, Patient Position: Lying) Comment: nurse notified  Pulse 68  Temp 98.9 °F (37.2 °C) (Axillary)   Resp 18  Ht 67\" (170.2 cm)  Wt 263 lb 11.2 oz (120 kg)  LMP  (LMP Unknown)  SpO2 91%  BMI 41.3 kg/m2  Physical Exam  Physical Exam  Constitutional: She is oriented to person, place, and time.   obese   HENT:   Head: Normocephalic and atraumatic.   Eyes: EOM are normal. Pupils are equal, round, and reactive to light.   Neck: " Normal range of motion. Neck supple. No tracheal deviation present.   Cardiovascular: Normal rate and normal heart sounds.  Exam reveals no gallop and no friction rub.    No murmur heard.  Irregular rhythm   Pulmonary/Chest: No respiratory distress.   Wearing CPAP at present.  Otherwise Oxygen at 3 L, lungs clear today.   Abdominal: Soft. There is no tenderness. A hernia (Ventral hernia noted) is present.   Genitourinary:   Genitourinary Comments: Deferred   Musculoskeletal: She exhibits edema (Trace pedal edema bilaterally).   Neurological: She is alert and oriented to person, place, and time.   Skin: Skin is warm and dry.   Psychiatric: She has a normal mood and affect.     Condition on Discharge:  Stable and improved    Discharge Disposition: Home with son.  Home health and physical therapy ordered at discharge.    Discharge Diet:   Diet Instructions     Advance Diet As Tolerated                  as tolerated    Activity at Discharge:   Activity Instructions     Activity as Tolerated                  as tolerated    Discharge Care Plan / Instructions:   1.  Should she have worsening, returning symptoms of epigastric pain she should seek medical attention.  2.  Hold Coumadin 7/23/17 and 7/24/17.  3.  Pro time INR,  7/25/17 with results to Dr. Martinez for coumadin adjustment.  Patient performs self testing.  4.  Losartan was not continued at time of admission or at discharge.  5.  Recommend basic metabolic panel 7/28/17 with return appointment to see Dr. Martinez.    Discharge Medications:   Barb Connors   Home Medication Instructions KELLY:[id number]    Printed on:07/23/17 2980   Medication Information                      allopurinol (ZYLOPRIM) 100 MG tablet  Take 100 mg by mouth Daily.             amLODIPine (NORVASC) 10 MG tablet  Take 10 mg by mouth Daily.             bumetanide (BUMEX) 1 MG tablet  Take 1 tablet by mouth Daily.             colesevelam (WELCHOL) 625 MG tablet  Hold x 1 week, may resume  7/30/17             cyanocobalamin (VITAMIN B-12) 500 MCG tablet  Take 500 mcg by mouth Daily.             levalbuterol (XOPENEX) 0.63 MG/3ML nebulizer solution  Take 1 ampule by nebulization Every 8 (Eight) Hours.             levothyroxine (SYNTHROID, LEVOTHROID) 200 MCG tablet  Take 200 mcg by mouth 4 (Four) Times a Week. Sunday, Tuesday, Thursday, Saturday.             levothyroxine (SYNTHROID, LEVOTHROID) 200 MCG tablet  Take 400 mcg by mouth 3 (Three) Times a Week. Monday, Wednesday, and Friday.             metoprolol tartrate (LOPRESSOR) 50 MG tablet  Take 50 mg by mouth Every 12 (Twelve) Hours.             O2 (OXYGEN)  Inhale 2.5 L/min Continuous.             pantoprazole (PROTONIX) 40 MG EC tablet  Take 40 mg by mouth Daily.             pneumococcal polysaccharide 23-valent (PNEUMOVAX-23) 25 MCG/0.5ML vaccine  Inject 0.5 mL into the shoulder, thigh, or buttocks 1 (One) Time for 1 dose.             vitamin D (ERGOCALCIFEROL) 72404 UNITS capsule capsule  Take 50,000 Units by mouth Every 7 (Seven) Days. Sunday.             warfarin (COUMADIN) 1 MG tablet  Take 1 mg by mouth 3 (Three) Times a Week. Monday, Wednesday, and Saturday.  Hold 7/24/17.            warfarin (COUMADIN) 2 MG tablet  Take 2 mg by mouth 4 (Four) Times a Week. Sunday, Tuesday, Thursday, and Friday.  Hold 7/23/17             Follow-up Appointments:   Follow-up Information     Follow up with Britney Martinez MD .    Specialty:  Internal Medicine    Why:  July 28, 2017 at 1:30PM. Needs [facility] with follow up appointment    Contact information:    [address] [place] [address] [address]  [facility] [address]  [phone]          Test Results Pending at Discharge: none    The above documentation resulted from a face-to-face encounter by me Lizbeth DHALIWAL, [facility].    MADHURI Potts  07/23/17  10:39 AM    Time:  This discharge process required 45 minutes for completion.     Plan discussed with Dr. Cali Monique, patient, and son.    Time  spent in face-to-face evaluation, chart review, planning and education 45 minutes.  Please note that portions of this note may have been completed with a voice recognition program. Efforts were made to edit the dictations, but occasionally words are mistranscribed.             Electronically signed by MADHURI Mckeon at 7/23/2017 10:59 AM        Discharge Order     Start     Ordered    07/23/17 1034  Discharge patient  Once     Expected Discharge Date:  07/23/17    Discharge Disposition:  Home or Self Care        07/23/17 1031

## 2017-07-23 NOTE — PLAN OF CARE
Problem: Patient Care Overview (Adult)  Goal: Plan of Care Review  Outcome: Outcome(s) achieved Date Met:  07/23/17 07/23/17 1310   Coping/Psychosocial Response Interventions   Plan Of Care Reviewed With patient;son   Outcome Evaluation   Outcome Summary/Follow up Plan Patient denies pain/nausea. Ambulates well w/ walker. Tolerating diet. Verbalizes understanding of d/c teaching.    Patient Care Overview   Progress improving       Goal: Adult Individualization and Mutuality  Outcome: Outcome(s) achieved Date Met:  07/23/17  Goal: Discharge Needs Assessment  Outcome: Outcome(s) achieved Date Met:  07/23/17    Problem: Pancreatitis, Acute/Chronic (Adult)  Goal: Signs and Symptoms of Listed Potential Problems Will be Absent or Manageable (Pancreatitis, Acute/Chronic)  Outcome: Outcome(s) achieved Date Met:  07/23/17 07/23/17 1310   Pancreatitis, Acute/Chronic   Problems Assessed (Pancreatitis) all   Problems Present (Pancreatitis) none

## 2017-07-23 NOTE — PROGRESS NOTES
Continued Stay Note   Rosemary     Patient Name: Barb Connors  MRN: [id number]  Today's Date: 7/23/2017    Admit Date: 7/19/2017          Discharge Plan       07/23/17 1142    Case Management/Social Work Plan    Plan Referral for hh.  Spoke to pt's son and he stated they had used [facility] in the past and would like them again. SW faxed referral to [facility] including dc summary and spoke to Ginger on call.  CHASE Horne.    Patient/Family In Agreement With Plan yes              Discharge Codes     None        Expected Discharge Date and Time     Expected Discharge Date Expected Discharge Time    Jul 23, 2017             CHASE Weber

## 2017-07-23 NOTE — PLAN OF CARE
Problem: Patient Care Overview (Adult)  Goal: Plan of Care Review  Outcome: Ongoing (interventions implemented as appropriate)    07/22/17 1454 07/23/17 0128   Coping/Psychosocial Response Interventions   Plan Of Care Reviewed With patient --    Outcome Evaluation   Outcome Summary/Follow up Plan --  No c/o nausea or pain on this shift, tolerating diet, will cont to monitor.   Patient Care Overview   Progress improving --        Goal: Adult Individualization and Mutuality  Outcome: Ongoing (interventions implemented as appropriate)  Goal: Discharge Needs Assessment  Outcome: Ongoing (interventions implemented as appropriate)    Problem: Pancreatitis, Acute/Chronic (Adult)  Goal: Signs and Symptoms of Listed Potential Problems Will be Absent or Manageable (Pancreatitis, Acute/Chronic)  Outcome: Ongoing (interventions implemented as appropriate)

## 2017-07-23 NOTE — PROGRESS NOTES
Nephrology ([facility]) Progress Note      Patient:  Barb Connors  YOB: 1935  Date of Service: 7/23/2017  MRN: [id number]   Acct: [id number]   Primary Care Physician: Britney Martinez MD  Advance Directive: Conditional Code  Admit Date: 7/19/2017       Hospital Day: 4  Referring Provider: Ángel Kahn*      Patient personally seen and examined.  Complete chart including Consults, Notes, Operative Reports, Labs, Cardiology, and Radiology studies reviewed as able.        Subjective:  Barb Connors is a 81 y.o. female  whom we were consulted for CKD 4 and renal mass.  Pt of Dr. Pinto with CKD.  Pt doesn't recall many details of her CKD or any prior renal imaging.  Admitted with abdominal pain/n/v.  Symptoms have been improving, taking small po now.  Dx with pancreatitis.  Also noted was indeterminate renal mass unable to be characterized on CT (noncontrast) and US.  Denies nsaids/rash/hematuria.       Improved today.  abd pain controlled.  Tolerating full diet.  Hoping for d/c soon.    Allergies:  Review of patient's allergies indicates no known allergies.    Home Meds:  Prescriptions Prior to Admission   Medication Sig Dispense Refill Last Dose   • allopurinol (ZYLOPRIM) 100 MG tablet Take 100 mg by mouth Daily.   Past Week at Unknown time   • amLODIPine (NORVASC) 10 MG tablet Take 10 mg by mouth Daily.   Past Week at Unknown time   • cyanocobalamin (VITAMIN B-12) 500 MCG tablet Take 500 mcg by mouth Daily.   Past Week at Unknown time   • levalbuterol (XOPENEX) 0.63 MG/3ML nebulizer solution Take 1 ampule by nebulization Every 8 (Eight) Hours.   Past Week at Unknown time   • levothyroxine (SYNTHROID, LEVOTHROID) 200 MCG tablet Take 200 mcg by mouth 4 (Four) Times a Week. Sunday, Tuesday, Thursday, Saturday.   Past Week at Unknown time   • levothyroxine (SYNTHROID, LEVOTHROID) 200 MCG tablet Take 400 mcg by mouth 3 (Three) Times a Week. Monday,  Wednesday, and Friday.   Past Week at Unknown time   • losartan (COZAAR) 100 MG tablet Take 50 mg by mouth Daily.   Past Week at Unknown time   • metoprolol tartrate (LOPRESSOR) 50 MG tablet Take 50 mg by mouth Every 12 (Twelve) Hours.   Past Week at Unknown time   • O2 (OXYGEN) Inhale 2.5 L/min Continuous.   Past Week at Unknown time   • pantoprazole (PROTONIX) 40 MG EC tablet Take 40 mg by mouth Daily.   Past Week at Unknown time   • vitamin D (ERGOCALCIFEROL) 73411 UNITS capsule capsule Take 50,000 Units by mouth Every 7 (Seven) Days. Sunday.   Past Week at Unknown time   • warfarin (COUMADIN) 1 MG tablet Take 1 mg by mouth 3 (Three) Times a Week. Monday, Wednesday, and Saturday.   Past Week at Unknown time   • warfarin (COUMADIN) 2 MG tablet Take 2 mg by mouth 4 (Four) Times a Week. Sunday, Tuesday, Thursday, and Friday.   Past Week at Unknown time       Medicines:  Current Facility-Administered Medications   Medication Dose Route Frequency Provider Last Rate Last Dose   • allopurinol (ZYLOPRIM) tablet 100 mg  100 mg Oral Daily Ángel Kahn MD   100 mg at 07/23/17 0854   • amLODIPine (NORVASC) tablet 10 mg  10 mg Oral Q24H Ángel Kahn MD   10 mg at 07/23/17 0854   • calcitriol (ROCALTROL) capsule 0.25 mcg  0.25 mcg Oral Every Other Day Ilan Montes MD   0.25 mcg at 07/23/17 0854   • HYDROmorphone (DILAUDID) injection 1 mg  1 mg Intravenous Q4H PRN Ángel Kahn MD   1 mg at 07/20/17 0340   • levalbuterol (XOPENEX) nebulizer solution 1.25 mg  1.25 mg Nebulization Q6H PRN Ángel Kahn MD   1.25 mg at 07/20/17 0952   • levothyroxine (SYNTHROID, LEVOTHROID) tablet 200 mcg  200 mcg Oral Q AM Ángel Kahn MD   200 mcg at 07/23/17 0545   • metoprolol tartrate (LOPRESSOR) tablet 50 mg  50 mg Oral Q12H Ángel Kahn MD   50 mg at 07/23/17 0855   • ondansetron (ZOFRAN) injection 4 mg  4 mg Intravenous Q4H PRN Ángel Kahn MD    4 mg at 07/21/17 1556   • pantoprazole (PROTONIX) EC tablet 40 mg  40 mg Oral Q AM Ángel Kahn MD   40 mg at 07/23/17 0545   • pneumococcal polysaccharide 23-valent (PNEUMOVAX-23) vaccine 0.5 mL  0.5 mL Intramuscular During Hospitalization Ángel Kahn MD       • sodium chloride 0.9 % flush 1-10 mL  1-10 mL Intravenous PRN Ángel Kahn MD       • sodium chloride 0.9 % flush 10 mL  10 mL Intravenous PRN Jozef Dye Jr., MD       • vitamin B-12 (CYANOCOBALAMIN) tablet 1,000 mcg  1,000 mcg Oral Daily Ángel Kahn MD   1,000 mcg at 07/23/17 0854   • warfarin (COUMADIN) tablet 1 mg  1 mg Oral Once per day on Mon Wed Sat Ángel Kahn MD   Stopped at 07/22/17 1800   • warfarin (COUMADIN) tablet 2 mg  2 mg Oral Once per day on Sun Tue Thu Fri Ángel Kahn MD   2 mg at 07/20/17 2004       Past Medical History:  Past Medical History:   Diagnosis Date   • A-fib    • COPD (chronic obstructive pulmonary disease)    • Hypertension    • Kidney failure        Past Surgical History:  Past Surgical History:   Procedure Laterality Date   • CARDIAC CATHETERIZATION     • CHOLECYSTECTOMY     • HYSTERECTOMY         Family History  History reviewed. No pertinent family history.    Social History  Social History     Social History   • Marital status:      Spouse name: N/A   • Number of children: N/A   • Years of education: N/A     Occupational History   • Not on file.     Social History Main Topics   • Smoking status: Former Smoker     Years: 5.00   • Smokeless tobacco: Not on file   • Alcohol use No   • Drug use: No   • Sexual activity: Defer     Other Topics Concern   • Not on file     Social History Narrative   • No narrative on file         Review of Systems:  History obtained from chart review and the patient  General ROS: No fever or chills  Respiratory ROS: No cough, shortness of breath, wheezing  Cardiovascular ROS: no chest pain or dyspnea on  "exertion  Gastrointestinal ROS: No abdominal pain or melena  Genito-Urinary ROS: No dysuria or hematuria  14 point ROS reviewed with the patient and negative except as noted above and in the HPI unless unable to obtain.    Objective:  /46 (BP Location: Left arm, Patient Position: Lying) Comment: nurse notified  Pulse 68  Temp 98.9 °F (37.2 °C) (Axillary)   Resp 18  Ht 67\" (170.2 cm)  Wt 263 lb 11.2 oz (120 kg)  LMP  (LMP Unknown)  SpO2 91%  BMI 41.3 kg/m2    Intake/Output Summary (Last 24 hours) at 07/23/17 1148  Last data filed at 07/22/17 1411   Gross per 24 hour   Intake              240 ml   Output                0 ml   Net              240 ml     General: awake/alert   Chest:  clear to auscultation bilaterally without respiratory distress  CVS: regular rate and rhythm  Abdominal: soft, nontender, normal bowel sounds  Extremities: tr ble edema  Skin: warm and dry without rash      Labs:    Results from last 7 days  Lab Units 07/20/17 0533 07/19/17 2020   WBC 10*3/mm3 7.40 8.42   HEMOGLOBIN g/dL 9.7* 10.5*   HEMATOCRIT % 33.4* 34.0*   PLATELETS 10*3/mm3 116* 114*           Results from last 7 days  Lab Units 07/23/17 0523 07/22/17 0518 07/21/17 0516 07/20/17 0533 07/19/17 2020   SODIUM mmol/L 140 139 141 146* 143   POTASSIUM mmol/L 3.8 3.9 4.1 4.2 4.4   CHLORIDE mmol/L 108 107 109 110 106   CO2 mmol/L 23.0* 25.0 26.0 27.0 25.0   BUN mg/dL 40* 36* 34* 30* 34*   CREATININE mg/dL 2.71* 2.58* 2.48* 2.43* 2.57*   CALCIUM mg/dL 9.0 8.7 8.6 9.0 10.0   BILIRUBIN mg/dL  --   --  0.7 0.7 1.1*   ALK PHOS U/L  --   --  50 61 77   ALT (SGPT) U/L  --   --  33 31 38   AST (SGOT) U/L  --   --  21 27 33   GLUCOSE mg/dL 91 83 81 111* 172*       Radiology:   Imaging Results (last 72 hours)     Procedure Component Value Units Date/Time    CT Abdomen Pelvis Without Contrast [[id number]] Collected:  07/19/17 2154     Updated:  07/19/17 2207    Narrative:       EXAMINATION: CT ABDOMEN PELVIS WO CONTRAST- 7/19/2017 " 9:32 PM CDT     HISTORY: Abdominal pain, vomiting, renal failure.     COMPARISON: None      DLP: 637 mGy cm     TECHNIQUE: Noncontrast enhanced images of the abdomen and pelvis  obtained without oral contrast. Sagittal and coronal reformations were  made from the original source data and reviewed.     FINDINGS:   The visualized heart appears mildly enlarged. There is trace pericardial  fluid. Atelectasis is noted at the lung bases.     Evaluation is limited by the lack of IV contrast. Allowing for these  limitations, the liver, spleen, and right adrenal gland are normal in  appearance. A mass is seen within the left adrenal gland with some  peripheral calcifications, compatible with an adrenal adenoma. The  kidneys appear atrophic bilaterally. A small amount of fluid is seen  within the right perinephric space. There is contour irregularity of the  mid left kidney, concerning for underlying mass. There is fatty atrophy  of the pancreas. The gallbladder is surgically absent. The infrarenal  abdominal aorta is dilated, measuring up to 3.0 cm in size. There are  scattered atherosclerotic calcifications of the aorta and its branch  vessels. There is fatty atrophy of the pancreas. There is subtle  inflammatory stranding around the pancreas.     There is no appreciable mesenteric or retroperitoneal lymphadenopathy. A  small hiatal hernia is suspected. The proximal portion of the stomach is  normal in appearance. Within the distal aspect of the stomach and  proximal duodenum, there is surrounding inflammatory stranding. A  hyperdense lesion is seen in the expected region of the ampulla, though  this is difficult to evaluate secondary to streak artifact from surgical  clips and lack of IV contrast. This could represent a distal common bile  duct stone, though there is no significant intra or extrahepatic biliary  ductal dilatation to support this. The small bowel is not overly  dilated. There are scattered colonic  diverticula without evidence of  diverticulitis. The large bowel is otherwise grossly normal in  appearance. No free air is seen in the abdomen. There is a  fat-containing umbilical hernia. There is some skin thickening of the  ventral abdominal wall. A midline abdominal wall scar is present.     There is mild inflammatory stranding around the wall of the urinary  bladder, extending into the lower quadrants bilaterally. There has been  previous hysterectomy. No pelvic mass or free fluid is identified. No  pelvic or inguinal lymphadenopathy is appreciated.     Review of the visualized osseous structures demonstrates no acute or  aggressive lesions. Somewhat heterogeneous appearance of the bones may  be related to osteopenia.       Impression:       1. Subtle inflammatory stranding around the pancreas and proximal  duodenum, concerning for acute pancreatitis. A calcification is noted in  the region of the ampulla, though this may be within small bowel, as  there is no appreciable intra or extrahepatic biliary ductal dilatation.  2. There is mild inflammatory stranding around the urinary bladder,  suggesting cystitis. Correlate with urinalysis.  3. Infrarenal abdominal aortic aneurysm, measuring up to 3.0 cm in size.  4. Possible left renal mass. Nonemergent renal ultrasound could be  performed for further evaluation.  This report was finalized on 07/19/2017 22:04 by Dr. Bryant Chandler MD.    US Renal Bilateral [[id number]] Collected:  07/20/17 0843     Updated:  07/20/17 0858    Narrative:       EXAMINATION: US RENAL BILATERAL-  7/20/2017 9:43 AM EDT     HISTORY: Left renal mass     COMPARISON:CT study dated 07/19/2017     TECHNIQUE:Bilateral renal ultrasound examination was performed.     FINDINGS:     The ultrasound evaluation was very challenging due to the patient's body  habitus.     In the interpolar region of the left kidney there is focal contour  change with question of underlying solid mass however the  examination is  equivocal. Further imaging characterization with CT renal cell protocol  suggested when clinically feasible.     The right kidney measures 8.4 cm in wlfg-[id number]-[id number] length. The left kidney  measures 11.1 cm in gyjf-xv-cqmu length.     There is no pelvocaliectasis or evidence of obstruction. There are no  perinephric abnormalities.       Impression:       1. Challenging ultrasound-guided evaluation of the kidneys due to the  patient's body habitus. Equivocal mass interpolar region left kidney.  Further imaging characterization with CT using renal mass protocol  recommended.  This report was finalized on 07/20/2017 08:55 by Dr. uJng Wynen MD.    MRI Abdomen Without Contrast [[id number]] Collected:  07/21/17 1609     Updated:  07/21/17 1641    Narrative:       MRI OF ABDOMEN WITH AND WITHOUT CONTRAST     HISTORY: possible left renal mass, can't have contrast because of  chronic kidney disease; K85.90-Acute pancreatitis without necrosis or  infection, unspecified; Z74.09-Other reduced mobility      COMPARISON: CT scan dated 07/19/2017      TECHNIQUE: Multiplanar, multisequence MR imaging of the abdomen was  performed without intravenous administration of contrast.     FINDINGS:  The liver is normal in size and does not appear overtly cirrhotic. No  focal liver lesions are identified on the T2-weighted images or  diffusion images. The gallbladder appears surgically absent. There is  prominence of the common bile duct, especially distally at the level of  the pancreatic head (series 301-image 31). The duct measures 7.7 mm in  this location. The previously identified stone from the noncontrast CT  is not well-visualized on the MRI. Additionally, the main pancreatic  duct at the level of the pancreatic head and neck appears dilated with  the duct measuring up to 1.4 cm at the level of the pancreatic head near  the ampulla (series 301-image 30). No definite mass lesions are  identifiable on this noncontrast  exam. At the level of the pancreatic  body and tail the main pancreatic duct is normal in caliber. The  pancreas does appear diffusely atrophic.     Unremarkable appearance of the spleen. A 3.4 cm oval circumscribed fluid  bright T2 hyperintense lesion is seen in the area of the left adrenal  gland (series 41-image 33). The in and out of phase T1 images are  uniformly low there is no associated diffusion restriction. The ADC  values are uniformly high in this thought to reflect a cyst, possibly  within the adrenal gland. It does not appear to reflect a solid lesion.     The right kidney is diffusely atrophic. Several small cysts are seen  arising from the right kidney. The left kidney is also atrophic although  not as severely atrophic as the right kidney. The left kidney does  display an irregular contour although the signal characteristics of the  left kidney are uniform throughout. No focal lesion is identified to  raise high suspicion for a left-sided renal malignancy. There does not  appear to be hydronephrosis.     Short segment aneurysmal dilation of the infrarenal abdominal aorta is  identified, measuring up to 3.4 cm in greatest axial diameter. Small  volume ascites in the abdomen. No mesenteric or retroperitoneal  adenopathy is identified. The visualized bony structures of the spine  are unremarkable. There is a small right-sided pleural effusion.       Impression:       1. Bilateral renal atrophy. Although there is an irregular surface  contour of the left kidney, no discrete mass lesion is identified to  suggest renal malignancy.  2. There is dilation of the main pancreatic duct in the pancreatic head  up to 1.4 cm. The recent noncontrast CT showed a calcification/stone in  the area of the pancreatic head and it is possible that there was/is a  stone within the pancreatic duct near the ampulla. No stone is  identified on the MRI although CT is better for evaluation of stones.  You may consider a repeat  noncontrast CT scan of the abdomen to see if a  stone is still present in this area.  3. No adenopathy is identified in abdomen.  4. Small volume ascites with small layering right pleural effusion.           This report was finalized on 07/21/2017 16:38 by Dr Franco Hernandez, .          Culture:         Assessment   CKD 4  Left indeterminate renal mass - not seen on renal mass  Acute pancreatitis  Hypertension  Low Vitamin D  Secondary Hyperparathyroidism  High INR      Plan:  MRI showed no renal mass  Lipase improved  Monitor labs  D/c IVF  Calcitriol  Ok for d/c, f/u with marquis franklin next week      Ilan Montes MD  7/23/2017  11:48 AM

## 2017-07-24 NOTE — PLAN OF CARE
Problem: Inpatient Physical Therapy  Goal: Bed Mobility Goal LTG- PT  Outcome: Outcome(s) achieved Date Met:  07/24/17 07/24/17 0722   Bed Mobility PT LTG   Bed Mobility PT LTG, Date Goal Reviewed 07/24/17   Bed Mobility PT LTG, Outcome goal met       Goal: Transfer Training Goal 1 LTG- PT  Outcome: Unable to achieve outcome(s) by discharge Date Met:  07/24/17 07/24/17 0722   Transfer Training PT LTG   Transfer Training PT LTG, Date Goal Reviewed 07/24/17   Transfer Training PT LTG, Outcome goal not met   Transfer Training PT LTG, Reason Goal Not Met discharged from facility       Goal: Gait Training Goal LTG- PT  Outcome: Unable to achieve outcome(s) by discharge Date Met:  07/24/17 07/24/17 0722   Gait Training PT LTG   Gait Training Goal PT LTG, Date Goal Reviewed 07/24/17   Gait Training Goal PT LTG, Outcome goal not met   Gait Training Goal PT LTG, Reason Goal Not Met discharged from facility       Goal: Stair Training Goal LTG- PT  Outcome: Unable to achieve outcome(s) by discharge Date Met:  07/24/17 07/24/17 0722   Stair Training PT LTG   Stair Training Goal PT LTG, Date Goal Reviewed 07/24/17   Stair Training Goal PT LTG, Outcome goal not met   Stair Training Goal PT LTG, Reason Goal Not Met discharged from facility

## 2017-07-24 NOTE — THERAPY DISCHARGE NOTE
Acute Care - Physical Therapy Discharge Summary  [facility]       Patient Name: Barb Connors  : 1935  MRN: [id number]    Today's Date: 2017  Onset of Illness/Injury or Date of Surgery Date: 17    Date of Referral to PT: 17  Referring Physician: MADHURI Castaneda      Admit Date: 2017      PT Recommendation and Plan    Visit Dx:    ICD-10-CM ICD-9-CM   1. Acute pancreatitis without infection or necrosis, unspecified pancreatitis type K85.90 577.0   2. Impaired mobility and endurance Z74.09 V49.89   3. CKD (chronic kidney disease) stage 4, GFR 15-29 ml/min N18.4 585.4             Outcome Measures       17 1100 17 1440       How much help from another person do you currently need...    Turning from your back to your side while in flat bed without using bedrails? 4  -CW 4  -TR     Moving from lying on back to sitting on the side of a flat bed without bedrails? 4  -CW 3  -TR     Moving to and from a bed to a chair (including a wheelchair)? 3  -CW 3  -TR     Standing up from a chair using your arms (e.g., wheelchair, bedside chair)? 3  -CW 3  -TR     Climbing 3-5 steps with a railing? 3  -CW 3  -TR     To walk in hospital room? 3  -CW 3  -TR     AM-PAC 6 Clicks Score 20  -CW 19  -TR     Functional Assessment    Outcome Measure Options AM-PAC 6 Clicks Basic Mobility (PT)  -CW AM-PAC 6 Clicks Basic Mobility (PT)  -TR       User Key  (r) = Recorded By, (t) = Taken By, (c) = Cosigned By    Initials Name Provider Type    CW Regi Cr PTA Physical Therapy Assistant    TR Carmel Ballard PTA Physical Therapy Assistant                      IP PT Goals       17 0722 17 1407       Bed Mobility PT LTG    Bed Mobility PT LTG, Date Established  17  -     Bed Mobility PT LTG, Time to Achieve  by discharge  -     Bed Mobility PT LTG, Activity Type  all bed mobility  -     Bed Mobility PT LTG, Kay Level  independent  -     Bed Mobility  PT LTG, Date Goal Reviewed 07/24/17  -      Bed Mobility PT LTG, Outcome goal met  -      Transfer Training PT LTG    Transfer Training PT LTG, Date Established  07/20/17  -     Transfer Training PT LTG, Time to Achieve  by discharge  -     Transfer Training PT LTG, Activity Type  bed to chair /chair to bed;sit to stand/stand to sit  -     Transfer Training PT LTG, Norman Level  supervision required  -     Transfer Training PT  LTG, Date Goal Reviewed 07/24/17  -      Transfer Training PT LTG, Outcome goal not met  -      Transfer Training PT LTG, Reason Goal Not Met discharged from facility  -      Gait Training PT LTG    Gait Training Goal PT LTG, Date Established  07/20/17  -     Gait Training Goal PT LTG, Time to Achieve  by discharge  -     Gait Training Goal PT LTG, Norman Level  supervision required  -     Gait Training Goal PT LTG, Distance to Achieve  100  -     Gait Training Goal PT LTG, Date Goal Reviewed 07/24/17  -      Gait Training Goal PT LTG, Outcome goal not met  -      Gait Training Goal PT LTG, Reason Goal Not Met discharged from facility  -      Stair Training PT LTG    Stair Training Goal PT LTG, Date Established  07/20/17  -     Stair Training Goal PT LTG, Time to Achieve  by discharge  -     Stair Training Goal PT LTG, Number of Steps  3  -     Stair Training Goal PT LTG, Norman Level  minimum assist (75% patient effort)  -     Stair Training Goal PT LTG, Assist Device  cane, straight  -     Stair Training Goal PT LTG, Date Goal Reviewed 07/24/17  -      Stair Training Goal PT LTG, Outcome goal not met  -      Stair Training Goal PT LTG, Reason Goal Not Met discharged from [facility]  -        User Key  (r) = Recorded By, (t) = Taken By, (c) = Cosigned By    Initials Name Provider Type    RULA Garsia, PT Physical Therapist    CW Regi Cr, PTA Physical Therapy Assistant              PT Discharge Summary  Reason for  Discharge: Discharge from facility  Outcomes Achieved: Refer to plan of care for updates on goals achieved  Discharge Destination: Home with assist      Regi Cr, PTA   7/24/2017

## 2017-07-28 ENCOUNTER — OFFICE VISIT (OUTPATIENT)
Dept: INTERNAL MEDICINE | Age: 82
End: 2017-07-28
Payer: MEDICARE

## 2017-07-28 VITALS
OXYGEN SATURATION: 91 % | SYSTOLIC BLOOD PRESSURE: 142 MMHG | WEIGHT: 250 LBS | HEIGHT: 67 IN | BODY MASS INDEX: 39.24 KG/M2 | TEMPERATURE: 98.4 F | HEART RATE: 71 BPM | DIASTOLIC BLOOD PRESSURE: 70 MMHG

## 2017-07-28 DIAGNOSIS — J96.11 CHRONIC RESPIRATORY FAILURE WITH HYPOXIA (HCC): ICD-10-CM

## 2017-07-28 DIAGNOSIS — N18.4 CHRONIC KIDNEY FAILURE, STAGE 4 (SEVERE) (HCC): Primary | ICD-10-CM

## 2017-07-28 DIAGNOSIS — R06.02 SOB (SHORTNESS OF BREATH): ICD-10-CM

## 2017-07-28 DIAGNOSIS — Z23 NEED FOR PROPHYLACTIC VACCINATION AGAINST STREPTOCOCCUS PNEUMONIAE (PNEUMOCOCCUS): ICD-10-CM

## 2017-07-28 DIAGNOSIS — Z99.89 BIPAP (BIPHASIC POSITIVE AIRWAY PRESSURE) DEPENDENCE: ICD-10-CM

## 2017-07-28 DIAGNOSIS — N18.4 CKD STAGE 4 SECONDARY TO HYPERTENSION (HCC): ICD-10-CM

## 2017-07-28 DIAGNOSIS — I10 ESSENTIAL HYPERTENSION: ICD-10-CM

## 2017-07-28 DIAGNOSIS — N18.4 CHRONIC KIDNEY FAILURE, STAGE 4 (SEVERE) (HCC): ICD-10-CM

## 2017-07-28 DIAGNOSIS — Z87.19 HISTORY OF PANCREATITIS: ICD-10-CM

## 2017-07-28 DIAGNOSIS — I12.9 CKD STAGE 4 SECONDARY TO HYPERTENSION (HCC): ICD-10-CM

## 2017-07-28 LAB
ANION GAP SERPL CALCULATED.3IONS-SCNC: 13 MMOL/L (ref 7–19)
BUN BLDV-MCNC: 23 MG/DL (ref 8–23)
CALCIUM SERPL-MCNC: 9.8 MG/DL (ref 8.8–10.2)
CHLORIDE BLD-SCNC: 100 MMOL/L (ref 98–111)
CO2: 29 MMOL/L (ref 22–29)
CREAT SERPL-MCNC: 2.3 MG/DL (ref 0.5–0.9)
GFR NON-AFRICAN AMERICAN: 20
GLUCOSE BLD-MCNC: 108 MG/DL (ref 74–109)
POTASSIUM SERPL-SCNC: 3.9 MMOL/L (ref 3.5–5)
SODIUM BLD-SCNC: 142 MMOL/L (ref 136–145)

## 2017-07-28 PROCEDURE — G8417 CALC BMI ABV UP PARAM F/U: HCPCS | Performed by: PHYSICIAN ASSISTANT

## 2017-07-28 PROCEDURE — 1090F PRES/ABSN URINE INCON ASSESS: CPT | Performed by: PHYSICIAN ASSISTANT

## 2017-07-28 PROCEDURE — G8400 PT W/DXA NO RESULTS DOC: HCPCS | Performed by: PHYSICIAN ASSISTANT

## 2017-07-28 PROCEDURE — G8427 DOCREV CUR MEDS BY ELIG CLIN: HCPCS | Performed by: PHYSICIAN ASSISTANT

## 2017-07-28 PROCEDURE — 99214 OFFICE O/P EST MOD 30 MIN: CPT | Performed by: PHYSICIAN ASSISTANT

## 2017-07-28 PROCEDURE — 1123F ACP DISCUSS/DSCN MKR DOCD: CPT | Performed by: PHYSICIAN ASSISTANT

## 2017-07-28 PROCEDURE — 4040F PNEUMOC VAC/ADMIN/RCVD: CPT | Performed by: PHYSICIAN ASSISTANT

## 2017-07-28 PROCEDURE — 1036F TOBACCO NON-USER: CPT | Performed by: PHYSICIAN ASSISTANT

## 2017-07-29 PROBLEM — K85.90 ACUTE PANCREATITIS: Status: ACTIVE | Noted: 2017-07-19

## 2017-07-29 PROBLEM — I48.20 CHRONIC ATRIAL FIBRILLATION (HCC): Status: ACTIVE | Noted: 2017-07-19

## 2017-07-29 PROBLEM — R93.89 ABNORMAL CAT SCAN: Status: ACTIVE | Noted: 2017-07-19

## 2017-07-29 ASSESSMENT — ENCOUNTER SYMPTOMS
DIARRHEA: 0
COUGH: 0
VOMITING: 0
NAUSEA: 0
SINUS PRESSURE: 0
COLOR CHANGE: 0
RHINORRHEA: 0
CONSTIPATION: 0
WHEEZING: 0
EYE REDNESS: 0
ABDOMINAL PAIN: 0
SHORTNESS OF BREATH: 1
EYE PAIN: 0
CHEST TIGHTNESS: 0
SORE THROAT: 0
PHOTOPHOBIA: 0

## 2017-08-10 ENCOUNTER — TELEPHONE (OUTPATIENT)
Dept: INTERNAL MEDICINE | Age: 82
End: 2017-08-10

## 2017-08-15 ENCOUNTER — TELEPHONE (OUTPATIENT)
Dept: INTERNAL MEDICINE | Age: 82
End: 2017-08-15

## 2017-08-22 ENCOUNTER — TELEPHONE (OUTPATIENT)
Dept: INTERNAL MEDICINE | Age: 82
End: 2017-08-22

## 2017-08-25 RX ORDER — WARFARIN SODIUM 1 MG/1
TABLET ORAL
Qty: 60 TABLET | Refills: 3 | Status: SHIPPED | OUTPATIENT
Start: 2017-08-25 | End: 2018-02-28 | Stop reason: SDUPTHER

## 2017-09-05 ENCOUNTER — TELEPHONE (OUTPATIENT)
Dept: INTERNAL MEDICINE | Age: 82
End: 2017-09-05

## 2017-09-12 ENCOUNTER — TELEPHONE (OUTPATIENT)
Dept: INTERNAL MEDICINE | Age: 82
End: 2017-09-12

## 2017-09-19 ENCOUNTER — TELEPHONE (OUTPATIENT)
Dept: INTERNAL MEDICINE | Age: 82
End: 2017-09-19

## 2017-09-26 ENCOUNTER — TELEPHONE (OUTPATIENT)
Dept: INTERNAL MEDICINE | Age: 82
End: 2017-09-26

## 2017-09-29 RX ORDER — CALCITRIOL 0.25 UG/1
0.25 CAPSULE, LIQUID FILLED ORAL DAILY
Qty: 90 CAPSULE | Refills: 3 | Status: SHIPPED | OUTPATIENT
Start: 2017-09-29 | End: 2018-10-15 | Stop reason: SDUPTHER

## 2017-09-29 RX ORDER — LEVOTHYROXINE SODIUM 0.2 MG/1
200 TABLET ORAL DAILY
Qty: 180 TABLET | Refills: 3 | Status: SHIPPED | OUTPATIENT
Start: 2017-09-29 | End: 2018-10-15 | Stop reason: SDUPTHER

## 2017-09-29 RX ORDER — LOSARTAN POTASSIUM 100 MG/1
100 TABLET ORAL DAILY
Qty: 90 TABLET | Refills: 3 | Status: SHIPPED | OUTPATIENT
Start: 2017-09-29 | End: 2018-09-06 | Stop reason: SDUPTHER

## 2017-10-03 ENCOUNTER — TELEPHONE (OUTPATIENT)
Dept: INTERNAL MEDICINE | Age: 82
End: 2017-10-03

## 2017-10-04 ENCOUNTER — TELEPHONE (OUTPATIENT)
Dept: INTERNAL MEDICINE | Age: 82
End: 2017-10-04

## 2017-10-04 NOTE — TELEPHONE ENCOUNTER
Matilde Christensen called to verify that this is a patient of ours in order to get cpap supply taken care of

## 2017-10-06 ENCOUNTER — TELEPHONE (OUTPATIENT)
Dept: NEUROLOGY | Age: 82
End: 2017-10-06

## 2017-10-17 ENCOUNTER — TELEPHONE (OUTPATIENT)
Dept: INTERNAL MEDICINE | Age: 82
End: 2017-10-17

## 2017-10-24 ENCOUNTER — TELEPHONE (OUTPATIENT)
Dept: INTERNAL MEDICINE | Age: 82
End: 2017-10-24

## 2017-10-31 ENCOUNTER — TELEPHONE (OUTPATIENT)
Dept: INTERNAL MEDICINE | Age: 82
End: 2017-10-31

## 2017-11-01 ENCOUNTER — TELEPHONE (OUTPATIENT)
Dept: INTERNAL MEDICINE | Age: 82
End: 2017-11-01

## 2017-11-02 ENCOUNTER — TELEPHONE (OUTPATIENT)
Dept: INTERNAL MEDICINE | Age: 82
End: 2017-11-02

## 2017-11-07 ENCOUNTER — TELEPHONE (OUTPATIENT)
Dept: INTERNAL MEDICINE | Age: 82
End: 2017-11-07

## 2017-11-14 ENCOUNTER — TELEPHONE (OUTPATIENT)
Dept: INTERNAL MEDICINE | Age: 82
End: 2017-11-14

## 2017-11-14 NOTE — TELEPHONE ENCOUNTER
Patient called and let us know that she had her blood taken today and it was a 3.1. Current dose 2mg daily. Please advise.    Call back number [phone]

## 2017-11-21 ENCOUNTER — TELEPHONE (OUTPATIENT)
Dept: INTERNAL MEDICINE | Age: 82
End: 2017-11-21

## 2017-11-28 ENCOUNTER — TELEPHONE (OUTPATIENT)
Dept: INTERNAL MEDICINE | Age: 82
End: 2017-11-28

## 2017-12-12 ENCOUNTER — TELEPHONE (OUTPATIENT)
Dept: INTERNAL MEDICINE | Age: 82
End: 2017-12-12

## 2017-12-12 NOTE — TELEPHONE ENCOUNTER
Patient called and left a message advising her INR was 5.4 this morning      Current dose: 2mg daily.     Please advise

## 2017-12-15 ENCOUNTER — TELEPHONE (OUTPATIENT)
Dept: INTERNAL MEDICINE | Age: 82
End: 2017-12-15

## 2017-12-18 ENCOUNTER — TELEPHONE (OUTPATIENT)
Dept: INTERNAL MEDICINE | Age: 82
End: 2017-12-18

## 2017-12-26 ENCOUNTER — TELEPHONE (OUTPATIENT)
Dept: INTERNAL MEDICINE | Age: 82
End: 2017-12-26

## 2017-12-27 ENCOUNTER — OFFICE VISIT (OUTPATIENT)
Dept: NEUROLOGY | Age: 82
End: 2017-12-27
Payer: MEDICARE

## 2017-12-27 VITALS
SYSTOLIC BLOOD PRESSURE: 134 MMHG | HEART RATE: 60 BPM | DIASTOLIC BLOOD PRESSURE: 72 MMHG | WEIGHT: 241 LBS | BODY MASS INDEX: 37.83 KG/M2 | HEIGHT: 67 IN | OXYGEN SATURATION: 94 %

## 2017-12-27 DIAGNOSIS — G47.61 PERIODIC LIMB MOVEMENT DISORDER: ICD-10-CM

## 2017-12-27 DIAGNOSIS — Z99.89 BIPAP (BIPHASIC POSITIVE AIRWAY PRESSURE) DEPENDENCE: ICD-10-CM

## 2017-12-27 DIAGNOSIS — G47.33 OBSTRUCTIVE SLEEP APNEA: Primary | ICD-10-CM

## 2017-12-27 DIAGNOSIS — R09.02 HYPOXEMIA: ICD-10-CM

## 2017-12-27 PROCEDURE — 99213 OFFICE O/P EST LOW 20 MIN: CPT | Performed by: PHYSICIAN ASSISTANT

## 2017-12-27 PROCEDURE — 4040F PNEUMOC VAC/ADMIN/RCVD: CPT | Performed by: PHYSICIAN ASSISTANT

## 2017-12-27 PROCEDURE — G8484 FLU IMMUNIZE NO ADMIN: HCPCS | Performed by: PHYSICIAN ASSISTANT

## 2017-12-27 PROCEDURE — 1090F PRES/ABSN URINE INCON ASSESS: CPT | Performed by: PHYSICIAN ASSISTANT

## 2017-12-27 PROCEDURE — 1123F ACP DISCUSS/DSCN MKR DOCD: CPT | Performed by: PHYSICIAN ASSISTANT

## 2017-12-27 PROCEDURE — G8417 CALC BMI ABV UP PARAM F/U: HCPCS | Performed by: PHYSICIAN ASSISTANT

## 2017-12-27 PROCEDURE — G8400 PT W/DXA NO RESULTS DOC: HCPCS | Performed by: PHYSICIAN ASSISTANT

## 2017-12-27 PROCEDURE — G8427 DOCREV CUR MEDS BY ELIG CLIN: HCPCS | Performed by: PHYSICIAN ASSISTANT

## 2017-12-27 PROCEDURE — 1036F TOBACCO NON-USER: CPT | Performed by: PHYSICIAN ASSISTANT

## 2017-12-27 NOTE — PROGRESS NOTES
[facility] Sleep Follow Up Encounter      Information:   Patient Name: Gabino Magana  :   1935  Age:   81 y.o. MRN:   [id number]  Account #:  [de-identified]  Today:                17    Provider:  Rhett Rowan PA-C    Chief Complaint   Patient presents with    Sleep Apnea     Patient has no complaints and is waiting for envelope to mail card to [facility]         Subjective:   Gabino Magana is a 81 y.o. woman  with a history of severe KARLI, PLMD, and hypoxemia who comes in for an annual sleep clinic follow up. The PSG,  revealed an AHI of 81.2. She is prescribed BiPAP therapy with a pressure of 15cm/11cm. She indicates that she  is averaging  8-10 hours of BiPAP use per day. She  averages 8-10 hours of sleep per night. She reports that consistent BiPAP use has alleviated the previous KARLI symptoms. She uses 24 hour supplemental O2. She is waiting for [facility] to provide her with an envelope to send the SD card back. They were previous pts of [facility]. They plan to switch to [address] [place] [address]. Location or symptom:  KARLI  Onset:  Initial PS; repeat PSG,   Timing:  q hs  Severity:  Severe  Associated:  Snoring, witnessed apneas, and excessive daytime somnolence  Alleviated:   BiPAP      Objective:     Past Medical History:   Diagnosis Date    BiPAP (biphasic positive airway pressure) dependence     15cm/11cm    Blood circulation, collateral     Ankles stay cold all time x 6 months    CAD (coronary artery disease)     Atrial Fib x years    CHF (congestive heart failure) (HCC)     Chronic diastolic heart failure (HCC)     Chronic kidney disease (CKD)     Stage 4 renal disease Sees Dr. Gregg Da Silva COPD (chronic obstructive pulmonary disease) (HCC)     on 2.5 L oxygen at home x 5 months    History of blood transfusion      When had     Hyperlipidemia     High cholesterol x years    Hypertension     x years    Hypoxemia     Nonischemic cardiomyopathy ([facility] [address]     Obstructive sleep apnea Initial PS; AHI:  81.2 per PSG, 2014    Paroxysmal atrial fibrillation (HCC)     Periodic limb movement disorder     Psychiatric problem     Depression    Sleep apnea     Thyroid disease     Hypothyroid x years       Past Surgical History:   Procedure Laterality Date    APPENDECTOMY      With hysterectomy    [facility]    EF 60%     SECTION      x 2    CHOLECYSTECTOMY      COLONOSCOPY      Last one     EYE SURGERY      Cataracts bilateral    FRACTURE SURGERY      Collar bone as child    HYSTERECTOMY      SKIN BIOPSY      on breast and back - benign       Recent Hospitalizations  ·     Significant Injuries  ·     Family History   Problem Relation Age of Onset    Heart Failure Mother      , 56   [facility] Hypertension Mother      hypertension    Cancer Sister          Heart Failure Father      , 65       Social History  History   Smoking Status    Former Smoker    Packs/day: 1.00    Years: 50.00    Types: Cigarettes    Quit date: 2013   Smokeless Tobacco    Never Used     History   Alcohol Use No     History   Drug Use No         Current Outpatient Prescriptions   Medication Sig Dispense Refill    glucose blood VI test strips (ASCENSIA AUTODISC VI;ONE TOUCH ULTRA TEST VI) strip 1 each by In Vitro route daily As needed. 100 each 11    levothyroxine (LEVOTHROID) 200 MCG tablet Take 1 tablet by mouth daily 2 tablets on Monday, Wed and Friday 180 tablet 3    calcitRIOL (ROCALTROL) 0.25 MCG capsule Take 1 capsule by mouth daily 90 capsule 3    losartan (COZAAR) 100 MG tablet Take 1 tablet by mouth daily 1/2 tab daily 90 tablet 3    warfarin (COUMADIN) 1 MG tablet TAKE 2 TABLETS DAILY AS DIRECTED.  60 tablet 3    levalbuterol (XOPENEX) 0.63 MG/3ML nebulization Take 3 mLs by nebulization every 8 hours as needed for Wheezing 270 vial 1    pantoprazole (PROTONIX) 40 MG tablet Take 1 tablet by mouth daily 90 tablet 3 Pain   [] Swelling   [x] Denies all unless marked  Skin:[] Rash  [] Color Change  [x] Denies all unless marked  Neurological:[] Visual Disturbance [] Double Vision [] Slurred Speech [] Trouble swallowing  [] Vertigo [] Tingling [] Numbness [] Weakness [] Loss of Balance   [] Loss of Consciousness [] Memory Loss  [x] Denies all unless marked  Psychiatric/Behavioral:[] Depression [] Anxiety  [x] Denies all unless marked  Sleep: [x]  Insomnia [] Sleep Disturbance [] Snoring [] Restless Legs [x] Daytime Sleepiness [x] Sleep Apnea  [] Denies all unless marked       Examination:  Vitals:  /72   Pulse 60 Comment: Patient in A Fib  Ht 5' 7\" (1.702 m)   Wt 241 lb (109.3 kg)   SpO2 94% Comment: Patient on 2.5 L Oxygen  BMI 37.75 kg/m²   General appearance:  alert and cooperative with exam  HEENT:  PERRLA, EOMI and Neck supple with midline trachea  Heart[de-identified]  Irregularly irregular rhythm (chronic atrial fibrillation)  Lungs:  clear to auscultation bilaterally  Extremities:  extremities normal, atraumatic, no cyanosis or edema  Neurologic:  Extraocular movements are intact without nystagmus. Visual fields are full to confrontation. Facial movements are symmetrical and normal.  Speech is precise. Extremity strength is normal in both uppers and lowers. Deep tendon reflexes are intact and symmetrical.  Rapid alternating movements are unimpaired. Finger-to-nose testing is performed well, without dysmetria. She is in a wheelchair. I reviewed the following studies:      []  :  Clinical laboratory test results    []  :  Radiology reports    []  :  Review and summarization of medical records and/or obtain medical records     []  :  Previous/recent polysomnogram report(s)    []  :  [place] Sleepiness Scale      [x]  :  Compliance download:  She has a BiPAP set at a pressure of 15cm/11cm. Compliance download is pending from [employer]. Assessment:       ICD-10-CM ICD-9-CM    1.  Obstructive sleep apnea G47.33 327.23 2. Periodic limb movement disorder G47.61 327.51    3. Hypoxemia R09.02 799.02    4. BiPAP (biphasic positive airway pressure) dependence Z99.89 V46.8           [x]  :  Stable     []  :  Improved                       []  :  Well controlled              []  :  Resolving     []  :  Resolved     []  :  Inadequately controlled     []  :  Worsening     []  :  Additional workup planned      Plan:     No orders of the defined types were placed in this encounter. 1.   Patient advised of the etiology,  pathophysiology, diagnosis, treatment options, and risks of untreated KARLI. Risks may include, but are not limited to  hypertension, coronary artery disease, diabetes, stroke, weight gain, impaired cognition, daytime somnolence,  and motor vehicle accidents. Advised to abstain from driving or operating heavy machinery when drowsy and the use of respiratory suppressants. 2.  The following educational material has been included in this visit after visit summary for your review: KARLI/PAP guidelines-Discussed with the patient and all questions fully answered. 3.  The current medical regimen is effective;  continue present plan. 4.  Continue BiPAP  5. Will review compliance download when received from [facility]; she plans to switch to [address]  6. Follow up in 1 year      Note:  A total of >50% (>8 minutes) of 15 minutes was spent discussing the pathophysiology and treatment and/or coordination of care of the above diagnoses.

## 2017-12-27 NOTE — PATIENT INSTRUCTIONS
Instructions:    1.  Schedule an appointment with RT Guicho at [facility]. It is located on [address] [place] [address]. It is next to [employer]. ([phone]). 2.  Take a copy of your sleep study to the appointment. What is sleep apnea?  Sleep apnea is a condition that makes you stop breathing for short periods while you are asleep. There are 2 types of sleep apnea. One is called \"obstructive sleep apnea,\" and the other is called \"central sleep apnea. \"  In obstructive sleep apnea, you stop breathing because your throat narrows or closes. In central sleep apnea, you stop breathing because your brain does not send the right signals to your muscles to make you breathe. When people talk about sleep apnea, they are usually referring to obstructive sleep apnea, which is what this article is about. People with sleep apnea do not know that they stop breathing when they are asleep. But they do sometimes wake up startled or gasping for breath. They also often hear from loved ones that they snore. What are the symptoms of sleep apnea?  The main symptoms of sleep apnea are loud snoring, tiredness, and daytime sleepiness. Other symptoms can include:  ?Restless sleep  ? Waking up choking or gasping  ? Morning headaches, dry mouth, or sore throat  ? Waking up often to urinate  ? Waking up feeling unrested or groggy  ? Trouble thinking clearly or remembering things  Some people with sleep apnea don't have symptoms, or they don't know they have them. They might figure that it's normal to be tired or to snore a lot.     Who is at high risk for sleep apnea?---Patients at high risk for KARLI are defined as follows: obesity (BMI ? 30 kg/m2), congestive heart failure, atrial fibrillation, treatment resistant hypertension (blood pressure above goal despite adherence to antihypertensive regimen of 3 medications, or hypertension controlled by at least 4 medications), impaired glucose tolerance or type 2 diabetes, nocturnal dysrhythmias, stroke, pulmonary hypertension, preoperative for bariatric surgery, coronary artery disease. Should I see a doctor or nurse?  Yes. If you think you might have sleep apnea, see your doctor. Is there a test for sleep apnea?  Yes. If your doctor or nurse suspects you have sleep apnea, he or she might send you for a \"sleep study. \" Sleep studies can sometimes be done at home, but they are usually done in a sleep lab. For the study, you spend the night in the lab, and you are hooked up to different machines that monitor your heart rate, breathing, and other body functions. The results of the test will tell your doctor or nurse if you have the disorder. Is there anything I can do on my own to help my sleep apnea?  Yes. Here are some things that might help:  ? Stay off your back when sleeping. (This is not always practical, because people cannot control their position while asleep. Plus, it only helps some people.)  ? Lose weight, if you are overweight  ? Avoid alcohol, because it can make sleep apnea worse    How is sleep apnea treated?  As mentioned above, weight loss can help if you are overweight or obese. But losing weight can be challenging, and it takes time to lose enough weight to help with your sleep apnea. Most people need other treatment while they work on losing weight. The most effective treatment for sleep apnea is a device that keeps your airway open while you sleep. Treatment with this device is called \"continuous positive airway pressure,\" or CPAP. People getting CPAP wear a face mask at night that keeps them breathing. If your doctor or nurse recommends a CPAP machine, try to be patient about using it. The mask might seem uncomfortable to wear at first, and the machine might seem noisy, but using the machine can really pay off. People with sleep apnea who use a CPAP machine feel more rested and generally feel better.     There is also another device that you wear in your mouth called an \"oral appliance\" or \"mandibular advancement device. \" It also helps keep your airway open while you sleep. This device is only recommended for mild cases of sleep apnea. It may not be covered by your insurance. In rare cases, when nothing else helps, doctors recommend surgery to keep the airway open. Surgery to do this is not always effective, and even when it is, the problem can come back. Is sleep apnea dangerous?  It can be. People with sleep apnea do not get good-quality sleep, so they are often tired and not alert. This puts them at risk for car accidents and other types of accidents. Plus, studies show that people with sleep apnea are more likely than others to have high blood pressure, heart attacks, and other serious heart problems. In people with severe sleep apnea, getting treated (for example, with a CPAP machine) can help prevent some of these problems. Important information:  Medicare/private insurance CPAP/BiPAP/APAP requirements:  Medicare/private insurance has specific requirements for PAP compliance that must be met during the first 90 days of use to continue coverage for CPAP/BiPAP/APAP  from day 91 and beyond. The policy requires that patients use a PAP device 4 hours per 24 hour period, at least 70% of the time over a 30 day period. This data must be downloaded as a report direct from the PAP devices. This is called a compliance download. Your PAP supplier will assist you in this matter. Reminder:  Please bring a copy of the compliance download to your next office visit or have your supplier fax it to our office prior to your office visit. Note:  Where applicable, we will utilize PAP device efficiency reports, additional testing, and face-to-face  clinical evaluation subsequent to any treatment, changes in treatment, and continued treatment.      Caution:  Please abstain from driving or engaging in other activities which may be hazardous in the presence of diminished alertness or daytime drowsiness. And avoid the use of sedatives or alcohol, which can worsen sleep apnea and daytime drowsiness. Mask suggestions:  - Resmed Airfit N20 (Nasal) or F20 (Full face mask). They conform to your face, thus decreasing the potential for mask leakage. You might like the [employer] (full face mask). It has a \"memory foam\" like cushion. You might also like to try a nasal mask called a Dreamwear nasal mask or the Dreamwear nasal pillow. One other suggestion is the [facility], it is a minimal contact full face mask. The Espiridion Moros incredible under the nose design makes it the only full face mask that won't cause red marks on the bridge of your nose when compared to other Full Face Masks        Organizations  American Sleep Apnea Association  Provides information about sleep apnea to the public, publishes a newsletter, and serves as an advocate for people with the disorder. Lexy, [address]   [email or web address]   [email or web address]   Tel: [phone]   Fax: [facility] organization that works to [employer] and safety by promoting public understanding of sleep and sleep disorders. Supports sleep-related education, research, and advocacy; produces and distributes educational materials to the public and healthcare professionals; and offers postdoctoral fellowships and grants for sleep researchers. [address]   [email or web address]   [email or web address]   Tel: [phone]   Fax: [phone]

## 2017-12-28 ENCOUNTER — ANTI-COAG VISIT (OUTPATIENT)
Dept: FAMILY MEDICINE CLINIC | Age: 82
End: 2017-12-28

## 2017-12-28 LAB — INR BLD: 3.7

## 2018-01-02 LAB — INR BLD: 2.4

## 2018-01-08 DIAGNOSIS — E03.9 HYPOTHYROIDISM, UNSPECIFIED TYPE: ICD-10-CM

## 2018-01-08 DIAGNOSIS — Z12.11 COLON CANCER SCREENING: ICD-10-CM

## 2018-01-08 DIAGNOSIS — D64.9 ANEMIA, UNSPECIFIED TYPE: ICD-10-CM

## 2018-01-08 DIAGNOSIS — R79.89 LOW VITAMIN D LEVEL: ICD-10-CM

## 2018-01-08 LAB
ALBUMIN SERPL-MCNC: 3.7 G/DL (ref 3.5–5.2)
ALP BLD-CCNC: 66 U/L (ref 35–104)
ALT SERPL-CCNC: 6 U/L (ref 5–33)
ANION GAP SERPL CALCULATED.3IONS-SCNC: 13 MMOL/L (ref 7–19)
AST SERPL-CCNC: 14 U/L (ref 5–32)
BASOPHILS ABSOLUTE: 0.1 K/UL (ref 0–0.2)
BASOPHILS RELATIVE PERCENT: 1 % (ref 0–1)
BILIRUB SERPL-MCNC: 0.7 MG/DL (ref 0.2–1.2)
BUN BLDV-MCNC: 20 MG/DL (ref 8–23)
CALCIUM SERPL-MCNC: 9.1 MG/DL (ref 8.8–10.2)
CHLORIDE BLD-SCNC: 103 MMOL/L (ref 98–111)
CHOLESTEROL, TOTAL: 123 MG/DL (ref 160–199)
CO2: 29 MMOL/L (ref 22–29)
CREAT SERPL-MCNC: 2.1 MG/DL (ref 0.5–0.9)
EOSINOPHILS ABSOLUTE: 0.3 K/UL (ref 0–0.6)
EOSINOPHILS RELATIVE PERCENT: 4.4 % (ref 0–5)
GFR NON-AFRICAN AMERICAN: 23
GLUCOSE BLD-MCNC: 98 MG/DL (ref 74–109)
HCT VFR BLD CALC: 34.1 % (ref 37–47)
HDLC SERPL-MCNC: 31 MG/DL (ref 65–121)
HEMOGLOBIN: 9.9 G/DL (ref 12–16)
LDL CHOLESTEROL CALCULATED: 72 MG/DL
LYMPHOCYTES ABSOLUTE: 1.1 K/UL (ref 1.1–4.5)
LYMPHOCYTES RELATIVE PERCENT: 17.8 % (ref 20–40)
MCH RBC QN AUTO: 28.4 PG (ref 27–31)
MCHC RBC AUTO-ENTMCNC: 29 G/DL (ref 33–37)
MCV RBC AUTO: 98 FL (ref 81–99)
MONOCYTES ABSOLUTE: 0.4 K/UL (ref 0–0.9)
MONOCYTES RELATIVE PERCENT: 6.5 % (ref 0–10)
NEUTROPHILS ABSOLUTE: 4.3 K/UL (ref 1.5–7.5)
NEUTROPHILS RELATIVE PERCENT: 70 % (ref 50–65)
PDW BLD-RTO: 15.6 % (ref 11.5–14.5)
PLATELET # BLD: 121 K/UL (ref 130–400)
PMV BLD AUTO: 13.6 FL (ref 9.4–12.3)
POTASSIUM SERPL-SCNC: 3.9 MMOL/L (ref 3.5–5)
RBC # BLD: 3.48 M/UL (ref 4.2–5.4)
SODIUM BLD-SCNC: 145 MMOL/L (ref 136–145)
TOTAL PROTEIN: 7.3 G/DL (ref 6.6–8.7)
TRIGL SERPL-MCNC: 100 MG/DL (ref 0–149)
TSH SERPL DL<=0.05 MIU/L-ACNC: 4.92 UIU/ML (ref 0.27–4.2)
WBC # BLD: 6.1 K/UL (ref 4.8–10.8)

## 2018-01-09 ENCOUNTER — TELEPHONE (OUTPATIENT)
Dept: INTERNAL MEDICINE | Age: 83
End: 2018-01-09

## 2018-01-12 LAB
VITAMIN D2 AND D3, TOTAL: 36.1 NG/ML (ref 30–80)
VITAMIN D2, 25 HYDROXY: 28.7 NG/ML
VITAMIN D3,25 HYDROXY: 7.4 NG/ML

## 2018-01-17 ENCOUNTER — TELEPHONE (OUTPATIENT)
Dept: INTERNAL MEDICINE | Age: 83
End: 2018-01-17

## 2018-01-23 ENCOUNTER — OFFICE VISIT (OUTPATIENT)
Dept: INTERNAL MEDICINE | Age: 83
End: 2018-01-23
Payer: MEDICARE

## 2018-01-23 VITALS
HEART RATE: 55 BPM | WEIGHT: 223 LBS | BODY MASS INDEX: 33.8 KG/M2 | SYSTOLIC BLOOD PRESSURE: 120 MMHG | OXYGEN SATURATION: 98 % | HEIGHT: 68 IN | DIASTOLIC BLOOD PRESSURE: 60 MMHG

## 2018-01-23 DIAGNOSIS — R79.89 LOW VITAMIN D LEVEL: ICD-10-CM

## 2018-01-23 DIAGNOSIS — N18.4 CHRONIC KIDNEY DISEASE (CKD), STAGE IV (SEVERE) (HCC): ICD-10-CM

## 2018-01-23 DIAGNOSIS — I48.91 ATRIAL FIBRILLATION, UNSPECIFIED TYPE (HCC): ICD-10-CM

## 2018-01-23 DIAGNOSIS — E78.5 HYPERLIPIDEMIA, UNSPECIFIED HYPERLIPIDEMIA TYPE: ICD-10-CM

## 2018-01-23 DIAGNOSIS — I10 ESSENTIAL HYPERTENSION: ICD-10-CM

## 2018-01-23 DIAGNOSIS — J44.9 CHRONIC OBSTRUCTIVE PULMONARY DISEASE, UNSPECIFIED COPD TYPE (HCC): ICD-10-CM

## 2018-01-23 DIAGNOSIS — D64.9 ANEMIA, UNSPECIFIED TYPE: ICD-10-CM

## 2018-01-23 DIAGNOSIS — K21.9 GASTROESOPHAGEAL REFLUX DISEASE WITHOUT ESOPHAGITIS: ICD-10-CM

## 2018-01-23 DIAGNOSIS — E03.9 HYPOTHYROIDISM, UNSPECIFIED TYPE: ICD-10-CM

## 2018-01-23 DIAGNOSIS — Z23 NEEDS FLU SHOT: Primary | ICD-10-CM

## 2018-01-23 PROCEDURE — G8484 FLU IMMUNIZE NO ADMIN: HCPCS | Performed by: INTERNAL MEDICINE

## 2018-01-23 PROCEDURE — G0008 ADMIN INFLUENZA VIRUS VAC: HCPCS | Performed by: INTERNAL MEDICINE

## 2018-01-23 PROCEDURE — 4040F PNEUMOC VAC/ADMIN/RCVD: CPT | Performed by: INTERNAL MEDICINE

## 2018-01-23 PROCEDURE — 90662 IIV NO PRSV INCREASED AG IM: CPT | Performed by: INTERNAL MEDICINE

## 2018-01-23 PROCEDURE — G8926 SPIRO NO PERF OR DOC: HCPCS | Performed by: INTERNAL MEDICINE

## 2018-01-23 PROCEDURE — 3023F SPIROM DOC REV: CPT | Performed by: INTERNAL MEDICINE

## 2018-01-23 PROCEDURE — G8400 PT W/DXA NO RESULTS DOC: HCPCS | Performed by: INTERNAL MEDICINE

## 2018-01-23 PROCEDURE — G8417 CALC BMI ABV UP PARAM F/U: HCPCS | Performed by: INTERNAL MEDICINE

## 2018-01-23 PROCEDURE — 1090F PRES/ABSN URINE INCON ASSESS: CPT | Performed by: INTERNAL MEDICINE

## 2018-01-23 PROCEDURE — 1036F TOBACCO NON-USER: CPT | Performed by: INTERNAL MEDICINE

## 2018-01-23 PROCEDURE — G8427 DOCREV CUR MEDS BY ELIG CLIN: HCPCS | Performed by: INTERNAL MEDICINE

## 2018-01-23 PROCEDURE — 99214 OFFICE O/P EST MOD 30 MIN: CPT | Performed by: INTERNAL MEDICINE

## 2018-01-23 PROCEDURE — 1123F ACP DISCUSS/DSCN MKR DOCD: CPT | Performed by: INTERNAL MEDICINE

## 2018-01-23 ASSESSMENT — ENCOUNTER SYMPTOMS
WHEEZING: 0
SINUS PRESSURE: 0
CHEST TIGHTNESS: 0
DIARRHEA: 0
SHORTNESS OF BREATH: 1
NAUSEA: 0
RHINORRHEA: 0
EYE PAIN: 0
PHOTOPHOBIA: 0
SORE THROAT: 0
COUGH: 0
CONSTIPATION: 0
VOMITING: 0
ABDOMINAL PAIN: 0
EYE REDNESS: 0
COLOR CHANGE: 0

## 2018-01-23 ASSESSMENT — PATIENT HEALTH QUESTIONNAIRE - PHQ9
2. FEELING DOWN, DEPRESSED OR HOPELESS: 0
SUM OF ALL RESPONSES TO PHQ QUESTIONS 1-9: 0
1. LITTLE INTEREST OR PLEASURE IN DOING THINGS: 0
SUM OF ALL RESPONSES TO PHQ9 QUESTIONS 1 & 2: 0

## 2018-01-23 NOTE — PROGRESS NOTES
Chief Complaint   Patient presents with    6 Month Follow-Up     inr is 2.3 today    Hypertension    Other     anything she eats is upsetting her stomach for a while and then it just goes       HPI: Cristel Rowley is a 81 y.o. female is here for Follow-up of COPD, hypothyroidism, hypertension. She remains on 2.5 liters of oxygen continuously. She's doing well. She is working on losing weight. She states the fried foods to make her little nauseated at times. She has had her gallbladder removed. She still takes her Protonix. She's not been having any issues with acid reflux. She denies a complaints of abdominal pain or blood in her stools. She states that she just tries to avoid fried foods. She states that as long as she does this, her she does not have any issues with abdominal pain. She's not had any gout flares. Her blood pressures well controlled. She denies any complaints of chest pain, chest pressure or shortness of breath. Her cholesterols 123. She would like a flu shot today. Her TSH has improved. She is not having issues with her atrial fibrillation.     Past Medical History:   Diagnosis Date    BiPAP (biphasic positive airway pressure) dependence     15cm/11cm    Blood circulation, collateral     Ankles stay cold all time x 6 months    CAD (coronary artery disease)     Atrial Fib x years    CHF (congestive heart failure) (HCC)     Chronic diastolic heart failure (HCC)     Chronic kidney disease (CKD)     Stage 4 renal disease Sees Dr. Wendi Phipps COPD (chronic obstructive pulmonary disease) (HCC)     on 2.5 L oxygen at home x 5 months    History of blood transfusion      When had     Hyperlipidemia     High cholesterol x years    Hypertension     x years    Hypoxemia     Nonischemic cardiomyopathy ([facility] [address]     Obstructive sleep apnea     Initial PS; AHI:  81.2 per PSG, 2014    Paroxysmal atrial fibrillation (HCC)     Periodic limb movement disorder     Psychiatric problem     Depression    Sleep apnea     Thyroid disease     Hypothyroid x years      Past Surgical History:   Procedure Laterality Date    APPENDECTOMY      With hysterectomy    [facility]    EF 60%     SECTION      x 2    CHOLECYSTECTOMY      COLONOSCOPY      Last one 2014    EYE SURGERY      Cataracts bilateral    FRACTURE SURGERY      Collar bone as child    HYSTERECTOMY      SKIN BIOPSY      on breast and back - benign      Social History     Social History    Marital status:      Spouse name: N/A    Number of children: N/A    Years of education: N/A     Occupational History    Retired       Social History Main Topics    Smoking status: Former Smoker     Packs/day: 1.00     Years: 50.00     Types: Cigarettes     Quit date: 2013    Smokeless tobacco: Never Used    Alcohol use No    Drug use: No    Sexual activity: Not Asked     Other Topics Concern    None     Social History Narrative    None      Family History   Problem Relation Age of Onset    Heart Failure Mother      , 56    Hypertension Mother      hypertension    Cancer Sister          Heart Failure Father      , 65        Current Outpatient Prescriptions   Medication Sig Dispense Refill    glucose blood VI test strips (ASCENSIA AUTODISC VI;ONE TOUCH ULTRA TEST VI) strip 1 each by In Vitro route daily As needed. 100 each 11    levothyroxine (LEVOTHROID) 200 MCG tablet Take 1 tablet by mouth daily 2 tablets on Monday, Wed and Friday 180 tablet 3    calcitRIOL (ROCALTROL) 0.25 MCG capsule Take 1 capsule by mouth daily 90 capsule 3    losartan (COZAAR) 100 MG tablet Take 1 tablet by mouth daily 1/2 tab daily 90 tablet 3    warfarin (COUMADIN) 1 MG tablet TAKE 2 TABLETS DAILY AS DIRECTED.  60 tablet 3    levalbuterol (XOPENEX) 0.63 MG/3ML nebulization Take 3 mLs by nebulization every 8 hours as needed for Wheezing 270 vial 1    pantoprazole (PROTONIX) 40 MG tablet Take 1 tablet by mouth daily 90 tablet 3    metoprolol tartrate (LOPRESSOR) 50 MG tablet Take 1 tablet by mouth daily 90 tablet 3    colesevelam (WELCHOL) 625 MG tablet Take 3 pills twice daily 270 tablet 5    allopurinol (ZYLOPRIM) 100 MG tablet Take 1 tablet by mouth daily 90 tablet 3    bumetanide (BUMEX) 1 MG tablet Take 1 tablet by mouth 2 times daily 180 tablet 3    ergocalciferol (ERGOCALCIFEROL) 68242 units capsule Take 1 capsule by mouth once a week Indications: Takes one tablet on the 1st and one tablet on 15th of each month 4 capsule 1    BiPAP Machine MISC by Does not apply route nightly      docusate sodium (COLACE) 100 MG capsule Take 200 mg by mouth 2 times daily      vitamin B-12 (CYANOCOBALAMIN) 500 MCG tablet Take 500 mcg by mouth daily      amLODIPine (NORVASC) 10 MG tablet Take 10 mg by mouth daily      aspirin 81 MG tablet Take 81 mg by mouth daily      Levothyroxine Sodium (SYNTHROID PO) Take 0.2 mg by mouth daily Take 2 tablets of 0.2 on Wednesdays       No current facility-administered medications for this visit. 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 HENT: Negative for congestion, ear pain, postnasal drip, rhinorrhea, sinus pressure, sneezing and sore throat.

## 2018-01-30 ENCOUNTER — TELEPHONE (OUTPATIENT)
Dept: INTERNAL MEDICINE | Age: 83
End: 2018-01-30

## 2018-02-01 ENCOUNTER — TELEPHONE (OUTPATIENT)
Dept: INTERNAL MEDICINE | Age: 83
End: 2018-02-01

## 2018-02-05 ENCOUNTER — TELEPHONE (OUTPATIENT)
Dept: INTERNAL MEDICINE | Age: 83
End: 2018-02-05

## 2018-02-05 ENCOUNTER — CARE COORDINATION (OUTPATIENT)
Dept: CARE COORDINATION | Age: 83
End: 2018-02-05

## 2018-02-13 ENCOUNTER — TELEPHONE (OUTPATIENT)
Dept: INTERNAL MEDICINE | Age: 83
End: 2018-02-13

## 2018-02-13 NOTE — TELEPHONE ENCOUNTER
inr is 3.8  Per verbal from dr. Uribe Blake hold today and tomorrow recheck on Thursday  Pt stated understanding

## 2018-02-15 ENCOUNTER — TELEPHONE (OUTPATIENT)
Dept: INTERNAL MEDICINE | Age: 83
End: 2018-02-15

## 2018-02-20 ENCOUNTER — TELEPHONE (OUTPATIENT)
Dept: INTERNAL MEDICINE | Age: 83
End: 2018-02-20

## 2018-02-27 ENCOUNTER — TELEPHONE (OUTPATIENT)
Dept: INTERNAL MEDICINE | Age: 83
End: 2018-02-27

## 2018-03-01 ENCOUNTER — TELEPHONE (OUTPATIENT)
Dept: INTERNAL MEDICINE | Age: 83
End: 2018-03-01

## 2018-03-01 RX ORDER — WARFARIN SODIUM 1 MG/1
TABLET ORAL
Qty: 60 TABLET | Refills: 5 | Status: SHIPPED | OUTPATIENT
Start: 2018-03-01 | End: 2019-01-02 | Stop reason: SDUPTHER

## 2018-03-06 ENCOUNTER — TELEPHONE (OUTPATIENT)
Dept: INTERNAL MEDICINE | Age: 83
End: 2018-03-06

## 2018-03-13 ENCOUNTER — TELEPHONE (OUTPATIENT)
Dept: INTERNAL MEDICINE | Age: 83
End: 2018-03-13

## 2018-03-20 ENCOUNTER — ANTI-COAG VISIT (OUTPATIENT)
Dept: INTERNAL MEDICINE | Age: 83
End: 2018-03-20

## 2018-03-20 ENCOUNTER — TELEPHONE (OUTPATIENT)
Dept: INTERNAL MEDICINE | Age: 83
End: 2018-03-20

## 2018-03-20 LAB — INR BLD: 2.7

## 2018-03-20 NOTE — PATIENT INSTRUCTIONS
Patient advised to Hold Tonight, Resume Current Dose tomorrow and check in one week.   Patient advised understanding

## 2018-03-27 ENCOUNTER — ANTI-COAG VISIT (OUTPATIENT)
Dept: INTERNAL MEDICINE | Age: 83
End: 2018-03-27

## 2018-03-27 ENCOUNTER — TELEPHONE (OUTPATIENT)
Dept: INTERNAL MEDICINE | Age: 83
End: 2018-03-27

## 2018-03-29 ENCOUNTER — ANTI-COAG VISIT (OUTPATIENT)
Dept: INTERNAL MEDICINE | Age: 83
End: 2018-03-29
Payer: MEDICARE

## 2018-03-29 ENCOUNTER — TELEPHONE (OUTPATIENT)
Dept: INTERNAL MEDICINE | Age: 83
End: 2018-03-29

## 2018-03-29 DIAGNOSIS — I48.20 CHRONIC ATRIAL FIBRILLATION (HCC): ICD-10-CM

## 2018-03-29 LAB — INR BLD: 3.8

## 2018-03-29 PROCEDURE — 93793 ANTICOAG MGMT PT WARFARIN: CPT | Performed by: INTERNAL MEDICINE

## 2018-04-02 ENCOUNTER — ANTI-COAG VISIT (OUTPATIENT)
Dept: INTERNAL MEDICINE | Age: 83
End: 2018-04-02
Payer: MEDICARE

## 2018-04-02 DIAGNOSIS — I48.20 CHRONIC ATRIAL FIBRILLATION (HCC): ICD-10-CM

## 2018-04-02 PROCEDURE — 93793 ANTICOAG MGMT PT WARFARIN: CPT | Performed by: INTERNAL MEDICINE

## 2018-04-10 ENCOUNTER — ANTI-COAG VISIT (OUTPATIENT)
Dept: INTERNAL MEDICINE | Age: 83
End: 2018-04-10
Payer: MEDICARE

## 2018-04-10 DIAGNOSIS — I48.20 CHRONIC ATRIAL FIBRILLATION (HCC): ICD-10-CM

## 2018-04-10 LAB — INR BLD: 4.6

## 2018-04-10 PROCEDURE — 93793 ANTICOAG MGMT PT WARFARIN: CPT | Performed by: INTERNAL MEDICINE

## 2018-04-12 ENCOUNTER — TELEPHONE (OUTPATIENT)
Dept: INTERNAL MEDICINE | Age: 83
End: 2018-04-12

## 2018-04-17 ENCOUNTER — TELEPHONE (OUTPATIENT)
Dept: INTERNAL MEDICINE | Age: 83
End: 2018-04-17

## 2018-04-17 ENCOUNTER — ANTI-COAG VISIT (OUTPATIENT)
Dept: INTERNAL MEDICINE | Age: 83
End: 2018-04-17
Payer: MEDICARE

## 2018-04-17 DIAGNOSIS — I48.20 CHRONIC ATRIAL FIBRILLATION (HCC): ICD-10-CM

## 2018-04-17 LAB — INR BLD: 4

## 2018-04-17 PROCEDURE — 93793 ANTICOAG MGMT PT WARFARIN: CPT | Performed by: INTERNAL MEDICINE

## 2018-04-19 ENCOUNTER — ANTI-COAG VISIT (OUTPATIENT)
Dept: INTERNAL MEDICINE | Age: 83
End: 2018-04-19
Payer: MEDICARE

## 2018-04-19 DIAGNOSIS — I48.20 CHRONIC ATRIAL FIBRILLATION (HCC): ICD-10-CM

## 2018-04-19 LAB — INR BLD: 4.2

## 2018-04-19 PROCEDURE — 93793 ANTICOAG MGMT PT WARFARIN: CPT | Performed by: INTERNAL MEDICINE

## 2018-04-23 ENCOUNTER — ANTI-COAG VISIT (OUTPATIENT)
Dept: INTERNAL MEDICINE | Age: 83
End: 2018-04-23

## 2018-04-23 LAB — INR BLD: 2.6

## 2018-04-23 RX ORDER — COLESEVELAM HYDROCHLORIDE 625 MG/1
TABLET, FILM COATED ORAL
Qty: 270 TABLET | Refills: 5 | Status: ON HOLD | OUTPATIENT
Start: 2018-04-23 | End: 2019-01-20

## 2018-04-30 ENCOUNTER — NURSE ONLY (OUTPATIENT)
Dept: INTERNAL MEDICINE | Age: 83
End: 2018-04-30
Payer: MEDICARE

## 2018-04-30 ENCOUNTER — ANTI-COAG VISIT (OUTPATIENT)
Dept: INTERNAL MEDICINE | Age: 83
End: 2018-04-30

## 2018-04-30 ENCOUNTER — CARE COORDINATION (OUTPATIENT)
Dept: CARE COORDINATION | Age: 83
End: 2018-04-30

## 2018-04-30 DIAGNOSIS — Z79.01 ENCOUNTER FOR CURRENT LONG-TERM USE OF ANTICOAGULANTS: Primary | ICD-10-CM

## 2018-04-30 LAB
INTERNATIONAL NORMALIZATION RATIO, POC: 4.6
PROTHROMBIN TIME, POC: NORMAL

## 2018-04-30 PROCEDURE — 85610 PROTHROMBIN TIME: CPT | Performed by: INTERNAL MEDICINE

## 2018-05-03 ENCOUNTER — ANTI-COAG VISIT (OUTPATIENT)
Dept: INTERNAL MEDICINE | Age: 83
End: 2018-05-03

## 2018-05-03 ENCOUNTER — TELEPHONE (OUTPATIENT)
Dept: INTERNAL MEDICINE | Age: 83
End: 2018-05-03

## 2018-05-03 LAB — INR BLD: 3.8

## 2018-05-10 ENCOUNTER — ANTI-COAG VISIT (OUTPATIENT)
Dept: INTERNAL MEDICINE | Age: 83
End: 2018-05-10

## 2018-05-10 LAB — INR BLD: 2.4

## 2018-05-17 ENCOUNTER — ANTI-COAG VISIT (OUTPATIENT)
Dept: INTERNAL MEDICINE | Age: 83
End: 2018-05-17

## 2018-05-17 LAB — INR BLD: 3.5

## 2018-05-24 ENCOUNTER — ANTI-COAG VISIT (OUTPATIENT)
Dept: INTERNAL MEDICINE | Age: 83
End: 2018-05-24
Payer: MEDICARE

## 2018-05-24 DIAGNOSIS — I48.20 CHRONIC ATRIAL FIBRILLATION (HCC): ICD-10-CM

## 2018-05-24 LAB — INR BLD: 4.1

## 2018-05-24 PROCEDURE — 93793 ANTICOAG MGMT PT WARFARIN: CPT | Performed by: INTERNAL MEDICINE

## 2018-05-31 ENCOUNTER — ANTI-COAG VISIT (OUTPATIENT)
Dept: INTERNAL MEDICINE | Age: 83
End: 2018-05-31
Payer: MEDICARE

## 2018-05-31 LAB — INTERNATIONAL NORMALIZATION RATIO, POC: 2.6

## 2018-05-31 PROCEDURE — 85610 PROTHROMBIN TIME: CPT | Performed by: INTERNAL MEDICINE

## 2018-06-07 ENCOUNTER — ANTI-COAG VISIT (OUTPATIENT)
Dept: INTERNAL MEDICINE | Age: 83
End: 2018-06-07

## 2018-06-07 LAB — INR BLD: 2.9

## 2018-06-14 ENCOUNTER — ANTI-COAG VISIT (OUTPATIENT)
Dept: INTERNAL MEDICINE | Age: 83
End: 2018-06-14

## 2018-06-14 LAB — INR BLD: 2.6

## 2018-06-21 ENCOUNTER — ANTI-COAG VISIT (OUTPATIENT)
Dept: INTERNAL MEDICINE | Age: 83
End: 2018-06-21

## 2018-06-21 LAB — INR BLD: 2.5

## 2018-06-28 ENCOUNTER — ANTI-COAG VISIT (OUTPATIENT)
Dept: INTERNAL MEDICINE | Age: 83
End: 2018-06-28

## 2018-06-28 LAB — INR BLD: 2.6

## 2018-07-03 RX ORDER — METOPROLOL TARTRATE 50 MG/1
TABLET, FILM COATED ORAL
Qty: 90 TABLET | Refills: 3 | Status: SHIPPED | OUTPATIENT
Start: 2018-07-03 | End: 2020-01-10 | Stop reason: SDUPTHER

## 2018-07-03 NOTE — TELEPHONE ENCOUNTER
Verner Bruin called requesting a refill of the below medication which has been pended for you:     Requested Prescriptions     Pending Prescriptions Disp Refills    metoprolol tartrate (LOPRESSOR) 50 MG tablet [Pharmacy Med Name: METOPROLOL TAB TAR 50MG] 90 tablet 3     Sig: TAKE 1 TABLET DAILY       Last Appointment Date: 1/23/2018  Next Appointment Date: 7/24/2018    No Known Allergies

## 2018-07-05 ENCOUNTER — ANTI-COAG VISIT (OUTPATIENT)
Dept: INTERNAL MEDICINE | Age: 83
End: 2018-07-05

## 2018-07-05 LAB — INR BLD: 2.4

## 2018-07-12 ENCOUNTER — ANTI-COAG VISIT (OUTPATIENT)
Dept: INTERNAL MEDICINE | Age: 83
End: 2018-07-12
Payer: MEDICARE

## 2018-07-12 ENCOUNTER — TELEPHONE (OUTPATIENT)
Dept: INTERNAL MEDICINE | Age: 83
End: 2018-07-12

## 2018-07-12 DIAGNOSIS — I48.20 CHRONIC ATRIAL FIBRILLATION (HCC): ICD-10-CM

## 2018-07-12 LAB — INR BLD: 2.4

## 2018-07-12 PROCEDURE — 93793 ANTICOAG MGMT PT WARFARIN: CPT | Performed by: INTERNAL MEDICINE

## 2018-07-12 NOTE — PROGRESS NOTES
HOME MONITORING REPORT    INR today:   Results for orders placed or performed in visit on 07/12/18   Protime-INR   Result Value Ref Range    INR 2.40        INR Goal: 2.0-3.0    Dosing Plan  As of 7/12/2018    TTR:   68.4 % (2.4 mo)   Full warfarin instructions:   1 mg every day              PLAN:PATIENT NOTIFIED TO  CONTINUE CURRENT DOSE AND RECHECK IN ONE WEEK.

## 2018-07-17 DIAGNOSIS — E78.5 HYPERLIPIDEMIA, UNSPECIFIED HYPERLIPIDEMIA TYPE: ICD-10-CM

## 2018-07-17 DIAGNOSIS — R79.89 LOW VITAMIN D LEVEL: ICD-10-CM

## 2018-07-17 DIAGNOSIS — D64.9 ANEMIA, UNSPECIFIED TYPE: ICD-10-CM

## 2018-07-17 LAB
ALBUMIN SERPL-MCNC: 3.9 G/DL (ref 3.5–5.2)
ALP BLD-CCNC: 65 U/L (ref 35–104)
ALT SERPL-CCNC: 8 U/L (ref 5–33)
ANION GAP SERPL CALCULATED.3IONS-SCNC: 19 MMOL/L (ref 7–19)
AST SERPL-CCNC: 16 U/L (ref 5–32)
BASOPHILS ABSOLUTE: 0 K/UL (ref 0–0.2)
BASOPHILS RELATIVE PERCENT: 0.8 % (ref 0–1)
BILIRUB SERPL-MCNC: 0.8 MG/DL (ref 0.2–1.2)
BUN BLDV-MCNC: 43 MG/DL (ref 8–23)
CALCIUM SERPL-MCNC: 9.6 MG/DL (ref 8.8–10.2)
CHLORIDE BLD-SCNC: 106 MMOL/L (ref 98–111)
CHOLESTEROL, TOTAL: 110 MG/DL (ref 160–199)
CO2: 21 MMOL/L (ref 22–29)
CREAT SERPL-MCNC: 2.5 MG/DL (ref 0.5–0.9)
EOSINOPHILS ABSOLUTE: 0.2 K/UL (ref 0–0.6)
EOSINOPHILS RELATIVE PERCENT: 3.1 % (ref 0–5)
GFR NON-AFRICAN AMERICAN: 18
GLUCOSE BLD-MCNC: 88 MG/DL (ref 74–109)
HCT VFR BLD CALC: 31.5 % (ref 37–47)
HDLC SERPL-MCNC: 40 MG/DL (ref 65–121)
HEMOGLOBIN: 9.2 G/DL (ref 12–16)
LDL CHOLESTEROL CALCULATED: 57 MG/DL
LYMPHOCYTES ABSOLUTE: 1.1 K/UL (ref 1.1–4.5)
LYMPHOCYTES RELATIVE PERCENT: 23.5 % (ref 20–40)
MCH RBC QN AUTO: 27.1 PG (ref 27–31)
MCHC RBC AUTO-ENTMCNC: 29.2 G/DL (ref 33–37)
MCV RBC AUTO: 92.6 FL (ref 81–99)
MONOCYTES ABSOLUTE: 0.4 K/UL (ref 0–0.9)
MONOCYTES RELATIVE PERCENT: 8.3 % (ref 0–10)
NEUTROPHILS ABSOLUTE: 3.1 K/UL (ref 1.5–7.5)
NEUTROPHILS RELATIVE PERCENT: 64.1 % (ref 50–65)
PDW BLD-RTO: 16.8 % (ref 11.5–14.5)
PLATELET # BLD: 83 K/UL (ref 130–400)
PLATELET SLIDE REVIEW: ABNORMAL
POTASSIUM SERPL-SCNC: 4.2 MMOL/L (ref 3.5–5)
RBC # BLD: 3.4 M/UL (ref 4.2–5.4)
SODIUM BLD-SCNC: 146 MMOL/L (ref 136–145)
TOTAL PROTEIN: 7.7 G/DL (ref 6.6–8.7)
TRIGL SERPL-MCNC: 65 MG/DL (ref 0–149)
TSH SERPL DL<=0.05 MIU/L-ACNC: 0.34 UIU/ML (ref 0.27–4.2)
WBC # BLD: 4.8 K/UL (ref 4.8–10.8)

## 2018-07-19 ENCOUNTER — ANTI-COAG VISIT (OUTPATIENT)
Dept: INTERNAL MEDICINE | Age: 83
End: 2018-07-19
Payer: MEDICARE

## 2018-07-19 DIAGNOSIS — I48.20 CHRONIC ATRIAL FIBRILLATION (HCC): ICD-10-CM

## 2018-07-19 LAB
INR BLD: 1.8
VITAMIN D 1,25-DIHYDROXY: 28.8 PG/ML (ref 19.9–79.3)

## 2018-07-19 PROCEDURE — 93793 ANTICOAG MGMT PT WARFARIN: CPT | Performed by: INTERNAL MEDICINE

## 2018-07-19 RX ORDER — ALLOPURINOL 100 MG/1
TABLET ORAL
Qty: 90 TABLET | Refills: 3 | Status: SHIPPED | OUTPATIENT
Start: 2018-07-19 | End: 2019-09-06

## 2018-07-19 NOTE — PROGRESS NOTES
HOME MONITORING REPORT    INR today:   Results for orders placed or performed in visit on 07/19/18   Protime-INR   Result Value Ref Range    INR 1.80        INR Goal: 2.0-3.0    Dosing Plan  As of 7/19/2018    TTR:   68.3 % (2.7 mo)   Full warfarin instructions:   7/19: 2 mg; Otherwise 1 mg every day              PLAN:PATIENT NOTIFIED TO  TAKE 2MG TONIGHT, THEN CONTINUE CURRENT DOSE AND RECHECK IN ONE WEEK.

## 2018-07-24 ENCOUNTER — OFFICE VISIT (OUTPATIENT)
Dept: INTERNAL MEDICINE | Age: 83
End: 2018-07-24
Payer: MEDICARE

## 2018-07-24 VITALS
HEART RATE: 67 BPM | HEIGHT: 67 IN | BODY MASS INDEX: 34.69 KG/M2 | DIASTOLIC BLOOD PRESSURE: 64 MMHG | SYSTOLIC BLOOD PRESSURE: 120 MMHG | OXYGEN SATURATION: 92 % | WEIGHT: 221 LBS

## 2018-07-24 DIAGNOSIS — N18.9 CHRONIC KIDNEY DISEASE, UNSPECIFIED CKD STAGE: ICD-10-CM

## 2018-07-24 DIAGNOSIS — E55.9 VITAMIN D DEFICIENCY: ICD-10-CM

## 2018-07-24 DIAGNOSIS — I48.91 ATRIAL FIBRILLATION, UNSPECIFIED TYPE (HCC): ICD-10-CM

## 2018-07-24 DIAGNOSIS — E78.5 HYPERLIPIDEMIA, UNSPECIFIED HYPERLIPIDEMIA TYPE: ICD-10-CM

## 2018-07-24 DIAGNOSIS — M10.9 GOUT, UNSPECIFIED CAUSE, UNSPECIFIED CHRONICITY, UNSPECIFIED SITE: ICD-10-CM

## 2018-07-24 DIAGNOSIS — K21.9 GASTROESOPHAGEAL REFLUX DISEASE WITHOUT ESOPHAGITIS: ICD-10-CM

## 2018-07-24 DIAGNOSIS — I10 ESSENTIAL HYPERTENSION: ICD-10-CM

## 2018-07-24 DIAGNOSIS — E03.9 HYPOTHYROIDISM, UNSPECIFIED TYPE: ICD-10-CM

## 2018-07-24 DIAGNOSIS — J96.11 CHRONIC RESPIRATORY FAILURE WITH HYPOXIA (HCC): ICD-10-CM

## 2018-07-24 DIAGNOSIS — Z00.00 ROUTINE GENERAL MEDICAL EXAMINATION AT A HEALTH CARE FACILITY: Primary | ICD-10-CM

## 2018-07-24 DIAGNOSIS — J44.9 CHRONIC OBSTRUCTIVE PULMONARY DISEASE, UNSPECIFIED COPD TYPE (HCC): ICD-10-CM

## 2018-07-24 PROCEDURE — G0439 PPPS, SUBSEQ VISIT: HCPCS | Performed by: INTERNAL MEDICINE

## 2018-07-24 PROCEDURE — 3023F SPIROM DOC REV: CPT | Performed by: INTERNAL MEDICINE

## 2018-07-24 PROCEDURE — G8427 DOCREV CUR MEDS BY ELIG CLIN: HCPCS | Performed by: INTERNAL MEDICINE

## 2018-07-24 PROCEDURE — G8417 CALC BMI ABV UP PARAM F/U: HCPCS | Performed by: INTERNAL MEDICINE

## 2018-07-24 PROCEDURE — 1101F PT FALLS ASSESS-DOCD LE1/YR: CPT | Performed by: INTERNAL MEDICINE

## 2018-07-24 PROCEDURE — 99214 OFFICE O/P EST MOD 30 MIN: CPT | Performed by: INTERNAL MEDICINE

## 2018-07-24 PROCEDURE — G8400 PT W/DXA NO RESULTS DOC: HCPCS | Performed by: INTERNAL MEDICINE

## 2018-07-24 PROCEDURE — 1090F PRES/ABSN URINE INCON ASSESS: CPT | Performed by: INTERNAL MEDICINE

## 2018-07-24 PROCEDURE — 1036F TOBACCO NON-USER: CPT | Performed by: INTERNAL MEDICINE

## 2018-07-24 PROCEDURE — 4040F PNEUMOC VAC/ADMIN/RCVD: CPT | Performed by: INTERNAL MEDICINE

## 2018-07-24 PROCEDURE — 1123F ACP DISCUSS/DSCN MKR DOCD: CPT | Performed by: INTERNAL MEDICINE

## 2018-07-24 PROCEDURE — G8926 SPIRO NO PERF OR DOC: HCPCS | Performed by: INTERNAL MEDICINE

## 2018-07-24 ASSESSMENT — PATIENT HEALTH QUESTIONNAIRE - PHQ9: SUM OF ALL RESPONSES TO PHQ QUESTIONS 1-9: 2

## 2018-07-24 ASSESSMENT — ANXIETY QUESTIONNAIRES: GAD7 TOTAL SCORE: 0

## 2018-07-24 ASSESSMENT — LIFESTYLE VARIABLES: HOW OFTEN DO YOU HAVE A DRINK CONTAINING ALCOHOL: 0

## 2018-07-24 NOTE — PATIENT INSTRUCTIONS
Eve account. Enter P883 in the [facility] box to learn more about \"Medicines to Avoid With Kidney Disease: Care Instructions. \"     If you do not have an account, please click on the \"Sign Up Now\" link. Current as of: May 12, 2017  Content Version: 11.6  © 5906-3887 [employer]. Care instructions adapted under license by [facility] ([facility]). If you have questions about a medical condition or this instruction, always ask your healthcare professional. [place] [address] any warranty or liability for your use of this information. Personalized Preventive Plan for Mary Medel - 7/24/2018  Medicare offers a range of preventive health benefits. Some of the tests and screenings are paid in full while other may be subject to a deductible, co-insurance, and/or copay. Some of these benefits include a comprehensive review of your medical history including lifestyle, illnesses that may run in your family, and various assessments and screenings as appropriate. After reviewing your medical record and screening and assessments performed today your provider may have ordered immunizations, labs, imaging, and/or referrals for you. A list of these orders (if applicable) as well as your Preventive Care list are included within your After Visit Summary for your review. Other Preventive Recommendations:    · A preventive eye exam performed by an eye specialist is recommended every 1-2 years to screen for glaucoma; cataracts, macular degeneration, and other eye disorders. · A preventive dental visit is recommended every 6 months. · Try to get at least 150 minutes of exercise per week or 10,000 steps per day on a pedometer . · Order or download the FREE \"Exercise & Physical Activity: Your Everyday Guide\" from [employer]. Call 2-934.170.9724 or search [employer] online.   · You need 7836-2618 mg of calcium and 0261-5484 IU of

## 2018-07-29 ASSESSMENT — ENCOUNTER SYMPTOMS
CONSTIPATION: 0
DIARRHEA: 0
COLOR CHANGE: 0
PHOTOPHOBIA: 0
SINUS PRESSURE: 0
COUGH: 0
NAUSEA: 0
SHORTNESS OF BREATH: 1
RHINORRHEA: 0
CHEST TIGHTNESS: 0
EYE PAIN: 0
ABDOMINAL PAIN: 0
EYE REDNESS: 0
WHEEZING: 0
VOMITING: 0
SORE THROAT: 0

## 2018-07-29 NOTE — PROGRESS NOTES
Medicare Annual Wellness Visit  Name: Mayra Mary Date: 2018   MRN: [id number] Sex: Female   Age: 81 y.o. Ethnicity: Non-/Non    : 1935 Race: Shaun Duffy is here for Medicare AWV    Screenings for behavioral, psychosocial and functional/safety risks, and cognitive dysfunction are all negative except as indicated below. These results, as well as other patient data from the [address] form, are documented in Flowsheets linked to this Encounter. No Known Allergies    Prior to Visit Medications    Medication Sig Taking? Authorizing Provider   allopurinol (ZYLOPRIM) 100 MG tablet TAKE 1 TABLET DAILY Yes Ingrid Alexander MD   metoprolol tartrate (LOPRESSOR) 50 MG tablet TAKE 1 TABLET DAILY Yes Ingrid Alexander MD   WELCHOL 625 MG tablet TAKE 3 TABLETS TWICE A DAY Yes Ingrid Alexander MD   warfarin (COUMADIN) 1 MG tablet TAKE 2 TABLETS DAILY AS DIRECTED. Patient taking differently: take 1 mg daily Yes Ingrid Alexander MD   glucose blood VI test strips (ASCENSIA AUTODISC VI;ONE TOUCH ULTRA TEST VI) strip 1 each by In Vitro route daily As needed.  Yes Ingrid Alexander MD   levothyroxine (LEVOTHROID) 200 MCG tablet Take 1 tablet by mouth daily 2 tablets on Monday, Wed and Friday Yes Ingrid Alexander MD   calcitRIOL (ROCALTROL) 0.25 MCG capsule Take 1 capsule by mouth daily Yes Ingrid Alexander MD   losartan (COZAAR) 100 MG tablet Take 1 tablet by mouth daily 1/2 tab daily Yes Ingrid Alexander MD   levalbuterol (XOPENEX) 0.63 MG/3ML nebulization Take 3 mLs by nebulization every 8 hours as needed for Wheezing Yes Ingrid Alexander MD   pantoprazole (PROTONIX) 40 MG tablet Take 1 tablet by mouth daily Yes Ingrid Alexander MD   bumetanide (BUMEX) 1 MG tablet Take 1 tablet by mouth 2 times daily Yes Ingrid Alexander MD   ergocalciferol (ERGOCALCIFEROL) 91083 units capsule Take 1 capsule by mouth once a week Indications: Takes one tablet on the 1st
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 HENT: Negative for congestion, ear pain, postnasal drip, rhinorrhea, sinus pressure, sneezing and sore throat. Eyes: Negative for photophobia, pain and redness. Respiratory: Positive for shortness of breath. Negative for cough, chest tightness and wheezing. She has chronic shortness of breath secondary to her COPD   Cardiovascular: Negative for chest pain, palpitations and leg swelling. Gastrointestinal: Negative for abdominal pain, constipation, diarrhea, nausea and vomiting. She does get slightly nauseated when she eats greasy foods   Endocrine: Negative for cold intolerance, heat intolerance, polydipsia, polyphagia and polyuria. Genitourinary: Negative for dysuria, frequency, hematuria and urgency. Musculoskeletal: Negative for arthralgias, gait problem, joint swelling and myalgias. Skin: Negative for color change, pallor and wound. Allergic/Immunologic: Positive for environmental allergies. Neurological: Negative for dizziness, facial asymmetry, speech difficulty, weakness and light-headedness. Hematological: Negative for adenopathy. Does not bruise/bleed easily. Psychiatric/Behavioral: Negative for confusion. The patient is not nervous/anxious.

## 2018-08-02 ENCOUNTER — ANTI-COAG VISIT (OUTPATIENT)
Dept: INTERNAL MEDICINE | Age: 83
End: 2018-08-02

## 2018-08-02 LAB — INR BLD: 2.1

## 2018-08-03 RX ORDER — LEVALBUTEROL INHALATION SOLUTION 0.63 MG/3ML
0.63 SOLUTION RESPIRATORY (INHALATION) EVERY 4 HOURS PRN
Qty: 3 ML | Refills: 3 | Status: ON HOLD | OUTPATIENT
Start: 2018-08-03 | End: 2019-01-20

## 2018-08-09 ENCOUNTER — ANTI-COAG VISIT (OUTPATIENT)
Dept: INTERNAL MEDICINE | Age: 83
End: 2018-08-09

## 2018-08-09 LAB — INR BLD: 2.1

## 2018-08-09 NOTE — PROGRESS NOTES
HOME MONITORING REPORT    INR today:   Results for orders placed or performed in visit on 08/09/18   Protime-INR   Result Value Ref Range    INR 2.10        INR Goal: 2.0-3.0    Dosing Plan  As of 8/9/2018    TTR:   65.6 % (3.4 mo)   Full warfarin instructions:   1 mg every day              PLAN:PATIENT NOTIFIED TO  CONTINUE CURRENT DOSE AND RECHECK IN ONE WEEK.

## 2018-08-16 ENCOUNTER — ANTI-COAG VISIT (OUTPATIENT)
Dept: INTERNAL MEDICINE | Age: 83
End: 2018-08-16

## 2018-08-16 LAB — INR BLD: 1.8

## 2018-08-16 NOTE — PROGRESS NOTES
HOME MONITORING REPORT    INR today:   Results for orders placed or performed in visit on 08/16/18   Protime-INR   Result Value Ref Range    INR 1.80        INR Goal: 2.0-3.0    Dosing Plan  As of 8/16/2018    TTR:   63.5 % (3.6 mo)   Full warfarin instructions:   8/16: 2 mg; Otherwise 1 mg every day              PLAN:PATIENT NOTIFIED TO  TAKE 2MG TONIGHT, THEN RESUME CURRENT DOSE AND RECHECK IN ONE WEEK.

## 2018-08-23 ENCOUNTER — ANTI-COAG VISIT (OUTPATIENT)
Dept: INTERNAL MEDICINE | Age: 83
End: 2018-08-23

## 2018-08-23 LAB — INR BLD: 2.8

## 2018-08-23 NOTE — PROGRESS NOTES
HOME MONITORING REPORT    INR today:   Results for orders placed or performed in visit on 08/23/18   Protime-INR   Result Value Ref Range    INR 2.80        INR Goal: 2.0-3.0    Dosing Plan  As of 8/23/2018    TTR:   64.5 % (3.8 mo)   Full warfarin instructions:   1 mg every day            PATIENT WILL EAT A SALAD TONIGHT  PLAN:PATIENT NOTIFIED TO  CONTINUE CURRENT DOSE AND RECHECK IN ONE WEEK.

## 2018-08-30 ENCOUNTER — ANTI-COAG VISIT (OUTPATIENT)
Dept: INTERNAL MEDICINE | Age: 83
End: 2018-08-30

## 2018-08-30 LAB — INR BLD: 2.2

## 2018-09-06 ENCOUNTER — ANTI-COAG VISIT (OUTPATIENT)
Dept: INTERNAL MEDICINE | Age: 83
End: 2018-09-06

## 2018-09-06 LAB — INR BLD: 1.9

## 2018-09-06 RX ORDER — LOSARTAN POTASSIUM 100 MG/1
TABLET ORAL
Qty: 45 TABLET | Refills: 3 | Status: SHIPPED | OUTPATIENT
Start: 2018-09-06 | End: 2019-09-06

## 2018-09-06 NOTE — PROGRESS NOTES
HOME MONITORING REPORT    INR today:   Results for orders placed or performed in visit on 09/06/18   Protime-INR   Result Value Ref Range    INR 1.90        INR Goal: 2.0-3.0    Dosing Plan  As of 9/6/2018    TTR:   66.6 % (4.3 mo)   Full warfarin instructions:   9/6: 2 mg; Otherwise 1 mg every day              PLAN:PATIENT NOTIFIED TO  TAKE 2MG TONIGHT, THEN RESUME CURRENT DOSE AND RECHECK IN ONE WEEK.

## 2018-09-13 ENCOUNTER — ANTI-COAG VISIT (OUTPATIENT)
Dept: INTERNAL MEDICINE | Age: 83
End: 2018-09-13

## 2018-09-13 LAB — INR BLD: 3

## 2018-09-13 NOTE — PROGRESS NOTES
HOME MONITORING REPORT    INR today:   Results for orders placed or performed in visit on 09/13/18   Protime-INR   Result Value Ref Range    INR 3.00        INR Goal: 2.0-3.0    Dosing Plan  As of 9/13/2018    TTR:   67.8 % (4.5 mo)   Full warfarin instructions:   1 mg every day              PLAN:PATIENT WILL EAT A SALAD OR BROCCOLI TONIGHT, SHE HAS BEEN AVOIDING ALL GREEN VEGETABLES. I LET HER KNOW THAT IT IS OK TO EAT THEM IN MODERATION. NOTIFIED TO  CONTINUE CURRENT DOSE AND RECHECK IN ONE WEEK. PATIENT VOICED UNDERSTANDING.

## 2018-09-17 RX ORDER — BUMETANIDE 1 MG/1
TABLET ORAL
Qty: 180 TABLET | Refills: 3 | Status: SHIPPED | OUTPATIENT
Start: 2018-09-17 | End: 2019-09-06

## 2018-09-20 ENCOUNTER — ANTI-COAG VISIT (OUTPATIENT)
Dept: INTERNAL MEDICINE | Age: 83
End: 2018-09-20

## 2018-09-20 LAB — INR BLD: 2.1

## 2018-09-27 ENCOUNTER — ANTI-COAG VISIT (OUTPATIENT)
Dept: INTERNAL MEDICINE | Age: 83
End: 2018-09-27
Payer: MEDICARE

## 2018-09-27 DIAGNOSIS — I48.20 CHRONIC ATRIAL FIBRILLATION (HCC): ICD-10-CM

## 2018-09-27 LAB — INR BLD: 2.2

## 2018-09-27 PROCEDURE — 93793 ANTICOAG MGMT PT WARFARIN: CPT | Performed by: INTERNAL MEDICINE

## 2018-09-27 NOTE — PROGRESS NOTES
HOME MONITORING REPORT    INR today:   Results for orders placed or performed in visit on 09/27/18   Protime-INR   Result Value Ref Range    INR 2.20        INR Goal: 2.0-3.0    Dosing Plan  As of 9/27/2018    TTR:   70.8 % (5 mo)   Full warfarin instructions:   1 mg every day              PLAN:PATIENT NOTIFIED TO  CONTINUE CURRENT DOSE AND RECHECK IN ONE WEEK.

## 2018-09-28 ENCOUNTER — ANTI-COAG VISIT (OUTPATIENT)
Dept: INTERNAL MEDICINE | Age: 83
End: 2018-09-28

## 2018-09-28 LAB — INR BLD: 2.2

## 2018-09-28 NOTE — PROGRESS NOTES
HOME MONITORING REPORT    INR today:   Results for orders placed or performed in visit on 09/28/18   Protime-INR   Result Value Ref Range    INR 2.20        INR Goal: 2.0-3.0    Dosing Plan  As of 9/28/2018    TTR:   70.8 % (5 mo)   Full warfarin instructions:   1 mg every day              PLAN: CONTINUE CURRENT DOSE    RECHECK IN ONE WEEK    Patient advised understanding

## 2018-10-04 ENCOUNTER — ANTI-COAG VISIT (OUTPATIENT)
Dept: INTERNAL MEDICINE | Age: 83
End: 2018-10-04

## 2018-10-04 LAB — INR BLD: 3.8

## 2018-10-11 ENCOUNTER — ANTI-COAG VISIT (OUTPATIENT)
Dept: INTERNAL MEDICINE | Age: 83
End: 2018-10-11

## 2018-10-11 LAB
INR BLD: 2.1
INR BLD: 2.1

## 2018-10-12 ENCOUNTER — ANTI-COAG VISIT (OUTPATIENT)
Dept: INTERNAL MEDICINE | Age: 83
End: 2018-10-12
Payer: MEDICARE

## 2018-10-12 DIAGNOSIS — I48.20 CHRONIC ATRIAL FIBRILLATION (HCC): ICD-10-CM

## 2018-10-12 PROCEDURE — 93793 ANTICOAG MGMT PT WARFARIN: CPT | Performed by: INTERNAL MEDICINE

## 2018-10-15 RX ORDER — CALCITRIOL 0.25 UG/1
CAPSULE, LIQUID FILLED ORAL
Qty: 90 CAPSULE | Refills: 3 | Status: SHIPPED | OUTPATIENT
Start: 2018-10-15 | End: 2019-08-13 | Stop reason: ALTCHOICE

## 2018-10-15 RX ORDER — LEVOTHYROXINE SODIUM 200 MCG
TABLET ORAL
Qty: 130 TABLET | Refills: 3 | Status: ON HOLD | OUTPATIENT
Start: 2018-10-15 | End: 2019-01-26 | Stop reason: HOSPADM

## 2018-10-15 NOTE — TELEPHONE ENCOUNTER
Yo Pascal called requesting a refill of the below medication which has been pended for you:     Requested Prescriptions     Pending Prescriptions Disp Refills    calcitRIOL (ROCALTROL) 0.25 MCG capsule [Pharmacy Med Name: CALCITRIOL CAP 0.25MCG] 90 capsule 3     Sig: TAKE 1 CAPSULE DAILY    SYNTHROID 200 MCG tablet [Pharmacy Med Name: SYNTHROID TAB 0.2MG] 130 tablet 3     Sig: TAKE 1 TABLET DAILY EXCEPT TAKE 2 TABLETS ON Lyly Hanson       Last Appointment Date: 7/24/2018  Next Appointment Date: 07/29/2019    No Known Allergies

## 2018-10-18 ENCOUNTER — ANTI-COAG VISIT (OUTPATIENT)
Dept: INTERNAL MEDICINE | Age: 83
End: 2018-10-18

## 2018-10-18 LAB
INR BLD: 2
INR BLD: 2

## 2018-10-18 NOTE — PROGRESS NOTES
HOME MONITORING REPORT    INR today:   Results for orders placed or performed in visit on 10/18/18   Protime-INR   Result Value Ref Range    INR 2.00        INR Goal: 2.0-3.0    Dosing Plan  As of 10/18/2018    TTR:   70.4 % (5.7 mo)   Full warfarin instructions:   1 mg every day              PLAN: Patient instructed to take 2mg tonight, then resume usual dose and recheck in 1 week. Patient voiced understanding.

## 2018-10-19 ENCOUNTER — ANTI-COAG VISIT (OUTPATIENT)
Dept: INTERNAL MEDICINE | Age: 83
End: 2018-10-19

## 2018-10-25 ENCOUNTER — ANTI-COAG VISIT (OUTPATIENT)
Dept: INTERNAL MEDICINE | Age: 83
End: 2018-10-25

## 2018-10-25 LAB — INR BLD: 2.9

## 2018-10-25 NOTE — PROGRESS NOTES
HOME MONITORING REPORT    INR today:   Results for orders placed or performed in visit on 10/25/18   Protime-INR   Result Value Ref Range    INR 2.90        INR Goal: 2.0-3.0    Dosing Plan  As of 10/25/2018    TTR:   71.6 % (5.9 mo)   Full warfarin instructions:   1 mg every day              PLAN: CONTINUE CURRENT DOSE    RECHECK IN ONE WEEK  PATIENT ADVISED UNDERSTANDING AND [id number] [address]

## 2018-11-01 ENCOUNTER — ANTI-COAG VISIT (OUTPATIENT)
Dept: INTERNAL MEDICINE | Age: 83
End: 2018-11-01

## 2018-11-01 LAB — INR BLD: 4.1

## 2018-11-06 ENCOUNTER — ANTI-COAG VISIT (OUTPATIENT)
Dept: INTERNAL MEDICINE | Age: 83
End: 2018-11-06

## 2018-11-06 LAB — INR BLD: 2.5

## 2018-11-13 ENCOUNTER — TELEPHONE (OUTPATIENT)
Dept: INTERNAL MEDICINE | Age: 83
End: 2018-11-13

## 2018-11-13 ENCOUNTER — ANTI-COAG VISIT (OUTPATIENT)
Dept: INTERNAL MEDICINE | Age: 83
End: 2018-11-13

## 2018-11-13 LAB — INR BLD: 3.4

## 2018-11-14 ENCOUNTER — TRANSCRIBE ORDERS (OUTPATIENT)
Dept: ADMINISTRATIVE | Facility: HOSPITAL | Age: 83
End: 2018-11-14

## 2018-11-14 DIAGNOSIS — I12.9 HYPERTENSIVE NEPHROPATHY: ICD-10-CM

## 2018-11-14 DIAGNOSIS — Z01.818 PREOP EXAMINATION: Primary | ICD-10-CM

## 2018-11-14 DIAGNOSIS — N18.4 CHRONIC KIDNEY DISEASE, STAGE IV (SEVERE) (HCC): ICD-10-CM

## 2018-11-20 ENCOUNTER — ANTI-COAG VISIT (OUTPATIENT)
Dept: INTERNAL MEDICINE | Age: 83
End: 2018-11-20

## 2018-11-20 LAB — INR BLD: 2.4

## 2018-11-27 ENCOUNTER — ANTI-COAG VISIT (OUTPATIENT)
Dept: INTERNAL MEDICINE | Age: 83
End: 2018-11-27

## 2018-11-27 LAB — INR BLD: 2.4

## 2018-11-28 ENCOUNTER — HOSPITAL ENCOUNTER (OUTPATIENT)
Dept: ULTRASOUND IMAGING | Facility: HOSPITAL | Age: 83
Discharge: HOME | End: 2018-11-28
Admitting: NURSE PRACTITIONER

## 2018-11-28 ENCOUNTER — TELEPHONE (OUTPATIENT)
Dept: VASCULAR SURGERY | Facility: CLINIC | Age: 83
End: 2018-11-28

## 2018-11-28 DIAGNOSIS — N18.4 CHRONIC KIDNEY DISEASE, STAGE IV (SEVERE) (HCC): ICD-10-CM

## 2018-11-28 DIAGNOSIS — Z01.818 PREOP EXAMINATION: ICD-10-CM

## 2018-11-28 DIAGNOSIS — I12.9 HYPERTENSIVE NEPHROPATHY: ICD-10-CM

## 2018-11-28 PROCEDURE — G0365 VESSEL MAPPING HEMO ACCESS: HCPCS

## 2018-11-28 PROCEDURE — 93970 EXTREMITY STUDY: CPT

## 2018-11-28 PROCEDURE — 93990 DOPPLER FLOW TESTING: CPT | Performed by: SURGERY

## 2018-11-29 ENCOUNTER — TELEPHONE (OUTPATIENT)
Dept: NEUROLOGY | Age: 83
End: 2018-11-29

## 2018-11-29 ENCOUNTER — OFFICE VISIT (OUTPATIENT)
Dept: VASCULAR SURGERY | Facility: CLINIC | Age: 83
End: 2018-11-29

## 2018-11-29 VITALS
SYSTOLIC BLOOD PRESSURE: 122 MMHG | DIASTOLIC BLOOD PRESSURE: 58 MMHG | HEIGHT: 68 IN | HEART RATE: 59 BPM | BODY MASS INDEX: 33.8 KG/M2 | OXYGEN SATURATION: 94 % | WEIGHT: 223 LBS

## 2018-11-29 DIAGNOSIS — I48.20 CHRONIC ATRIAL FIBRILLATION (HCC): ICD-10-CM

## 2018-11-29 DIAGNOSIS — Z79.02 ENCOUNTER FOR MONITORING ANTIPLATELET THERAPY: ICD-10-CM

## 2018-11-29 DIAGNOSIS — Z51.81 ENCOUNTER FOR MONITORING ANTIPLATELET THERAPY: ICD-10-CM

## 2018-11-29 DIAGNOSIS — I10 ESSENTIAL HYPERTENSION: ICD-10-CM

## 2018-11-29 DIAGNOSIS — N18.4 CKD (CHRONIC KIDNEY DISEASE) STAGE 4, GFR 15-29 ML/MIN (HCC): Primary | ICD-10-CM

## 2018-11-29 PROCEDURE — 99204 OFFICE O/P NEW MOD 45 MIN: CPT | Performed by: SURGERY

## 2018-11-29 RX ORDER — LOSARTAN POTASSIUM 100 MG/1
TABLET ORAL
COMMUNITY
Start: 2018-09-06

## 2018-11-29 NOTE — PATIENT INSTRUCTIONS
She will need to hold her Coumadin for 4 days prior to surgery.      Vascular Access for Hemodialysis  A vascular access is a connection between two blood vessels that allows blood to be easily removed from the body and returned to the body during hemodialysis. Hemodialysis is a procedure in which a machine outside of the body filters the blood. There are three types of vascular accesses:  · Arteriovenous fistula. This is a connection between an artery and a vein (usually in the arm) that is made by sewing them together. Blood in the artery flows directly into the vein, causing it to get larger over time. This makes it easier for the vein to be used for hemodialysis. An arteriovenous fistula takes 1-6 months to develop after surgery.  · Arteriovenous graft. This is a connection between an artery and a vein in the arm that is made with a tube. An arteriovenous graft can be used within 2-3 weeks of surgery.  · Venous catheter. This is a thin, flexible tube that is placed in a large vein (usually in the neck, chest, or groin). A venous catheter for hemodialysis contains two tubes that come out of the skin. A venous catheter can be used right away. It is usually used as a temporary access if you need hemodialysis before a fistula or graft has developed. It may also be used as a permanent access if a fistula or graft cannot be created.    Which type of access is best for me?  The type of access that is best for you depends on the size and strength of your veins.  A fistula is usually the preferred type of access. It can last several years and is less likely than the other types of accesses to become infected or to cause blood clots within a blood vessel (thrombosis). However, a fistula is not an option for everyone. If your veins are not the right size, a graft may be used instead. Grafts require you to have strong veins. If your veins are not strong enough for a graft, a catheter may be used. Catheters are more likely  "than fistulas and grafts to become infected or to have thrombosis.  Sometimes, only one type of access is an option. Your health care provider will help you determine which type of access is best for you.  How is a vascular access used?  The way the access is used depends on the type of access:  · If the access is a fistula or graft, two needles are inserted through the skin into the access before each hemodialysis session. Blood leaves the body through one of the needles and travels through a tube to the hemodialysis machine (dialyzer). It then flows through another tube and returns to the body through the second needle.  · If the access is a catheter, one tube is connected directly to the tube that leads to the dialyzer and the other is connected to a tube that leads away from the dialyzer. Blood leaves the body through one tube and returns to the body through the other.    What kind of problems can occur with vascular accesses?  · Blood clots within a blood vessel (thrombosis). Thrombosis can lead to a narrowing of a blood vessel or tube (stenosis). If thrombosis occurs frequently, another access site may be created as a backup.  · Infection.  These problems are most likely to occur with a venous catheter and least likely to occur with an arteriovenous fistula.  How do I care for my vascular access?  Wear a medical alert bracelet. This tells health care providers that you are a dialysis patient in the case of an emergency and allows them to care for your veins appropriately. If you have a graft or fistula:  · A \"bruit\" is a noise that is heard with a stethoscope and a \"thrill\" is a vibration felt over the graft or fistula. The presence of the bruit and thrill indicates that the access is working. You will be taught to feel for the thrill each day. If this is not felt, the access may be clotted. Call your health care provider.  · You may use the arm where your vascular access is located freely after the site " heals. Keep the following in mind:  ? Avoid pressure on the arm.  ? Avoid lifting heavy objects with the arm.  ? Avoid sleeping on the arm.  ? Avoid wearing tight-sleeved shirts or jewelry around the graft or fistula.  · Do not allow blood pressure monitoring or needle punctures on the side where the graft or fistula is located.  · With permission from your health care provider, you may do exercises to help with blood flow through a fistula. These exercises involve squeezing a rubber ball or other soft objects as instructed.    Contact a health care provider if:  · Chills develop.  · You have an oral temperature above 102° F (38.9° C).  · Swelling around the graft or fistula gets worse.  · New pain develops.  · Pus or other fluid (drainage) is seen at the vascular access site.  · Skin redness or red streaking is seen on the skin around, above, or below the vascular access.  Get help right away if:  · Pain, numbness, or an unusual pale skin color develops in the hand on the side of your fistula.  · Dizziness or weakness develops that you have not had before.  · The vascular access has bleeding that cannot be easily controlled.  This information is not intended to replace advice given to you by your health care provider. Make sure you discuss any questions you have with your health care provider.  Document Released: 03/09/2004 Document Revised: 05/25/2017 Document Reviewed: 05/06/2014  [employer]vier Interactive Patient Education © 2017 [employer]

## 2018-11-30 ENCOUNTER — TELEPHONE (OUTPATIENT)
Dept: VASCULAR SURGERY | Facility: CLINIC | Age: 83
End: 2018-11-30

## 2018-12-03 ENCOUNTER — APPOINTMENT (OUTPATIENT)
Dept: PREADMISSION TESTING | Facility: HOSPITAL | Age: 83
End: 2018-12-03

## 2018-12-03 ENCOUNTER — HOSPITAL ENCOUNTER (OUTPATIENT)
Dept: GENERAL RADIOLOGY | Facility: HOSPITAL | Age: 83
Discharge: HOME | End: 2018-12-03
Admitting: SURGERY

## 2018-12-03 ENCOUNTER — TELEPHONE (OUTPATIENT)
Dept: INTERNAL MEDICINE | Age: 83
End: 2018-12-03

## 2018-12-03 VITALS
DIASTOLIC BLOOD PRESSURE: 45 MMHG | HEIGHT: 68 IN | RESPIRATION RATE: 20 BRPM | HEART RATE: 61 BPM | WEIGHT: 223.8 LBS | OXYGEN SATURATION: 93 % | SYSTOLIC BLOOD PRESSURE: 151 MMHG | BODY MASS INDEX: 33.92 KG/M2

## 2018-12-03 DIAGNOSIS — Z79.02 ENCOUNTER FOR MONITORING ANTIPLATELET THERAPY: ICD-10-CM

## 2018-12-03 DIAGNOSIS — Z51.81 ENCOUNTER FOR MONITORING ANTIPLATELET THERAPY: ICD-10-CM

## 2018-12-03 DIAGNOSIS — N18.4 CKD (CHRONIC KIDNEY DISEASE) STAGE 4, GFR 15-29 ML/MIN (HCC): ICD-10-CM

## 2018-12-03 LAB
ANION GAP SERPL CALCULATED.3IONS-SCNC: 10 MMOL/L (ref 4–13)
APTT PPP: 44.6 SECONDS (ref 24.1–34.8)
BASOPHILS # BLD AUTO: 0.04 10*3/MM3 (ref 0–0.2)
BASOPHILS NFR BLD AUTO: 0.7 % (ref 0–2)
BUN BLD-MCNC: 42 MG/DL (ref 5–21)
BUN/CREAT SERPL: 14.9 (ref 7–25)
CALCIUM SPEC-SCNC: 9.7 MG/DL (ref 8.4–10.4)
CHLORIDE SERPL-SCNC: 107 MMOL/L (ref 98–110)
CO2 SERPL-SCNC: 23 MMOL/L (ref 24–31)
CREAT BLD-MCNC: 2.81 MG/DL (ref 0.5–1.4)
DEPRECATED RDW RBC AUTO: 59.1 FL (ref 40–54)
EOSINOPHIL # BLD AUTO: 0.27 10*3/MM3 (ref 0–0.7)
EOSINOPHIL NFR BLD AUTO: 4.8 % (ref 0–4)
ERYTHROCYTE [DISTWIDTH] IN BLOOD BY AUTOMATED COUNT: 17.6 % (ref 12–15)
GFR SERPL CREATININE-BSD FRML MDRD: 16 ML/MIN/1.73
GLUCOSE BLD-MCNC: 108 MG/DL (ref 70–100)
HCT VFR BLD AUTO: 32.3 % (ref 37–47)
HGB BLD-MCNC: 9.9 G/DL (ref 12–16)
INR PPP: 2.59 (ref 0.91–1.09)
LYMPHOCYTES # BLD AUTO: 0.85 10*3/MM3 (ref 0.72–4.86)
LYMPHOCYTES NFR BLD AUTO: 15 % (ref 15–45)
MCH RBC QN AUTO: 28.4 PG (ref 28–32)
MCHC RBC AUTO-ENTMCNC: 30.7 G/DL (ref 33–36)
MCV RBC AUTO: 92.8 FL (ref 82–98)
MONOCYTES # BLD AUTO: 0.43 10*3/MM3 (ref 0.19–1.3)
MONOCYTES NFR BLD AUTO: 7.6 % (ref 4–12)
NEUTROPHILS # BLD AUTO: 4.03 10*3/MM3 (ref 1.87–8.4)
NEUTROPHILS NFR BLD AUTO: 71.4 % (ref 39–78)
PLATELET # BLD AUTO: 114 10*3/MM3 (ref 130–400)
PMV BLD AUTO: 14.1 FL (ref 6–12)
POTASSIUM BLD-SCNC: 3.9 MMOL/L (ref 3.5–5.3)
PROTHROMBIN TIME: 28.7 SECONDS (ref 11.9–14.6)
RBC # BLD AUTO: 3.48 10*6/MM3 (ref 4.2–5.4)
SODIUM BLD-SCNC: 140 MMOL/L (ref 135–145)
WBC NRBC COR # BLD: 5.65 10*3/MM3 (ref 4.8–10.8)

## 2018-12-03 PROCEDURE — 80048 BASIC METABOLIC PNL TOTAL CA: CPT | Performed by: SURGERY

## 2018-12-03 PROCEDURE — 93005 ELECTROCARDIOGRAM TRACING: CPT

## 2018-12-03 PROCEDURE — 93010 ELECTROCARDIOGRAM REPORT: CPT | Performed by: INTERNAL MEDICINE

## 2018-12-03 PROCEDURE — 36415 COLL VENOUS BLD VENIPUNCTURE: CPT

## 2018-12-03 PROCEDURE — 85730 THROMBOPLASTIN TIME PARTIAL: CPT | Performed by: SURGERY

## 2018-12-03 PROCEDURE — 71046 X-RAY EXAM CHEST 2 VIEWS: CPT

## 2018-12-03 PROCEDURE — 85025 COMPLETE CBC W/AUTO DIFF WBC: CPT | Performed by: SURGERY

## 2018-12-03 PROCEDURE — 85610 PROTHROMBIN TIME: CPT | Performed by: SURGERY

## 2018-12-03 RX ORDER — CALCITRIOL 0.25 UG/1
0.25 CAPSULE, LIQUID FILLED ORAL DAILY
COMMUNITY
End: 2019-02-12

## 2018-12-03 RX ORDER — DOCUSATE SODIUM 100 MG/1
100 CAPSULE, LIQUID FILLED ORAL DAILY
COMMUNITY
End: 2019-01-31

## 2018-12-03 NOTE — DISCHARGE INSTRUCTIONS
DAY OF SURGERY INSTRUCTIONS        YOUR SURGEON: dr colon    PROCEDURE: ***Left upper extremtiy arteriovenous graft    DATE OF SURGERY: ***12/7/2018    ARRIVAL TIME: AS DIRECTED BY OFFICE    YOU MAY TAKE THE FOLLOWING MEDICATION(S) THE MORNING OF SURGERY WITH A SIP OF WATER: ***amlodipine, metoprolol                MANAGING PAIN AFTER SURGERY    We know you are probably wondering what your pain will be like after surgery.  Following surgery it is unrealistic to expect you will not have pain.   Pain is how our bodies let us know that something is wrong or cautions us to be careful.  That said, our goal is to make your pain tolerable.    Methods we may use to treat your pain include (oral or IV medications, PCAs, epidurals, nerve blocks, etc.)   While some procedures require IV pain medications for a short time after surgery, transitioning to pain medications by mouth allows for better management of pain.   Your nurse will encourage you to take oral pain medications whenever possible.  IV medications work almost immediately, but only last a short while.  Taking medications by mouth allows for a more constant level of medication in your blood stream for a longer period of time.      Once your pain is out of control it is harder to get back under control.  It is important you are aware when your next dose of pain medication is due.  If you are admitted, your nurse may write the time of your next dose on the white board in your room to help you remember.      We are interested in your pain and encourage you to inform us about aggravating factors during your visit.   Many times a simple repositioning every few hours can make a big difference.    If your physician says it is okay, do not let your pain prevent you from getting out of bed. Be sure to call your nurse for assistance prior to getting up so you do not fall.      Before surgery, please decide your tolerable pain goal.  These faces help describe the pain  ratings we use on a 0-10 scale.   Be prepared to tell us your goal and whether or not you take pain or anxiety medications at home.          BEFORE YOU COME TO THE HOSPITAL  (Pre-op instructions)  • Do not eat, drink, smoke or chew gum after midnight the night before surgery.  This also includes no mints.  • Morning of surgery take only the medicines you have been instructed with a sip of water unless otherwise instructed  by your physician.  • Do not shave, wear makeup or dark nail polish.  • Remove all jewelry including rings.  • Leave anything you consider valuable at home.  • Leave your suitcase in the car until after your surgery.  • Bring the following with you if applicable:  o Picture ID and insurance, Medicare or Medicaid cards  o Co-pay/deductible required by insurance (cash, check, credit card)  o Copy of advance directive, living will or power-of- documents if not brought to PAT  o CPAP or BIPAP mask and tubing  o Relaxation aids (MP3 player, book, magazine)  • On the day of surgery check in at registration located at the main entrance of the hospital.       Outpatient Surgery Guidelines, Adult  Outpatient procedures are those for which the person having the procedure is allowed to go home the same day as the procedure. Various procedures are done on an outpatient basis. You should follow some general guidelines if you will be having an outpatient procedure.  LET YOUR HEALTH CARE PROVIDER KNOW ABOUT:  · Any allergies you have.  · All medicines you are taking, including vitamins, herbs, eye drops, creams, and over-the-counter medicines.  · Previous problems you or members of your family have had with the use of anesthetics.  · Any blood disorders you have.  · Previous surgeries you have had.  · Medical conditions you have.  RISKS AND COMPLICATIONS  Your health care provider will discuss possible risks and complications with you before surgery. Common risks and complications include:    · Problems  due to the use of anesthetics.  · Blood loss and replacement (does not apply to minor surgical procedures).  · Temporary increase in pain due to surgery.  · Uncorrected pain or problems that the surgery was meant to correct.  · Infection.  · New damage.  BEFORE THE PROCEDURE  · Ask your health care provider about changing or stopping your regular medicines. You may need to stop taking certain medicines in the days or weeks before the procedure.  · Stop smoking at least 2 weeks before surgery. This lowers your risk for complications during and after surgery. Ask your health care provider for help with this if needed.  · Eat your usual meals and a light supper the day before surgery. Continue fluid intake. Do not drink alcohol.  · Do not eat or drink after midnight the night before your surgery.   · Arrange for someone to take you home and to stay with you for 24 hours after the procedure. Medicine given for your procedure may affect your ability to drive or to care for yourself.  · Call your health care provider's office if you develop an illness or problem that may prevent you from safely having your procedure.  AFTER THE PROCEDURE  After surgery, you will be taken to a recovery area, where your progress will be monitored. If there are no complications, you will be allowed to go home when you are awake, stable, and taking fluids well. You may have numbness around the surgical site. Healing will take some time. You will have tenderness at the surgical site and may have some swelling and bruising. You may also have some nausea.  HOME CARE INSTRUCTIONS  · Do not drive for 24 hours, or as directed by your health care provider. Do not drive while taking prescription pain medicines.  · Do not drink alcohol for 24 hours.  · Do not make important decisions or sign legal documents for 24 hours.  · You may resume a normal diet and activities as directed.  · Do not lift anything heavier than 10 pounds (4.5 kg) or play contact  sports until your health care provider says it is okay.  · Change your bandages (dressings) as directed.  · Only take over-the-counter or prescription medicines as directed by your health care provider.  · Follow up with your health care provider as directed.  SEEK MEDICAL CARE IF:  · You have increased bleeding (more than a small spot) from the surgical site.  · You have redness, swelling, or increasing pain in the wound.  · You see pus coming from the wound.  · You have a fever.  · You notice a bad smell coming from the wound or dressing.  · You feel lightheaded or faint.  · You develop a rash.  · You have trouble breathing.  · You develop allergies.  MAKE SURE YOU:  · Understand these instructions.  · Will watch your condition.  · Will get help right away if you are not doing well or get worse.     This information is not intended to replace advice given to you by your health care provider. Make sure you discuss any questions you have with your health care provider.     Document Released: 09/12/2002 Document Revised: 05/03/2016 Document Reviewed: 05/22/2014  [employer] Interactive Patient Education ©2016 [employer]       Fall Prevention in Hospitals, Adult  As a hospital patient, your condition and the treatments you receive can increase your risk for falls. Some additional risk factors for falls in a hospital include:  · Being in an unfamiliar environment.  · Being on bed rest.  · Your surgery.  · Taking certain medicines.  · Your tubing requirements, such as intravenous (IV) therapy or catheters.  It is important that you learn how to decrease fall risks while at the hospital. Below are important tips that can help prevent falls.  SAFETY TIPS FOR PREVENTING FALLS  Talk about your risk of falling.  · Ask your health care provider why you are at risk for falling. Is it your medicine, illness, tubing placement, or something else?  · Make a plan with your health care provider to keep you safe from falls.  · Ask  your health care provider or pharmacist about side effects of your medicines. Some medicines can make you dizzy or affect your coordination.  Ask for help.  · Ask for help before getting out of bed. You may need to press your call button.  · Ask for assistance in getting safely to the toilet.  · Ask for a walker or cane to be put at your bedside. Ask that most of the side rails on your bed be placed up before your health care provider leaves the room.  · Ask family or friends to sit with you.  · Ask for things that are out of your reach, such as your glasses, hearing aids, telephone, bedside table, or call button.  Follow these tips to avoid falling:  · Stay lying or seated, rather than standing, while waiting for help.  · Wear rubber-soled slippers or shoes whenever you walk in the hospital.  · Avoid quick, sudden movements.  ¨ Change positions slowly.  ¨ Sit on the side of your bed before standing.  ¨ Stand up slowly and wait before you start to walk.  · Let your health care provider know if there is a spill on the floor.  · Pay careful attention to the medical equipment, electrical cords, and tubes around you.  · When you need help, use your call button by your bed or in the bathroom. Wait for one of your health care providers to help you.  · If you feel dizzy or unsure of your footing, return to bed and wait for assistance.  · Avoid being distracted by the TV, telephone, or another person in your room.  · Do not lean or support yourself on rolling objects, such as IV poles or bedside tables.     This information is not intended to replace advice given to you by your health care provider. Make sure you discuss any questions you have with your health care provider.     Document Released: 12/15/2001 Document Revised: 01/08/2016 Document Reviewed: 08/25/2013  [employer] Interactive Patient Education ©2016 [employer]       Surgical Site Infections FAQs  What is a Surgical Site Infection (SSI)?  A surgical site  infection is an infection that occurs after surgery in the part of the body where the surgery took place. Most patients who have surgery do not develop an infection. However, infections develop in about 1 to 3 out of every 100 patients who have surgery.  Some of the common symptoms of a surgical site infection are:  · Redness and pain around the area where you had surgery  · Drainage of cloudy fluid from your surgical wound  · Fever  Can SSIs be treated?  Yes. Most surgical site infections can be treated with antibiotics. The antibiotic given to you depends on the bacteria (germs) causing the infection. Sometimes patients with SSIs also need another surgery to treat the infection.  What are some of the things that hospitals are doing to prevent SSIs?  To prevent SSIs, doctors, nurses, and other healthcare providers:  · Clean their hands and arms up to their elbows with an antiseptic agent just before the surgery.  · Clean their hands with soap and water or an alcohol-based hand rub before and after caring for each patient.  · May remove some of your hair immediately before your surgery using electric clippers if the hair is in the same area where the procedure will occur. They should not shave you with a razor.  · Wear special hair covers, masks, gowns, and gloves during surgery to keep the surgery area clean.  · Give you antibiotics before your surgery starts. In most cases, you should get antibiotics within 60 minutes before the surgery starts and the antibiotics should be stopped within 24 hours after surgery.  · Clean the skin at the site of your surgery with a special soap that kills germs.  What can I do to help prevent SSIs?  Before your surgery:  · Tell your doctor about other medical problems you may have. Health problems such as allergies, diabetes, and obesity could affect your surgery and your treatment.  · Quit smoking. Patients who smoke get more infections. Talk to your doctor about how you can quit  before your surgery.  · Do not shave near where you will have surgery. Shaving with a razor can irritate your skin and make it easier to develop an infection.  At the time of your surgery:  · Speak up if someone tries to shave you with a razor before surgery. Ask why you need to be shaved and talk with your surgeon if you have any concerns.  · Ask if you will get antibiotics before surgery.  After your surgery:  · Make sure that your healthcare providers clean their hands before examining you, either with soap and water or an alcohol-based hand rub.  · If you do not see your providers clean their hands, please ask them to do so.  · Family and friends who visit you should not touch the surgical wound or dressings.  · Family and friends should clean their hands with soap and water or an alcohol-based hand rub before and after visiting you. If you do not see them clean their hands, ask them to clean their hands.  What do I need to do when I go home from the hospital?  · Before you go home, your doctor or nurse should explain everything you need to know about taking care of your wound. Make sure you understand how to care for your wound before you leave the hospital.  · Always clean your hands before and after caring for your wound.  · Before you go home, make sure you know who to contact if you have questions or problems after you get home.  · If you have any symptoms of an infection, such as redness and pain at the surgery site, drainage, or fever, call your doctor immediately.  If you have additional questions, please ask your doctor or nurse.  Developed and co-sponsored by The Society for Healthcare Epidemiology of [place] (SHEA); Infectious Diseases Society of [place] (IDSA); American Hospital Association; Association for Professionals in Infection Control and Epidemiology (APIC); Centers for Disease Control and Prevention (CDC); and The Joint Commission.     This information is not intended to replace advice given  to you by your health care provider. Make sure you discuss any questions you have with your health care provider.     Document Released: 12/23/2014 Document Revised: 01/08/2016 Document Reviewed: 03/02/2016  [employer] Interactive Patient Education ©2016 [employer]       [facility]  CHG 4% Patient Instruction Sheet    Preparing the Skin Before Surgery  Preparing or “prepping” skin before surgery can reduce the risk of infection at the surgical site. To make the process easier,[facility] has chosen 4% Chlorhexidine Gluconate (CHG) antiseptic solution.   The steps below outline the prepping process and should be carefully followed.                                                                                                                                                      Prep the skin at the following time(s):                                                      We recommend you shower the night before surgery, and again the morning of surgery with the 4% CHG antiseptic solution using half of the bottle and a cloth each time.  Dress in clean clothes/sleepwear after showering.  See instructions below for application.          Do not apply any lotions or moisturizers.       Do not shave the area to be prepped for at least 2 days prior to surgery.    Clipping the hair may be done immediately prior to your surgery at the hospital    if needed.    Directions:  Thoroughly rinse your body with water.  Apply 4% CHG to a cloth and wash skin gently, paying special attention to the operative site.  Rinse again thoroughly.  Once you have begun using this product do not apply anything else to your skin. If itching or redness persists, rinse affected areas and discontinue use.    When using this product:  • Keep out of eyes, ears, and mouth.  • If solution should contact these areas, rinse out promptly and thoroughly with water.  • For external use only.  • Do not use in genital area, on your face or  head.      PATIENT/FAMILY/RESPONSIBLE PARTY VERBALIZES UNDERSTANDING OF ABOVE EDUCATION.  COPY OF PAIN SCALE GIVEN AND REVIEWED WITH VERBALIZED UNDERSTANDING.

## 2018-12-04 ENCOUNTER — ANTI-COAG VISIT (OUTPATIENT)
Dept: INTERNAL MEDICINE | Age: 83
End: 2018-12-04

## 2018-12-04 LAB — INR BLD: 2.5

## 2018-12-06 ENCOUNTER — TELEPHONE (OUTPATIENT)
Dept: VASCULAR SURGERY | Facility: CLINIC | Age: 83
End: 2018-12-06

## 2018-12-06 NOTE — TELEPHONE ENCOUNTER
Spoke with patient and remind of procedure tomorrow with a 5:15 am arrival time.  Patient expressed understanding stating she would be here.

## 2018-12-07 ENCOUNTER — HOSPITAL ENCOUNTER (OUTPATIENT)
Facility: HOSPITAL | Age: 83
Setting detail: HOSPITAL OUTPATIENT SURGERY
Discharge: HOME | End: 2018-12-07
Attending: SURGERY | Admitting: SURGERY

## 2018-12-07 ENCOUNTER — ANESTHESIA (OUTPATIENT)
Dept: PERIOP | Facility: HOSPITAL | Age: 83
End: 2018-12-07

## 2018-12-07 ENCOUNTER — ANESTHESIA EVENT (OUTPATIENT)
Dept: PERIOP | Facility: HOSPITAL | Age: 83
End: 2018-12-07

## 2018-12-07 VITALS
HEART RATE: 58 BPM | WEIGHT: 223.33 LBS | TEMPERATURE: 97.3 F | OXYGEN SATURATION: 100 % | BODY MASS INDEX: 34.44 KG/M2 | DIASTOLIC BLOOD PRESSURE: 58 MMHG | RESPIRATION RATE: 16 BRPM | SYSTOLIC BLOOD PRESSURE: 146 MMHG

## 2018-12-07 DIAGNOSIS — N18.4 CKD (CHRONIC KIDNEY DISEASE) STAGE 4, GFR 15-29 ML/MIN (HCC): ICD-10-CM

## 2018-12-07 LAB
ABO GROUP BLD: NORMAL
BLD GP AB SCN SERPL QL: NEGATIVE
INR PPP: 2.32 (ref 0.91–1.09)
PROTHROMBIN TIME: 26.3 SECONDS (ref 11.9–14.6)
RH BLD: POSITIVE
T&S EXPIRATION DATE: NORMAL

## 2018-12-07 PROCEDURE — 36830 ARTERY-VEIN NONAUTOGRAFT: CPT | Performed by: SURGERY

## 2018-12-07 PROCEDURE — C1768 GRAFT, VASCULAR: HCPCS | Performed by: SURGERY

## 2018-12-07 PROCEDURE — 86900 BLOOD TYPING SEROLOGIC ABO: CPT | Performed by: SURGERY

## 2018-12-07 PROCEDURE — 25010000002 PROPOFOL 10 MG/ML EMULSION: Performed by: NURSE ANESTHETIST, CERTIFIED REGISTERED

## 2018-12-07 PROCEDURE — 25010000002 HEPARIN (PORCINE) PER 1000 UNITS: Performed by: SURGERY

## 2018-12-07 PROCEDURE — 85610 PROTHROMBIN TIME: CPT | Performed by: SURGERY

## 2018-12-07 PROCEDURE — 25010000002 HEPARIN (PORCINE) PER 1000 UNITS: Performed by: NURSE ANESTHETIST, CERTIFIED REGISTERED

## 2018-12-07 PROCEDURE — 25010000002 DEXAMETHASONE PER 1 MG: Performed by: NURSE ANESTHETIST, CERTIFIED REGISTERED

## 2018-12-07 PROCEDURE — 25010000002 FENTANYL CITRATE (PF) 100 MCG/2ML SOLUTION: Performed by: NURSE ANESTHETIST, CERTIFIED REGISTERED

## 2018-12-07 PROCEDURE — 86901 BLOOD TYPING SEROLOGIC RH(D): CPT | Performed by: SURGERY

## 2018-12-07 PROCEDURE — 86850 RBC ANTIBODY SCREEN: CPT | Performed by: SURGERY

## 2018-12-07 PROCEDURE — 25010000002 ONDANSETRON PER 1 MG: Performed by: NURSE ANESTHETIST, CERTIFIED REGISTERED

## 2018-12-07 DEVICE — GRFT VASC PROPATEN STD 4/ 7MM 45CM: Type: IMPLANTABLE DEVICE | Site: ARM | Status: FUNCTIONAL

## 2018-12-07 RX ORDER — SODIUM CHLORIDE 9 MG/ML
100 INJECTION, SOLUTION INTRAVENOUS CONTINUOUS
Status: DISCONTINUED | OUTPATIENT
Start: 2018-12-07 | End: 2018-12-07 | Stop reason: HOSPADM

## 2018-12-07 RX ORDER — PROPOFOL 10 MG/ML
VIAL (ML) INTRAVENOUS AS NEEDED
Status: DISCONTINUED | OUTPATIENT
Start: 2018-12-07 | End: 2018-12-07 | Stop reason: SURG

## 2018-12-07 RX ORDER — IBUPROFEN 600 MG/1
600 TABLET ORAL ONCE AS NEEDED
Status: DISCONTINUED | OUTPATIENT
Start: 2018-12-07 | End: 2018-12-07 | Stop reason: HOSPADM

## 2018-12-07 RX ORDER — SODIUM CHLORIDE 0.9 % (FLUSH) 0.9 %
3 SYRINGE (ML) INJECTION EVERY 12 HOURS SCHEDULED
Status: DISCONTINUED | OUTPATIENT
Start: 2018-12-07 | End: 2018-12-07 | Stop reason: HOSPADM

## 2018-12-07 RX ORDER — DEXAMETHASONE SODIUM PHOSPHATE 4 MG/ML
INJECTION, SOLUTION INTRA-ARTICULAR; INTRALESIONAL; INTRAMUSCULAR; INTRAVENOUS; SOFT TISSUE AS NEEDED
Status: DISCONTINUED | OUTPATIENT
Start: 2018-12-07 | End: 2018-12-07 | Stop reason: SURG

## 2018-12-07 RX ORDER — SODIUM CHLORIDE 9 MG/ML
INJECTION, SOLUTION INTRAVENOUS CONTINUOUS PRN
Status: DISCONTINUED | OUTPATIENT
Start: 2018-12-07 | End: 2018-12-07 | Stop reason: SURG

## 2018-12-07 RX ORDER — SODIUM CHLORIDE, SODIUM LACTATE, POTASSIUM CHLORIDE, CALCIUM CHLORIDE 600; 310; 30; 20 MG/100ML; MG/100ML; MG/100ML; MG/100ML
1000 INJECTION, SOLUTION INTRAVENOUS CONTINUOUS
Status: DISCONTINUED | OUTPATIENT
Start: 2018-12-07 | End: 2018-12-07 | Stop reason: HOSPADM

## 2018-12-07 RX ORDER — SODIUM CHLORIDE, SODIUM LACTATE, POTASSIUM CHLORIDE, CALCIUM CHLORIDE 600; 310; 30; 20 MG/100ML; MG/100ML; MG/100ML; MG/100ML
100 INJECTION, SOLUTION INTRAVENOUS CONTINUOUS
Status: DISCONTINUED | OUTPATIENT
Start: 2018-12-07 | End: 2018-12-07 | Stop reason: HOSPADM

## 2018-12-07 RX ORDER — BUPIVACAINE HCL/0.9 % NACL/PF 0.1 %
2 PLASTIC BAG, INJECTION (ML) EPIDURAL ONCE
Status: COMPLETED | OUTPATIENT
Start: 2018-12-07 | End: 2018-12-07

## 2018-12-07 RX ORDER — FENTANYL CITRATE 50 UG/ML
25 INJECTION, SOLUTION INTRAMUSCULAR; INTRAVENOUS AS NEEDED
Status: DISCONTINUED | OUTPATIENT
Start: 2018-12-07 | End: 2018-12-07 | Stop reason: SDUPTHER

## 2018-12-07 RX ORDER — HYDROCODONE BITARTRATE AND ACETAMINOPHEN 5; 325 MG/1; MG/1
1 TABLET ORAL ONCE AS NEEDED
Status: DISCONTINUED | OUTPATIENT
Start: 2018-12-07 | End: 2018-12-07 | Stop reason: HOSPADM

## 2018-12-07 RX ORDER — ONDANSETRON 2 MG/ML
INJECTION INTRAMUSCULAR; INTRAVENOUS AS NEEDED
Status: DISCONTINUED | OUTPATIENT
Start: 2018-12-07 | End: 2018-12-07 | Stop reason: SURG

## 2018-12-07 RX ORDER — IPRATROPIUM BROMIDE AND ALBUTEROL SULFATE 2.5; .5 MG/3ML; MG/3ML
3 SOLUTION RESPIRATORY (INHALATION) ONCE
Status: COMPLETED | OUTPATIENT
Start: 2018-12-07 | End: 2018-12-07

## 2018-12-07 RX ORDER — LABETALOL HYDROCHLORIDE 5 MG/ML
5 INJECTION, SOLUTION INTRAVENOUS
Status: DISCONTINUED | OUTPATIENT
Start: 2018-12-07 | End: 2018-12-07 | Stop reason: HOSPADM

## 2018-12-07 RX ORDER — FENTANYL CITRATE 50 UG/ML
INJECTION, SOLUTION INTRAMUSCULAR; INTRAVENOUS AS NEEDED
Status: DISCONTINUED | OUTPATIENT
Start: 2018-12-07 | End: 2018-12-07 | Stop reason: SURG

## 2018-12-07 RX ORDER — SODIUM CHLORIDE 0.9 % (FLUSH) 0.9 %
1-10 SYRINGE (ML) INJECTION AS NEEDED
Status: DISCONTINUED | OUTPATIENT
Start: 2018-12-07 | End: 2018-12-07 | Stop reason: HOSPADM

## 2018-12-07 RX ORDER — ROCURONIUM BROMIDE 10 MG/ML
INJECTION, SOLUTION INTRAVENOUS AS NEEDED
Status: DISCONTINUED | OUTPATIENT
Start: 2018-12-07 | End: 2018-12-07 | Stop reason: SURG

## 2018-12-07 RX ORDER — NALOXONE HCL 0.4 MG/ML
0.4 VIAL (ML) INJECTION AS NEEDED
Status: DISCONTINUED | OUTPATIENT
Start: 2018-12-07 | End: 2018-12-07 | Stop reason: HOSPADM

## 2018-12-07 RX ORDER — FENTANYL CITRATE 50 UG/ML
25 INJECTION, SOLUTION INTRAMUSCULAR; INTRAVENOUS AS NEEDED
Status: DISCONTINUED | OUTPATIENT
Start: 2018-12-07 | End: 2018-12-07 | Stop reason: HOSPADM

## 2018-12-07 RX ORDER — VASOPRESSIN 20 U/ML
INJECTION PARENTERAL AS NEEDED
Status: DISCONTINUED | OUTPATIENT
Start: 2018-12-07 | End: 2018-12-07 | Stop reason: SURG

## 2018-12-07 RX ORDER — HYDROCODONE BITARTRATE AND ACETAMINOPHEN 5; 325 MG/1; MG/1
1-2 TABLET ORAL EVERY 6 HOURS PRN
Qty: 40 TABLET | Refills: 0 | Status: SHIPPED | OUTPATIENT
Start: 2018-12-07 | End: 2019-01-31

## 2018-12-07 RX ORDER — SODIUM CHLORIDE 0.9 % (FLUSH) 0.9 %
3 SYRINGE (ML) INJECTION AS NEEDED
Status: DISCONTINUED | OUTPATIENT
Start: 2018-12-07 | End: 2018-12-07 | Stop reason: HOSPADM

## 2018-12-07 RX ORDER — OXYCODONE AND ACETAMINOPHEN 10; 325 MG/1; MG/1
1 TABLET ORAL ONCE AS NEEDED
Status: DISCONTINUED | OUTPATIENT
Start: 2018-12-07 | End: 2018-12-07 | Stop reason: HOSPADM

## 2018-12-07 RX ORDER — IPRATROPIUM BROMIDE AND ALBUTEROL SULFATE 2.5; .5 MG/3ML; MG/3ML
3 SOLUTION RESPIRATORY (INHALATION) ONCE AS NEEDED
Status: DISCONTINUED | OUTPATIENT
Start: 2018-12-07 | End: 2018-12-07 | Stop reason: HOSPADM

## 2018-12-07 RX ORDER — METOCLOPRAMIDE HYDROCHLORIDE 5 MG/ML
5 INJECTION INTRAMUSCULAR; INTRAVENOUS
Status: DISCONTINUED | OUTPATIENT
Start: 2018-12-07 | End: 2018-12-07 | Stop reason: HOSPADM

## 2018-12-07 RX ORDER — MEPERIDINE HYDROCHLORIDE 25 MG/ML
12.5 INJECTION INTRAMUSCULAR; INTRAVENOUS; SUBCUTANEOUS
Status: DISCONTINUED | OUTPATIENT
Start: 2018-12-07 | End: 2018-12-07 | Stop reason: HOSPADM

## 2018-12-07 RX ORDER — OXYCODONE AND ACETAMINOPHEN 7.5; 325 MG/1; MG/1
2 TABLET ORAL ONCE AS NEEDED
Status: DISCONTINUED | OUTPATIENT
Start: 2018-12-07 | End: 2018-12-07 | Stop reason: HOSPADM

## 2018-12-07 RX ORDER — ACETAMINOPHEN 500 MG
1000 TABLET ORAL ONCE
Status: COMPLETED | OUTPATIENT
Start: 2018-12-07 | End: 2018-12-07

## 2018-12-07 RX ORDER — LIDOCAINE HYDROCHLORIDE 20 MG/ML
INJECTION, SOLUTION INFILTRATION; PERINEURAL AS NEEDED
Status: DISCONTINUED | OUTPATIENT
Start: 2018-12-07 | End: 2018-12-07 | Stop reason: SURG

## 2018-12-07 RX ORDER — LIDOCAINE HYDROCHLORIDE 40 MG/ML
SOLUTION TOPICAL AS NEEDED
Status: DISCONTINUED | OUTPATIENT
Start: 2018-12-07 | End: 2018-12-07 | Stop reason: SURG

## 2018-12-07 RX ORDER — BUPIVACAINE HYDROCHLORIDE 5 MG/ML
INJECTION, SOLUTION PERINEURAL AS NEEDED
Status: DISCONTINUED | OUTPATIENT
Start: 2018-12-07 | End: 2018-12-07 | Stop reason: HOSPADM

## 2018-12-07 RX ORDER — ONDANSETRON 2 MG/ML
4 INJECTION INTRAMUSCULAR; INTRAVENOUS ONCE AS NEEDED
Status: DISCONTINUED | OUTPATIENT
Start: 2018-12-07 | End: 2018-12-07 | Stop reason: HOSPADM

## 2018-12-07 RX ORDER — HEPARIN SODIUM 1000 [USP'U]/ML
INJECTION, SOLUTION INTRAVENOUS; SUBCUTANEOUS AS NEEDED
Status: DISCONTINUED | OUTPATIENT
Start: 2018-12-07 | End: 2018-12-07 | Stop reason: SURG

## 2018-12-07 RX ADMIN — PROPOFOL 150 MG: 10 INJECTION, EMULSION INTRAVENOUS at 09:10

## 2018-12-07 RX ADMIN — VASOPRESSIN 1 UNITS: 20 INJECTION INTRAVENOUS at 10:10

## 2018-12-07 RX ADMIN — SODIUM CHLORIDE: 9 INJECTION, SOLUTION INTRAVENOUS at 09:08

## 2018-12-07 RX ADMIN — IPRATROPIUM BROMIDE AND ALBUTEROL SULFATE 3 ML: 2.5; .5 SOLUTION RESPIRATORY (INHALATION) at 08:59

## 2018-12-07 RX ADMIN — LIDOCAINE HYDROCHLORIDE 50 MG: 20 INJECTION, SOLUTION INFILTRATION; PERINEURAL at 09:10

## 2018-12-07 RX ADMIN — ACETAMINOPHEN 1000 MG: 500 TABLET ORAL at 08:59

## 2018-12-07 RX ADMIN — Medication 2 G: at 09:16

## 2018-12-07 RX ADMIN — FENTANYL CITRATE 100 MCG: 50 INJECTION, SOLUTION INTRAMUSCULAR; INTRAVENOUS at 09:27

## 2018-12-07 RX ADMIN — DEXAMETHASONE SODIUM PHOSPHATE 4 MG: 4 INJECTION, SOLUTION INTRAMUSCULAR; INTRAVENOUS at 09:10

## 2018-12-07 RX ADMIN — HEPARIN SODIUM 3000 UNITS: 1000 INJECTION, SOLUTION INTRAVENOUS; SUBCUTANEOUS at 09:45

## 2018-12-07 RX ADMIN — ONDANSETRON HYDROCHLORIDE 4 MG: 2 SOLUTION INTRAMUSCULAR; INTRAVENOUS at 09:10

## 2018-12-07 RX ADMIN — ROCURONIUM BROMIDE 10 MG: 10 INJECTION INTRAVENOUS at 09:10

## 2018-12-07 RX ADMIN — SODIUM CHLORIDE, POTASSIUM CHLORIDE, SODIUM LACTATE AND CALCIUM CHLORIDE 1000 ML: 600; 310; 30; 20 INJECTION, SOLUTION INTRAVENOUS at 06:39

## 2018-12-07 RX ADMIN — LIDOCAINE HYDROCHLORIDE 1 EACH: 40 SOLUTION TOPICAL at 09:12

## 2018-12-07 NOTE — ANESTHESIA POSTPROCEDURE EVALUATION
Patient: Barb Connors    Procedure Summary     Date:  12/07/18 Room / Location:  [facility] OR  /  PAD HYBRID OR 12    Anesthesia Start:  0908 Anesthesia Stop:  1057    Procedure:  Left upper extremtiy arteriovenous graft (Left Arm Upper) Diagnosis:       CKD (chronic kidney disease) stage 4, GFR 15-29 ml/min (CMS/HCC)      (CKD (chronic kidney disease) stage 4, GFR 15-29 ml/min (CMS/HCC) [N18.4])    Surgeon:  Jay Garcia DO Provider:  Zachariah Shields CRNA    Anesthesia Type:  general ASA Status:  3          Anesthesia Type: general  Last vitals  BP   133/48 (12/07/18 1208)   Temp   97.3 °F (36.3 °C) (12/07/18 1155)   Pulse   54 (12/07/18 1208)   Resp   16 (12/07/18 1208)     SpO2   94 % (12/07/18 1208)     Post Anesthesia Care and Evaluation    PONV Status: none  Comments: Patient d/c from PACU prior to anes eval based on Chidi score.  Please see RN notes for details of d/c criteria.    Blood pressure 133/48, pulse 54, temperature 97.3 °F (36.3 °C), temperature source Temporal, resp. rate 16, weight 101 kg (223 lb 5.2 oz), SpO2 94 %.

## 2018-12-07 NOTE — ANESTHESIA PREPROCEDURE EVALUATION
Anesthesia Evaluation     Patient summary reviewed   no history of anesthetic complications:  NPO Solid Status: > 8 hours  NPO Liquid Status: > 4 hours           Airway   Mallampati: II  TM distance: >3 FB  Neck ROM: full  Dental    (+) poor dentition        Pulmonary - normal exam    breath sounds clear to auscultation  (+) a smoker (quit 8 yrs ago) Former, COPD, sleep apnea on CPAP,   (-) asthma, recent URI    ROS comment: Uses 2.5L NCO2 at all times  Cardiovascular - normal exam  Exercise tolerance: poor (<4 METS) (Limited by SOB)    ECG reviewed  Patient on routine beta blocker and Beta blocker given within 24 hours of surgery  Rhythm: regular  Rate: normal    (+) hypertension, dysrhythmias (refractory to ablation) Atrial Fib, angina (Present for years, associated it more with dyspnea than angina) with exertion,   (-) pacemaker, past MI, cardiac stents    ROS comment: TTE5/30/17 - Summary   Moderate tricuspid regurgitation with estimated RVSP of 56 mm Hg.   Severely dilated left atrium.   Normal left ventricular size with preserved LV function and an estimated   ejection fraction of approximately 55-60%.   Right Atrium is not clearly visualized.   Moderately enlarged right atrium size.   There is a small circumferential pericardial effusion noted.    Neuro/Psych  (-) seizures, TIA, CVA  GI/Hepatic/Renal/Endo    (+)   hypothyroidism,   (-) GERD, liver disease, no renal disease, diabetes, hyperthyroidism    Musculoskeletal     Abdominal    Substance History      OB/GYN          Other                      Anesthesia Plan    ASA 3     MAC   (Discussed conversion to GETA if necessary)  intravenous induction   Anesthetic plan, all risks, benefits, and alternatives have been provided, discussed and informed consent has been obtained with: patient.

## 2018-12-07 NOTE — DISCHARGE INSTRUCTIONS
YOUR NEXT PAIN MEDICATION IS DUE AT______________        Moderate Conscious Sedation, Adult, Care After  Refer to this sheet in the next few weeks. These instructions provide you with information on caring for yourself after your procedure. Your health care provider may also give you more specific instructions. Your treatment has been planned according to current medical practices, but problems sometimes occur. Call your health care provider if you have any problems or questions after your procedure.  WHAT TO EXPECT AFTER THE PROCEDURE    After your procedure:  · You may feel sleepy, clumsy, and have poor balance for several hours.  · Vomiting may occur if you eat too soon after the procedure.  HOME CARE INSTRUCTIONS  · Do not participate in any activities where you could become injured for at least 24 hours. Do not:  ¨ Drive.  ¨ Swim.  ¨ Ride a bicycle.  ¨ Operate heavy machinery.  ¨ Cook.  ¨ Use power tools.  ¨ Climb ladders.  ¨ Work from a high place.  · Do not make important decisions or sign legal documents until you are improved.  · If you vomit, drink water, juice, or soup when you can drink without vomiting. Make sure you have little or no nausea before eating solid foods.  · Only take over-the-counter or prescription medicines for pain, discomfort, or fever as directed by your health care provider.  · Make sure you and your family fully understand everything about the medicines given to you, including what side effects may occur.  · You should not drink alcohol, take sleeping pills, or take medicines that cause drowsiness for at least 24 hours.  · If you smoke, do not smoke without supervision.  · If you are feeling better, you may resume normal activities 24 hours after you were sedated.  · Keep all appointments with your health care provider.  SEEK MEDICAL CARE IF:  · Your skin is pale or bluish in color.  · You continue to feel nauseous or vomit.  · Your pain is getting worse and is not helped by  medicine.  · You have bleeding or swelling.  · You are still sleepy or feeling clumsy after 24 hours.  SEEK IMMEDIATE MEDICAL CARE IF:  · You develop a rash.  · You have difficulty breathing.  · You develop any type of allergic problem.  · You have a fever.  MAKE SURE YOU:  · Understand these instructions.  · Will watch your condition.  · Will get help right away if you are not doing well or get worse.     This information is not intended to replace advice given to you by your health care provider. Make sure you discuss any questions you have with your health care provider.     Document Released: 10/08/2014 Document Revised: 01/08/2016 Document Reviewed: 10/08/2014  [employer] Interactive Patient Education ©2016 [employer]         CALL YOUR PHYSICIAN IF YOU EXPERIENCE  INCREASED PAIN NOT HELPED BY YOUR PAIN MEDICATION.        Fall Prevention in the Home      Falls can cause injuries. They can happen to people of all ages. There are many things you can do to make your home safe and to help prevent falls.    WHAT CAN I DO ON THE OUTSIDE OF MY HOME?  · Regularly fix the edges of walkways and driveways and fix any cracks.  · Remove anything that might make you trip as you walk through a door, such as a raised step or threshold.  · Trim any bushes or trees on the path to your home.  · Use bright outdoor lighting.  · Clear any walking paths of anything that might make someone trip, such as rocks or tools.  · Regularly check to see if handrails are loose or broken. Make sure that both sides of any steps have handrails.  · Any raised decks and porches should have guardrails on the edges.  · Have any leaves, snow, or ice cleared regularly.  · Use sand or salt on walking paths during winter.  · Clean up any spills in your garage right away. This includes oil or grease spills.  WHAT CAN I DO IN THE BATHROOM?    · Use night lights.  · Install grab bars by the toilet and in the tub and shower. Do not use towel bars as grab  bars.  · Use non-skid mats or decals in the tub or shower.  · If you need to sit down in the shower, use a plastic, non-slip stool.  · Keep the floor dry. Clean up any water that spills on the floor as soon as it happens.  · Remove soap buildup in the tub or shower regularly.  · Attach bath mats securely with double-sided non-slip rug tape.  · Do not have throw rugs and other things on the floor that can make you trip.  WHAT CAN I DO IN THE BEDROOM?  · Use night lights.  · Make sure that you have a light by your bed that is easy to reach.  · Do not use any sheets or blankets that are too big for your bed. They should not hang down onto the floor.  · Have a firm chair that has side arms. You can use this for support while you get dressed.  · Do not have throw rugs and other things on the floor that can make you trip.  WHAT CAN I DO IN THE KITCHEN?  · Clean up any spills right away.  · Avoid walking on wet floors.  · Keep items that you use a lot in easy-to-reach places.  · If you need to reach something above you, use a strong step stool that has a grab bar.  · Keep electrical cords out of the way.  · Do not use floor polish or wax that makes floors slippery. If you must use wax, use non-skid floor wax.  · Do not have throw rugs and other things on the floor that can make you trip.  WHAT CAN I DO WITH MY STAIRS?  · Do not leave any items on the stairs.  · Make sure that there are handrails on both sides of the stairs and use them. Fix handrails that are broken or loose. Make sure that handrails are as long as the stairways.  · Check any carpeting to make sure that it is firmly attached to the stairs. Fix any carpet that is loose or worn.  · Avoid having throw rugs at the top or bottom of the stairs. If you do have throw rugs, attach them to the floor with carpet tape.  · Make sure that you have a light switch at the top of the stairs and the bottom of the stairs. If you do not have them, ask someone to add them for  you.  WHAT ELSE CAN I DO TO HELP PREVENT FALLS?  · Wear shoes that:  ¨ Do not have high heels.  ¨ Have rubber bottoms.  ¨ Are comfortable and fit you well.  ¨ Are closed at the toe. Do not wear sandals.  · If you use a stepladder:  ¨ Make sure that it is fully opened. Do not climb a closed stepladder.  ¨ Make sure that both sides of the stepladder are locked into place.  ¨ Ask someone to hold it for you, if possible.  · Clearly channing and make sure that you can see:  ¨ Any grab bars or handrails.  ¨ First and last steps.  ¨ Where the edge of each step is.  · Use tools that help you move around (mobility aids) if they are needed. These include:  ¨ Canes.  ¨ Walkers.  ¨ Scooters.  ¨ Crutches.  · Turn on the lights when you go into a dark area. Replace any light bulbs as soon as they burn out.  · Set up your furniture so you have a clear path. Avoid moving your furniture around.  · If any of your floors are uneven, fix them.  · If there are any pets around you, be aware of where they are.  · Review your medicines with your doctor. Some medicines can make you feel dizzy. This can increase your chance of falling.  Ask your doctor what other things that you can do to help prevent falls.     This information is not intended to replace advice given to you by your health care provider. Make sure you discuss any questions you have with your health care provider.     Document Released: 10/14/2010 Document Revised: 05/03/2016 Document Reviewed: 01/22/2016  Elsevier Interactive Patient Education ©2016 [employer]     PATIENT/FAMILY/RESPONSIBLE PARTY VERBALIZES UNDERSTANDING OF ABOVE EDUCATION.  COPY OF PAIN SCALE GIVEN AND REVIEWED WITH VERBALIZED UNDERSTANDING.

## 2018-12-07 NOTE — OP NOTE
Barb Connors  12/7/2018     PREOPERATIVE DIAGNOSIS: CKD (chronic kidney disease) stage 4, GFR 15-29 ml/min (CMS/[facility]) [N18.4]     POSTOPERATIVE DIAGNOSIS: Post-Op Diagnosis Codes:     * CKD (chronic kidney disease) stage 4, GFR 15-29 ml/min (CMS/[facility]) [N18.4]     PROCEDURE PERFORMED:   1.  Left upper extremity brachial artery to axillary vein AV graft placement (4 x 7 Propaten)     SURGEON: Jay Garcia DO      ANESTHESIA: General.    PREPARATION: Routine.    STAFF: Circulator: Delilah Hung RN  Scrub Person: Britney Rodriguez  Assistant: Anne Wynne    ESTIMATED BLOOD LOSS: 25 mL    SPECIMENS: None    COMPLICATIONS: None    INDICATIONS: Barb Connors is a 83 y.o. female who you are currently following for chronic kidney disease.  Most recent lab work from 11/5/18 shows GFR 15.  She has never had dialysis previously.  She did have vein mapping performed, which I did review in office.  She is chronically anticoagulated with Coumadin for Atrial fibrillation. The indications, risks, and possible complications of the procedure were explained to the patient, who voiced understanding and wished to proceed with surgery.     PROCEDURE IN DETAIL: The patient was taken to the operating room and placed on the operating table in a supine position. After general anesthesia was obtained, the left was prepped and draped in a sterile manner.  5 mL of 0.5% Marcaine plain was used to infiltrate the skin just above the antecubital fossa.  A longitudinal incision was then made overlying the brachial artery just proximal to the antecubital fossa.  Careful dissection was made down through subcutaneous tissues using the Bovie cautery to ensure hemostasis.  The fascia was incised.  The brachial artery was identified.  It was freed from his local attachments with the Metzenbaum scissors proximally and distally.  Vessel loops were placed for control.  Next the attention was then turned to the axilla.  Another 5 mL  of 0.5% Marcaine plain was used to infiltrate the skin for anesthesia.  A longitudinal incision was made.  Careful dissection was made down through subcutaneous tissues using the Bovie cautery to ensure hemostasis.  The fascia was incised.  The axillary vein was quickly identified.  It was freed from its local attachments with the Metzenbaum scissors.  Proximal and distal control was established with vessel loops.  Next, the [place] tunneling device was then tunneled in a subcutaneous manner out over the upper arm.  The 4 x 7 Propaten AV graft was placed through the tunnel successfully.  The patient was given 3000 units of intravenous heparin.  After 3 minutes, the vein was clamped proximally and distally.  An 11 blade was used to make a venotomy and extended with the Ross scissors.  7-0 Prolene stay sutures were placed.  The starr of the graft was then appropriately fashioned.  The anastomosis was then performed in an end-to-side fashion with 6-0 Prolene in a running fashion.  Prior to completion of the anastomosis the appropriate flushing maneuvers were performed and anastomosis was completed.  The graft was flushed with heparinized saline and clamped.  Next the arterial anastomosis was performed.  The artery was clamped proximally and distally.  A small arteriotomy was made with 11 blade and extended with the Ross scissors.  7-0 Prolene stay sutures were placed.  The end-to-side anastomosis was performed with a 6-0 Prolene in a running fashion.  Prior to completion of the anastomosis the appropriate flushing maneuvers were performed and anastomosis was completed.  Flow was reestablished.  There was a strong palpable thrill noted in the graft.  There was also palpable pulses at the wrist.  The wound bed was irrigated with antibiotic saline and hemostasis was observed.  The deep layers were then closed with a 3-0 Vicryl in a running fashion.  Both skin layers were closed with a 4-0 Monocryl in a subcuticular fashion.   The wounds were then cleaned.  Sterile dressings were applied. The patient tolerated the procedure well. Sponge and needle counts were correct. The patient was then awakened and extubated in the operating room and taken to the recovery room in good condition.    Jay Garcia,   Date: 12/7/2018 Time: 10:55 AM     CC:MADHURI Dye

## 2018-12-07 NOTE — ANESTHESIA PROCEDURE NOTES
ANESTHESIA INTUBATION  Urgency: elective    Airway not difficult    General Information and Staff    Patient location during procedure: OR  CRNA: Zachariah Shields CRNA    Indications and Patient Condition  Indications for airway management: airway protection    Preoxygenated: yes  MILS maintained throughout  Mask difficulty assessment: 1 - vent by mask    Final Airway Details  Final airway type: endotracheal airway      Successful airway: ETT  Cuffed: yes   Successful intubation technique: direct laryngoscopy  Endotracheal tube insertion site: oral  Blade: Chandler  Blade size: 2  ETT size (mm): 7.0  Cormack-Lehane Classification: grade I - full view of glottis  Placement verified by: chest auscultation and capnometry   Cuff volume (mL): 6  Measured from: lips  ETT to lips (cm): 21  Number of attempts at approach: 1

## 2018-12-11 ENCOUNTER — ANTI-COAG VISIT (OUTPATIENT)
Dept: INTERNAL MEDICINE | Age: 83
End: 2018-12-11

## 2018-12-11 ENCOUNTER — TELEPHONE (OUTPATIENT)
Dept: INTERNAL MEDICINE | Age: 83
End: 2018-12-11

## 2018-12-11 ENCOUNTER — TELEPHONE (OUTPATIENT)
Dept: VASCULAR SURGERY | Facility: CLINIC | Age: 83
End: 2018-12-11

## 2018-12-11 DIAGNOSIS — R53.1 WEAKNESS: Primary | ICD-10-CM

## 2018-12-11 DIAGNOSIS — R53.81 PHYSICAL DECONDITIONING: ICD-10-CM

## 2018-12-11 LAB — INR BLD: 3.3

## 2018-12-11 NOTE — TELEPHONE ENCOUNTER
Per has made contact with [facility] as well, apparently [facility] was referred but they do not go to [address] [place] [address] were pt is located - would you refer to [facility] per the family request  ?

## 2018-12-11 NOTE — PROGRESS NOTES
HOME MONITORING REPORT    INR today:   Results for orders placed or performed in visit on 12/11/18   Protime-INR   Result Value Ref Range    INR 3.30        INR Goal: 2.0-3.0    Dosing Plan  As of 12/11/2018    TTR:   69.4 % (7.5 mo)   Full warfarin instructions:   12/11: Hold; 12/12: 0.5 mg; Otherwise 0.5 mg on Tue, Thu; 1 mg all other days              Spoke with patient and her INR is 3.3 today. Patient held 4 days last week and resumed current coumadin dose on Friday. Patient states she has only been drinking water, not on antibiotic, she was started on pain medicine, hydrocodone. She takes 1 every 6 hours. Advised patient to hold tonight, then take 0.5mg tomorrow. She will recheck it Thursday to see where she is at. Patient is almost out of strips, she will call the home monitoring place to order some. I advised that she may have to come in to have checked if they don't get them to her in a timely manner. I also told her to call me back and I can try to help her if any issues ordering her strips.

## 2018-12-11 NOTE — TELEPHONE ENCOUNTER
Mr Johnston called in regards to his Mother Barb Connors wanting to see about getting her Home Health to come by a couple times a week, just for a few weeks.     Spoke with Bell DHALIWAL and she advised he needed to contact Mrs Connors's PCP as that is who would be able to get her Home Health set Up.     Called Mr Connors back to advice him to contact Mrs Connors's PCP to get her set up with Home Health. After speaking with Mr Connors he stated he had just found out [facility] didn't come out to [facility] but He would contact her PCP to see who they could get to see her. Mr Connors verbalized understanding.

## 2018-12-12 ENCOUNTER — TELEPHONE (OUTPATIENT)
Dept: INTERNAL MEDICINE CLINIC | Age: 83
End: 2018-12-12

## 2018-12-12 NOTE — TELEPHONE ENCOUNTER
Gary Henderson nurse admitted pt - thehy will be seeing her for 3 weeks - once weekly to assess shunt placement incision and assessments   Requests to add  MSW for pt request for DNR orders and smoke detectors In the home     Please advise

## 2018-12-13 ENCOUNTER — ANTI-COAG VISIT (OUTPATIENT)
Dept: INTERNAL MEDICINE | Age: 83
End: 2018-12-13

## 2018-12-13 LAB — INR BLD: 3

## 2018-12-18 ENCOUNTER — ANTI-COAG VISIT (OUTPATIENT)
Dept: INTERNAL MEDICINE | Age: 83
End: 2018-12-18

## 2018-12-18 LAB — INR BLD: 2.9

## 2018-12-19 ENCOUNTER — TELEPHONE (OUTPATIENT)
Dept: VASCULAR SURGERY | Facility: CLINIC | Age: 83
End: 2018-12-19

## 2018-12-19 NOTE — TELEPHONE ENCOUNTER
SPOKE W PT TO VERIFY APPT TIME FOR TOMORROW, 12/20. PT GAVE VERBAL UNDERSTANDING, AND STATED SHE WOULD BE HERE

## 2018-12-20 ENCOUNTER — OFFICE VISIT (OUTPATIENT)
Dept: VASCULAR SURGERY | Facility: CLINIC | Age: 83
End: 2018-12-20

## 2018-12-20 VITALS
OXYGEN SATURATION: 95 % | DIASTOLIC BLOOD PRESSURE: 62 MMHG | HEIGHT: 68 IN | HEART RATE: 47 BPM | WEIGHT: 220 LBS | BODY MASS INDEX: 33.34 KG/M2 | SYSTOLIC BLOOD PRESSURE: 128 MMHG

## 2018-12-20 DIAGNOSIS — N18.4 CKD (CHRONIC KIDNEY DISEASE) STAGE 4, GFR 15-29 ML/MIN (HCC): Primary | ICD-10-CM

## 2018-12-20 PROCEDURE — 99024 POSTOP FOLLOW-UP VISIT: CPT | Performed by: SURGERY

## 2018-12-20 NOTE — PROGRESS NOTES
"12/20/2018       Britney Martinez MD  [address] [place] [address] [address]  [place] [address]    aBrb Connors  1935    Chief Complaint   Patient presents with   • Follow-up     2 Week Post-Op Follow Up For Left upper extremtiy arteriovenous graft. Patient denies any stroke like symptoms.    • other     Patient states left arm hurting, just aches all the time and states the wrist tingles all the time.        Dear Britney Martinez MD       HPI  I had the pleasure of seeing your patient Barb Connors in the office today.  Thank you kindly for this consultation.  As you recall, Barb Connors is a 83 y.o. right-handed female who you are currently following for chronic kidney disease.  Most recent lab work from 11/5/18 shows GFR 15.  She did have left upper extremity AV graft on 12/7/18.  She states her arm is aching constantly with tingling to her left hand.        Review of Systems   Constitutional: Negative.    HENT: Negative.    Eyes: Negative.    Respiratory: Negative.         Home oxygen   Cardiovascular: Positive for leg swelling.   Gastrointestinal: Negative.    Endocrine: Negative.    Genitourinary: Negative.    Musculoskeletal: Negative.    Skin: Negative.    Allergic/Immunologic: Negative.    Neurological: Positive for numbness (tingling to left hand).   Hematological: Negative.    Psychiatric/Behavioral: Negative.        /62 (BP Location: Right arm, Patient Position: Sitting, Cuff Size: Adult)   Pulse (!) 47   Ht 171.5 cm (67.5\")   Wt 99.8 kg (220 lb)   LMP  (LMP Unknown)   SpO2 95%   BMI 33.95 kg/m²   Physical Exam   Constitutional: She is oriented to person, place, and time. She appears well-developed and well-nourished. No distress.   HENT:   Head: Normocephalic and atraumatic.   Mouth/Throat: Oropharynx is clear and moist.   Eyes: Pupils are equal, round, and reactive to light. No scleral icterus.   Neck: Normal range of motion. Neck supple. No JVD present. Carotid " bruit is not present. No thyromegaly present.   Cardiovascular: Normal rate, regular rhythm, S2 normal, normal heart sounds, intact distal pulses and normal pulses. Exam reveals no gallop and no friction rub.   No murmur heard.  Pulses:       Carotid pulses are 2+ on the right side, and 2+ on the left side.       Femoral pulses are 2+ on the right side, and 2+ on the left side.       Popliteal pulses are 2+ on the right side, and 2+ on the left side.        Dorsalis pedis pulses are 2+ on the right side, and 2+ on the left side.        Posterior tibial pulses are 2+ on the right side, and 2+ on the left side.   Doppler signals left upper extremity  +thrill to LUE AV graft   Pulmonary/Chest: Effort normal and breath sounds normal.   Home oxygen   Abdominal: Soft. Normal aorta and bowel sounds are normal. She exhibits no distension, no abdominal bruit and no mass. There is no hepatosplenomegaly. There is no tenderness.   Musculoskeletal: Normal range of motion. She exhibits edema (BLE).   Lymphadenopathy:     She has no cervical adenopathy.   Neurological: She is alert and oriented to person, place, and time. She has normal strength. No cranial nerve deficit or sensory deficit.   Skin: Skin is warm, dry and intact. She is not diaphoretic.   Psychiatric: She has a normal mood and affect. Her behavior is normal. Judgment and thought content normal.   Nursing note and vitals reviewed.        Patient Active Problem List   Diagnosis   • Acute pancreatitis without infection or necrosis   • CKD (chronic kidney disease) stage 4, GFR 15-29 ml/min (CMS/HCC)   • Abnormal CT scan, kidney, no mass lesion to suggest renal malignancy per MRI abdomen   • Chronic atrial fibrillation on chronic anticoagulation    • Sleep apnea   • COPD (chronic obstructive pulmonary disease); not in exacerbation; O2 dependent   • Hypertension   • A-fib (CMS/HCC)         ICD-10-CM ICD-9-CM   1. CKD (chronic kidney disease) stage 4, GFR 15-29 ml/min  (CMS/[facility]) N18.4 585.4         Plan: After thoroughly evaluating Barb Connors, I believe the best course of action is to remain conservative from a vascular standpoint.  She does have some symptoms of steal, however has doppler signals to her left hand.  We did encourage her to continue to squeeze stress ball or roll of socks.  We explained to have her arm in dependent position.  The graft is ready for use if needed at this time. We will see her back in 6 months for graft surveillance.  Encouraged to call if symptoms worsen, however she states this has been improving.  I did discuss vascular risk factors as they pertain to the progression of vascular disease including controlling hypertension.  The patient can continue taking their current medication regimen as previously planned.  This was all discussed in full with complete understanding.    Thank you for allowing me to participate in the care of your patient.  Please do not hesitate with any questions or concerns.  I will keep you aware of any further encounters with Barb Connors.        Sincerely yours,         MADHURI Gillespie

## 2018-12-26 ENCOUNTER — ANTI-COAG VISIT (OUTPATIENT)
Dept: INTERNAL MEDICINE | Age: 83
End: 2018-12-26

## 2018-12-26 LAB — INR BLD: 1.8

## 2019-01-02 ENCOUNTER — ANTI-COAG VISIT (OUTPATIENT)
Dept: INTERNAL MEDICINE | Age: 84
End: 2019-01-02

## 2019-01-02 LAB
INR BLD: 2.4
INR BLD: 2.4

## 2019-01-02 RX ORDER — WARFARIN SODIUM 1 MG/1
TABLET ORAL
Qty: 60 TABLET | Refills: 5 | Status: ON HOLD | OUTPATIENT
Start: 2019-01-02 | End: 2019-01-26

## 2019-01-08 ENCOUNTER — ANTI-COAG VISIT (OUTPATIENT)
Dept: INTERNAL MEDICINE | Age: 84
End: 2019-01-08

## 2019-01-08 ENCOUNTER — ANTI-COAG VISIT (OUTPATIENT)
Dept: INTERNAL MEDICINE | Age: 84
End: 2019-01-08
Payer: MEDICARE

## 2019-01-08 DIAGNOSIS — I48.20 CHRONIC ATRIAL FIBRILLATION (HCC): ICD-10-CM

## 2019-01-08 LAB
INR BLD: 2.6
INR BLD: 2.6

## 2019-01-08 PROCEDURE — 93793 ANTICOAG MGMT PT WARFARIN: CPT | Performed by: INTERNAL MEDICINE

## 2019-01-10 ENCOUNTER — TELEPHONE (OUTPATIENT)
Dept: INTERNAL MEDICINE | Age: 84
End: 2019-01-10

## 2019-01-10 ENCOUNTER — OFFICE VISIT (OUTPATIENT)
Dept: INTERNAL MEDICINE | Age: 84
End: 2019-01-10
Payer: MEDICARE

## 2019-01-10 VITALS
HEART RATE: 56 BPM | OXYGEN SATURATION: 99 % | TEMPERATURE: 96.9 F | BODY MASS INDEX: 34.61 KG/M2 | DIASTOLIC BLOOD PRESSURE: 60 MMHG | HEIGHT: 67 IN | SYSTOLIC BLOOD PRESSURE: 92 MMHG

## 2019-01-10 DIAGNOSIS — N18.4 CHRONIC KIDNEY DISEASE, STAGE IV (SEVERE) (HCC): ICD-10-CM

## 2019-01-10 DIAGNOSIS — R11.0 NAUSEA: Primary | ICD-10-CM

## 2019-01-10 PROCEDURE — G8427 DOCREV CUR MEDS BY ELIG CLIN: HCPCS | Performed by: PHYSICIAN ASSISTANT

## 2019-01-10 PROCEDURE — 4040F PNEUMOC VAC/ADMIN/RCVD: CPT | Performed by: PHYSICIAN ASSISTANT

## 2019-01-10 PROCEDURE — 1101F PT FALLS ASSESS-DOCD LE1/YR: CPT | Performed by: PHYSICIAN ASSISTANT

## 2019-01-10 PROCEDURE — 99213 OFFICE O/P EST LOW 20 MIN: CPT | Performed by: PHYSICIAN ASSISTANT

## 2019-01-10 PROCEDURE — 1123F ACP DISCUSS/DSCN MKR DOCD: CPT | Performed by: PHYSICIAN ASSISTANT

## 2019-01-10 PROCEDURE — 1090F PRES/ABSN URINE INCON ASSESS: CPT | Performed by: PHYSICIAN ASSISTANT

## 2019-01-10 PROCEDURE — G8417 CALC BMI ABV UP PARAM F/U: HCPCS | Performed by: PHYSICIAN ASSISTANT

## 2019-01-10 PROCEDURE — 1036F TOBACCO NON-USER: CPT | Performed by: PHYSICIAN ASSISTANT

## 2019-01-10 PROCEDURE — G8484 FLU IMMUNIZE NO ADMIN: HCPCS | Performed by: PHYSICIAN ASSISTANT

## 2019-01-10 PROCEDURE — G8400 PT W/DXA NO RESULTS DOC: HCPCS | Performed by: PHYSICIAN ASSISTANT

## 2019-01-10 RX ORDER — ONDANSETRON 4 MG/1
4 TABLET, FILM COATED ORAL 3 TIMES DAILY PRN
Qty: 30 TABLET | Refills: 0 | Status: SHIPPED | OUTPATIENT
Start: 2019-01-10 | End: 2019-09-19 | Stop reason: ALTCHOICE

## 2019-01-10 ASSESSMENT — ENCOUNTER SYMPTOMS
EYE REDNESS: 0
PHOTOPHOBIA: 0
COLOR CHANGE: 0
DIARRHEA: 0
WHEEZING: 0
SINUS PRESSURE: 0
CONSTIPATION: 0
SORE THROAT: 0
CHEST TIGHTNESS: 0
ABDOMINAL PAIN: 0
RHINORRHEA: 0
COUGH: 0
SHORTNESS OF BREATH: 0
EYE PAIN: 0
VOMITING: 0
NAUSEA: 1

## 2019-01-17 ENCOUNTER — ANTI-COAG VISIT (OUTPATIENT)
Dept: INTERNAL MEDICINE | Age: 84
End: 2019-01-17

## 2019-01-17 LAB — INR BLD: 2.5

## 2019-01-20 ENCOUNTER — HOSPITAL ENCOUNTER (INPATIENT)
Age: 84
LOS: 6 days | Discharge: HOME HEALTH | DRG: 682 | End: 2019-01-26
Attending: EMERGENCY MEDICINE | Admitting: INTERNAL MEDICINE
Payer: MEDICARE

## 2019-01-20 ENCOUNTER — APPOINTMENT (OUTPATIENT)
Dept: GENERAL RADIOLOGY | Age: 84
DRG: 682 | End: 2019-01-20
Payer: MEDICARE

## 2019-01-20 DIAGNOSIS — E87.5 HYPERKALEMIA: ICD-10-CM

## 2019-01-20 DIAGNOSIS — R06.00 DYSPNEA, UNSPECIFIED TYPE: ICD-10-CM

## 2019-01-20 DIAGNOSIS — N19 RENAL FAILURE, UNSPECIFIED CHRONICITY: Primary | ICD-10-CM

## 2019-01-20 PROBLEM — N18.4 ACUTE RENAL FAILURE SUPERIMPOSED ON STAGE 4 CHRONIC KIDNEY DISEASE (HCC): Status: ACTIVE | Noted: 2019-01-20

## 2019-01-20 PROBLEM — N17.9 ACUTE RENAL FAILURE SUPERIMPOSED ON STAGE 4 CHRONIC KIDNEY DISEASE (HCC): Status: ACTIVE | Noted: 2019-01-20

## 2019-01-20 LAB
ALBUMIN SERPL-MCNC: 3.4 G/DL (ref 3.5–5.2)
ALP BLD-CCNC: 50 U/L (ref 35–104)
ALT SERPL-CCNC: <5 U/L (ref 5–33)
ANION GAP SERPL CALCULATED.3IONS-SCNC: 16 MMOL/L (ref 7–19)
AST SERPL-CCNC: 8 U/L (ref 5–32)
BASE EXCESS ARTERIAL: -4 MMOL/L (ref -2–2)
BASE EXCESS ARTERIAL: -4 MMOL/L (ref -2–2)
BASOPHILS ABSOLUTE: 0.1 K/UL (ref 0–0.2)
BASOPHILS RELATIVE PERCENT: 0.7 % (ref 0–1)
BILIRUB SERPL-MCNC: 0.9 MG/DL (ref 0.2–1.2)
BUN BLDV-MCNC: 89 MG/DL (ref 8–23)
CALCIUM SERPL-MCNC: 9 MG/DL (ref 8.8–10.2)
CARBOXYHEMOGLOBIN ARTERIAL: 2.1 % (ref 0–5)
CARBOXYHEMOGLOBIN ARTERIAL: 2.1 % (ref 0–5)
CHLORIDE BLD-SCNC: 96 MMOL/L (ref 98–111)
CO2: 21 MMOL/L (ref 22–29)
CREAT SERPL-MCNC: 9.5 MG/DL (ref 0.5–0.9)
EOSINOPHILS ABSOLUTE: 0.3 K/UL (ref 0–0.6)
EOSINOPHILS RELATIVE PERCENT: 2.8 % (ref 0–5)
GFR NON-AFRICAN AMERICAN: 4
GLUCOSE BLD-MCNC: 118 MG/DL (ref 74–109)
HCO3 ARTERIAL: 22.2 MMOL/L (ref 22–26)
HCO3 ARTERIAL: 22.6 MMOL/L (ref 22–26)
HCT VFR BLD CALC: 33 % (ref 37–47)
HEMOGLOBIN, ART, EXTENDED: 10.3 G/DL (ref 12–16)
HEMOGLOBIN, ART, EXTENDED: 9.7 G/DL (ref 12–16)
HEMOGLOBIN: 10 G/DL (ref 12–16)
INR BLD: 5.33 (ref 0.88–1.18)
LYMPHOCYTES ABSOLUTE: 1.3 K/UL (ref 1.1–4.5)
LYMPHOCYTES RELATIVE PERCENT: 14.5 % (ref 20–40)
MCH RBC QN AUTO: 28.2 PG (ref 27–31)
MCHC RBC AUTO-ENTMCNC: 30.3 G/DL (ref 33–37)
MCV RBC AUTO: 93.2 FL (ref 81–99)
METHEMOGLOBIN ARTERIAL: 0.5 %
METHEMOGLOBIN ARTERIAL: 1.3 %
MONOCYTES ABSOLUTE: 0.8 K/UL (ref 0–0.9)
MONOCYTES RELATIVE PERCENT: 9.4 % (ref 0–10)
NEUTROPHILS ABSOLUTE: 6.5 K/UL (ref 1.5–7.5)
NEUTROPHILS RELATIVE PERCENT: 72.3 % (ref 50–65)
O2 CONTENT ARTERIAL: 10.9 ML/DL
O2 CONTENT ARTERIAL: 11.6 ML/DL
O2 SAT, ARTERIAL: 80.2 %
O2 SAT, ARTERIAL: 92.7 %
O2 THERAPY: ABNORMAL
O2 THERAPY: ABNORMAL
PCO2 ARTERIAL: 44 MMHG (ref 35–45)
PCO2 ARTERIAL: 47 MMHG (ref 35–45)
PDW BLD-RTO: 16.6 % (ref 11.5–14.5)
PH ARTERIAL: 7.29 (ref 7.35–7.45)
PH ARTERIAL: 7.31 (ref 7.35–7.45)
PLATELET # BLD: 111 K/UL (ref 130–400)
PMV BLD AUTO: 12 FL (ref 9.4–12.3)
PO2 ARTERIAL: 41 MMHG (ref 80–100)
PO2 ARTERIAL: 66 MMHG (ref 80–100)
POTASSIUM REFLEX MAGNESIUM: 5.8 MMOL/L (ref 3.5–5)
POTASSIUM SERPL-SCNC: 6.3 MMOL/L (ref 3.5–5)
POTASSIUM, WHOLE BLOOD: 4.8
POTASSIUM, WHOLE BLOOD: 5.4
PRO-BNP: 6972 PG/ML (ref 0–1800)
PROTHROMBIN TIME: 48.1 SEC (ref 12–14.6)
RBC # BLD: 3.54 M/UL (ref 4.2–5.4)
SODIUM BLD-SCNC: 133 MMOL/L (ref 136–145)
TOTAL PROTEIN: 6.8 G/DL (ref 6.6–8.7)
TROPONIN: 0.03 NG/ML (ref 0–0.03)
WBC # BLD: 9 K/UL (ref 4.8–10.8)

## 2019-01-20 PROCEDURE — 80053 COMPREHEN METABOLIC PANEL: CPT

## 2019-01-20 PROCEDURE — 6370000000 HC RX 637 (ALT 250 FOR IP): Performed by: INTERNAL MEDICINE

## 2019-01-20 PROCEDURE — 83880 ASSAY OF NATRIURETIC PEPTIDE: CPT

## 2019-01-20 PROCEDURE — 94762 N-INVAS EAR/PLS OXIMTRY CONT: CPT

## 2019-01-20 PROCEDURE — 2580000003 HC RX 258: Performed by: INTERNAL MEDICINE

## 2019-01-20 PROCEDURE — 36415 COLL VENOUS BLD VENIPUNCTURE: CPT

## 2019-01-20 PROCEDURE — 84132 ASSAY OF SERUM POTASSIUM: CPT

## 2019-01-20 PROCEDURE — 6360000002 HC RX W HCPCS: Performed by: NURSE PRACTITIONER

## 2019-01-20 PROCEDURE — 84484 ASSAY OF TROPONIN QUANT: CPT

## 2019-01-20 PROCEDURE — 2700000000 HC OXYGEN THERAPY PER DAY

## 2019-01-20 PROCEDURE — 99223 1ST HOSP IP/OBS HIGH 75: CPT | Performed by: INTERNAL MEDICINE

## 2019-01-20 PROCEDURE — 36600 WITHDRAWAL OF ARTERIAL BLOOD: CPT

## 2019-01-20 PROCEDURE — 93005 ELECTROCARDIOGRAM TRACING: CPT

## 2019-01-20 PROCEDURE — 6360000002 HC RX W HCPCS: Performed by: INTERNAL MEDICINE

## 2019-01-20 PROCEDURE — 82803 BLOOD GASES ANY COMBINATION: CPT

## 2019-01-20 PROCEDURE — 71045 X-RAY EXAM CHEST 1 VIEW: CPT

## 2019-01-20 PROCEDURE — 99291 CRITICAL CARE FIRST HOUR: CPT | Performed by: INTERNAL MEDICINE

## 2019-01-20 PROCEDURE — 99285 EMERGENCY DEPT VISIT HI MDM: CPT | Performed by: EMERGENCY MEDICINE

## 2019-01-20 PROCEDURE — 6370000000 HC RX 637 (ALT 250 FOR IP): Performed by: NURSE PRACTITIONER

## 2019-01-20 PROCEDURE — 99285 EMERGENCY DEPT VISIT HI MDM: CPT

## 2019-01-20 PROCEDURE — 2580000003 HC RX 258: Performed by: NURSE PRACTITIONER

## 2019-01-20 PROCEDURE — 1210000000 HC MED SURG R&B

## 2019-01-20 PROCEDURE — 94660 CPAP INITIATION&MGMT: CPT

## 2019-01-20 PROCEDURE — 85610 PROTHROMBIN TIME: CPT

## 2019-01-20 PROCEDURE — 85025 COMPLETE CBC W/AUTO DIFF WBC: CPT

## 2019-01-20 PROCEDURE — 74018 RADEX ABDOMEN 1 VIEW: CPT

## 2019-01-20 PROCEDURE — 6370000000 HC RX 637 (ALT 250 FOR IP): Performed by: EMERGENCY MEDICINE

## 2019-01-20 RX ORDER — SODIUM POLYSTYRENE SULFONATE 15 G/60ML
30 SUSPENSION ORAL; RECTAL ONCE
Status: COMPLETED | OUTPATIENT
Start: 2019-01-20 | End: 2019-01-20

## 2019-01-20 RX ORDER — SODIUM POLYSTYRENE SULFONATE 15 G/60ML
15 SUSPENSION ORAL; RECTAL ONCE
Status: DISCONTINUED | OUTPATIENT
Start: 2019-01-20 | End: 2019-01-20

## 2019-01-20 RX ORDER — CALCITRIOL 0.25 UG/1
0.25 CAPSULE, LIQUID FILLED ORAL DAILY
Status: DISCONTINUED | OUTPATIENT
Start: 2019-01-21 | End: 2019-01-21

## 2019-01-20 RX ORDER — SODIUM CHLORIDE 0.9 % (FLUSH) 0.9 %
10 SYRINGE (ML) INJECTION PRN
Status: DISCONTINUED | OUTPATIENT
Start: 2019-01-20 | End: 2019-01-26 | Stop reason: HOSPADM

## 2019-01-20 RX ORDER — ONDANSETRON 2 MG/ML
4 INJECTION INTRAMUSCULAR; INTRAVENOUS EVERY 6 HOURS PRN
Status: DISCONTINUED | OUTPATIENT
Start: 2019-01-20 | End: 2019-01-26 | Stop reason: HOSPADM

## 2019-01-20 RX ORDER — ASPIRIN 81 MG/1
81 TABLET, CHEWABLE ORAL DAILY
Status: DISCONTINUED | OUTPATIENT
Start: 2019-01-21 | End: 2019-01-26 | Stop reason: HOSPADM

## 2019-01-20 RX ORDER — DEXTROSE MONOHYDRATE 25 G/50ML
25 INJECTION, SOLUTION INTRAVENOUS ONCE
Status: COMPLETED | OUTPATIENT
Start: 2019-01-20 | End: 2019-01-20

## 2019-01-20 RX ORDER — DOCUSATE SODIUM 100 MG/1
100 CAPSULE, LIQUID FILLED ORAL 2 TIMES DAILY
Status: DISCONTINUED | OUTPATIENT
Start: 2019-01-20 | End: 2019-01-26 | Stop reason: HOSPADM

## 2019-01-20 RX ORDER — SODIUM POLYSTYRENE SULFONATE 15 G/60ML
15 SUSPENSION ORAL; RECTAL ONCE
Status: COMPLETED | OUTPATIENT
Start: 2019-01-20 | End: 2019-01-20

## 2019-01-20 RX ORDER — SODIUM CHLORIDE 0.9 % (FLUSH) 0.9 %
10 SYRINGE (ML) INJECTION EVERY 12 HOURS SCHEDULED
Status: DISCONTINUED | OUTPATIENT
Start: 2019-01-20 | End: 2019-01-26 | Stop reason: HOSPADM

## 2019-01-20 RX ADMIN — Medication 15 G: at 23:04

## 2019-01-20 RX ADMIN — SODIUM POLYSTYRENE SULFONATE 30 G: 15 SUSPENSION ORAL; RECTAL at 18:04

## 2019-01-20 RX ADMIN — ONDANSETRON HYDROCHLORIDE 4 MG: 2 INJECTION, SOLUTION INTRAMUSCULAR; INTRAVENOUS at 21:11

## 2019-01-20 RX ADMIN — INSULIN HUMAN 10 UNITS: 100 INJECTION, SOLUTION PARENTERAL at 23:05

## 2019-01-20 RX ADMIN — DEXTROSE MONOHYDRATE 25 G: 25 INJECTION, SOLUTION INTRAVENOUS at 23:05

## 2019-01-20 RX ADMIN — CALCIUM GLUCONATE 1 G: 98 INJECTION, SOLUTION INTRAVENOUS at 23:05

## 2019-01-20 RX ADMIN — DOCUSATE SODIUM 100 MG: 100 CAPSULE, LIQUID FILLED ORAL at 23:19

## 2019-01-20 RX ADMIN — FUROSEMIDE 5 MG/HR: 10 INJECTION, SOLUTION INTRAVENOUS at 19:47

## 2019-01-20 ASSESSMENT — PAIN SCALES - GENERAL: PAINLEVEL_OUTOF10: 4

## 2019-01-20 ASSESSMENT — ENCOUNTER SYMPTOMS
SHORTNESS OF BREATH: 1
WHEEZING: 0
COUGH: 0

## 2019-01-21 LAB
ALBUMIN SERPL-MCNC: 3.5 G/DL (ref 3.5–5.2)
ALP BLD-CCNC: 54 U/L (ref 35–104)
ALT SERPL-CCNC: <5 U/L (ref 5–33)
ANION GAP SERPL CALCULATED.3IONS-SCNC: 15 MMOL/L (ref 7–19)
ANION GAP SERPL CALCULATED.3IONS-SCNC: 15 MMOL/L (ref 7–19)
AST SERPL-CCNC: 8 U/L (ref 5–32)
BACTERIA: ABNORMAL /HPF
BACTERIA: ABNORMAL /HPF
BASE EXCESS ARTERIAL: -2.1 MMOL/L (ref -2–2)
BILIRUB SERPL-MCNC: 1 MG/DL (ref 0.2–1.2)
BILIRUBIN URINE: ABNORMAL
BILIRUBIN URINE: ABNORMAL
BLOOD, URINE: ABNORMAL
BLOOD, URINE: ABNORMAL
BUN BLDV-MCNC: 89 MG/DL (ref 8–23)
BUN BLDV-MCNC: 89 MG/DL (ref 8–23)
CALCIUM SERPL-MCNC: 8.7 MG/DL (ref 8.8–10.2)
CALCIUM SERPL-MCNC: 9.3 MG/DL (ref 8.8–10.2)
CARBOXYHEMOGLOBIN ARTERIAL: 2.8 % (ref 0–5)
CASTS 2: ABNORMAL /LPF
CASTS: ABNORMAL /LPF
CHLORIDE BLD-SCNC: 95 MMOL/L (ref 98–111)
CHLORIDE BLD-SCNC: 95 MMOL/L (ref 98–111)
CLARITY: ABNORMAL
CLARITY: ABNORMAL
CO2: 22 MMOL/L (ref 22–29)
CO2: 22 MMOL/L (ref 22–29)
COLOR: ABNORMAL
COLOR: ABNORMAL
CREAT SERPL-MCNC: 10 MG/DL (ref 0.5–0.9)
CREAT SERPL-MCNC: 10.2 MG/DL (ref 0.5–0.9)
CREATININE URINE: 200.9 MG/DL (ref 4.2–622)
CRYSTALS, UA: ABNORMAL /HPF
CRYSTALS, UA: ABNORMAL /HPF
EKG P AXIS: 0 DEGREES
EKG P AXIS: NORMAL DEGREES
EKG P-R INTERVAL: 108 MS
EKG P-R INTERVAL: NORMAL MS
EKG Q-T INTERVAL: 532 MS
EKG Q-T INTERVAL: 552 MS
EKG QRS DURATION: 148 MS
EKG QRS DURATION: 173 MS
EKG QTC CALCULATION (BAZETT): 490 MS
EKG QTC CALCULATION (BAZETT): 531 MS
EKG T AXIS: 44 DEGREES
EKG T AXIS: 81 DEGREES
EPITHELIAL CELLS, UA: 3 /HPF (ref 0–5)
EPITHELIAL CELLS, UA: ABNORMAL /HPF
EPITHELIAL CELLS, UA: ABNORMAL /HPF
GFR NON-AFRICAN AMERICAN: 4
GFR NON-AFRICAN AMERICAN: 4
GLUCOSE BLD-MCNC: 103 MG/DL (ref 74–109)
GLUCOSE BLD-MCNC: 105 MG/DL (ref 70–99)
GLUCOSE BLD-MCNC: 131 MG/DL (ref 74–109)
GLUCOSE BLD-MCNC: 96 MG/DL (ref 70–99)
GLUCOSE BLD-MCNC: 99 MG/DL (ref 70–99)
GLUCOSE URINE: NEGATIVE MG/DL
GLUCOSE URINE: NEGATIVE MG/DL
HBV SURFACE AB TITR SER: NORMAL {TITER}
HCO3 ARTERIAL: 23.2 MMOL/L (ref 22–26)
HCT VFR BLD CALC: 31.2 % (ref 37–47)
HEMOGLOBIN, ART, EXTENDED: 9.4 G/DL (ref 12–16)
HEMOGLOBIN: 9.4 G/DL (ref 12–16)
HEPATITIS B SURFACE ANTIGEN INTERPRETATION: NORMAL
HYALINE CASTS: 34 /HPF (ref 0–8)
INR BLD: 7 (ref 0.88–1.18)
KETONES, URINE: NEGATIVE MG/DL
KETONES, URINE: NEGATIVE MG/DL
LACTIC ACID: 1.9 MMOL/L (ref 0.5–1.9)
LEUKOCYTE ESTERASE, URINE: ABNORMAL
LEUKOCYTE ESTERASE, URINE: ABNORMAL
MAGNESIUM: 2.2 MG/DL (ref 1.6–2.4)
MCH RBC QN AUTO: 28.4 PG (ref 27–31)
MCHC RBC AUTO-ENTMCNC: 30.1 G/DL (ref 33–37)
MCV RBC AUTO: 94.3 FL (ref 81–99)
METHEMOGLOBIN ARTERIAL: 0.6 %
NITRITE, URINE: NEGATIVE
NITRITE, URINE: NEGATIVE
O2 CONTENT ARTERIAL: 11.7 ML/DL
O2 SAT, ARTERIAL: 88.4 %
O2 THERAPY: ABNORMAL
PARATHYROID HORMONE INTACT: 74.6 PG/ML (ref 15–65)
PCO2 ARTERIAL: 41 MMHG (ref 35–45)
PDW BLD-RTO: 16.7 % (ref 11.5–14.5)
PERFORMED ON: ABNORMAL
PERFORMED ON: NORMAL
PERFORMED ON: NORMAL
PH ARTERIAL: 7.36 (ref 7.35–7.45)
PH UA: 5.5
PH UA: 5.5
PHOSPHORUS: 6 MG/DL (ref 2.5–4.5)
PLATELET # BLD: 96 K/UL (ref 130–400)
PMV BLD AUTO: 12.8 FL (ref 9.4–12.3)
PO2 ARTERIAL: 53 MMHG (ref 80–100)
POTASSIUM SERPL-SCNC: 5 MMOL/L (ref 3.5–5)
POTASSIUM SERPL-SCNC: 5.1 MMOL/L (ref 3.5–5)
POTASSIUM, WHOLE BLOOD: 5.1
PROTEIN PROTEIN: 238 MG/DL (ref 15–45)
PROTEIN UA: 100 MG/DL
PROTEIN UA: 300 MG/DL
PROTHROMBIN TIME: 59.7 SEC (ref 12–14.6)
RAPID INFLUENZA  B AGN: NEGATIVE
RAPID INFLUENZA A AGN: NEGATIVE
RBC # BLD: 3.31 M/UL (ref 4.2–5.4)
RBC UA: ABNORMAL /HPF (ref 0–2)
RBC UA: ABNORMAL /HPF (ref 0–2)
SODIUM BLD-SCNC: 132 MMOL/L (ref 136–145)
SODIUM BLD-SCNC: 132 MMOL/L (ref 136–145)
SODIUM URINE: 25 MMOL/L
SPECIFIC GRAVITY UA: 1.02
SPECIFIC GRAVITY UA: 1.02
TOTAL PROTEIN: 7 G/DL (ref 6.6–8.7)
TROPONIN: 0.04 NG/ML (ref 0–0.03)
TSH SERPL DL<=0.05 MIU/L-ACNC: 0.33 UIU/ML (ref 0.27–4.2)
UREA NITROGEN, UR: 276 MG/DL
UROBILINOGEN, URINE: 0.2 E.U./DL
UROBILINOGEN, URINE: 0.2 E.U./DL
VITAMIN D 25-HYDROXY: 38.1 NG/ML
WBC # BLD: 6.2 K/UL (ref 4.8–10.8)
WBC UA: 20 /HPF (ref 0–5)
WBC UA: ABNORMAL /HPF (ref 0–5)
YEAST: ABNORMAL /HPF

## 2019-01-21 PROCEDURE — 82948 REAGENT STRIP/BLOOD GLUCOSE: CPT

## 2019-01-21 PROCEDURE — 85027 COMPLETE CBC AUTOMATED: CPT

## 2019-01-21 PROCEDURE — 84540 ASSAY OF URINE/UREA-N: CPT

## 2019-01-21 PROCEDURE — 84443 ASSAY THYROID STIM HORMONE: CPT

## 2019-01-21 PROCEDURE — 2580000003 HC RX 258: Performed by: INTERNAL MEDICINE

## 2019-01-21 PROCEDURE — 83970 ASSAY OF PARATHORMONE: CPT

## 2019-01-21 PROCEDURE — 99233 SBSQ HOSP IP/OBS HIGH 50: CPT | Performed by: INTERNAL MEDICINE

## 2019-01-21 PROCEDURE — 84132 ASSAY OF SERUM POTASSIUM: CPT

## 2019-01-21 PROCEDURE — 93005 ELECTROCARDIOGRAM TRACING: CPT

## 2019-01-21 PROCEDURE — 84100 ASSAY OF PHOSPHORUS: CPT

## 2019-01-21 PROCEDURE — 84300 ASSAY OF URINE SODIUM: CPT

## 2019-01-21 PROCEDURE — 6360000002 HC RX W HCPCS: Performed by: INTERNAL MEDICINE

## 2019-01-21 PROCEDURE — 2000000000 HC ICU R&B

## 2019-01-21 PROCEDURE — 87086 URINE CULTURE/COLONY COUNT: CPT

## 2019-01-21 PROCEDURE — 6370000000 HC RX 637 (ALT 250 FOR IP): Performed by: INTERNAL MEDICINE

## 2019-01-21 PROCEDURE — 2700000000 HC OXYGEN THERAPY PER DAY

## 2019-01-21 PROCEDURE — 81001 URINALYSIS AUTO W/SCOPE: CPT

## 2019-01-21 PROCEDURE — 84484 ASSAY OF TROPONIN QUANT: CPT

## 2019-01-21 PROCEDURE — 85610 PROTHROMBIN TIME: CPT

## 2019-01-21 PROCEDURE — 84156 ASSAY OF PROTEIN URINE: CPT

## 2019-01-21 PROCEDURE — 82306 VITAMIN D 25 HYDROXY: CPT

## 2019-01-21 PROCEDURE — 87040 BLOOD CULTURE FOR BACTERIA: CPT

## 2019-01-21 PROCEDURE — 5A1D70Z PERFORMANCE OF URINARY FILTRATION, INTERMITTENT, LESS THAN 6 HOURS PER DAY: ICD-10-PCS | Performed by: INTERNAL MEDICINE

## 2019-01-21 PROCEDURE — 87804 INFLUENZA ASSAY W/OPTIC: CPT

## 2019-01-21 PROCEDURE — 82570 ASSAY OF URINE CREATININE: CPT

## 2019-01-21 PROCEDURE — 83605 ASSAY OF LACTIC ACID: CPT

## 2019-01-21 PROCEDURE — 86706 HEP B SURFACE ANTIBODY: CPT

## 2019-01-21 PROCEDURE — 83735 ASSAY OF MAGNESIUM: CPT

## 2019-01-21 PROCEDURE — 36415 COLL VENOUS BLD VENIPUNCTURE: CPT

## 2019-01-21 PROCEDURE — 80053 COMPREHEN METABOLIC PANEL: CPT

## 2019-01-21 PROCEDURE — 36600 WITHDRAWAL OF ARTERIAL BLOOD: CPT

## 2019-01-21 PROCEDURE — 87633 RESP VIRUS 12-25 TARGETS: CPT

## 2019-01-21 PROCEDURE — 82803 BLOOD GASES ANY COMBINATION: CPT

## 2019-01-21 PROCEDURE — 87340 HEPATITIS B SURFACE AG IA: CPT

## 2019-01-21 PROCEDURE — 8010000000 HC HEMODIALYSIS ACUTE INPT

## 2019-01-21 PROCEDURE — 87081 CULTURE SCREEN ONLY: CPT

## 2019-01-21 RX ORDER — VANCOMYCIN HYDROCHLORIDE 1 G/200ML
1000 INJECTION, SOLUTION INTRAVENOUS ONCE
Status: COMPLETED | OUTPATIENT
Start: 2019-01-21 | End: 2019-01-21

## 2019-01-21 RX ADMIN — ASPIRIN 81 MG CHEWABLE TABLET 81 MG: 81 TABLET CHEWABLE at 08:56

## 2019-01-21 RX ADMIN — CALCITRIOL 0.25 MCG: 0.25 CAPSULE ORAL at 08:56

## 2019-01-21 RX ADMIN — DOCUSATE SODIUM 100 MG: 100 CAPSULE, LIQUID FILLED ORAL at 20:20

## 2019-01-21 RX ADMIN — ONDANSETRON HYDROCHLORIDE 4 MG: 2 INJECTION, SOLUTION INTRAMUSCULAR; INTRAVENOUS at 07:03

## 2019-01-21 RX ADMIN — VANCOMYCIN HYDROCHLORIDE 1000 MG: 1 INJECTION, SOLUTION INTRAVENOUS at 12:00

## 2019-01-21 RX ADMIN — CALCIUM GLUCONATE 1 G: 98 INJECTION, SOLUTION INTRAVENOUS at 11:55

## 2019-01-21 RX ADMIN — Medication 10 ML: at 20:20

## 2019-01-21 RX ADMIN — Medication 10 ML: at 08:56

## 2019-01-21 RX ADMIN — DOCUSATE SODIUM 100 MG: 100 CAPSULE, LIQUID FILLED ORAL at 08:56

## 2019-01-21 RX ADMIN — PIPERACILLIN SODIUM, TAZOBACTAM SODIUM 2.25 G: 2; .25 INJECTION, POWDER, LYOPHILIZED, FOR SOLUTION INTRAVENOUS at 16:48

## 2019-01-21 ASSESSMENT — PAIN SCALES - GENERAL
PAINLEVEL_OUTOF10: 0

## 2019-01-22 ENCOUNTER — APPOINTMENT (OUTPATIENT)
Dept: GENERAL RADIOLOGY | Age: 84
DRG: 682 | End: 2019-01-22
Payer: MEDICARE

## 2019-01-22 PROBLEM — Z51.5 PALLIATIVE CARE PATIENT: Status: ACTIVE | Noted: 2018-01-21

## 2019-01-22 LAB
ALBUMIN SERPL-MCNC: 3.1 G/DL (ref 3.5–5.2)
ALP BLD-CCNC: 51 U/L (ref 35–104)
ALT SERPL-CCNC: <5 U/L (ref 5–33)
ANION GAP SERPL CALCULATED.3IONS-SCNC: 13 MMOL/L (ref 7–19)
AST SERPL-CCNC: 8 U/L (ref 5–32)
BASOPHILS ABSOLUTE: 0 K/UL (ref 0–0.2)
BASOPHILS RELATIVE PERCENT: 0.4 % (ref 0–1)
BILIRUB SERPL-MCNC: 0.9 MG/DL (ref 0.2–1.2)
BUN BLDV-MCNC: 75 MG/DL (ref 8–23)
CALCIUM SERPL-MCNC: 8.7 MG/DL (ref 8.8–10.2)
CHLORIDE BLD-SCNC: 95 MMOL/L (ref 98–111)
CO2: 26 MMOL/L (ref 22–29)
CREAT SERPL-MCNC: 8.7 MG/DL (ref 0.5–0.9)
EOSINOPHILS ABSOLUTE: 0.2 K/UL (ref 0–0.6)
EOSINOPHILS RELATIVE PERCENT: 3.3 % (ref 0–5)
GFR NON-AFRICAN AMERICAN: 4
GLUCOSE BLD-MCNC: 104 MG/DL (ref 70–99)
GLUCOSE BLD-MCNC: 87 MG/DL (ref 74–109)
GLUCOSE BLD-MCNC: 91 MG/DL (ref 70–99)
GLUCOSE BLD-MCNC: 93 MG/DL (ref 70–99)
GLUCOSE BLD-MCNC: 96 MG/DL (ref 70–99)
HCT VFR BLD CALC: 30.3 % (ref 37–47)
HEMOGLOBIN: 9.1 G/DL (ref 12–16)
INR BLD: 7.78 (ref 0.88–1.18)
LYMPHOCYTES ABSOLUTE: 1.3 K/UL (ref 1.1–4.5)
LYMPHOCYTES RELATIVE PERCENT: 17.5 % (ref 20–40)
MAGNESIUM: 2.1 MG/DL (ref 1.6–2.4)
MCH RBC QN AUTO: 27.8 PG (ref 27–31)
MCHC RBC AUTO-ENTMCNC: 30 G/DL (ref 33–37)
MCV RBC AUTO: 92.7 FL (ref 81–99)
MONOCYTES ABSOLUTE: 0.8 K/UL (ref 0–0.9)
MONOCYTES RELATIVE PERCENT: 10.5 % (ref 0–10)
MRSA CULTURE ONLY: ABNORMAL
MRSA CULTURE ONLY: ABNORMAL
NEUTROPHILS ABSOLUTE: 5 K/UL (ref 1.5–7.5)
NEUTROPHILS RELATIVE PERCENT: 68 % (ref 50–65)
ORGANISM: ABNORMAL
PDW BLD-RTO: 16.7 % (ref 11.5–14.5)
PERFORMED ON: ABNORMAL
PERFORMED ON: NORMAL
PHOSPHORUS: 5.4 MG/DL (ref 2.5–4.5)
PLATELET # BLD: 80 K/UL (ref 130–400)
PMV BLD AUTO: 14 FL (ref 9.4–12.3)
POTASSIUM SERPL-SCNC: 4.7 MMOL/L (ref 3.5–5)
PROTHROMBIN TIME: 65.1 SEC (ref 12–14.6)
RBC # BLD: 3.27 M/UL (ref 4.2–5.4)
SODIUM BLD-SCNC: 134 MMOL/L (ref 136–145)
TOTAL PROTEIN: 6.3 G/DL (ref 6.6–8.7)
TROPONIN: 0.02 NG/ML (ref 0–0.03)
VANCOMYCIN RANDOM: 7.6 UG/ML
VITAMIN D 25-HYDROXY: 33.1 NG/ML
WBC # BLD: 7.4 K/UL (ref 4.8–10.8)

## 2019-01-22 PROCEDURE — 1210000000 HC MED SURG R&B

## 2019-01-22 PROCEDURE — 83735 ASSAY OF MAGNESIUM: CPT

## 2019-01-22 PROCEDURE — 84484 ASSAY OF TROPONIN QUANT: CPT

## 2019-01-22 PROCEDURE — 99233 SBSQ HOSP IP/OBS HIGH 50: CPT | Performed by: INTERNAL MEDICINE

## 2019-01-22 PROCEDURE — 84100 ASSAY OF PHOSPHORUS: CPT

## 2019-01-22 PROCEDURE — 2580000003 HC RX 258: Performed by: INTERNAL MEDICINE

## 2019-01-22 PROCEDURE — 93308 TTE F-UP OR LMTD: CPT

## 2019-01-22 PROCEDURE — 85025 COMPLETE CBC W/AUTO DIFF WBC: CPT

## 2019-01-22 PROCEDURE — 36415 COLL VENOUS BLD VENIPUNCTURE: CPT

## 2019-01-22 PROCEDURE — 82306 VITAMIN D 25 HYDROXY: CPT

## 2019-01-22 PROCEDURE — 80053 COMPREHEN METABOLIC PANEL: CPT

## 2019-01-22 PROCEDURE — 6370000000 HC RX 637 (ALT 250 FOR IP): Performed by: INTERNAL MEDICINE

## 2019-01-22 PROCEDURE — 8010000000 HC HEMODIALYSIS ACUTE INPT

## 2019-01-22 PROCEDURE — 80202 ASSAY OF VANCOMYCIN: CPT

## 2019-01-22 PROCEDURE — 82948 REAGENT STRIP/BLOOD GLUCOSE: CPT

## 2019-01-22 PROCEDURE — 2700000000 HC OXYGEN THERAPY PER DAY

## 2019-01-22 PROCEDURE — 85610 PROTHROMBIN TIME: CPT

## 2019-01-22 PROCEDURE — 6360000002 HC RX W HCPCS: Performed by: INTERNAL MEDICINE

## 2019-01-22 PROCEDURE — 71045 X-RAY EXAM CHEST 1 VIEW: CPT

## 2019-01-22 PROCEDURE — 93306 TTE W/DOPPLER COMPLETE: CPT

## 2019-01-22 RX ORDER — VANCOMYCIN HYDROCHLORIDE 1 G/200ML
1000 INJECTION, SOLUTION INTRAVENOUS ONCE
Status: COMPLETED | OUTPATIENT
Start: 2019-01-22 | End: 2019-01-22

## 2019-01-22 RX ADMIN — DOCUSATE SODIUM 100 MG: 100 CAPSULE, LIQUID FILLED ORAL at 08:23

## 2019-01-22 RX ADMIN — VANCOMYCIN HYDROCHLORIDE 1000 MG: 1 INJECTION, SOLUTION INTRAVENOUS at 17:20

## 2019-01-22 RX ADMIN — PIPERACILLIN SODIUM, TAZOBACTAM SODIUM 2.25 G: 2; .25 INJECTION, POWDER, LYOPHILIZED, FOR SOLUTION INTRAVENOUS at 00:16

## 2019-01-22 RX ADMIN — PIPERACILLIN SODIUM, TAZOBACTAM SODIUM 2.25 G: 2; .25 INJECTION, POWDER, LYOPHILIZED, FOR SOLUTION INTRAVENOUS at 08:19

## 2019-01-22 RX ADMIN — Medication 10 ML: at 20:21

## 2019-01-22 RX ADMIN — DOCUSATE SODIUM 100 MG: 100 CAPSULE, LIQUID FILLED ORAL at 20:21

## 2019-01-22 RX ADMIN — Medication 10 ML: at 08:23

## 2019-01-22 RX ADMIN — PIPERACILLIN SODIUM, TAZOBACTAM SODIUM 2.25 G: 2; .25 INJECTION, POWDER, LYOPHILIZED, FOR SOLUTION INTRAVENOUS at 16:06

## 2019-01-22 RX ADMIN — ASPIRIN 81 MG CHEWABLE TABLET 81 MG: 81 TABLET CHEWABLE at 08:23

## 2019-01-22 ASSESSMENT — PAIN SCALES - GENERAL
PAINLEVEL_OUTOF10: 0

## 2019-01-23 LAB
ALBUMIN SERPL-MCNC: 2.9 G/DL (ref 3.5–5.2)
ALP BLD-CCNC: 45 U/L (ref 35–104)
ALT SERPL-CCNC: <5 U/L (ref 5–33)
ANION GAP SERPL CALCULATED.3IONS-SCNC: 13 MMOL/L (ref 7–19)
AST SERPL-CCNC: 7 U/L (ref 5–32)
BASOPHILS ABSOLUTE: 0 K/UL (ref 0–0.2)
BASOPHILS RELATIVE PERCENT: 0.7 % (ref 0–1)
BILIRUB SERPL-MCNC: 0.9 MG/DL (ref 0.2–1.2)
BUN BLDV-MCNC: 51 MG/DL (ref 8–23)
CALCIUM SERPL-MCNC: 8.4 MG/DL (ref 8.8–10.2)
CHLORIDE BLD-SCNC: 97 MMOL/L (ref 98–111)
CO2: 28 MMOL/L (ref 22–29)
CREAT SERPL-MCNC: 6.9 MG/DL (ref 0.5–0.9)
EKG P AXIS: NORMAL DEGREES
EKG P AXIS: NORMAL DEGREES
EKG P-R INTERVAL: NORMAL MS
EKG P-R INTERVAL: NORMAL MS
EKG Q-T INTERVAL: 468 MS
EKG Q-T INTERVAL: 512 MS
EKG QRS DURATION: 144 MS
EKG QRS DURATION: 150 MS
EKG QTC CALCULATION (BAZETT): 476 MS
EKG QTC CALCULATION (BAZETT): 498 MS
EKG T AXIS: 26 DEGREES
EKG T AXIS: NORMAL DEGREES
EOSINOPHILS ABSOLUTE: 0.2 K/UL (ref 0–0.6)
EOSINOPHILS RELATIVE PERCENT: 4.8 % (ref 0–5)
GFR NON-AFRICAN AMERICAN: 6
GLUCOSE BLD-MCNC: 79 MG/DL (ref 74–109)
GLUCOSE BLD-MCNC: 82 MG/DL (ref 70–99)
GLUCOSE BLD-MCNC: 86 MG/DL (ref 70–99)
GLUCOSE BLD-MCNC: 99 MG/DL (ref 70–99)
HCT VFR BLD CALC: 27.3 % (ref 37–47)
HEMOGLOBIN: 8.4 G/DL (ref 12–16)
INR BLD: 8.87 (ref 0.88–1.18)
LYMPHOCYTES ABSOLUTE: 0.8 K/UL (ref 1.1–4.5)
LYMPHOCYTES RELATIVE PERCENT: 17.6 % (ref 20–40)
MAGNESIUM: 2 MG/DL (ref 1.6–2.4)
MCH RBC QN AUTO: 28.2 PG (ref 27–31)
MCHC RBC AUTO-ENTMCNC: 30.8 G/DL (ref 33–37)
MCV RBC AUTO: 91.6 FL (ref 81–99)
MONOCYTES ABSOLUTE: 0.5 K/UL (ref 0–0.9)
MONOCYTES RELATIVE PERCENT: 11.3 % (ref 0–10)
NEUTROPHILS ABSOLUTE: 3 K/UL (ref 1.5–7.5)
NEUTROPHILS RELATIVE PERCENT: 65.4 % (ref 50–65)
PDW BLD-RTO: 16.6 % (ref 11.5–14.5)
PERFORMED ON: NORMAL
PLATELET # BLD: 67 K/UL (ref 130–400)
PMV BLD AUTO: 11.7 FL (ref 9.4–12.3)
POTASSIUM SERPL-SCNC: 3.9 MMOL/L (ref 3.5–5)
PROTHROMBIN TIME: 72.2 SEC (ref 12–14.6)
RBC # BLD: 2.98 M/UL (ref 4.2–5.4)
SODIUM BLD-SCNC: 138 MMOL/L (ref 136–145)
TOTAL PROTEIN: 5.9 G/DL (ref 6.6–8.7)
URINE CULTURE, ROUTINE: NORMAL
WBC # BLD: 4.6 K/UL (ref 4.8–10.8)

## 2019-01-23 PROCEDURE — 85025 COMPLETE CBC W/AUTO DIFF WBC: CPT

## 2019-01-23 PROCEDURE — 8010000000 HC HEMODIALYSIS ACUTE INPT

## 2019-01-23 PROCEDURE — 83735 ASSAY OF MAGNESIUM: CPT

## 2019-01-23 PROCEDURE — 2580000003 HC RX 258: Performed by: INTERNAL MEDICINE

## 2019-01-23 PROCEDURE — 80053 COMPREHEN METABOLIC PANEL: CPT

## 2019-01-23 PROCEDURE — 94762 N-INVAS EAR/PLS OXIMTRY CONT: CPT

## 2019-01-23 PROCEDURE — 94640 AIRWAY INHALATION TREATMENT: CPT

## 2019-01-23 PROCEDURE — 6370000000 HC RX 637 (ALT 250 FOR IP): Performed by: INTERNAL MEDICINE

## 2019-01-23 PROCEDURE — 82948 REAGENT STRIP/BLOOD GLUCOSE: CPT

## 2019-01-23 PROCEDURE — 85610 PROTHROMBIN TIME: CPT

## 2019-01-23 PROCEDURE — 6360000002 HC RX W HCPCS: Performed by: INTERNAL MEDICINE

## 2019-01-23 PROCEDURE — 99233 SBSQ HOSP IP/OBS HIGH 50: CPT | Performed by: INTERNAL MEDICINE

## 2019-01-23 PROCEDURE — 2700000000 HC OXYGEN THERAPY PER DAY

## 2019-01-23 PROCEDURE — 36415 COLL VENOUS BLD VENIPUNCTURE: CPT

## 2019-01-23 PROCEDURE — 1210000000 HC MED SURG R&B

## 2019-01-23 RX ORDER — IPRATROPIUM BROMIDE AND ALBUTEROL SULFATE 2.5; .5 MG/3ML; MG/3ML
1 SOLUTION RESPIRATORY (INHALATION)
Status: DISCONTINUED | OUTPATIENT
Start: 2019-01-23 | End: 2019-01-24

## 2019-01-23 RX ORDER — PHYTONADIONE 5 MG/1
5 TABLET ORAL ONCE
Status: COMPLETED | OUTPATIENT
Start: 2019-01-23 | End: 2019-01-23

## 2019-01-23 RX ADMIN — DOCUSATE SODIUM 100 MG: 100 CAPSULE, LIQUID FILLED ORAL at 19:56

## 2019-01-23 RX ADMIN — PIPERACILLIN SODIUM, TAZOBACTAM SODIUM 2.25 G: 2; .25 INJECTION, POWDER, LYOPHILIZED, FOR SOLUTION INTRAVENOUS at 10:26

## 2019-01-23 RX ADMIN — Medication 10 ML: at 10:41

## 2019-01-23 RX ADMIN — Medication 10 ML: at 19:58

## 2019-01-23 RX ADMIN — IPRATROPIUM BROMIDE AND ALBUTEROL SULFATE 1 AMPULE: .5; 3 SOLUTION RESPIRATORY (INHALATION) at 11:31

## 2019-01-23 RX ADMIN — PIPERACILLIN SODIUM, TAZOBACTAM SODIUM 2.25 G: 2; .25 INJECTION, POWDER, LYOPHILIZED, FOR SOLUTION INTRAVENOUS at 16:30

## 2019-01-23 RX ADMIN — DOCUSATE SODIUM 100 MG: 100 CAPSULE, LIQUID FILLED ORAL at 10:26

## 2019-01-23 RX ADMIN — PIPERACILLIN SODIUM, TAZOBACTAM SODIUM 2.25 G: 2; .25 INJECTION, POWDER, LYOPHILIZED, FOR SOLUTION INTRAVENOUS at 01:29

## 2019-01-23 RX ADMIN — PHYTONADIONE 5 MG: 5 TABLET ORAL at 10:45

## 2019-01-24 ENCOUNTER — APPOINTMENT (OUTPATIENT)
Dept: CT IMAGING | Age: 84
DRG: 682 | End: 2019-01-24
Payer: MEDICARE

## 2019-01-24 LAB
ADENOVIRUS SPECIES BE: NOT DETECTED
ADENOVIRUS SPECIES C: NOT DETECTED
ALBUMIN SERPL-MCNC: 2.8 G/DL (ref 3.5–5.2)
ALP BLD-CCNC: 40 U/L (ref 35–104)
ALT SERPL-CCNC: <5 U/L (ref 5–33)
ANION GAP SERPL CALCULATED.3IONS-SCNC: 12 MMOL/L (ref 7–19)
AST SERPL-CCNC: 8 U/L (ref 5–32)
BASOPHILS ABSOLUTE: 0 K/UL (ref 0–0.2)
BASOPHILS RELATIVE PERCENT: 0.7 % (ref 0–1)
BILIRUB SERPL-MCNC: 1 MG/DL (ref 0.2–1.2)
BUN BLDV-MCNC: 32 MG/DL (ref 8–23)
CALCIUM SERPL-MCNC: 8.3 MG/DL (ref 8.8–10.2)
CHLORIDE BLD-SCNC: 99 MMOL/L (ref 98–111)
CO2: 27 MMOL/L (ref 22–29)
CREAT SERPL-MCNC: 4.9 MG/DL (ref 0.5–0.9)
EOSINOPHILS ABSOLUTE: 0.2 K/UL (ref 0–0.6)
EOSINOPHILS RELATIVE PERCENT: 5 % (ref 0–5)
GFR NON-AFRICAN AMERICAN: 8
GLUCOSE BLD-MCNC: 117 MG/DL (ref 70–99)
GLUCOSE BLD-MCNC: 59 MG/DL (ref 70–99)
GLUCOSE BLD-MCNC: 72 MG/DL (ref 74–109)
HCT VFR BLD CALC: 26.8 % (ref 37–47)
HEMOGLOBIN: 8.2 G/DL (ref 12–16)
HUMAN METAPNEUMOVIRUS PCR: NOT DETECTED
INFLUENZA A BY PCR: NOT DETECTED
INFLUENZA A H1 (2009) PCR: NOT DETECTED
INFLUENZA A H1 (2009) PCR: NOT DETECTED
INFLUENZA A H3 PCR: NOT DETECTED
INFLUENZA B BY PCR: NOT DETECTED
INR BLD: 3.77 (ref 0.88–1.18)
LYMPHOCYTES ABSOLUTE: 0.9 K/UL (ref 1.1–4.5)
LYMPHOCYTES RELATIVE PERCENT: 20.2 % (ref 20–40)
MAGNESIUM: 1.9 MG/DL (ref 1.6–2.4)
MCH RBC QN AUTO: 28.2 PG (ref 27–31)
MCHC RBC AUTO-ENTMCNC: 30.6 G/DL (ref 33–37)
MCV RBC AUTO: 92.1 FL (ref 81–99)
MONOCYTES ABSOLUTE: 0.5 K/UL (ref 0–0.9)
MONOCYTES RELATIVE PERCENT: 11.6 % (ref 0–10)
NEUTROPHILS ABSOLUTE: 2.8 K/UL (ref 1.5–7.5)
NEUTROPHILS RELATIVE PERCENT: 62.3 % (ref 50–65)
PARAINFLUENZA 2: NOT DETECTED
PARAINFLUENZA 3: NOT DETECTED
PARAINFLUENZA1: NOT DETECTED
PDW BLD-RTO: 16.7 % (ref 11.5–14.5)
PERFORMED ON: ABNORMAL
PERFORMED ON: ABNORMAL
PLATELET # BLD: 58 K/UL (ref 130–400)
PMV BLD AUTO: 12.6 FL (ref 9.4–12.3)
POTASSIUM SERPL-SCNC: 3.9 MMOL/L (ref 3.5–5)
PROTHROMBIN TIME: 36.4 SEC (ref 12–14.6)
RBC # BLD: 2.91 M/UL (ref 4.2–5.4)
RHINOVIRUS RNA XXX PCR: NOT DETECTED
RSV A AB BY PCR: NOT DETECTED
RSV B AB BY PCR: NOT DETECTED
RVP SOURCE: NORMAL
SODIUM BLD-SCNC: 138 MMOL/L (ref 136–145)
TOTAL PROTEIN: 5.9 G/DL (ref 6.6–8.7)
WBC # BLD: 4.6 K/UL (ref 4.8–10.8)

## 2019-01-24 PROCEDURE — 1210000000 HC MED SURG R&B

## 2019-01-24 PROCEDURE — 36415 COLL VENOUS BLD VENIPUNCTURE: CPT

## 2019-01-24 PROCEDURE — 6370000000 HC RX 637 (ALT 250 FOR IP): Performed by: INTERNAL MEDICINE

## 2019-01-24 PROCEDURE — 2580000003 HC RX 258: Performed by: INTERNAL MEDICINE

## 2019-01-24 PROCEDURE — 80053 COMPREHEN METABOLIC PANEL: CPT

## 2019-01-24 PROCEDURE — 82948 REAGENT STRIP/BLOOD GLUCOSE: CPT

## 2019-01-24 PROCEDURE — 99233 SBSQ HOSP IP/OBS HIGH 50: CPT | Performed by: INTERNAL MEDICINE

## 2019-01-24 PROCEDURE — 97530 THERAPEUTIC ACTIVITIES: CPT

## 2019-01-24 PROCEDURE — 83735 ASSAY OF MAGNESIUM: CPT

## 2019-01-24 PROCEDURE — 6360000002 HC RX W HCPCS: Performed by: INTERNAL MEDICINE

## 2019-01-24 PROCEDURE — 2700000000 HC OXYGEN THERAPY PER DAY

## 2019-01-24 PROCEDURE — 94664 DEMO&/EVAL PT USE INHALER: CPT

## 2019-01-24 PROCEDURE — 94762 N-INVAS EAR/PLS OXIMTRY CONT: CPT

## 2019-01-24 PROCEDURE — 97162 PT EVAL MOD COMPLEX 30 MIN: CPT

## 2019-01-24 PROCEDURE — 85025 COMPLETE CBC W/AUTO DIFF WBC: CPT

## 2019-01-24 PROCEDURE — 71250 CT THORAX DX C-: CPT

## 2019-01-24 PROCEDURE — 85610 PROTHROMBIN TIME: CPT

## 2019-01-24 PROCEDURE — 94640 AIRWAY INHALATION TREATMENT: CPT

## 2019-01-24 PROCEDURE — 97165 OT EVAL LOW COMPLEX 30 MIN: CPT

## 2019-01-24 RX ORDER — AMOXICILLIN AND CLAVULANATE POTASSIUM 500; 125 MG/1; MG/1
1 TABLET, FILM COATED ORAL
Status: DISCONTINUED | OUTPATIENT
Start: 2019-01-24 | End: 2019-01-26 | Stop reason: HOSPADM

## 2019-01-24 RX ORDER — LEVOTHYROXINE SODIUM 0.05 MG/1
200 TABLET ORAL DAILY
Status: DISCONTINUED | OUTPATIENT
Start: 2019-01-24 | End: 2019-01-26 | Stop reason: HOSPADM

## 2019-01-24 RX ADMIN — IPRATROPIUM BROMIDE 0.5 MG: 0.5 SOLUTION RESPIRATORY (INHALATION) at 11:00

## 2019-01-24 RX ADMIN — LEVOTHYROXINE SODIUM 200 MCG: 50 TABLET ORAL at 12:23

## 2019-01-24 RX ADMIN — ASPIRIN 81 MG CHEWABLE TABLET 81 MG: 81 TABLET CHEWABLE at 08:31

## 2019-01-24 RX ADMIN — DOCUSATE SODIUM 100 MG: 100 CAPSULE, LIQUID FILLED ORAL at 19:45

## 2019-01-24 RX ADMIN — Medication 10 ML: at 08:31

## 2019-01-24 RX ADMIN — AMOXICILLIN AND CLAVULANATE POTASSIUM 1 TABLET: 500; 125 TABLET, FILM COATED ORAL at 08:31

## 2019-01-24 RX ADMIN — DOCUSATE SODIUM 100 MG: 100 CAPSULE, LIQUID FILLED ORAL at 08:31

## 2019-01-24 RX ADMIN — PIPERACILLIN SODIUM, TAZOBACTAM SODIUM 2.25 G: 2; .25 INJECTION, POWDER, LYOPHILIZED, FOR SOLUTION INTRAVENOUS at 01:33

## 2019-01-24 RX ADMIN — IPRATROPIUM BROMIDE 0.5 MG: 0.5 SOLUTION RESPIRATORY (INHALATION) at 15:10

## 2019-01-24 RX ADMIN — Medication 10 ML: at 19:45

## 2019-01-24 RX ADMIN — IPRATROPIUM BROMIDE 0.5 MG: 0.5 SOLUTION RESPIRATORY (INHALATION) at 18:28

## 2019-01-24 ASSESSMENT — PAIN DESCRIPTION - LOCATION: LOCATION: BACK

## 2019-01-24 ASSESSMENT — PAIN DESCRIPTION - PAIN TYPE: TYPE: CHRONIC PAIN

## 2019-01-24 ASSESSMENT — PAIN DESCRIPTION - DESCRIPTORS: DESCRIPTORS: ACHING

## 2019-01-24 ASSESSMENT — PAIN SCALES - WONG BAKER: WONGBAKER_NUMERICALRESPONSE: 2

## 2019-01-24 ASSESSMENT — PAIN DESCRIPTION - ORIENTATION: ORIENTATION: LOWER

## 2019-01-25 LAB
ALBUMIN SERPL-MCNC: 2.9 G/DL (ref 3.5–5.2)
ALP BLD-CCNC: 38 U/L (ref 35–104)
ALT SERPL-CCNC: <5 U/L (ref 5–33)
ANION GAP SERPL CALCULATED.3IONS-SCNC: 10 MMOL/L (ref 7–19)
AST SERPL-CCNC: 8 U/L (ref 5–32)
BASOPHILS ABSOLUTE: 0 K/UL (ref 0–0.2)
BASOPHILS RELATIVE PERCENT: 0.7 % (ref 0–1)
BILIRUB SERPL-MCNC: 0.7 MG/DL (ref 0.2–1.2)
BUN BLDV-MCNC: 35 MG/DL (ref 8–23)
CALCIUM SERPL-MCNC: 8.7 MG/DL (ref 8.8–10.2)
CHLORIDE BLD-SCNC: 98 MMOL/L (ref 98–111)
CO2: 30 MMOL/L (ref 22–29)
CREAT SERPL-MCNC: 5.2 MG/DL (ref 0.5–0.9)
EOSINOPHILS ABSOLUTE: 0.3 K/UL (ref 0–0.6)
EOSINOPHILS RELATIVE PERCENT: 6.1 % (ref 0–5)
GFR NON-AFRICAN AMERICAN: 8
GLUCOSE BLD-MCNC: 100 MG/DL (ref 74–109)
GLUCOSE BLD-MCNC: 103 MG/DL (ref 70–99)
GLUCOSE BLD-MCNC: 84 MG/DL (ref 70–99)
GLUCOSE BLD-MCNC: 87 MG/DL (ref 70–99)
GLUCOSE BLD-MCNC: 92 MG/DL (ref 70–99)
HCT VFR BLD CALC: 28.2 % (ref 37–47)
HEMOGLOBIN: 8.2 G/DL (ref 12–16)
INR BLD: 2.24 (ref 0.88–1.18)
LYMPHOCYTES ABSOLUTE: 0.8 K/UL (ref 1.1–4.5)
LYMPHOCYTES RELATIVE PERCENT: 19.2 % (ref 20–40)
MAGNESIUM: 1.8 MG/DL (ref 1.6–2.4)
MCH RBC QN AUTO: 27.8 PG (ref 27–31)
MCHC RBC AUTO-ENTMCNC: 29.1 G/DL (ref 33–37)
MCV RBC AUTO: 95.6 FL (ref 81–99)
MONOCYTES ABSOLUTE: 0.5 K/UL (ref 0–0.9)
MONOCYTES RELATIVE PERCENT: 11.5 % (ref 0–10)
NEUTROPHILS ABSOLUTE: 2.7 K/UL (ref 1.5–7.5)
NEUTROPHILS RELATIVE PERCENT: 62.3 % (ref 50–65)
PDW BLD-RTO: 16.9 % (ref 11.5–14.5)
PERFORMED ON: ABNORMAL
PERFORMED ON: NORMAL
PLATELET # BLD: 61 K/UL (ref 130–400)
PMV BLD AUTO: 13.2 FL (ref 9.4–12.3)
POTASSIUM SERPL-SCNC: 3.6 MMOL/L (ref 3.5–5)
PROTHROMBIN TIME: 24 SEC (ref 12–14.6)
RBC # BLD: 2.95 M/UL (ref 4.2–5.4)
SODIUM BLD-SCNC: 138 MMOL/L (ref 136–145)
TOTAL PROTEIN: 5.8 G/DL (ref 6.6–8.7)
WBC # BLD: 4.3 K/UL (ref 4.8–10.8)

## 2019-01-25 PROCEDURE — 1210000000 HC MED SURG R&B

## 2019-01-25 PROCEDURE — 83735 ASSAY OF MAGNESIUM: CPT

## 2019-01-25 PROCEDURE — 85610 PROTHROMBIN TIME: CPT

## 2019-01-25 PROCEDURE — 2700000000 HC OXYGEN THERAPY PER DAY

## 2019-01-25 PROCEDURE — 8010000000 HC HEMODIALYSIS ACUTE INPT

## 2019-01-25 PROCEDURE — 80053 COMPREHEN METABOLIC PANEL: CPT

## 2019-01-25 PROCEDURE — 2580000003 HC RX 258: Performed by: INTERNAL MEDICINE

## 2019-01-25 PROCEDURE — 6370000000 HC RX 637 (ALT 250 FOR IP): Performed by: INTERNAL MEDICINE

## 2019-01-25 PROCEDURE — 82948 REAGENT STRIP/BLOOD GLUCOSE: CPT

## 2019-01-25 PROCEDURE — 36415 COLL VENOUS BLD VENIPUNCTURE: CPT

## 2019-01-25 PROCEDURE — 85025 COMPLETE CBC W/AUTO DIFF WBC: CPT

## 2019-01-25 PROCEDURE — 6360000002 HC RX W HCPCS: Performed by: INTERNAL MEDICINE

## 2019-01-25 PROCEDURE — 94640 AIRWAY INHALATION TREATMENT: CPT

## 2019-01-25 PROCEDURE — 94762 N-INVAS EAR/PLS OXIMTRY CONT: CPT

## 2019-01-25 PROCEDURE — 99232 SBSQ HOSP IP/OBS MODERATE 35: CPT | Performed by: INTERNAL MEDICINE

## 2019-01-25 RX ORDER — SODIUM BICARBONATE 650 MG/1
TABLET ORAL
Refills: 3 | Status: ON HOLD | COMMUNITY
Start: 2019-01-14 | End: 2019-03-08 | Stop reason: ALTCHOICE

## 2019-01-25 RX ORDER — WARFARIN SODIUM 1 MG/1
1 TABLET ORAL
Status: COMPLETED | OUTPATIENT
Start: 2019-01-25 | End: 2019-01-25

## 2019-01-25 RX ADMIN — WARFARIN SODIUM 1 MG: 1 TABLET ORAL at 19:43

## 2019-01-25 RX ADMIN — IPRATROPIUM BROMIDE 0.5 MG: 0.5 SOLUTION RESPIRATORY (INHALATION) at 11:05

## 2019-01-25 RX ADMIN — AMOXICILLIN AND CLAVULANATE POTASSIUM 1 TABLET: 500; 125 TABLET, FILM COATED ORAL at 10:48

## 2019-01-25 RX ADMIN — METOPROLOL TARTRATE 25 MG: 25 TABLET ORAL at 21:52

## 2019-01-25 RX ADMIN — DOCUSATE SODIUM 100 MG: 100 CAPSULE, LIQUID FILLED ORAL at 21:51

## 2019-01-25 RX ADMIN — DOCUSATE SODIUM 100 MG: 100 CAPSULE, LIQUID FILLED ORAL at 10:48

## 2019-01-25 RX ADMIN — IPRATROPIUM BROMIDE 0.5 MG: 0.5 SOLUTION RESPIRATORY (INHALATION) at 20:45

## 2019-01-25 RX ADMIN — Medication 10 ML: at 10:48

## 2019-01-25 RX ADMIN — Medication 10 ML: at 21:52

## 2019-01-25 RX ADMIN — LEVOTHYROXINE SODIUM 200 MCG: 50 TABLET ORAL at 07:28

## 2019-01-25 ASSESSMENT — PAIN SCALES - GENERAL: PAINLEVEL_OUTOF10: 0

## 2019-01-26 VITALS
HEIGHT: 67 IN | RESPIRATION RATE: 20 BRPM | OXYGEN SATURATION: 95 % | TEMPERATURE: 97.3 F | DIASTOLIC BLOOD PRESSURE: 62 MMHG | BODY MASS INDEX: 34.9 KG/M2 | SYSTOLIC BLOOD PRESSURE: 118 MMHG | HEART RATE: 68 BPM | WEIGHT: 222.38 LBS

## 2019-01-26 PROBLEM — D69.6 THROMBOCYTOPENIA (HCC): Status: ACTIVE | Noted: 2019-01-26

## 2019-01-26 LAB
ALBUMIN SERPL-MCNC: 3 G/DL (ref 3.5–5.2)
ALP BLD-CCNC: 38 U/L (ref 35–104)
ALT SERPL-CCNC: <5 U/L (ref 5–33)
ANION GAP SERPL CALCULATED.3IONS-SCNC: 12 MMOL/L (ref 7–19)
ANISOCYTOSIS: ABNORMAL
AST SERPL-CCNC: 9 U/L (ref 5–32)
BASOPHILS ABSOLUTE: 0 K/UL (ref 0–0.2)
BASOPHILS RELATIVE PERCENT: 0.8 % (ref 0–1)
BILIRUB SERPL-MCNC: 0.8 MG/DL (ref 0.2–1.2)
BLOOD CULTURE, ROUTINE: NORMAL
BUN BLDV-MCNC: 25 MG/DL (ref 8–23)
CALCIUM SERPL-MCNC: 8.7 MG/DL (ref 8.8–10.2)
CHLORIDE BLD-SCNC: 98 MMOL/L (ref 98–111)
CO2: 29 MMOL/L (ref 22–29)
CREAT SERPL-MCNC: 3.9 MG/DL (ref 0.5–0.9)
CULTURE, BLOOD 2: NORMAL
EOSINOPHILS ABSOLUTE: 0.2 K/UL (ref 0–0.6)
EOSINOPHILS RELATIVE PERCENT: 5.3 % (ref 0–5)
FERRITIN: 60.8 NG/ML (ref 13–150)
GFR NON-AFRICAN AMERICAN: 11
GLUCOSE BLD-MCNC: 84 MG/DL (ref 74–109)
HCT VFR BLD CALC: 27.3 % (ref 37–47)
HEMOGLOBIN: 7.8 G/DL (ref 12–16)
HYPOCHROMIA: ABNORMAL
INR BLD: 2.05 (ref 0.88–1.18)
IRON SATURATION: 16 % (ref 14–50)
IRON: 39 UG/DL (ref 37–145)
LYMPHOCYTES ABSOLUTE: 0.8 K/UL (ref 1.1–4.5)
LYMPHOCYTES RELATIVE PERCENT: 20.6 % (ref 20–40)
MAGNESIUM: 1.8 MG/DL (ref 1.6–2.4)
MCH RBC QN AUTO: 27.8 PG (ref 27–31)
MCHC RBC AUTO-ENTMCNC: 28.6 G/DL (ref 33–37)
MCV RBC AUTO: 97.2 FL (ref 81–99)
MONOCYTES ABSOLUTE: 0.4 K/UL (ref 0–0.9)
MONOCYTES RELATIVE PERCENT: 10.6 % (ref 0–10)
NEUTROPHILS ABSOLUTE: 2.4 K/UL (ref 1.5–7.5)
NEUTROPHILS RELATIVE PERCENT: 62.2 % (ref 50–65)
PDW BLD-RTO: 16.9 % (ref 11.5–14.5)
PLATELET # BLD: 57 K/UL (ref 130–400)
PLATELET SLIDE REVIEW: ABNORMAL
PMV BLD AUTO: 11.9 FL (ref 9.4–12.3)
POTASSIUM SERPL-SCNC: 3.8 MMOL/L (ref 3.5–5)
PROTHROMBIN TIME: 22.4 SEC (ref 12–14.6)
RBC # BLD: 2.81 M/UL (ref 4.2–5.4)
SLIDE REVIEW: ABNORMAL
SODIUM BLD-SCNC: 139 MMOL/L (ref 136–145)
TOTAL IRON BINDING CAPACITY: 243 UG/DL (ref 250–400)
TOTAL PROTEIN: 6 G/DL (ref 6.6–8.7)
WBC # BLD: 3.8 K/UL (ref 4.8–10.8)

## 2019-01-26 PROCEDURE — 83550 IRON BINDING TEST: CPT

## 2019-01-26 PROCEDURE — 94640 AIRWAY INHALATION TREATMENT: CPT

## 2019-01-26 PROCEDURE — 6370000000 HC RX 637 (ALT 250 FOR IP): Performed by: INTERNAL MEDICINE

## 2019-01-26 PROCEDURE — 83735 ASSAY OF MAGNESIUM: CPT

## 2019-01-26 PROCEDURE — 36415 COLL VENOUS BLD VENIPUNCTURE: CPT

## 2019-01-26 PROCEDURE — 85610 PROTHROMBIN TIME: CPT

## 2019-01-26 PROCEDURE — 99239 HOSP IP/OBS DSCHRG MGMT >30: CPT | Performed by: INTERNAL MEDICINE

## 2019-01-26 PROCEDURE — 2700000000 HC OXYGEN THERAPY PER DAY

## 2019-01-26 PROCEDURE — 2580000003 HC RX 258: Performed by: INTERNAL MEDICINE

## 2019-01-26 PROCEDURE — 6360000002 HC RX W HCPCS: Performed by: INTERNAL MEDICINE

## 2019-01-26 PROCEDURE — 82728 ASSAY OF FERRITIN: CPT

## 2019-01-26 PROCEDURE — 83540 ASSAY OF IRON: CPT

## 2019-01-26 PROCEDURE — 80053 COMPREHEN METABOLIC PANEL: CPT

## 2019-01-26 PROCEDURE — 85025 COMPLETE CBC W/AUTO DIFF WBC: CPT

## 2019-01-26 RX ORDER — ASPIRIN 81 MG/1
81 TABLET, CHEWABLE ORAL DAILY
Qty: 30 TABLET | Refills: 3 | Status: ON HOLD | OUTPATIENT
Start: 2019-01-27 | End: 2019-03-08

## 2019-01-26 RX ORDER — WARFARIN SODIUM 1 MG/1
TABLET ORAL
Qty: 60 TABLET | Refills: 5 | Status: ON HOLD | OUTPATIENT
Start: 2019-01-26 | End: 2019-03-09 | Stop reason: HOSPADM

## 2019-01-26 RX ADMIN — Medication 10 ML: at 09:18

## 2019-01-26 RX ADMIN — IPRATROPIUM BROMIDE 0.5 MG: 0.5 SOLUTION RESPIRATORY (INHALATION) at 11:25

## 2019-01-26 RX ADMIN — IPRATROPIUM BROMIDE 0.5 MG: 0.5 SOLUTION RESPIRATORY (INHALATION) at 07:31

## 2019-01-26 RX ADMIN — LEVOTHYROXINE SODIUM 200 MCG: 50 TABLET ORAL at 06:04

## 2019-01-26 RX ADMIN — AMOXICILLIN AND CLAVULANATE POTASSIUM 1 TABLET: 500; 125 TABLET, FILM COATED ORAL at 09:13

## 2019-01-26 ASSESSMENT — ENCOUNTER SYMPTOMS
ABDOMINAL PAIN: 0
CONSTIPATION: 0
DIARRHEA: 0
EYE DISCHARGE: 0
EYES NEGATIVE: 1
SORE THROAT: 0
COUGH: 0
EYE REDNESS: 0
BACK PAIN: 0
VOMITING: 0
SHORTNESS OF BREATH: 1
BLOOD IN STOOL: 0
EYE PAIN: 0
WHEEZING: 0
NAUSEA: 0
GASTROINTESTINAL NEGATIVE: 1

## 2019-01-26 ASSESSMENT — PAIN SCALES - GENERAL: PAINLEVEL_OUTOF10: 0

## 2019-01-28 ENCOUNTER — CARE COORDINATION (OUTPATIENT)
Dept: CASE MANAGEMENT | Age: 84
End: 2019-01-28

## 2019-01-29 ENCOUNTER — ANTI-COAG VISIT (OUTPATIENT)
Dept: INTERNAL MEDICINE | Age: 84
End: 2019-01-29

## 2019-01-29 LAB — INR BLD: 2.1

## 2019-01-30 ENCOUNTER — TELEPHONE (OUTPATIENT)
Dept: VASCULAR SURGERY | Facility: CLINIC | Age: 84
End: 2019-01-30

## 2019-01-30 DIAGNOSIS — Z48.812 ENCOUNTER FOR SURGICAL AFTERCARE FOLLOWING SURGERY OF CIRCULATORY SYSTEM: ICD-10-CM

## 2019-01-30 DIAGNOSIS — T82.590A AV GRAFT MALFUNCTION, INITIAL ENCOUNTER (HCC): Primary | ICD-10-CM

## 2019-01-30 NOTE — TELEPHONE ENCOUNTER
Left message advising patient that [facility] and a follow up appointment with .  Received a phone call from [facility]ialysis stating that every time that the blood flow is increased the graft blows out.  Left all contact information for patient to return phone call to the office.

## 2019-01-31 ENCOUNTER — TELEPHONE (OUTPATIENT)
Dept: VASCULAR SURGERY | Facility: CLINIC | Age: 84
End: 2019-01-31

## 2019-01-31 ENCOUNTER — HOSPITAL ENCOUNTER (OUTPATIENT)
Dept: ULTRASOUND IMAGING | Facility: HOSPITAL | Age: 84
Discharge: HOME | End: 2019-01-31
Admitting: NURSE PRACTITIONER

## 2019-01-31 ENCOUNTER — OFFICE VISIT (OUTPATIENT)
Dept: VASCULAR SURGERY | Facility: CLINIC | Age: 84
End: 2019-01-31

## 2019-01-31 ENCOUNTER — CARE COORDINATION (OUTPATIENT)
Dept: CASE MANAGEMENT | Age: 84
End: 2019-01-31

## 2019-01-31 VITALS
BODY MASS INDEX: 33.34 KG/M2 | HEIGHT: 68 IN | SYSTOLIC BLOOD PRESSURE: 122 MMHG | WEIGHT: 220 LBS | OXYGEN SATURATION: 93 % | HEART RATE: 46 BPM | DIASTOLIC BLOOD PRESSURE: 50 MMHG

## 2019-01-31 DIAGNOSIS — Z48.812 ENCOUNTER FOR SURGICAL AFTERCARE FOLLOWING SURGERY OF CIRCULATORY SYSTEM: ICD-10-CM

## 2019-01-31 DIAGNOSIS — T82.590A AV GRAFT MALFUNCTION, INITIAL ENCOUNTER (HCC): ICD-10-CM

## 2019-01-31 DIAGNOSIS — Z95.828 S/P ARTERIOVENOUS (AV) GRAFT PLACEMENT: ICD-10-CM

## 2019-01-31 DIAGNOSIS — N18.4 CHRONIC KIDNEY DISEASE, STAGE IV (SEVERE) (HCC): Primary | ICD-10-CM

## 2019-01-31 PROCEDURE — 99024 POSTOP FOLLOW-UP VISIT: CPT | Performed by: SURGERY

## 2019-01-31 PROCEDURE — 93931 UPPER EXTREMITY STUDY: CPT

## 2019-01-31 PROCEDURE — 93931 UPPER EXTREMITY STUDY: CPT | Performed by: SURGERY

## 2019-01-31 NOTE — TELEPHONE ENCOUNTER
Called [facility] in [place] spoke with Antonella letting her know that Dr Garcia had just seen Mrs Cononrs and stated that her graph was fine and didn't see any problems. Antonella verbalized understanding.

## 2019-01-31 NOTE — PROGRESS NOTES
"1/31/2019       Britney Martinez MD  [address] [place] [address] [address]  [place] [address]    Barb Connors  1935    Chief Complaint   Patient presents with   • Follow-up     Dialysis sent the patient because her graft is \"blowing out\" after the blood flow is increased.         Dear Britney Martinez MD       HPI  I had the pleasure of seeing your patient Barb Connors in the office today.  Thank you kindly for this consultation.  As you recall, Barb Connors is a 83 y.o. right-handed female who you are currently following for chronic kidney disease.  She did have left upper extremity AV graft on 12/7/18.  She has had some trouble recently at dialysis with cannulation of the graft.  She had 3 or 4 times which were successful and then one or 2 times which caused extravasation and swelling of the arm.  I had her get an arterial duplex of the graft and she is here for evaluation.        Review of Systems   Constitutional: Negative.    HENT: Negative.    Eyes: Negative.    Respiratory: Negative.         Home oxygen   Cardiovascular: Positive for leg swelling.   Gastrointestinal: Negative.    Endocrine: Negative.    Genitourinary: Negative.    Musculoskeletal: Negative.    Skin: Negative.    Allergic/Immunologic: Negative.    Neurological: Positive for numbness (tingling to left hand).   Hematological: Negative.    Psychiatric/Behavioral: Negative.        /50   Pulse (!) 46   Ht 171.5 cm (67.5\")   Wt 99.8 kg (220 lb)   LMP  (LMP Unknown)   SpO2 93%   BMI 33.95 kg/m²   Physical Exam   Constitutional: She is oriented to person, place, and time. She appears well-developed and well-nourished. No distress.   HENT:   Head: Normocephalic and atraumatic.   Mouth/Throat: Oropharynx is clear and moist.   Eyes: Pupils are equal, round, and reactive to light. No scleral icterus.   Neck: Normal range of motion. Neck supple. No JVD present. Carotid bruit is not present. No thyromegaly present. "   Cardiovascular: Normal rate, regular rhythm, S2 normal, normal heart sounds, intact distal pulses and normal pulses. Exam reveals no gallop and no friction rub.   No murmur heard.  Pulses:       Carotid pulses are 2+ on the right side, and 2+ on the left side.       Femoral pulses are 2+ on the right side, and 2+ on the left side.       Popliteal pulses are 2+ on the right side, and 2+ on the left side.        Dorsalis pedis pulses are 2+ on the right side, and 2+ on the left side.        Posterior tibial pulses are 2+ on the right side, and 2+ on the left side.   Doppler signals left upper extremity  +thrill to LUE AV graft   Pulmonary/Chest: Effort normal and breath sounds normal.   Home oxygen   Abdominal: Soft. Normal aorta and bowel sounds are normal. She exhibits no distension, no abdominal bruit and no mass. There is no hepatosplenomegaly. There is no tenderness.   Musculoskeletal: Normal range of motion. She exhibits edema (BLE).   Lymphadenopathy:     She has no cervical adenopathy.   Neurological: She is alert and oriented to person, place, and time. She has normal strength. No cranial nerve deficit or sensory deficit.   Skin: Skin is warm, dry and intact. She is not diaphoretic.   Psychiatric: She has a normal mood and affect. Her behavior is normal. Judgment and thought content normal.   Nursing note and vitals reviewed.    Diagnostic data:  Arterial duplex of the left upper extremity AV graft shows a widely patent graft with adequate flow velocities and no evidence of clot.    Patient Active Problem List   Diagnosis   • Acute pancreatitis without infection or necrosis   • CKD (chronic kidney disease) stage 4, GFR 15-29 ml/min (CMS/HCC)   • Abnormal CT scan, kidney, no mass lesion to suggest renal malignancy per MRI abdomen   • Chronic atrial fibrillation on chronic anticoagulation    • Sleep apnea   • COPD (chronic obstructive pulmonary disease); not in exacerbation; O2 dependent   • Hypertension   •  A-fib (CMS/[facility])         ICD-10-CM ICD-9-CM   1. Chronic kidney disease, stage IV (severe) (CMS/[facility]) N18.4 585.4   2. S/P arteriovenous (AV) graft placement Z98.890 V45.89         Plan: After thoroughly evaluating Barb Connors, I believe the best course of action is to remain conservative from a vascular standpoint.  Currently, her graft appears to be widely patent with a nice thrill noted.  She does have some mild swelling and ecchymoses on the upper arm.  She is okay to use the graft at dialysis in the a.m.  If this continues to be a problem then I may need the put in a PermCath until the arm has healed.  We will see her back in 6 months for graft surveillance.  I did discuss vascular risk factors as they pertain to the progression of vascular disease including controlling hypertension.  The patient can continue taking their current medication regimen as previously planned.  This was all discussed in full with complete understanding.    Thank you for allowing me to participate in the care of your patient.  Please do not hesitate with any questions or concerns.  I will keep you aware of any further encounters with Barb Connors.        Sincerely yours,         Jay Garcia, DO

## 2019-02-01 ENCOUNTER — ANTI-COAG VISIT (OUTPATIENT)
Dept: INTERNAL MEDICINE | Age: 84
End: 2019-02-01
Payer: MEDICARE

## 2019-02-01 DIAGNOSIS — I48.20 CHRONIC ATRIAL FIBRILLATION (HCC): ICD-10-CM

## 2019-02-01 LAB — INR BLD: 2.1

## 2019-02-01 PROCEDURE — 93793 ANTICOAG MGMT PT WARFARIN: CPT | Performed by: INTERNAL MEDICINE

## 2019-02-04 ENCOUNTER — TELEPHONE (OUTPATIENT)
Dept: INTERNAL MEDICINE | Age: 84
End: 2019-02-04

## 2019-02-05 ENCOUNTER — CARE COORDINATION (OUTPATIENT)
Dept: CASE MANAGEMENT | Age: 84
End: 2019-02-05

## 2019-02-05 ENCOUNTER — ANTI-COAG VISIT (OUTPATIENT)
Dept: INTERNAL MEDICINE | Age: 84
End: 2019-02-05

## 2019-02-05 LAB — INR BLD: 2.2

## 2019-02-08 ENCOUNTER — TELEPHONE (OUTPATIENT)
Dept: INTERNAL MEDICINE | Age: 84
End: 2019-02-08

## 2019-02-08 ENCOUNTER — ANTI-COAG VISIT (OUTPATIENT)
Dept: INTERNAL MEDICINE | Age: 84
End: 2019-02-08
Payer: MEDICARE

## 2019-02-08 ENCOUNTER — CARE COORDINATION (OUTPATIENT)
Dept: CASE MANAGEMENT | Age: 84
End: 2019-02-08

## 2019-02-08 DIAGNOSIS — I48.20 CHRONIC ATRIAL FIBRILLATION (HCC): ICD-10-CM

## 2019-02-08 LAB — INR BLD: 2.2

## 2019-02-08 PROCEDURE — 93793 ANTICOAG MGMT PT WARFARIN: CPT | Performed by: INTERNAL MEDICINE

## 2019-02-11 ENCOUNTER — ANTI-COAG VISIT (OUTPATIENT)
Dept: INTERNAL MEDICINE | Age: 84
End: 2019-02-11
Payer: MEDICARE

## 2019-02-11 ENCOUNTER — TELEPHONE (OUTPATIENT)
Dept: VASCULAR SURGERY | Facility: CLINIC | Age: 84
End: 2019-02-11

## 2019-02-11 ENCOUNTER — PREP FOR SURGERY (OUTPATIENT)
Dept: OTHER | Facility: HOSPITAL | Age: 84
End: 2019-02-11

## 2019-02-11 DIAGNOSIS — T82.590A MALFUNCTION OF ARTERIOVENOUS GRAFT, INITIAL ENCOUNTER (HCC): Primary | ICD-10-CM

## 2019-02-11 DIAGNOSIS — I48.91 ATRIAL FIBRILLATION, UNSPECIFIED TYPE (HCC): ICD-10-CM

## 2019-02-11 DIAGNOSIS — Z51.81 ENCOUNTER FOR MONITORING ANTIPLATELET THERAPY: ICD-10-CM

## 2019-02-11 DIAGNOSIS — Z01.818 PREOP TESTING: ICD-10-CM

## 2019-02-11 DIAGNOSIS — Z79.02 ENCOUNTER FOR MONITORING ANTIPLATELET THERAPY: ICD-10-CM

## 2019-02-11 DIAGNOSIS — I48.0 PAROXYSMAL ATRIAL FIBRILLATION (HCC): ICD-10-CM

## 2019-02-11 LAB — INR BLD: 2.2

## 2019-02-11 PROCEDURE — 93793 ANTICOAG MGMT PT WARFARIN: CPT | Performed by: INTERNAL MEDICINE

## 2019-02-11 RX ORDER — BUPIVACAINE HCL/0.9 % NACL/PF 0.1 %
2 PLASTIC BAG, INJECTION (ML) EPIDURAL ONCE
Status: CANCELLED | OUTPATIENT
Start: 2019-02-11 | End: 2019-02-11

## 2019-02-11 NOTE — TELEPHONE ENCOUNTER
Yesenia with [facility] [phone] called and stated they wasn't able to get Mrs Connors going today for Dialysis. Yesenia stated you could hear and feel a thrill to the arm but it wouldn't pull nor would it run without alarming every 15 minutes from previous days. Yesenia also states it is very swollen and bruised.     Spoke with Bell DHALIWAL in regards to this and she stated she would talk with Dr Garcia and advise the patient what needs to be done.     Spoke with Yesenia let her know we would get with Dr Garcia and see what he advised and let the patient know. Yesenia verbalized understanding.

## 2019-02-11 NOTE — TELEPHONE ENCOUNTER
Patient will have her procedure on 2/13/2019 with an arrival time of 515 am.  Patient pre work is scheduled for 2/12/2019 with an arrival time of 1215 pm.  Patient last dose of coumadin will be today.  Patient will have dialysis after her procedure.  Patient advised of location time and prep.  Patient expressed understanding for all that was discussed.

## 2019-02-12 ENCOUNTER — APPOINTMENT (OUTPATIENT)
Dept: PREADMISSION TESTING | Facility: HOSPITAL | Age: 84
End: 2019-02-12

## 2019-02-12 ENCOUNTER — HOSPITAL ENCOUNTER (OUTPATIENT)
Dept: GENERAL RADIOLOGY | Facility: HOSPITAL | Age: 84
Discharge: HOME | End: 2019-02-12
Admitting: NURSE PRACTITIONER

## 2019-02-12 ENCOUNTER — CARE COORDINATION (OUTPATIENT)
Dept: CASE MANAGEMENT | Age: 84
End: 2019-02-12

## 2019-02-12 ENCOUNTER — TELEPHONE (OUTPATIENT)
Dept: VASCULAR SURGERY | Facility: CLINIC | Age: 84
End: 2019-02-12

## 2019-02-12 VITALS
DIASTOLIC BLOOD PRESSURE: 43 MMHG | HEIGHT: 68 IN | WEIGHT: 221.12 LBS | HEART RATE: 43 BPM | BODY MASS INDEX: 33.51 KG/M2 | SYSTOLIC BLOOD PRESSURE: 107 MMHG | RESPIRATION RATE: 18 BRPM | OXYGEN SATURATION: 92 %

## 2019-02-12 DIAGNOSIS — Z01.818 PREOP TESTING: ICD-10-CM

## 2019-02-12 DIAGNOSIS — I48.91 ATRIAL FIBRILLATION, UNSPECIFIED TYPE (HCC): ICD-10-CM

## 2019-02-12 DIAGNOSIS — T82.590A MALFUNCTION OF ARTERIOVENOUS GRAFT, INITIAL ENCOUNTER (HCC): ICD-10-CM

## 2019-02-12 DIAGNOSIS — Z51.81 ENCOUNTER FOR MONITORING ANTIPLATELET THERAPY: ICD-10-CM

## 2019-02-12 DIAGNOSIS — Z79.02 ENCOUNTER FOR MONITORING ANTIPLATELET THERAPY: ICD-10-CM

## 2019-02-12 LAB
ABO GROUP BLD: NORMAL
ANION GAP SERPL CALCULATED.3IONS-SCNC: 12 MMOL/L (ref 4–13)
APTT PPP: 36.9 SECONDS (ref 24.1–34.8)
BASOPHILS # BLD AUTO: 0.08 10*3/MM3 (ref 0–0.2)
BASOPHILS NFR BLD AUTO: 1.5 % (ref 0–2)
BLD GP AB SCN SERPL QL: NEGATIVE
BUN BLD-MCNC: 37 MG/DL (ref 5–21)
BUN/CREAT SERPL: 5 (ref 7–25)
CALCIUM SPEC-SCNC: 9 MG/DL (ref 8.4–10.4)
CHLORIDE SERPL-SCNC: 95 MMOL/L (ref 98–110)
CO2 SERPL-SCNC: 30 MMOL/L (ref 24–31)
CREAT BLD-MCNC: 7.46 MG/DL (ref 0.5–1.4)
DEPRECATED RDW RBC AUTO: 69.3 FL (ref 40–54)
EOSINOPHIL # BLD AUTO: 0.11 10*3/MM3 (ref 0–0.7)
EOSINOPHIL NFR BLD AUTO: 2 % (ref 0–4)
ERYTHROCYTE [DISTWIDTH] IN BLOOD BY AUTOMATED COUNT: 19.8 % (ref 12–15)
GFR SERPL CREATININE-BSD FRML MDRD: 5 ML/MIN/1.73
GFR SERPL CREATININE-BSD FRML MDRD: ABNORMAL ML/MIN/1.73
GLUCOSE BLD-MCNC: 88 MG/DL (ref 70–100)
HCT VFR BLD AUTO: 27.8 % (ref 37–47)
HGB BLD-MCNC: 8.3 G/DL (ref 12–16)
IMM GRANULOCYTES # BLD AUTO: 0.02 10*3/MM3 (ref 0–0.05)
IMM GRANULOCYTES NFR BLD AUTO: 0.4 % (ref 0–5)
INR PPP: 2.09 (ref 0.91–1.09)
LYMPHOCYTES # BLD AUTO: 1.07 10*3/MM3 (ref 0.72–4.86)
LYMPHOCYTES NFR BLD AUTO: 19.9 % (ref 15–45)
MCH RBC QN AUTO: 28.6 PG (ref 28–32)
MCHC RBC AUTO-ENTMCNC: 29.9 G/DL (ref 33–36)
MCV RBC AUTO: 95.9 FL (ref 82–98)
MONOCYTES # BLD AUTO: 0.55 10*3/MM3 (ref 0.19–1.3)
MONOCYTES NFR BLD AUTO: 10.2 % (ref 4–12)
NEUTROPHILS # BLD AUTO: 3.55 10*3/MM3 (ref 1.87–8.4)
NEUTROPHILS NFR BLD AUTO: 66 % (ref 39–78)
NRBC BLD AUTO-RTO: 0 /100 WBC (ref 0–0)
PLATELET # BLD AUTO: 72 10*3/MM3 (ref 130–400)
PMV BLD AUTO: 14.4 FL (ref 6–12)
POTASSIUM BLD-SCNC: 3.4 MMOL/L (ref 3.5–5.3)
PROTHROMBIN TIME: 24.2 SECONDS (ref 11.9–14.6)
RBC # BLD AUTO: 2.9 10*6/MM3 (ref 4.2–5.4)
RH BLD: POSITIVE
SODIUM BLD-SCNC: 137 MMOL/L (ref 135–145)
T&S EXPIRATION DATE: NORMAL
WBC NRBC COR # BLD: 5.38 10*3/MM3 (ref 4.8–10.8)

## 2019-02-12 PROCEDURE — 93010 ELECTROCARDIOGRAM REPORT: CPT | Performed by: INTERNAL MEDICINE

## 2019-02-12 PROCEDURE — 86900 BLOOD TYPING SEROLOGIC ABO: CPT | Performed by: NURSE PRACTITIONER

## 2019-02-12 PROCEDURE — 71046 X-RAY EXAM CHEST 2 VIEWS: CPT

## 2019-02-12 PROCEDURE — 86901 BLOOD TYPING SEROLOGIC RH(D): CPT | Performed by: NURSE PRACTITIONER

## 2019-02-12 PROCEDURE — 85610 PROTHROMBIN TIME: CPT | Performed by: NURSE PRACTITIONER

## 2019-02-12 PROCEDURE — 36415 COLL VENOUS BLD VENIPUNCTURE: CPT

## 2019-02-12 PROCEDURE — 85730 THROMBOPLASTIN TIME PARTIAL: CPT | Performed by: NURSE PRACTITIONER

## 2019-02-12 PROCEDURE — 93005 ELECTROCARDIOGRAM TRACING: CPT

## 2019-02-12 PROCEDURE — 80048 BASIC METABOLIC PNL TOTAL CA: CPT | Performed by: NURSE PRACTITIONER

## 2019-02-12 PROCEDURE — 86850 RBC ANTIBODY SCREEN: CPT | Performed by: NURSE PRACTITIONER

## 2019-02-12 PROCEDURE — 85025 COMPLETE CBC W/AUTO DIFF WBC: CPT | Performed by: NURSE PRACTITIONER

## 2019-02-12 NOTE — DISCHARGE INSTRUCTIONS
DAY OF SURGERY INSTRUCTIONS        YOUR SURGEON: ***G BICKING     PROCEDURE: ***HENODIALYSIS CATHETER INSERTION     DATE OF SURGERY: ***2/13/2019    ARRIVAL TIME: AS DIRECTED BY OFFICE  A  YOU MAY TAKE THE FOLLOWING MEDICATION(S) THE MORNING OF SURGERY WITH A SIP OF WATER: ***AS DIRECTED                MANAGING PAIN AFTER SURGERY    We know you are probably wondering what your pain will be like after surgery.  Following surgery it is unrealistic to expect you will not have pain.   Pain is how our bodies let us know that something is wrong or cautions us to be careful.  That said, our goal is to make your pain tolerable.    Methods we may use to treat your pain include (oral or IV medications, PCAs, epidurals, nerve blocks, etc.)   While some procedures require IV pain medications for a short time after surgery, transitioning to pain medications by mouth allows for better management of pain.   Your nurse will encourage you to take oral pain medications whenever possible.  IV medications work almost immediately, but only last a short while.  Taking medications by mouth allows for a more constant level of medication in your blood stream for a longer period of time.      Once your pain is out of control it is harder to get back under control.  It is important you are aware when your next dose of pain medication is due.  If you are admitted, your nurse may write the time of your next dose on the white board in your room to help you remember.      We are interested in your pain and encourage you to inform us about aggravating factors during your visit.   Many times a simple repositioning every few hours can make a big difference.    If your physician says it is okay, do not let your pain prevent you from getting out of bed. Be sure to call your nurse for assistance prior to getting up so you do not fall.      Before surgery, please decide your tolerable pain goal.  These faces help describe the pain ratings we use on a  0-10 scale.   Be prepared to tell us your goal and whether or not you take pain or anxiety medications at home.          BEFORE YOU COME TO THE HOSPITAL  (Pre-op instructions)  • Do not eat, drink, smoke or chew gum after midnight the night before surgery.  This also includes no mints.  • Morning of surgery take only the medicines you have been instructed with a sip of water unless otherwise instructed  by your physician.  • Do not shave, wear makeup or dark nail polish.  • Remove all jewelry including rings.  • Leave anything you consider valuable at home.  • Leave your suitcase in the car until after your surgery.  • Bring the following with you if applicable:  o Picture ID and insurance, Medicare or Medicaid cards  o Co-pay/deductible required by insurance (cash, check, credit card)  o Copy of advance directive, living will or power-of- documents if not brought to PAT  o CPAP or BIPAP mask and tubing  o Relaxation aids (MP3 player, book, magazine)  • On the day of surgery check in at registration located at the main entrance of the hospital.       Outpatient Surgery Guidelines, Adult  Outpatient procedures are those for which the person having the procedure is allowed to go home the same day as the procedure. Various procedures are done on an outpatient basis. You should follow some general guidelines if you will be having an outpatient procedure.  LET YOUR HEALTH CARE PROVIDER KNOW ABOUT:  · Any allergies you have.  · All medicines you are taking, including vitamins, herbs, eye drops, creams, and over-the-counter medicines.  · Previous problems you or members of your family have had with the use of anesthetics.  · Any blood disorders you have.  · Previous surgeries you have had.  · Medical conditions you have.  RISKS AND COMPLICATIONS  Your health care provider will discuss possible risks and complications with you before surgery. Common risks and complications include:    · Problems due to the use of  anesthetics.  · Blood loss and replacement (does not apply to minor surgical procedures).  · Temporary increase in pain due to surgery.  · Uncorrected pain or problems that the surgery was meant to correct.  · Infection.  · New damage.  BEFORE THE PROCEDURE  · Ask your health care provider about changing or stopping your regular medicines. You may need to stop taking certain medicines in the days or weeks before the procedure.  · Stop smoking at least 2 weeks before surgery. This lowers your risk for complications during and after surgery. Ask your health care provider for help with this if needed.  · Eat your usual meals and a light supper the day before surgery. Continue fluid intake. Do not drink alcohol.  · Do not eat or drink after midnight the night before your surgery.   · Arrange for someone to take you home and to stay with you for 24 hours after the procedure. Medicine given for your procedure may affect your ability to drive or to care for yourself.  · Call your health care provider's office if you develop an illness or problem that may prevent you from safely having your procedure.  AFTER THE PROCEDURE  After surgery, you will be taken to a recovery area, where your progress will be monitored. If there are no complications, you will be allowed to go home when you are awake, stable, and taking fluids well. You may have numbness around the surgical site. Healing will take some time. You will have tenderness at the surgical site and may have some swelling and bruising. You may also have some nausea.  HOME CARE INSTRUCTIONS  · Do not drive for 24 hours, or as directed by your health care provider. Do not drive while taking prescription pain medicines.  · Do not drink alcohol for 24 hours.  · Do not make important decisions or sign legal documents for 24 hours.  · You may resume a normal diet and activities as directed.  · Do not lift anything heavier than 10 pounds (4.5 kg) or play contact sports until your  health care provider says it is okay.  · Change your bandages (dressings) as directed.  · Only take over-the-counter or prescription medicines as directed by your health care provider.  · Follow up with your health care provider as directed.  SEEK MEDICAL CARE IF:  · You have increased bleeding (more than a small spot) from the surgical site.  · You have redness, swelling, or increasing pain in the wound.  · You see pus coming from the wound.  · You have a fever.  · You notice a bad smell coming from the wound or dressing.  · You feel lightheaded or faint.  · You develop a rash.  · You have trouble breathing.  · You develop allergies.  MAKE SURE YOU:  · Understand these instructions.  · Will watch your condition.  · Will get help right away if you are not doing well or get worse.     This information is not intended to replace advice given to you by your health care provider. Make sure you discuss any questions you have with your health care provider.     Document Released: 09/12/2002 Document Revised: 05/03/2016 Document Reviewed: 05/22/2014  [employer] Interactive Patient Education ©2016 [employer]       Fall Prevention in Hospitals, Adult  As a hospital patient, your condition and the treatments you receive can increase your risk for falls. Some additional risk factors for falls in a hospital include:  · Being in an unfamiliar environment.  · Being on bed rest.  · Your surgery.  · Taking certain medicines.  · Your tubing requirements, such as intravenous (IV) therapy or catheters.  It is important that you learn how to decrease fall risks while at the hospital. Below are important tips that can help prevent falls.  SAFETY TIPS FOR PREVENTING FALLS  Talk about your risk of falling.  · Ask your health care provider why you are at risk for falling. Is it your medicine, illness, tubing placement, or something else?  · Make a plan with your health care provider to keep you safe from falls.  · Ask your health care  provider or pharmacist about side effects of your medicines. Some medicines can make you dizzy or affect your coordination.  Ask for help.  · Ask for help before getting out of bed. You may need to press your call button.  · Ask for assistance in getting safely to the toilet.  · Ask for a walker or cane to be put at your bedside. Ask that most of the side rails on your bed be placed up before your health care provider leaves the room.  · Ask family or friends to sit with you.  · Ask for things that are out of your reach, such as your glasses, hearing aids, telephone, bedside table, or call button.  Follow these tips to avoid falling:  · Stay lying or seated, rather than standing, while waiting for help.  · Wear rubber-soled slippers or shoes whenever you walk in the hospital.  · Avoid quick, sudden movements.  ¨ Change positions slowly.  ¨ Sit on the side of your bed before standing.  ¨ Stand up slowly and wait before you start to walk.  · Let your health care provider know if there is a spill on the floor.  · Pay careful attention to the medical equipment, electrical cords, and tubes around you.  · When you need help, use your call button by your bed or in the bathroom. Wait for one of your health care providers to help you.  · If you feel dizzy or unsure of your footing, return to bed and wait for assistance.  · Avoid being distracted by the TV, telephone, or another person in your room.  · Do not lean or support yourself on rolling objects, such as IV poles or bedside tables.     This information is not intended to replace advice given to you by your health care provider. Make sure you discuss any questions you have with your health care provider.     Document Released: 12/15/2001 Document Revised: 01/08/2016 Document Reviewed: 08/25/2013  [employer] Interactive Patient Education ©2016 [employer]       Surgical Site Infections FAQs  What is a Surgical Site Infection (SSI)?  A surgical site infection is an  infection that occurs after surgery in the part of the body where the surgery took place. Most patients who have surgery do not develop an infection. However, infections develop in about 1 to 3 out of every 100 patients who have surgery.  Some of the common symptoms of a surgical site infection are:  · Redness and pain around the area where you had surgery  · Drainage of cloudy fluid from your surgical wound  · Fever  Can SSIs be treated?  Yes. Most surgical site infections can be treated with antibiotics. The antibiotic given to you depends on the bacteria (germs) causing the infection. Sometimes patients with SSIs also need another surgery to treat the infection.  What are some of the things that hospitals are doing to prevent SSIs?  To prevent SSIs, doctors, nurses, and other healthcare providers:  · Clean their hands and arms up to their elbows with an antiseptic agent just before the surgery.  · Clean their hands with soap and water or an alcohol-based hand rub before and after caring for each patient.  · May remove some of your hair immediately before your surgery using electric clippers if the hair is in the same area where the procedure will occur. They should not shave you with a razor.  · Wear special hair covers, masks, gowns, and gloves during surgery to keep the surgery area clean.  · Give you antibiotics before your surgery starts. In most cases, you should get antibiotics within 60 minutes before the surgery starts and the antibiotics should be stopped within 24 hours after surgery.  · Clean the skin at the site of your surgery with a special soap that kills germs.  What can I do to help prevent SSIs?  Before your surgery:  · Tell your doctor about other medical problems you may have. Health problems such as allergies, diabetes, and obesity could affect your surgery and your treatment.  · Quit smoking. Patients who smoke get more infections. Talk to your doctor about how you can quit before your  surgery.  · Do not shave near where you will have surgery. Shaving with a razor can irritate your skin and make it easier to develop an infection.  At the time of your surgery:  · Speak up if someone tries to shave you with a razor before surgery. Ask why you need to be shaved and talk with your surgeon if you have any concerns.  · Ask if you will get antibiotics before surgery.  After your surgery:  · Make sure that your healthcare providers clean their hands before examining you, either with soap and water or an alcohol-based hand rub.  · If you do not see your providers clean their hands, please ask them to do so.  · Family and friends who visit you should not touch the surgical wound or dressings.  · Family and friends should clean their hands with soap and water or an alcohol-based hand rub before and after visiting you. If you do not see them clean their hands, ask them to clean their hands.  What do I need to do when I go home from the hospital?  · Before you go home, your doctor or nurse should explain everything you need to know about taking care of your wound. Make sure you understand how to care for your wound before you leave the hospital.  · Always clean your hands before and after caring for your wound.  · Before you go home, make sure you know who to contact if you have questions or problems after you get home.  · If you have any symptoms of an infection, such as redness and pain at the surgery site, drainage, or fever, call your doctor immediately.  If you have additional questions, please ask your doctor or nurse.  Developed and co-sponsored by The Society for Healthcare Epidemiology of [place] (SHEA); Infectious Diseases Society of [place] (IDSA); American Hospital Association; Association for Professionals in Infection Control and Epidemiology (APIC); Centers for Disease Control and Prevention (CDC); and The Joint Commission.     This information is not intended to replace advice given to you by  your health care provider. Make sure you discuss any questions you have with your health care provider.     Document Released: 12/23/2014 Document Revised: 01/08/2016 Document Reviewed: 03/02/2016  [employer] Interactive Patient Education ©2016 [employer]       [facility]  CHG 4% Patient Instruction Sheet    Preparing the Skin Before Surgery  Preparing or “prepping” skin before surgery can reduce the risk of infection at the surgical site. To make the process easier,[facility] has chosen 4% Chlorhexidine Gluconate (CHG) antiseptic solution.   The steps below outline the prepping process and should be carefully followed.                                                                                                                                                      Prep the skin at the following time(s):                                                      We recommend you shower the night before surgery, and again the morning of surgery with the 4% CHG antiseptic solution using half of the bottle and a cloth each time.  Dress in clean clothes/sleepwear after showering.  See instructions below for application.          Do not apply any lotions or moisturizers.       Do not shave the area to be prepped for at least 2 days prior to surgery.    Clipping the hair may be done immediately prior to your surgery at the hospital    if needed.    Directions:  Thoroughly rinse your body with water.  Apply 4% CHG to a cloth and wash skin gently, paying special attention to the operative site.  Rinse again thoroughly.  Once you have begun using this product do not apply anything else to your skin. If itching or redness persists, rinse affected areas and discontinue use.    When using this product:  • Keep out of eyes, ears, and mouth.  • If solution should contact these areas, rinse out promptly and thoroughly with water.  • For external use only.  • Do not use in genital area, on your face or head.          DAY OF  SURGERY INSTRUCTIONS        YOUR SURGEON: ***    PROCEDURE: ***    DATE OF SURGERY: ***    ARRIVAL TIME: AS DIRECTED BY OFFICE    YOU MAY TAKE THE FOLLOWING MEDICATION(S) THE MORNING OF SURGERY WITH A SIP OF WATER: ***                MANAGING PAIN AFTER SURGERY    We know you are probably wondering what your pain will be like after surgery.  Following surgery it is unrealistic to expect you will not have pain.   Pain is how our bodies let us know that something is wrong or cautions us to be careful.  That said, our goal is to make your pain tolerable.    Methods we may use to treat your pain include (oral or IV medications, PCAs, epidurals, nerve blocks, etc.)   While some procedures require IV pain medications for a short time after surgery, transitioning to pain medications by mouth allows for better management of pain.   Your nurse will encourage you to take oral pain medications whenever possible.  IV medications work almost immediately, but only last a short while.  Taking medications by mouth allows for a more constant level of medication in your blood stream for a longer period of time.      Once your pain is out of control it is harder to get back under control.  It is important you are aware when your next dose of pain medication is due.  If you are admitted, your nurse may write the time of your next dose on the white board in your room to help you remember.      We are interested in your pain and encourage you to inform us about aggravating factors during your visit.   Many times a simple repositioning every few hours can make a big difference.    If your physician says it is okay, do not let your pain prevent you from getting out of bed. Be sure to call your nurse for assistance prior to getting up so you do not fall.      Before surgery, please decide your tolerable pain goal.  These faces help describe the pain ratings we use on a 0-10 scale.   Be prepared to tell us your goal and whether or not you  take pain or anxiety medications at home.          BEFORE YOU COME TO THE HOSPITAL  (Pre-op instructions)  • Do not eat, drink, smoke or chew gum after midnight the night before surgery.  This also includes no mints.  • Morning of surgery take only the medicines you have been instructed with a sip of water unless otherwise instructed  by your physician.  • Do not shave, wear makeup or dark nail polish.  • Remove all jewelry including rings.  • Leave anything you consider valuable at home.  • Leave your suitcase in the car until after your surgery.  • Bring the following with you if applicable:  o Picture ID and insurance, Medicare or Medicaid cards  o Co-pay/deductible required by insurance (cash, check, credit card)  o Copy of advance directive, living will or power-of- documents if not brought to PAT  o CPAP or BIPAP mask and tubing  o Relaxation aids (MP3 player, book, magazine)  • On the day of surgery check in at registration located at the main entrance of the hospital.       Outpatient Surgery Guidelines, Adult  Outpatient procedures are those for which the person having the procedure is allowed to go home the same day as the procedure. Various procedures are done on an outpatient basis. You should follow some general guidelines if you will be having an outpatient procedure.  LET YOUR HEALTH CARE PROVIDER KNOW ABOUT:  · Any allergies you have.  · All medicines you are taking, including vitamins, herbs, eye drops, creams, and over-the-counter medicines.  · Previous problems you or members of your family have had with the use of anesthetics.  · Any blood disorders you have.  · Previous surgeries you have had.  · Medical conditions you have.  RISKS AND COMPLICATIONS  Your health care provider will discuss possible risks and complications with you before surgery. Common risks and complications include:    · Problems due to the use of anesthetics.  · Blood loss and replacement (does not apply to minor  surgical procedures).  · Temporary increase in pain due to surgery.  · Uncorrected pain or problems that the surgery was meant to correct.  · Infection.  · New damage.  BEFORE THE PROCEDURE  · Ask your health care provider about changing or stopping your regular medicines. You may need to stop taking certain medicines in the days or weeks before the procedure.  · Stop smoking at least 2 weeks before surgery. This lowers your risk for complications during and after surgery. Ask your health care provider for help with this if needed.  · Eat your usual meals and a light supper the day before surgery. Continue fluid intake. Do not drink alcohol.  · Do not eat or drink after midnight the night before your surgery.   · Arrange for someone to take you home and to stay with you for 24 hours after the procedure. Medicine given for your procedure may affect your ability to drive or to care for yourself.  · Call your health care provider's office if you develop an illness or problem that may prevent you from safely having your procedure.  AFTER THE PROCEDURE  After surgery, you will be taken to a recovery area, where your progress will be monitored. If there are no complications, you will be allowed to go home when you are awake, stable, and taking fluids well. You may have numbness around the surgical site. Healing will take some time. You will have tenderness at the surgical site and may have some swelling and bruising. You may also have some nausea.  HOME CARE INSTRUCTIONS  · Do not drive for 24 hours, or as directed by your health care provider. Do not drive while taking prescription pain medicines.  · Do not drink alcohol for 24 hours.  · Do not make important decisions or sign legal documents for 24 hours.  · You may resume a normal diet and activities as directed.  · Do not lift anything heavier than 10 pounds (4.5 kg) or play contact sports until your health care provider says it is okay.  · Change your bandages  (dressings) as directed.  · Only take over-the-counter or prescription medicines as directed by your health care provider.  · Follow up with your health care provider as directed.  SEEK MEDICAL CARE IF:  · You have increased bleeding (more than a small spot) from the surgical site.  · You have redness, swelling, or increasing pain in the wound.  · You see pus coming from the wound.  · You have a fever.  · You notice a bad smell coming from the wound or dressing.  · You feel lightheaded or faint.  · You develop a rash.  · You have trouble breathing.  · You develop allergies.  MAKE SURE YOU:  · Understand these instructions.  · Will watch your condition.  · Will get help right away if you are not doing well or get worse.     This information is not intended to replace advice given to you by your health care provider. Make sure you discuss any questions you have with your health care provider.     Document Released: 09/12/2002 Document Revised: 05/03/2016 Document Reviewed: 05/22/2014  [email or web address] Interactive Patient Education ©2016 [employer]       Fall Prevention in Hospitals, Adult  As a hospital patient, your condition and the treatments you receive can increase your risk for falls. Some additional risk factors for falls in a hospital include:  · Being in an unfamiliar environment.  · Being on bed rest.  · Your surgery.  · Taking certain medicines.  · Your tubing requirements, such as intravenous (IV) therapy or catheters.  It is important that you learn how to decrease fall risks while at the hospital. Below are important tips that can help prevent falls.  SAFETY TIPS FOR PREVENTING FALLS  Talk about your risk of falling.  · Ask your health care provider why you are at risk for falling. Is it your medicine, illness, tubing placement, or something else?  · Make a plan with your health care provider to keep you safe from falls.  · Ask your health care provider or pharmacist about side effects of your medicines. Some  medicines can make you dizzy or affect your coordination.  Ask for help.  · Ask for help before getting out of bed. You may need to press your call button.  · Ask for assistance in getting safely to the toilet.  · Ask for a walker or cane to be put at your bedside. Ask that most of the side rails on your bed be placed up before your health care provider leaves the room.  · Ask family or friends to sit with you.  · Ask for things that are out of your reach, such as your glasses, hearing aids, telephone, bedside table, or call button.  Follow these tips to avoid falling:  · Stay lying or seated, rather than standing, while waiting for help.  · Wear rubber-soled slippers or shoes whenever you walk in the hospital.  · Avoid quick, sudden movements.  ¨ Change positions slowly.  ¨ Sit on the side of your bed before standing.  ¨ Stand up slowly and wait before you start to walk.  · Let your health care provider know if there is a spill on the floor.  · Pay careful attention to the medical equipment, electrical cords, and tubes around you.  · When you need help, use your call button by your bed or in the bathroom. Wait for one of your health care providers to help you.  · If you feel dizzy or unsure of your footing, return to bed and wait for assistance.  · Avoid being distracted by the TV, telephone, or another person in your room.  · Do not lean or support yourself on rolling objects, such as IV poles or bedside tables.     This information is not intended to replace advice given to you by your health care provider. Make sure you discuss any questions you have with your health care provider.     Document Released: 12/15/2001 Document Revised: 01/08/2016 Document Reviewed: 08/25/2013  [employer] Interactive Patient Education ©2016 [employer]       Surgical Site Infections FAQs  What is a Surgical Site Infection (SSI)?  A surgical site infection is an infection that occurs after surgery in the part of the body where the  surgery took place. Most patients who have surgery do not develop an infection. However, infections develop in about 1 to 3 out of every 100 patients who have surgery.  Some of the common symptoms of a surgical site infection are:  · Redness and pain around the area where you had surgery  · Drainage of cloudy fluid from your surgical wound  · Fever  Can SSIs be treated?  Yes. Most surgical site infections can be treated with antibiotics. The antibiotic given to you depends on the bacteria (germs) causing the infection. Sometimes patients with SSIs also need another surgery to treat the infection.  What are some of the things that hospitals are doing to prevent SSIs?  To prevent SSIs, doctors, nurses, and other healthcare providers:  · Clean their hands and arms up to their elbows with an antiseptic agent just before the surgery.  · Clean their hands with soap and water or an alcohol-based hand rub before and after caring for each patient.  · May remove some of your hair immediately before your surgery using electric clippers if the hair is in the same area where the procedure will occur. They should not shave you with a razor.  · Wear special hair covers, masks, gowns, and gloves during surgery to keep the surgery area clean.  · Give you antibiotics before your surgery starts. In most cases, you should get antibiotics within 60 minutes before the surgery starts and the antibiotics should be stopped within 24 hours after surgery.  · Clean the skin at the site of your surgery with a special soap that kills germs.  What can I do to help prevent SSIs?  Before your surgery:  · Tell your doctor about other medical problems you may have. Health problems such as allergies, diabetes, and obesity could affect your surgery and your treatment.  · Quit smoking. Patients who smoke get more infections. Talk to your doctor about how you can quit before your surgery.  · Do not shave near where you will have surgery. Shaving with a  razor can irritate your skin and make it easier to develop an infection.  At the time of your surgery:  · Speak up if someone tries to shave you with a razor before surgery. Ask why you need to be shaved and talk with your surgeon if you have any concerns.  · Ask if you will get antibiotics before surgery.  After your surgery:  · Make sure that your healthcare providers clean their hands before examining you, either with soap and water or an alcohol-based hand rub.  · If you do not see your providers clean their hands, please ask them to do so.  · Family and friends who visit you should not touch the surgical wound or dressings.  · Family and friends should clean their hands with soap and water or an alcohol-based hand rub before and after visiting you. If you do not see them clean their hands, ask them to clean their hands.  What do I need to do when I go home from the hospital?  · Before you go home, your doctor or nurse should explain everything you need to know about taking care of your wound. Make sure you understand how to care for your wound before you leave the hospital.  · Always clean your hands before and after caring for your wound.  · Before you go home, make sure you know who to contact if you have questions or problems after you get home.  · If you have any symptoms of an infection, such as redness and pain at the surgery site, drainage, or fever, call your doctor immediately.  If you have additional questions, please ask your doctor or nurse.  Developed and co-sponsored by The Society for Healthcare Epidemiology of [place] (SHEA); Infectious Diseases Society of [place] (IDSA); American Hospital Association; Association for Professionals in Infection Control and Epidemiology (APIC); Centers for Disease Control and Prevention (CDC); and The Joint Commission.     This information is not intended to replace advice given to you by your health care provider. Make sure you discuss any questions you have  with your health care provider.     Document Released: 12/23/2014 Document Revised: 01/08/2016 Document Reviewed: 03/02/2016  [employer] Interactive Patient Education ©2016 [employer]       [facility]  CHG 4% Patient Instruction Sheet    Preparing the Skin Before Surgery  Preparing or “prepping” skin before surgery can reduce the risk of infection at the surgical site. To make the process easier,[facility] has chosen 4% Chlorhexidine Gluconate (CHG) antiseptic solution.   The steps below outline the prepping process and should be carefully followed.                                                                                                                                                      Prep the skin at the following time(s):                                                      We recommend you shower the night before surgery, and again the morning of surgery with the 4% CHG antiseptic solution using half of the bottle and a cloth each time.  Dress in clean clothes/sleepwear after showering.  See instructions below for application.          Do not apply any lotions or moisturizers.       Do not shave the area to be prepped for at least 2 days prior to surgery.    Clipping the hair may be done immediately prior to your surgery at the hospital    if needed.    Directions:  Thoroughly rinse your body with water.  Apply 4% CHG to a cloth and wash skin gently, paying special attention to the operative site.  Rinse again thoroughly.  Once you have begun using this product do not apply anything else to your skin. If itching or redness persists, rinse affected areas and discontinue use.    When using this product:  • Keep out of eyes, ears, and mouth.  • If solution should contact these areas, rinse out promptly and thoroughly with water.  • For external use only.  • Do not use in genital area, on your face or head.      PATIENT/FAMILY/RESPONSIBLE PARTY VERBALIZES UNDERSTANDING OF ABOVE EDUCATION.  COPY OF  PAIN SCALE GIVEN AND REVIEWED WITH VERBALIZED UNDERSTANDING.

## 2019-02-13 ENCOUNTER — CARE COORDINATION (OUTPATIENT)
Dept: CASE MANAGEMENT | Age: 84
End: 2019-02-13

## 2019-02-13 ENCOUNTER — ANTI-COAG VISIT (OUTPATIENT)
Dept: INTERNAL MEDICINE | Age: 84
End: 2019-02-13

## 2019-02-13 ENCOUNTER — ANESTHESIA EVENT (OUTPATIENT)
Dept: PERIOP | Facility: HOSPITAL | Age: 84
End: 2019-02-13

## 2019-02-13 ENCOUNTER — ANESTHESIA (OUTPATIENT)
Dept: PERIOP | Facility: HOSPITAL | Age: 84
End: 2019-02-13

## 2019-02-13 ENCOUNTER — APPOINTMENT (OUTPATIENT)
Dept: INTERVENTIONAL RADIOLOGY/VASCULAR | Facility: HOSPITAL | Age: 84
End: 2019-02-13

## 2019-02-13 ENCOUNTER — HOSPITAL ENCOUNTER (OUTPATIENT)
Facility: HOSPITAL | Age: 84
Setting detail: HOSPITAL OUTPATIENT SURGERY
Discharge: HOME | End: 2019-02-13
Attending: SURGERY | Admitting: SURGERY

## 2019-02-13 VITALS
TEMPERATURE: 98.1 F | SYSTOLIC BLOOD PRESSURE: 112 MMHG | HEART RATE: 61 BPM | OXYGEN SATURATION: 94 % | DIASTOLIC BLOOD PRESSURE: 37 MMHG | RESPIRATION RATE: 18 BRPM

## 2019-02-13 DIAGNOSIS — T82.590A MALFUNCTION OF ARTERIOVENOUS GRAFT, INITIAL ENCOUNTER (HCC): ICD-10-CM

## 2019-02-13 DIAGNOSIS — Z01.818 PREOP TESTING: ICD-10-CM

## 2019-02-13 LAB
ANION GAP SERPL CALCULATED.3IONS-SCNC: 10 MMOL/L (ref 4–13)
BUN BLD-MCNC: 42 MG/DL (ref 5–21)
BUN/CREAT SERPL: 5.6 (ref 7–25)
CALCIUM SPEC-SCNC: 8.8 MG/DL (ref 8.4–10.4)
CHLORIDE SERPL-SCNC: 97 MMOL/L (ref 98–110)
CO2 SERPL-SCNC: 30 MMOL/L (ref 24–31)
CREAT BLD-MCNC: 7.44 MG/DL (ref 0.5–1.4)
GFR SERPL CREATININE-BSD FRML MDRD: 5 ML/MIN/1.73
GFR SERPL CREATININE-BSD FRML MDRD: ABNORMAL ML/MIN/1.73
GLUCOSE BLD-MCNC: 90 MG/DL (ref 70–100)
INR BLD: 2.2
INR PPP: 2.33 (ref 0.91–1.09)
POTASSIUM BLD-SCNC: 3.7 MMOL/L (ref 3.5–5.3)
PROTHROMBIN TIME: 26.4 SECONDS (ref 11.9–14.6)
SODIUM BLD-SCNC: 137 MMOL/L (ref 135–145)

## 2019-02-13 PROCEDURE — 76000 FLUOROSCOPY <1 HR PHYS/QHP: CPT

## 2019-02-13 PROCEDURE — 36558 INSERT TUNNELED CV CATH: CPT

## 2019-02-13 PROCEDURE — 80048 BASIC METABOLIC PNL TOTAL CA: CPT | Performed by: SURGERY

## 2019-02-13 PROCEDURE — 85610 PROTHROMBIN TIME: CPT | Performed by: SURGERY

## 2019-02-13 PROCEDURE — 25010000002 HEPARIN (PORCINE) PER 1000 UNITS: Performed by: SURGERY

## 2019-02-13 PROCEDURE — C1750 CATH, HEMODIALYSIS,LONG-TERM: HCPCS | Performed by: SURGERY

## 2019-02-13 PROCEDURE — 25010000002 PROPOFOL 10 MG/ML EMULSION: Performed by: NURSE ANESTHETIST, CERTIFIED REGISTERED

## 2019-02-13 PROCEDURE — 76937 US GUIDE VASCULAR ACCESS: CPT | Performed by: SURGERY

## 2019-02-13 PROCEDURE — 25010000002 PHENYLEPHRINE PER 1 ML: Performed by: NURSE ANESTHETIST, CERTIFIED REGISTERED

## 2019-02-13 PROCEDURE — 36558 INSERT TUNNELED CV CATH: CPT | Performed by: SURGERY

## 2019-02-13 PROCEDURE — C1894 INTRO/SHEATH, NON-LASER: HCPCS | Performed by: SURGERY

## 2019-02-13 RX ORDER — PROPOFOL 10 MG/ML
VIAL (ML) INTRAVENOUS AS NEEDED
Status: DISCONTINUED | OUTPATIENT
Start: 2019-02-13 | End: 2019-02-13 | Stop reason: SURG

## 2019-02-13 RX ORDER — SODIUM CHLORIDE 0.9 % (FLUSH) 0.9 %
3 SYRINGE (ML) INJECTION EVERY 12 HOURS SCHEDULED
Status: DISCONTINUED | OUTPATIENT
Start: 2019-02-13 | End: 2019-02-13 | Stop reason: HOSPADM

## 2019-02-13 RX ORDER — OXYCODONE HYDROCHLORIDE AND ACETAMINOPHEN 5; 325 MG/1; MG/1
1 TABLET ORAL ONCE AS NEEDED
Status: DISCONTINUED | OUTPATIENT
Start: 2019-02-13 | End: 2019-02-13 | Stop reason: HOSPADM

## 2019-02-13 RX ORDER — OXYCODONE AND ACETAMINOPHEN 7.5; 325 MG/1; MG/1
1 TABLET ORAL EVERY 4 HOURS PRN
Status: DISCONTINUED | OUTPATIENT
Start: 2019-02-13 | End: 2019-02-13 | Stop reason: HOSPADM

## 2019-02-13 RX ORDER — METOCLOPRAMIDE HYDROCHLORIDE 5 MG/ML
5 INJECTION INTRAMUSCULAR; INTRAVENOUS
Status: DISCONTINUED | OUTPATIENT
Start: 2019-02-13 | End: 2019-02-13 | Stop reason: HOSPADM

## 2019-02-13 RX ORDER — FAMOTIDINE 10 MG/ML
20 INJECTION, SOLUTION INTRAVENOUS
Status: DISCONTINUED | OUTPATIENT
Start: 2019-02-13 | End: 2019-02-13 | Stop reason: HOSPADM

## 2019-02-13 RX ORDER — SODIUM CHLORIDE 0.9 % (FLUSH) 0.9 %
1-10 SYRINGE (ML) INJECTION AS NEEDED
Status: DISCONTINUED | OUTPATIENT
Start: 2019-02-13 | End: 2019-02-13 | Stop reason: HOSPADM

## 2019-02-13 RX ORDER — HYDROCODONE BITARTRATE AND ACETAMINOPHEN 5; 325 MG/1; MG/1
1 TABLET ORAL ONCE AS NEEDED
Status: DISCONTINUED | OUTPATIENT
Start: 2019-02-13 | End: 2019-02-13 | Stop reason: HOSPADM

## 2019-02-13 RX ORDER — SODIUM CHLORIDE, SODIUM LACTATE, POTASSIUM CHLORIDE, CALCIUM CHLORIDE 600; 310; 30; 20 MG/100ML; MG/100ML; MG/100ML; MG/100ML
100 INJECTION, SOLUTION INTRAVENOUS CONTINUOUS
Status: DISCONTINUED | OUTPATIENT
Start: 2019-02-13 | End: 2019-02-13 | Stop reason: HOSPADM

## 2019-02-13 RX ORDER — ACETAMINOPHEN 500 MG
1000 TABLET ORAL ONCE
Status: COMPLETED | OUTPATIENT
Start: 2019-02-13 | End: 2019-02-13

## 2019-02-13 RX ORDER — IPRATROPIUM BROMIDE AND ALBUTEROL SULFATE 2.5; .5 MG/3ML; MG/3ML
3 SOLUTION RESPIRATORY (INHALATION) ONCE AS NEEDED
Status: DISCONTINUED | OUTPATIENT
Start: 2019-02-13 | End: 2019-02-13 | Stop reason: HOSPADM

## 2019-02-13 RX ORDER — BUPIVACAINE HYDROCHLORIDE AND EPINEPHRINE 5; 5 MG/ML; UG/ML
INJECTION, SOLUTION EPIDURAL; INTRACAUDAL; PERINEURAL AS NEEDED
Status: DISCONTINUED | OUTPATIENT
Start: 2019-02-13 | End: 2019-02-13 | Stop reason: HOSPADM

## 2019-02-13 RX ORDER — LIDOCAINE HYDROCHLORIDE 20 MG/ML
INJECTION, SOLUTION INFILTRATION; PERINEURAL AS NEEDED
Status: DISCONTINUED | OUTPATIENT
Start: 2019-02-13 | End: 2019-02-13 | Stop reason: SURG

## 2019-02-13 RX ORDER — ONDANSETRON 2 MG/ML
4 INJECTION INTRAMUSCULAR; INTRAVENOUS ONCE AS NEEDED
Status: DISCONTINUED | OUTPATIENT
Start: 2019-02-13 | End: 2019-02-13 | Stop reason: HOSPADM

## 2019-02-13 RX ORDER — FENTANYL CITRATE 50 UG/ML
25 INJECTION, SOLUTION INTRAMUSCULAR; INTRAVENOUS AS NEEDED
Status: DISCONTINUED | OUTPATIENT
Start: 2019-02-13 | End: 2019-02-13 | Stop reason: HOSPADM

## 2019-02-13 RX ORDER — SODIUM CHLORIDE 0.9 % (FLUSH) 0.9 %
3 SYRINGE (ML) INJECTION AS NEEDED
Status: DISCONTINUED | OUTPATIENT
Start: 2019-02-13 | End: 2019-02-13 | Stop reason: HOSPADM

## 2019-02-13 RX ORDER — NALOXONE HCL 0.4 MG/ML
0.4 VIAL (ML) INJECTION AS NEEDED
Status: DISCONTINUED | OUTPATIENT
Start: 2019-02-13 | End: 2019-02-13 | Stop reason: HOSPADM

## 2019-02-13 RX ORDER — HYDROCODONE BITARTRATE AND ACETAMINOPHEN 5; 325 MG/1; MG/1
1-2 TABLET ORAL EVERY 6 HOURS PRN
Qty: 20 TABLET | Refills: 0 | Status: SHIPPED | OUTPATIENT
Start: 2019-02-13

## 2019-02-13 RX ORDER — METOPROLOL TARTRATE 5 MG/5ML
2 INJECTION INTRAVENOUS ONCE AS NEEDED
Status: COMPLETED | OUTPATIENT
Start: 2019-02-13 | End: 2019-02-13

## 2019-02-13 RX ORDER — IBUPROFEN 600 MG/1
600 TABLET ORAL ONCE AS NEEDED
Status: DISCONTINUED | OUTPATIENT
Start: 2019-02-13 | End: 2019-02-13 | Stop reason: HOSPADM

## 2019-02-13 RX ORDER — MEPERIDINE HYDROCHLORIDE 25 MG/ML
12.5 INJECTION INTRAMUSCULAR; INTRAVENOUS; SUBCUTANEOUS
Status: DISCONTINUED | OUTPATIENT
Start: 2019-02-13 | End: 2019-02-13 | Stop reason: HOSPADM

## 2019-02-13 RX ORDER — BUPIVACAINE HCL/0.9 % NACL/PF 0.1 %
2 PLASTIC BAG, INJECTION (ML) EPIDURAL ONCE
Status: COMPLETED | OUTPATIENT
Start: 2019-02-13 | End: 2019-02-13

## 2019-02-13 RX ORDER — LABETALOL HYDROCHLORIDE 5 MG/ML
5 INJECTION, SOLUTION INTRAVENOUS
Status: DISCONTINUED | OUTPATIENT
Start: 2019-02-13 | End: 2019-02-13 | Stop reason: HOSPADM

## 2019-02-13 RX ORDER — SODIUM CHLORIDE 9 MG/ML
500 INJECTION, SOLUTION INTRAVENOUS CONTINUOUS
Status: DISCONTINUED | OUTPATIENT
Start: 2019-02-13 | End: 2019-02-13 | Stop reason: HOSPADM

## 2019-02-13 RX ORDER — IPRATROPIUM BROMIDE AND ALBUTEROL SULFATE 2.5; .5 MG/3ML; MG/3ML
3 SOLUTION RESPIRATORY (INHALATION) ONCE
Status: COMPLETED | OUTPATIENT
Start: 2019-02-13 | End: 2019-02-13

## 2019-02-13 RX ORDER — HEPARIN SODIUM 1000 [USP'U]/ML
INJECTION, SOLUTION INTRAVENOUS; SUBCUTANEOUS AS NEEDED
Status: DISCONTINUED | OUTPATIENT
Start: 2019-02-13 | End: 2019-02-13 | Stop reason: HOSPADM

## 2019-02-13 RX ADMIN — PROPOFOL 30 MG: 10 INJECTION, EMULSION INTRAVENOUS at 07:55

## 2019-02-13 RX ADMIN — PROPOFOL 30 MG: 10 INJECTION, EMULSION INTRAVENOUS at 08:00

## 2019-02-13 RX ADMIN — FAMOTIDINE 20 MG: 10 INJECTION INTRAVENOUS at 06:49

## 2019-02-13 RX ADMIN — METOPROLOL TARTRATE 2 MG: 1 INJECTION, SOLUTION INTRAVENOUS at 06:49

## 2019-02-13 RX ADMIN — PROPOFOL 30 MG: 10 INJECTION, EMULSION INTRAVENOUS at 08:05

## 2019-02-13 RX ADMIN — LIDOCAINE HYDROCHLORIDE 40 MG: 20 INJECTION, SOLUTION INFILTRATION; PERINEURAL at 07:50

## 2019-02-13 RX ADMIN — ACETAMINOPHEN 1000 MG: 500 TABLET, FILM COATED ORAL at 06:49

## 2019-02-13 RX ADMIN — PROPOFOL 30 MG: 10 INJECTION, EMULSION INTRAVENOUS at 07:50

## 2019-02-13 RX ADMIN — PHENYLEPHRINE HYDROCHLORIDE 80 MCG: 10 INJECTION INTRAVENOUS at 08:02

## 2019-02-13 RX ADMIN — Medication 2 G: at 07:54

## 2019-02-13 RX ADMIN — SODIUM CHLORIDE 500 ML: 9 INJECTION, SOLUTION INTRAVENOUS at 06:13

## 2019-02-13 RX ADMIN — IPRATROPIUM BROMIDE AND ALBUTEROL SULFATE 3 ML: 2.5; .5 SOLUTION RESPIRATORY (INHALATION) at 06:49

## 2019-02-13 NOTE — NURSING NOTE
Dr Garcia states pt is to go to dialysis before dc'ing home today. Dr Garcia states his office said dialysis is scheduled here ([facility]) call outpatient dialysis unit no one answered. Called [facility] dialysis they stated she was not a pt of theirs so they referred me to the [place] clinic. I called them and they said she was not a pt of theirs, so they referred me to the clinic in [place] I called and they said yes she was a pt of theirs and was scheduled for dialysis at 2pm today. I called back into the OR room and told Dr Garcia this information. He was okay w/it. I then called to [facility] and spoke with Thao GIVENS asked if pt's family was in the room with pt so I could inform them about the dialysis treatment today but family wasn't present at this time explained to Thao GIVENS and she was going to tellthe family when they arrive

## 2019-02-13 NOTE — OP NOTE
Barb Connors  2/13/2019     PREOPERATIVE DIAGNOSIS: Malfunction of arteriovenous graft, initial encounter (CMS/[facility]) [T82.590A]  Preop testing [Z01.818]     POSTOPERATIVE DIAGNOSIS: Post-Op Diagnosis Codes:     * Malfunction of arteriovenous graft, initial encounter (CMS/[facility]) [T82.590A]     * Preop testing [Z01.818]     PROCEDURE PERFORMED:   1.  Ultrasound-guided cannulation of the right internal jugular vein  2.  Placement of a 16 Tajik by 19 cm tunneled PermCath  3.  Radiograph is supervision and interpretation     SURGEON: Jay Garcia DO      ANESTHESIA: MAC    PREPARATION: Routine.    STAFF: Circulator: Delilah Hung RN  Scrub Person: Miky Tran  Assistant: Anne Wynne  Vascular Radiology Technician: Bindu Caldwell    ESTIMATED BLOOD LOSS: 10 mL    SPECIMENS: None    COMPLICATIONS: None    INDICATIONS: Barb Connors is a 83 y.o. female who you are currently following for chronic kidney disease.  She did have left upper extremity AV graft on 12/7/18.  She has had some trouble recently at dialysis with cannulation of the graft.  She had 3 or 4 times which were successful and then one or 2 times which caused extravasation and swelling of the arm.  I had her get an arterial duplex of the graft and she is here for evaluation.  The indications, risks, and possible complications of the procedure were explained to the patient, who voiced understanding and wished to proceed with surgery.     PROCEDURE IN DETAIL:     The patient was taken to the operating room and placed on the operating table in a supine position. After Mac anesthesia was obtained, the right neck and chest was prepped and draped in a sterile manner.  Under ultrasound guidance and using a micropuncture technique the right internal jugular vein was cannulated and a micro-sheath was placed.  The J-wire was advanced on into the IVC under fluoroscopic guidance.  Next, 10 mL of 0.5% Marcaine with epinephrine was used to  infiltrate subcutaneous tract.  2 small stab incisions were made with the 11 blade.  The tunneling device attached to the catheter was then tunneled in a subcutaneous manner up through the neck insertion site.  Next, serial dilatation was then made of the internal jugular vein under fluoroscopic guidance.  Lastly, the tear-away sheath was placed and the inner dilator and wire were removed.  The catheter was placed through the tear-away sheath with the tip ending in the right atrial/SVC junction.  There were no kinks in the catheter and both ports had good blood return.  Each port was flushed with heparinized saline and straight heparin 1000 units per mL.  The caps were applied.  The catheter was secured to skin with a 2-0 nylon.  The neck insertion site was closed with a 4-0 Monocryl in a subcuticular fashion.  The wounds were then cleaned.  Sterile dressings were applied. The patient tolerated the procedure well. Sponge and needle counts were correct. The patient was then awakened in the operating room and taken to the recovery room in good condition.    Jay Garcia DO  2/13/2019  8:14 AM

## 2019-02-13 NOTE — ANESTHESIA PREPROCEDURE EVALUATION
Anesthesia Evaluation     Patient summary reviewed and Nursing notes reviewed   history of anesthetic complications: PONV  NPO Solid Status: > 8 hours  NPO Liquid Status: > 8 hours           Airway   Mallampati: III  TM distance: >3 FB  Neck ROM: full  No difficulty expected  Dental      Pulmonary     breath sounds clear to auscultation  (+) a smoker (quit 6 years ago) Former, COPD (on o2 at all times), sleep apnea,   Cardiovascular   Exercise tolerance: poor (<4 METS)    ECG reviewed  Patient on routine beta blocker and Beta blocker given within 24 hours of surgery  Rhythm: regular  Rate: normal    (+) hypertension, dysrhythmias Atrial Fib,       Neuro/Psych  (-) seizures, TIA, CVA  GI/Hepatic/Renal/Endo    (+) obesity,   renal disease CRI,   (-) diabetes    Musculoskeletal     Abdominal    Substance History      OB/GYN          Other                        Anesthesia Plan    ASA 3     MAC     intravenous induction   Anesthetic plan, all risks, benefits, and alternatives have been provided, discussed and informed consent has been obtained with: patient.

## 2019-02-13 NOTE — H&P
Barb Connors  [id number]  [id number]  PAD OR/NONE  Jay Garcia DO  2/13/2019    No chief complaint on file.      HPI: I had the pleasure of seeing your patient Barb Connors in the office today.  Thank you kindly for this consultation.  As you recall, Barb Connors is a 83 y.o. right-handed female who you are currently following for chronic kidney disease.  She did have left upper extremity AV graft on 12/7/18.  She has had some trouble recently at dialysis with cannulation of the graft.  She had 3 or 4 times which were successful and then one or 2 times which caused extravasation and swelling of the arm.  I had her get an arterial duplex of the graft and she is here for evaluation.          Past Medical History:   Diagnosis Date   • A-fib (CMS/[facility])    • COPD (chronic obstructive pulmonary disease) (CMS/[facility])    • Disease of thyroid gland    • Dry skin    • Hemodialysis access site with arteriovenous graft (CMS/[facility]) 01/25/2018   • Hypertension    • Kidney failure    • PONV (postoperative nausea and vomiting)    • Sleep apnea     uses cpap at night and continuous oxygen at 2.5l per nasal cannula   • Swelling     lower extremities       Past Surgical History:   Procedure Laterality Date   • ARTERIOVENOUS FISTULA/SHUNT SURGERY Left 12/7/2018    Procedure: Left upper extremtiy arteriovenous graft;  Surgeon: Jay Garcia DO;  Location: [place] [address];  Service: Vascular   • CARDIAC CATHETERIZATION     • CHOLECYSTECTOMY     • COLONOSCOPY     • HYSTERECTOMY         History reviewed. No pertinent family history.    Social History     Socioeconomic History   • Marital status:      Spouse name: Not on file   • Number of children: Not on file   • Years of education: Not on file   • Highest education level: Not on file   Social Needs   • Financial resource strain: Not on file   • Food insecurity - worry: Not on file   • Food insecurity - inability: Not on file   • Transportation  needs - medical: Not on file   • Transportation needs - non-medical: Not on file   Occupational History   • Not on file   Tobacco Use   • Smoking status: Former Smoker     Years: 50.00     Last attempt to quit: 2013     Years since quittin.0   • Smokeless tobacco: Never Used   Substance and Sexual Activity   • Alcohol use: No   • Drug use: No   • Sexual activity: Defer   Other Topics Concern   • Not on file   Social History Narrative   • Not on file       No Known Allergies    Medications Prior to Admission   Medication Sig Dispense Refill Last Dose   • allopurinol (ZYLOPRIM) 100 MG tablet Take 100 mg by mouth Daily.   2019 at 0900   • amLODIPine (NORVASC) 10 MG tablet Take 10 mg by mouth Daily.   2019 at 0900   • bumetanide (BUMEX) 1 MG tablet Take 1 tablet by mouth Daily.   2019   • levothyroxine (SYNTHROID, LEVOTHROID) 200 MCG tablet Take 400 mcg by mouth 3 (Three) Times a Week. Monday, Wednesday, and Friday.  And one  Pill on rest of the days   2019   • losartan (COZAAR) 100 MG tablet TAKE 1/2 TABLET DAILY   2019   • metoprolol tartrate (LOPRESSOR) 50 MG tablet Take 50 mg by mouth Daily.   2019 at 0900   • O2 (OXYGEN) Inhale 3 L/min Continuous.   2019 at 0618   • warfarin (COUMADIN) 1 MG tablet Take 1 mg by mouth 3 (Three) Times a Week. Monday, Wednesday, Friday, Saturday,  take 1mg then on tues, thurs, take 1/2mg   2/10/2019 at 1800       Review of Systems  Constitutional: Negative.    HENT: Negative.    Eyes: Negative.    Respiratory: Negative.         Home oxygen   Cardiovascular: Positive for leg swelling.   Gastrointestinal: Negative.    Endocrine: Negative.    Genitourinary: Negative.    Musculoskeletal: Negative.    Skin: Negative.    Allergic/Immunologic: Negative.    Neurological: Positive for numbness (tingling to left hand).   Hematological: Negative.    Psychiatric/Behavioral: Negative.        /78 (BP Location: Left arm, Patient Position:  Sitting)   Pulse 75   Temp 98.1 °F (36.7 °C) (Temporal)   Resp 18   LMP  (LMP Unknown)   SpO2 98%   Physical Exam  Constitutional: She is oriented to person, place, and time. She appears well-developed and well-nourished. No distress.   HENT:   Head: Normocephalic and atraumatic.   Mouth/Throat: Oropharynx is clear and moist.   Eyes: Pupils are equal, round, and reactive to light. No scleral icterus.   Neck: Normal range of motion. Neck supple. No JVD present. Carotid bruit is not present. No thyromegaly present.   Cardiovascular: Normal rate, regular rhythm, S2 normal, normal heart sounds, intact distal pulses and normal pulses. Exam reveals no gallop and no friction rub.   No murmur heard.  Pulses:       Carotid pulses are 2+ on the right side, and 2+ on the left side.       Femoral pulses are 2+ on the right side, and 2+ on the left side.       Popliteal pulses are 2+ on the right side, and 2+ on the left side.        Dorsalis pedis pulses are 2+ on the right side, and 2+ on the left side.        Posterior tibial pulses are 2+ on the right side, and 2+ on the left side.   Doppler signals left upper extremity  +thrill to LUE AV graft   Pulmonary/Chest: Effort normal and breath sounds normal.   Home oxygen   Abdominal: Soft. Normal aorta and bowel sounds are normal. She exhibits no distension, no abdominal bruit and no mass. There is no hepatosplenomegaly. There is no tenderness.   Musculoskeletal: Normal range of motion. She exhibits edema (BLE).   Lymphadenopathy:     She has no cervical adenopathy.   Neurological: She is alert and oriented to person, place, and time. She has normal strength. No cranial nerve deficit or sensory deficit.   Skin: Skin is warm, dry and intact. She is not diaphoretic.   Psychiatric: She has a normal mood and affect. Her behavior is normal. Judgment and thought content normal.   Nursing note and vitals reviewed.      Lab Results (last 7 days)     Procedure Component Value Units  Date/Time    Basic Metabolic Panel [[id number]]  (Abnormal) Collected:  02/13/19 0606    Specimen:  Blood Updated:  02/13/19 0632     Glucose 90 mg/dL      BUN 42 mg/dL      Creatinine 7.44 mg/dL      Sodium 137 mmol/L      Potassium 3.7 mmol/L      Chloride 97 mmol/L      CO2 30.0 mmol/L      Calcium 8.8 mg/dL      eGFR  African Amer -- mL/min/1.73      Comment: <15 Indicative of kidney failure.        eGFR Non African Amer 5 mL/min/1.73      Comment: <15 Indicative of kidney failure.        BUN/Creatinine Ratio 5.6     Anion Gap 10.0 mmol/L     Narrative:       The MDRD GFR formula is only valid for adults with stable renal function between ages 18 and 70.    Protime-INR [[id number]]  (Abnormal) Collected:  02/13/19 0605    Specimen:  Blood Updated:  02/13/19 0628     Protime 26.4 Seconds      INR 2.33          Imaging Results (last 7 days)     ** No results found for the last 168 hours. **          Impression:  1. Chronic kidney disease, stage IV (severe) (CMS/[facility]) N18.4 585.4   2. S/P arteriovenous (AV) graft placement Z98.890 V45.89              Plan:  After thoroughly evaluating Barb Connors, I believe the best course of action is to remain conservative from a vascular standpoint.  Currently, her graft appears to be widely patent with a nice thrill noted.  She does have some mild swelling and ecchymoses on the upper arm.  She is okay to use the graft at dialysis in the a.m.  If this continues to be a problem then I may need the put in a PermCath until the arm has healed.  We will see her back in 6 months for graft surveillance.  I did discuss vascular risk factors as they pertain to the progression of vascular disease including controlling hypertension.  The patient can continue taking their current medication regimen as previously planned.  This was all discussed in full with complete understanding.The risks and benefits were explained at great length to the patient which include but are not limited to  bleeding, infection, vessel damage, nerve damage and pneumothorax. The patient understands the risks and wishes for me to proceed.         Jay Garcia DO  2/13/2019  7:51 AM

## 2019-02-13 NOTE — DISCHARGE INSTRUCTIONS

## 2019-02-14 ENCOUNTER — ANTI-COAG VISIT (OUTPATIENT)
Dept: INTERNAL MEDICINE | Age: 84
End: 2019-02-14
Payer: MEDICARE

## 2019-02-14 DIAGNOSIS — I48.20 CHRONIC ATRIAL FIBRILLATION (HCC): ICD-10-CM

## 2019-02-14 LAB — INR BLD: 2.2

## 2019-02-14 PROCEDURE — 93793 ANTICOAG MGMT PT WARFARIN: CPT | Performed by: INTERNAL MEDICINE

## 2019-02-19 ENCOUNTER — ANTI-COAG VISIT (OUTPATIENT)
Dept: INTERNAL MEDICINE | Age: 84
End: 2019-02-19
Payer: MEDICARE

## 2019-02-19 DIAGNOSIS — I48.20 CHRONIC ATRIAL FIBRILLATION (HCC): ICD-10-CM

## 2019-02-19 LAB — INR BLD: 2.3

## 2019-02-19 PROCEDURE — 93793 ANTICOAG MGMT PT WARFARIN: CPT | Performed by: INTERNAL MEDICINE

## 2019-02-26 ENCOUNTER — TELEPHONE (OUTPATIENT)
Dept: VASCULAR SURGERY | Facility: CLINIC | Age: 84
End: 2019-02-26

## 2019-02-26 ENCOUNTER — ANTI-COAG VISIT (OUTPATIENT)
Dept: INTERNAL MEDICINE | Age: 84
End: 2019-02-26
Payer: MEDICARE

## 2019-02-26 DIAGNOSIS — I48.20 CHRONIC ATRIAL FIBRILLATION (HCC): ICD-10-CM

## 2019-02-26 LAB — INR BLD: 2.3

## 2019-02-26 PROCEDURE — 93793 ANTICOAG MGMT PT WARFARIN: CPT | Performed by: INTERNAL MEDICINE

## 2019-02-28 ENCOUNTER — TELEPHONE (OUTPATIENT)
Dept: VASCULAR SURGERY | Facility: CLINIC | Age: 84
End: 2019-02-28

## 2019-03-06 ENCOUNTER — ANTI-COAG VISIT (OUTPATIENT)
Dept: INTERNAL MEDICINE | Age: 84
End: 2019-03-06

## 2019-03-06 LAB — INR BLD: 3.5

## 2019-03-07 ENCOUNTER — APPOINTMENT (OUTPATIENT)
Dept: GENERAL RADIOLOGY | Age: 84
End: 2019-03-07
Payer: MEDICARE

## 2019-03-07 ENCOUNTER — HOSPITAL ENCOUNTER (OUTPATIENT)
Age: 84
Setting detail: OBSERVATION
Discharge: HOME HEALTH | End: 2019-03-09
Attending: EMERGENCY MEDICINE | Admitting: FAMILY MEDICINE
Payer: MEDICARE

## 2019-03-07 ENCOUNTER — APPOINTMENT (OUTPATIENT)
Dept: CT IMAGING | Age: 84
End: 2019-03-07
Payer: MEDICARE

## 2019-03-07 DIAGNOSIS — E87.70 HYPERVOLEMIA, UNSPECIFIED HYPERVOLEMIA TYPE: Primary | ICD-10-CM

## 2019-03-07 DIAGNOSIS — M67.912 TENDINOPATHY OF LEFT ROTATOR CUFF: ICD-10-CM

## 2019-03-07 DIAGNOSIS — R09.02 HYPOXIA: ICD-10-CM

## 2019-03-07 DIAGNOSIS — M25.512 LEFT SHOULDER PAIN, UNSPECIFIED CHRONICITY: ICD-10-CM

## 2019-03-07 DIAGNOSIS — N18.6 ESRD (END STAGE RENAL DISEASE) (HCC): ICD-10-CM

## 2019-03-07 LAB
ALBUMIN SERPL-MCNC: 3 G/DL (ref 3.5–5.2)
ALP BLD-CCNC: 71 U/L (ref 35–104)
ALT SERPL-CCNC: <5 U/L (ref 5–33)
ANION GAP SERPL CALCULATED.3IONS-SCNC: 15 MMOL/L (ref 7–19)
AST SERPL-CCNC: 14 U/L (ref 5–32)
BASE EXCESS ARTERIAL: 7.1 MMOL/L (ref -2–2)
BASOPHILS ABSOLUTE: 0.1 K/UL (ref 0–0.2)
BASOPHILS RELATIVE PERCENT: 1 % (ref 0–1)
BILIRUB SERPL-MCNC: 1.2 MG/DL (ref 0.2–1.2)
BUN BLDV-MCNC: 16 MG/DL (ref 8–23)
CALCIUM SERPL-MCNC: 8.6 MG/DL (ref 8.8–10.2)
CARBOXYHEMOGLOBIN ARTERIAL: 2.8 % (ref 0–5)
CHLORIDE BLD-SCNC: 95 MMOL/L (ref 98–111)
CO2: 28 MMOL/L (ref 22–29)
CREAT SERPL-MCNC: 3.3 MG/DL (ref 0.5–0.9)
EOSINOPHILS ABSOLUTE: 0 K/UL (ref 0–0.6)
EOSINOPHILS RELATIVE PERCENT: 0.2 % (ref 0–5)
GFR NON-AFRICAN AMERICAN: 13
GLUCOSE BLD-MCNC: 101 MG/DL (ref 74–109)
HCO3 ARTERIAL: 32.5 MMOL/L (ref 22–26)
HCT VFR BLD CALC: 34.3 % (ref 37–47)
HEMOGLOBIN, ART, EXTENDED: 10.7 G/DL (ref 12–16)
HEMOGLOBIN: 10.1 G/DL (ref 12–16)
INR BLD: 3.13 (ref 0.88–1.18)
LYMPHOCYTES ABSOLUTE: 0.7 K/UL (ref 1.1–4.5)
LYMPHOCYTES RELATIVE PERCENT: 8.3 % (ref 20–40)
MCH RBC QN AUTO: 28.6 PG (ref 27–31)
MCHC RBC AUTO-ENTMCNC: 29.4 G/DL (ref 33–37)
MCV RBC AUTO: 97.2 FL (ref 81–99)
METHEMOGLOBIN ARTERIAL: 1 %
MONOCYTES ABSOLUTE: 0.7 K/UL (ref 0–0.9)
MONOCYTES RELATIVE PERCENT: 8.7 % (ref 0–10)
NEUTROPHILS ABSOLUTE: 6.6 K/UL (ref 1.5–7.5)
NEUTROPHILS RELATIVE PERCENT: 81.4 % (ref 50–65)
O2 CONTENT ARTERIAL: 13.4 ML/DL
O2 SAT, ARTERIAL: 89 %
O2 THERAPY: ABNORMAL
PCO2 ARTERIAL: 49 MMHG (ref 35–45)
PDW BLD-RTO: 17.1 % (ref 11.5–14.5)
PH ARTERIAL: 7.43 (ref 7.35–7.45)
PLATELET # BLD: 91 K/UL (ref 130–400)
PMV BLD AUTO: 14 FL (ref 9.4–12.3)
PO2 ARTERIAL: 54 MMHG (ref 80–100)
POTASSIUM SERPL-SCNC: 3.2 MMOL/L (ref 3.5–5)
POTASSIUM, WHOLE BLOOD: 3.1
PROTHROMBIN TIME: 31.4 SEC (ref 12–14.6)
RBC # BLD: 3.53 M/UL (ref 4.2–5.4)
SODIUM BLD-SCNC: 138 MMOL/L (ref 136–145)
TOTAL PROTEIN: 6.7 G/DL (ref 6.6–8.7)
WBC # BLD: 8.1 K/UL (ref 4.8–10.8)

## 2019-03-07 PROCEDURE — 36415 COLL VENOUS BLD VENIPUNCTURE: CPT

## 2019-03-07 PROCEDURE — 6370000000 HC RX 637 (ALT 250 FOR IP): Performed by: EMERGENCY MEDICINE

## 2019-03-07 PROCEDURE — G0378 HOSPITAL OBSERVATION PER HR: HCPCS

## 2019-03-07 PROCEDURE — 99285 EMERGENCY DEPT VISIT HI MDM: CPT | Performed by: EMERGENCY MEDICINE

## 2019-03-07 PROCEDURE — 85610 PROTHROMBIN TIME: CPT

## 2019-03-07 PROCEDURE — 71275 CT ANGIOGRAPHY CHEST: CPT

## 2019-03-07 PROCEDURE — 99219 PR INITIAL OBSERVATION CARE/DAY 50 MINUTES: CPT | Performed by: INTERNAL MEDICINE

## 2019-03-07 PROCEDURE — 84132 ASSAY OF SERUM POTASSIUM: CPT

## 2019-03-07 PROCEDURE — 96374 THER/PROPH/DIAG INJ IV PUSH: CPT

## 2019-03-07 PROCEDURE — 73030 X-RAY EXAM OF SHOULDER: CPT

## 2019-03-07 PROCEDURE — 80053 COMPREHEN METABOLIC PANEL: CPT

## 2019-03-07 PROCEDURE — 82803 BLOOD GASES ANY COMBINATION: CPT

## 2019-03-07 PROCEDURE — 93005 ELECTROCARDIOGRAM TRACING: CPT

## 2019-03-07 PROCEDURE — 6360000002 HC RX W HCPCS: Performed by: EMERGENCY MEDICINE

## 2019-03-07 PROCEDURE — 6360000004 HC RX CONTRAST MEDICATION: Performed by: EMERGENCY MEDICINE

## 2019-03-07 PROCEDURE — 85025 COMPLETE CBC W/AUTO DIFF WBC: CPT

## 2019-03-07 PROCEDURE — 71045 X-RAY EXAM CHEST 1 VIEW: CPT

## 2019-03-07 PROCEDURE — 6370000000 HC RX 637 (ALT 250 FOR IP): Performed by: INTERNAL MEDICINE

## 2019-03-07 PROCEDURE — 99285 EMERGENCY DEPT VISIT HI MDM: CPT

## 2019-03-07 PROCEDURE — 36600 WITHDRAWAL OF ARTERIAL BLOOD: CPT

## 2019-03-07 PROCEDURE — 2580000003 HC RX 258: Performed by: INTERNAL MEDICINE

## 2019-03-07 RX ORDER — SENNA PLUS 8.6 MG/1
1 TABLET ORAL DAILY PRN
Status: DISCONTINUED | OUTPATIENT
Start: 2019-03-07 | End: 2019-03-09 | Stop reason: HOSPADM

## 2019-03-07 RX ORDER — SODIUM CHLORIDE 0.9 % (FLUSH) 0.9 %
10 SYRINGE (ML) INJECTION EVERY 12 HOURS SCHEDULED
Status: DISCONTINUED | OUTPATIENT
Start: 2019-03-07 | End: 2019-03-09 | Stop reason: HOSPADM

## 2019-03-07 RX ORDER — ONDANSETRON 2 MG/ML
4 INJECTION INTRAMUSCULAR; INTRAVENOUS EVERY 6 HOURS PRN
Status: DISCONTINUED | OUTPATIENT
Start: 2019-03-07 | End: 2019-03-09 | Stop reason: HOSPADM

## 2019-03-07 RX ORDER — ACETAMINOPHEN 325 MG/1
650 TABLET ORAL EVERY 8 HOURS PRN
Status: DISCONTINUED | OUTPATIENT
Start: 2019-03-07 | End: 2019-03-09 | Stop reason: HOSPADM

## 2019-03-07 RX ORDER — HYDROCODONE BITARTRATE AND ACETAMINOPHEN 5; 325 MG/1; MG/1
1 TABLET ORAL ONCE
Status: COMPLETED | OUTPATIENT
Start: 2019-03-07 | End: 2019-03-07

## 2019-03-07 RX ORDER — SODIUM CHLORIDE 0.9 % (FLUSH) 0.9 %
10 SYRINGE (ML) INJECTION PRN
Status: DISCONTINUED | OUTPATIENT
Start: 2019-03-07 | End: 2019-03-09 | Stop reason: HOSPADM

## 2019-03-07 RX ORDER — FUROSEMIDE 10 MG/ML
80 INJECTION INTRAMUSCULAR; INTRAVENOUS ONCE
Status: COMPLETED | OUTPATIENT
Start: 2019-03-07 | End: 2019-03-07

## 2019-03-07 RX ADMIN — HYDROCODONE BITARTRATE AND ACETAMINOPHEN 1 TABLET: 5; 325 TABLET ORAL at 17:38

## 2019-03-07 RX ADMIN — ACETAMINOPHEN 650 MG: 325 TABLET ORAL at 23:12

## 2019-03-07 RX ADMIN — Medication 10 ML: at 23:15

## 2019-03-07 RX ADMIN — IOPAMIDOL 95 ML: 755 INJECTION, SOLUTION INTRAVENOUS at 18:51

## 2019-03-07 RX ADMIN — FUROSEMIDE 80 MG: 10 INJECTION, SOLUTION INTRAMUSCULAR; INTRAVENOUS at 21:14

## 2019-03-07 ASSESSMENT — ENCOUNTER SYMPTOMS
NAUSEA: 0
ABDOMINAL PAIN: 0
BACK PAIN: 0
SHORTNESS OF BREATH: 0
VOMITING: 0
RHINORRHEA: 0
SORE THROAT: 0
DIARRHEA: 0

## 2019-03-07 ASSESSMENT — PAIN SCALES - GENERAL
PAINLEVEL_OUTOF10: 8
PAINLEVEL_OUTOF10: 6
PAINLEVEL_OUTOF10: 10
PAINLEVEL_OUTOF10: 10

## 2019-03-08 PROBLEM — N18.6 ESRD ON DIALYSIS (HCC): Status: ACTIVE | Noted: 2019-03-08

## 2019-03-08 PROBLEM — Z99.2 ESRD ON DIALYSIS (HCC): Status: ACTIVE | Noted: 2019-03-08

## 2019-03-08 PROBLEM — E87.8 HYPOCHLOREMIA: Status: ACTIVE | Noted: 2019-03-08

## 2019-03-08 PROBLEM — J96.22 ACUTE ON CHRONIC RESPIRATORY FAILURE WITH HYPOXIA AND HYPERCAPNIA (HCC): Status: ACTIVE | Noted: 2019-03-08

## 2019-03-08 PROBLEM — M25.512 LEFT SHOULDER PAIN: Status: ACTIVE | Noted: 2019-03-08

## 2019-03-08 PROBLEM — E87.6 HYPOKALEMIA: Status: ACTIVE | Noted: 2019-03-08

## 2019-03-08 PROBLEM — D53.9 MACROCYTIC ANEMIA: Status: ACTIVE | Noted: 2019-03-08

## 2019-03-08 PROBLEM — D68.9 COAGULOPATHY (HCC): Status: ACTIVE | Noted: 2019-03-08

## 2019-03-08 PROBLEM — J96.21 ACUTE ON CHRONIC RESPIRATORY FAILURE WITH HYPOXIA AND HYPERCAPNIA (HCC): Status: ACTIVE | Noted: 2019-03-08

## 2019-03-08 LAB
ANION GAP SERPL CALCULATED.3IONS-SCNC: 14 MMOL/L (ref 7–19)
ANISOCYTOSIS: ABNORMAL
BASOPHILS ABSOLUTE: 0.1 K/UL (ref 0–0.2)
BASOPHILS RELATIVE PERCENT: 1 % (ref 0–1)
BUN BLDV-MCNC: 18 MG/DL (ref 8–23)
CALCIUM SERPL-MCNC: 8.6 MG/DL (ref 8.8–10.2)
CHLORIDE BLD-SCNC: 96 MMOL/L (ref 98–111)
CO2: 28 MMOL/L (ref 22–29)
CREAT SERPL-MCNC: 3.6 MG/DL (ref 0.5–0.9)
EOSINOPHILS ABSOLUTE: 0.1 K/UL (ref 0–0.6)
EOSINOPHILS RELATIVE PERCENT: 1.1 % (ref 0–5)
GFR NON-AFRICAN AMERICAN: 12
GLUCOSE BLD-MCNC: 117 MG/DL (ref 70–99)
GLUCOSE BLD-MCNC: 72 MG/DL (ref 70–99)
GLUCOSE BLD-MCNC: 90 MG/DL (ref 74–109)
HCT VFR BLD CALC: 32.2 % (ref 37–47)
HEMOGLOBIN: 9.2 G/DL (ref 12–16)
HYPOCHROMIA: ABNORMAL
INR BLD: 3.65 (ref 0.88–1.18)
LYMPHOCYTES ABSOLUTE: 1 K/UL (ref 1.1–4.5)
LYMPHOCYTES RELATIVE PERCENT: 16.4 % (ref 20–40)
MACROCYTES: ABNORMAL
MAGNESIUM: 2 MG/DL (ref 1.6–2.4)
MCH RBC QN AUTO: 28.4 PG (ref 27–31)
MCHC RBC AUTO-ENTMCNC: 28.6 G/DL (ref 33–37)
MCV RBC AUTO: 99.4 FL (ref 81–99)
MONOCYTES ABSOLUTE: 0.7 K/UL (ref 0–0.9)
MONOCYTES RELATIVE PERCENT: 11.5 % (ref 0–10)
NEUTROPHILS ABSOLUTE: 4.3 K/UL (ref 1.5–7.5)
NEUTROPHILS RELATIVE PERCENT: 69.5 % (ref 50–65)
OVALOCYTES: ABNORMAL
PDW BLD-RTO: 17 % (ref 11.5–14.5)
PERFORMED ON: ABNORMAL
PERFORMED ON: NORMAL
PLATELET # BLD: 88 K/UL (ref 130–400)
PLATELET SLIDE REVIEW: ABNORMAL
PMV BLD AUTO: 13.1 FL (ref 9.4–12.3)
POLYCHROMASIA: ABNORMAL
POTASSIUM REFLEX MAGNESIUM: 3.1 MMOL/L (ref 3.5–5)
PROTHROMBIN TIME: 35.5 SEC (ref 12–14.6)
RBC # BLD: 3.24 M/UL (ref 4.2–5.4)
SODIUM BLD-SCNC: 138 MMOL/L (ref 136–145)
WBC # BLD: 6.2 K/UL (ref 4.8–10.8)

## 2019-03-08 PROCEDURE — 99225 PR SBSQ OBSERVATION CARE/DAY 25 MINUTES: CPT | Performed by: FAMILY MEDICINE

## 2019-03-08 PROCEDURE — 2700000000 HC OXYGEN THERAPY PER DAY

## 2019-03-08 PROCEDURE — 80048 BASIC METABOLIC PNL TOTAL CA: CPT

## 2019-03-08 PROCEDURE — 85610 PROTHROMBIN TIME: CPT

## 2019-03-08 PROCEDURE — G0378 HOSPITAL OBSERVATION PER HR: HCPCS

## 2019-03-08 PROCEDURE — 83735 ASSAY OF MAGNESIUM: CPT

## 2019-03-08 PROCEDURE — 8010000000 HC HEMODIALYSIS ACUTE INPT

## 2019-03-08 PROCEDURE — 2580000003 HC RX 258: Performed by: INTERNAL MEDICINE

## 2019-03-08 PROCEDURE — 82948 REAGENT STRIP/BLOOD GLUCOSE: CPT

## 2019-03-08 PROCEDURE — 6370000000 HC RX 637 (ALT 250 FOR IP): Performed by: INTERNAL MEDICINE

## 2019-03-08 PROCEDURE — 36415 COLL VENOUS BLD VENIPUNCTURE: CPT

## 2019-03-08 PROCEDURE — 85025 COMPLETE CBC W/AUTO DIFF WBC: CPT

## 2019-03-08 PROCEDURE — G0257 UNSCHED DIALYSIS ESRD PT HOS: HCPCS

## 2019-03-08 RX ORDER — ASPIRIN 81 MG/1
81 TABLET, CHEWABLE ORAL DAILY
Status: DISCONTINUED | OUTPATIENT
Start: 2019-03-08 | End: 2019-03-09 | Stop reason: HOSPADM

## 2019-03-08 RX ORDER — ALLOPURINOL 100 MG/1
100 TABLET ORAL DAILY
Status: DISCONTINUED | OUTPATIENT
Start: 2019-03-08 | End: 2019-03-09 | Stop reason: HOSPADM

## 2019-03-08 RX ORDER — BUMETANIDE 1 MG/1
1 TABLET ORAL 2 TIMES DAILY
Status: DISCONTINUED | OUTPATIENT
Start: 2019-03-08 | End: 2019-03-09 | Stop reason: HOSPADM

## 2019-03-08 RX ORDER — SODIUM BICARBONATE 650 MG/1
650 TABLET ORAL 3 TIMES DAILY
Status: DISCONTINUED | OUTPATIENT
Start: 2019-03-08 | End: 2019-03-08

## 2019-03-08 RX ORDER — HYDROCODONE BITARTRATE AND ACETAMINOPHEN 5; 325 MG/1; MG/1
1 TABLET ORAL EVERY 6 HOURS PRN
Status: DISCONTINUED | OUTPATIENT
Start: 2019-03-08 | End: 2019-03-09 | Stop reason: HOSPADM

## 2019-03-08 RX ORDER — CALCITRIOL 0.25 UG/1
0.25 CAPSULE, LIQUID FILLED ORAL DAILY
Status: DISCONTINUED | OUTPATIENT
Start: 2019-03-08 | End: 2019-03-09 | Stop reason: HOSPADM

## 2019-03-08 RX ADMIN — ASPIRIN 81 MG 81 MG: 81 TABLET ORAL at 16:54

## 2019-03-08 RX ADMIN — ALLOPURINOL 100 MG: 100 TABLET ORAL at 16:54

## 2019-03-08 RX ADMIN — Medication 10 ML: at 09:00

## 2019-03-08 RX ADMIN — ACETAMINOPHEN 650 MG: 325 TABLET ORAL at 14:26

## 2019-03-08 RX ADMIN — CALCITRIOL 0.25 MCG: 0.25 CAPSULE ORAL at 16:54

## 2019-03-08 RX ADMIN — BUMETANIDE 1 MG: 1 TABLET ORAL at 21:47

## 2019-03-08 RX ADMIN — Medication 10 ML: at 21:47

## 2019-03-08 ASSESSMENT — ENCOUNTER SYMPTOMS
BACK PAIN: 0
ORTHOPNEA: 0
VOMITING: 0
EYE DISCHARGE: 0
ABDOMINAL PAIN: 0
DIARRHEA: 0
HEMOPTYSIS: 0
SPUTUM PRODUCTION: 0
EYE PAIN: 0
DOUBLE VISION: 0
HEARTBURN: 0
NAUSEA: 0
SHORTNESS OF BREATH: 1
BLURRED VISION: 0
WHEEZING: 0
SINUS PAIN: 0
PHOTOPHOBIA: 0
COUGH: 0

## 2019-03-08 ASSESSMENT — PAIN SCALES - GENERAL
PAINLEVEL_OUTOF10: 0
PAINLEVEL_OUTOF10: 5

## 2019-03-09 ENCOUNTER — APPOINTMENT (OUTPATIENT)
Dept: CT IMAGING | Age: 84
End: 2019-03-09
Payer: MEDICARE

## 2019-03-09 VITALS
DIASTOLIC BLOOD PRESSURE: 58 MMHG | SYSTOLIC BLOOD PRESSURE: 137 MMHG | OXYGEN SATURATION: 96 % | WEIGHT: 203.56 LBS | RESPIRATION RATE: 14 BRPM | BODY MASS INDEX: 31.95 KG/M2 | HEART RATE: 76 BPM | TEMPERATURE: 97.6 F | HEIGHT: 67 IN

## 2019-03-09 PROBLEM — M67.912 TENDINOPATHY OF LEFT ROTATOR CUFF: Status: ACTIVE | Noted: 2019-03-09

## 2019-03-09 LAB
INR BLD: 4.4 (ref 0.88–1.18)
PROTHROMBIN TIME: 41.2 SEC (ref 12–14.6)

## 2019-03-09 PROCEDURE — 85610 PROTHROMBIN TIME: CPT

## 2019-03-09 PROCEDURE — 99217 PR OBSERVATION CARE DISCHARGE MANAGEMENT: CPT | Performed by: FAMILY MEDICINE

## 2019-03-09 PROCEDURE — G0378 HOSPITAL OBSERVATION PER HR: HCPCS

## 2019-03-09 PROCEDURE — G0257 UNSCHED DIALYSIS ESRD PT HOS: HCPCS

## 2019-03-09 PROCEDURE — 2700000000 HC OXYGEN THERAPY PER DAY

## 2019-03-09 PROCEDURE — 6370000000 HC RX 637 (ALT 250 FOR IP): Performed by: INTERNAL MEDICINE

## 2019-03-09 PROCEDURE — 36415 COLL VENOUS BLD VENIPUNCTURE: CPT

## 2019-03-09 PROCEDURE — 73200 CT UPPER EXTREMITY W/O DYE: CPT

## 2019-03-09 PROCEDURE — 8010000000 HC HEMODIALYSIS ACUTE INPT

## 2019-03-09 PROCEDURE — 2580000003 HC RX 258: Performed by: INTERNAL MEDICINE

## 2019-03-09 RX ORDER — HYDROCODONE BITARTRATE AND ACETAMINOPHEN 5; 325 MG/1; MG/1
1 TABLET ORAL EVERY 6 HOURS PRN
Qty: 12 TABLET | Refills: 0 | Status: SHIPPED | OUTPATIENT
Start: 2019-03-09 | End: 2019-03-12

## 2019-03-09 RX ADMIN — Medication 10 ML: at 10:40

## 2019-03-09 RX ADMIN — ALLOPURINOL 100 MG: 100 TABLET ORAL at 10:40

## 2019-03-09 RX ADMIN — ASPIRIN 81 MG 81 MG: 81 TABLET ORAL at 10:40

## 2019-03-09 RX ADMIN — CALCITRIOL 0.25 MCG: 0.25 CAPSULE ORAL at 10:40

## 2019-03-09 RX ADMIN — BUMETANIDE 1 MG: 1 TABLET ORAL at 10:40

## 2019-03-11 ENCOUNTER — TELEPHONE (OUTPATIENT)
Dept: VASCULAR SURGERY | Facility: CLINIC | Age: 84
End: 2019-03-11

## 2019-03-11 ENCOUNTER — TELEPHONE (OUTPATIENT)
Dept: INTERNAL MEDICINE CLINIC | Age: 84
End: 2019-03-11

## 2019-03-11 NOTE — TELEPHONE ENCOUNTER
Spoke with Mr Connors's girlfriend  reminding her of Ms Connors's  appointment for Tuesday, March 12th, 2019 at 1130 am with Dr Garcia. Mr Connors's girlfriend stated they were at Dialysis but she would let Mr Connors know as soon as he got home with Mrs Connors.

## 2019-03-12 ENCOUNTER — TELEPHONE (OUTPATIENT)
Dept: INTERNAL MEDICINE | Age: 84
End: 2019-03-12

## 2019-03-12 ENCOUNTER — ANTI-COAG VISIT (OUTPATIENT)
Dept: INTERNAL MEDICINE | Age: 84
End: 2019-03-12

## 2019-03-12 ENCOUNTER — OFFICE VISIT (OUTPATIENT)
Dept: VASCULAR SURGERY | Facility: CLINIC | Age: 84
End: 2019-03-12

## 2019-03-12 VITALS
BODY MASS INDEX: 34.69 KG/M2 | WEIGHT: 221 LBS | HEART RATE: 65 BPM | SYSTOLIC BLOOD PRESSURE: 122 MMHG | HEIGHT: 67 IN | OXYGEN SATURATION: 92 % | DIASTOLIC BLOOD PRESSURE: 58 MMHG

## 2019-03-12 DIAGNOSIS — E87.70 HYPERVOLEMIA, UNSPECIFIED HYPERVOLEMIA TYPE: Primary | ICD-10-CM

## 2019-03-12 DIAGNOSIS — N18.4 CKD (CHRONIC KIDNEY DISEASE) STAGE 4, GFR 15-29 ML/MIN (HCC): Primary | ICD-10-CM

## 2019-03-12 LAB — INR BLD: 2.3

## 2019-03-12 PROCEDURE — 99213 OFFICE O/P EST LOW 20 MIN: CPT | Performed by: NURSE PRACTITIONER

## 2019-03-12 NOTE — PROGRESS NOTES
"3/12/2019       Britney Martinez MD  [address] [place] [address] [address]  [place] [address]    Babr Connors  1935    Chief Complaint   Patient presents with   • Follow-up     2 Week Post-Op Follow Up for HEMODIALYSIS CATHETER INSERTION. Patient denies any stroke like symptoms.        Dear Britney Martinez MD       HPI  I had the pleasure of seeing your patient Barb Connors in the office today.    As you recall, Barb Connors is a 83 y.o. right-handed female who you are currently following for chronic kidney disease.  She did have left upper extremity AV graft on 12/7/18.  She has had some trouble recently at dialysis with cannulation of the graft.  She had 3 or 4 times which were successful and then one or 2 times which caused extravasation and swelling of the arm.  She did undergo PermCath insertion on February 13, 2019.      Review of Systems   Constitutional: Negative.    HENT: Negative.    Eyes: Negative.    Respiratory: Negative.         Home oxygen   Cardiovascular: Positive for leg swelling.   Gastrointestinal: Negative.    Endocrine: Negative.    Genitourinary: Negative.    Musculoskeletal: Negative.    Skin: Negative.    Allergic/Immunologic: Negative.    Neurological: Positive for numbness (tingling to left hand).   Hematological: Negative.    Psychiatric/Behavioral: Negative.        /58 (BP Location: Right arm, Patient Position: Sitting, Cuff Size: Adult)   Pulse 65   Ht 170.2 cm (67\")   Wt 100 kg (221 lb)   LMP  (LMP Unknown)   SpO2 92%   BMI 34.61 kg/m²   Physical Exam   Constitutional: She is oriented to person, place, and time. She appears well-developed and well-nourished. No distress.   HENT:   Head: Normocephalic and atraumatic.   Mouth/Throat: Oropharynx is clear and moist.   Eyes: Pupils are equal, round, and reactive to light. No scleral icterus.   Neck: Normal range of motion. Neck supple. No JVD present. Carotid bruit is not present. No thyromegaly " present.   Cardiovascular: Normal rate, regular rhythm, S2 normal, normal heart sounds, intact distal pulses and normal pulses. Exam reveals no gallop and no friction rub.   No murmur heard.  Pulses:       Carotid pulses are 2+ on the right side, and 2+ on the left side.       Femoral pulses are 2+ on the right side, and 2+ on the left side.       Popliteal pulses are 2+ on the right side, and 2+ on the left side.        Dorsalis pedis pulses are 2+ on the right side, and 2+ on the left side.        Posterior tibial pulses are 2+ on the right side, and 2+ on the left side.   Doppler signals left upper extremity  +thrill to LUE AV graft, ecchymosis  Right internal jugular PermCath   Pulmonary/Chest: Effort normal and breath sounds normal.   Home oxygen   Abdominal: Soft. Normal aorta and bowel sounds are normal. She exhibits no distension, no abdominal bruit and no mass. There is no hepatosplenomegaly. There is no tenderness.   Musculoskeletal: Normal range of motion. She exhibits edema (BLE).   Lymphadenopathy:     She has no cervical adenopathy.   Neurological: She is alert and oriented to person, place, and time. She has normal strength. No cranial nerve deficit or sensory deficit.   Skin: Skin is warm, dry and intact. She is not diaphoretic.   Psychiatric: She has a normal mood and affect. Her behavior is normal. Judgment and thought content normal.   Nursing note and vitals reviewed.      Patient Active Problem List   Diagnosis   • Acute pancreatitis without infection or necrosis   • CKD (chronic kidney disease) stage 4, GFR 15-29 ml/min (CMS/HCC)   • Abnormal CT scan, kidney, no mass lesion to suggest renal malignancy per MRI abdomen   • Chronic atrial fibrillation on chronic anticoagulation    • Sleep apnea   • COPD (chronic obstructive pulmonary disease); not in exacerbation; O2 dependent   • Hypertension   • A-fib (CMS/HCC)   • Malfunction of arteriovenous graft (CMS/HCC)   • Preop testing         ICD-10-CM  ICD-9-CM   1. CKD (chronic kidney disease) stage 4, GFR 15-29 ml/min (CMS/[facility]) N18.4 585.4         Plan: After thoroughly evaluating Barb Connors, I believe the best course of action is to remain conservative from a vascular standpoint.  Currently, her graft appears to be widely patent with a nice thrill noted.  She is currently being dialyzed through her PermCath.  She can start accessing her left AV graft in 1 week.  We will see her back in 3 weeks and hopefully at that time they have been using her graft successfully and we will be able to remove PermCath.    I did discuss vascular risk factors as they pertain to the progression of vascular disease including controlling hypertension.  The patient can continue taking their current medication regimen as previously planned.  This was all discussed in full with complete understanding.    Thank you for allowing me to participate in the care of your patient.  Please do not hesitate with any questions or concerns.  I will keep you aware of any further encounters with Barb Connors.        Sincerely yours,         MADHURI Gillespie

## 2019-03-18 ENCOUNTER — TELEPHONE (OUTPATIENT)
Dept: INTERNAL MEDICINE CLINIC | Age: 84
End: 2019-03-18

## 2019-03-19 ENCOUNTER — ANTI-COAG VISIT (OUTPATIENT)
Dept: FAMILY MEDICINE CLINIC | Age: 84
End: 2019-03-19

## 2019-03-19 ENCOUNTER — OFFICE VISIT (OUTPATIENT)
Dept: NEUROLOGY | Age: 84
End: 2019-03-19
Payer: MEDICARE

## 2019-03-19 ENCOUNTER — ANTI-COAG VISIT (OUTPATIENT)
Dept: INTERNAL MEDICINE | Age: 84
End: 2019-03-19
Payer: MEDICARE

## 2019-03-19 VITALS
HEART RATE: 65 BPM | SYSTOLIC BLOOD PRESSURE: 124 MMHG | DIASTOLIC BLOOD PRESSURE: 62 MMHG | RESPIRATION RATE: 18 BRPM | HEIGHT: 67 IN | WEIGHT: 220 LBS | BODY MASS INDEX: 34.53 KG/M2

## 2019-03-19 DIAGNOSIS — J96.11 CHRONIC RESPIRATORY FAILURE WITH HYPOXIA (HCC): ICD-10-CM

## 2019-03-19 DIAGNOSIS — I48.20 CHRONIC ATRIAL FIBRILLATION (HCC): ICD-10-CM

## 2019-03-19 DIAGNOSIS — G47.33 OBSTRUCTIVE SLEEP APNEA: Primary | ICD-10-CM

## 2019-03-19 LAB
EKG P AXIS: NORMAL DEGREES
EKG P-R INTERVAL: NORMAL MS
EKG Q-T INTERVAL: 412 MS
EKG QRS DURATION: 134 MS
EKG QTC CALCULATION (BAZETT): 454 MS
EKG T AXIS: -34 DEGREES
INR BLD: 1.9
INR BLD: 1.9

## 2019-03-19 PROCEDURE — 1036F TOBACCO NON-USER: CPT | Performed by: PSYCHIATRY & NEUROLOGY

## 2019-03-19 PROCEDURE — 4040F PNEUMOC VAC/ADMIN/RCVD: CPT | Performed by: PSYCHIATRY & NEUROLOGY

## 2019-03-19 PROCEDURE — G8400 PT W/DXA NO RESULTS DOC: HCPCS | Performed by: PSYCHIATRY & NEUROLOGY

## 2019-03-19 PROCEDURE — G8417 CALC BMI ABV UP PARAM F/U: HCPCS | Performed by: PSYCHIATRY & NEUROLOGY

## 2019-03-19 PROCEDURE — 1090F PRES/ABSN URINE INCON ASSESS: CPT | Performed by: PSYCHIATRY & NEUROLOGY

## 2019-03-19 PROCEDURE — G8427 DOCREV CUR MEDS BY ELIG CLIN: HCPCS | Performed by: PSYCHIATRY & NEUROLOGY

## 2019-03-19 PROCEDURE — G8484 FLU IMMUNIZE NO ADMIN: HCPCS | Performed by: PSYCHIATRY & NEUROLOGY

## 2019-03-19 PROCEDURE — 99213 OFFICE O/P EST LOW 20 MIN: CPT | Performed by: PSYCHIATRY & NEUROLOGY

## 2019-03-19 PROCEDURE — 93793 ANTICOAG MGMT PT WARFARIN: CPT | Performed by: INTERNAL MEDICINE

## 2019-03-19 PROCEDURE — 1123F ACP DISCUSS/DSCN MKR DOCD: CPT | Performed by: PSYCHIATRY & NEUROLOGY

## 2019-03-19 RX ORDER — WARFARIN SODIUM 1 MG/1
1 TABLET ORAL
COMMUNITY
Start: 2017-07-18 | End: 2020-01-10 | Stop reason: SDUPTHER

## 2019-03-19 RX ORDER — HYDROCODONE BITARTRATE AND ACETAMINOPHEN 5; 325 MG/1; MG/1
1-2 TABLET ORAL
COMMUNITY
Start: 2019-02-13 | End: 2019-08-13 | Stop reason: ALTCHOICE

## 2019-03-26 ENCOUNTER — ANTI-COAG VISIT (OUTPATIENT)
Dept: INTERNAL MEDICINE | Age: 84
End: 2019-03-26
Payer: MEDICARE

## 2019-03-26 DIAGNOSIS — I48.20 CHRONIC ATRIAL FIBRILLATION (HCC): ICD-10-CM

## 2019-03-26 LAB — INR BLD: 1.9

## 2019-03-26 PROCEDURE — 93793 ANTICOAG MGMT PT WARFARIN: CPT | Performed by: INTERNAL MEDICINE

## 2019-03-28 ENCOUNTER — OFFICE VISIT (OUTPATIENT)
Dept: INTERNAL MEDICINE | Age: 84
End: 2019-03-28
Payer: MEDICARE

## 2019-03-28 VITALS
HEART RATE: 80 BPM | SYSTOLIC BLOOD PRESSURE: 134 MMHG | OXYGEN SATURATION: 92 % | TEMPERATURE: 98 F | DIASTOLIC BLOOD PRESSURE: 60 MMHG

## 2019-03-28 DIAGNOSIS — I48.20 CHRONIC ATRIAL FIBRILLATION (HCC): ICD-10-CM

## 2019-03-28 DIAGNOSIS — D68.9 COAGULOPATHY (HCC): ICD-10-CM

## 2019-03-28 DIAGNOSIS — D69.6 THROMBOCYTOPENIA (HCC): ICD-10-CM

## 2019-03-28 DIAGNOSIS — J96.11 CHRONIC RESPIRATORY FAILURE WITH HYPOXIA (HCC): ICD-10-CM

## 2019-03-28 DIAGNOSIS — R06.02 SOB (SHORTNESS OF BREATH): ICD-10-CM

## 2019-03-28 DIAGNOSIS — J06.9 UPPER RESPIRATORY TRACT INFECTION, UNSPECIFIED TYPE: ICD-10-CM

## 2019-03-28 DIAGNOSIS — Z86.39: Primary | ICD-10-CM

## 2019-03-28 DIAGNOSIS — I48.0 PAROXYSMAL ATRIAL FIBRILLATION (HCC): ICD-10-CM

## 2019-03-28 DIAGNOSIS — Z99.2 ESRD ON DIALYSIS (HCC): ICD-10-CM

## 2019-03-28 DIAGNOSIS — N18.6 ESRD ON DIALYSIS (HCC): ICD-10-CM

## 2019-03-28 PROCEDURE — G8417 CALC BMI ABV UP PARAM F/U: HCPCS | Performed by: PHYSICIAN ASSISTANT

## 2019-03-28 PROCEDURE — 1111F DSCHRG MED/CURRENT MED MERGE: CPT | Performed by: PHYSICIAN ASSISTANT

## 2019-03-28 PROCEDURE — G8400 PT W/DXA NO RESULTS DOC: HCPCS | Performed by: PHYSICIAN ASSISTANT

## 2019-03-28 PROCEDURE — 99214 OFFICE O/P EST MOD 30 MIN: CPT | Performed by: PHYSICIAN ASSISTANT

## 2019-03-28 PROCEDURE — 4040F PNEUMOC VAC/ADMIN/RCVD: CPT | Performed by: PHYSICIAN ASSISTANT

## 2019-03-28 PROCEDURE — 1036F TOBACCO NON-USER: CPT | Performed by: PHYSICIAN ASSISTANT

## 2019-03-28 PROCEDURE — G8484 FLU IMMUNIZE NO ADMIN: HCPCS | Performed by: PHYSICIAN ASSISTANT

## 2019-03-28 PROCEDURE — G8427 DOCREV CUR MEDS BY ELIG CLIN: HCPCS | Performed by: PHYSICIAN ASSISTANT

## 2019-03-28 PROCEDURE — 1090F PRES/ABSN URINE INCON ASSESS: CPT | Performed by: PHYSICIAN ASSISTANT

## 2019-03-28 PROCEDURE — 1123F ACP DISCUSS/DSCN MKR DOCD: CPT | Performed by: PHYSICIAN ASSISTANT

## 2019-03-28 RX ORDER — AZITHROMYCIN 250 MG/1
TABLET, FILM COATED ORAL
Qty: 6 TABLET | Refills: 0 | Status: ON HOLD | OUTPATIENT
Start: 2019-03-28 | End: 2019-05-18 | Stop reason: HOSPADM

## 2019-03-28 ASSESSMENT — ENCOUNTER SYMPTOMS
NAUSEA: 0
PHOTOPHOBIA: 0
WHEEZING: 0
SINUS PRESSURE: 0
CHEST TIGHTNESS: 0
VOMITING: 0
RHINORRHEA: 1
EYE REDNESS: 0
COLOR CHANGE: 0
CONSTIPATION: 0
SORE THROAT: 0
EYE PAIN: 0
ABDOMINAL PAIN: 0
SHORTNESS OF BREATH: 1
COUGH: 1
DIARRHEA: 0

## 2019-04-03 ENCOUNTER — TELEPHONE (OUTPATIENT)
Dept: INTERNAL MEDICINE | Age: 84
End: 2019-04-03

## 2019-04-03 ENCOUNTER — ANTI-COAG VISIT (OUTPATIENT)
Dept: INTERNAL MEDICINE | Age: 84
End: 2019-04-03
Payer: MEDICARE

## 2019-04-03 DIAGNOSIS — I48.20 CHRONIC ATRIAL FIBRILLATION (HCC): ICD-10-CM

## 2019-04-03 LAB — INR BLD: 3

## 2019-04-03 PROCEDURE — 93793 ANTICOAG MGMT PT WARFARIN: CPT | Performed by: INTERNAL MEDICINE

## 2019-04-03 NOTE — PROGRESS NOTES
Nitza Romero INR result today was a 3.0  Per verbal from , Pt to hold tonight and return to to current dose. Check in one week. Called Nitza Romero and advised of instructions. Pt verbalized understanding.

## 2019-04-09 ENCOUNTER — OFFICE VISIT (OUTPATIENT)
Dept: VASCULAR SURGERY | Facility: CLINIC | Age: 84
End: 2019-04-09

## 2019-04-09 VITALS
DIASTOLIC BLOOD PRESSURE: 68 MMHG | OXYGEN SATURATION: 96 % | BODY MASS INDEX: 34.37 KG/M2 | WEIGHT: 219 LBS | HEART RATE: 58 BPM | HEIGHT: 67 IN | SYSTOLIC BLOOD PRESSURE: 120 MMHG

## 2019-04-09 DIAGNOSIS — N18.4 CKD (CHRONIC KIDNEY DISEASE) STAGE 4, GFR 15-29 ML/MIN (HCC): Primary | ICD-10-CM

## 2019-04-09 PROCEDURE — 36589 REMOVAL TUNNELED CV CATH: CPT | Performed by: NURSE PRACTITIONER

## 2019-04-09 PROCEDURE — 99213 OFFICE O/P EST LOW 20 MIN: CPT | Performed by: NURSE PRACTITIONER

## 2019-04-09 NOTE — PROGRESS NOTES
"4/9/2019       Britney Martinez MD  [address] [place] [address] [address]  [place] [address]    Barb Connors  1935    Chief Complaint   Patient presents with   • Follow-up     3 Week Follow Up For Perm Cath Removal. Patient denies any stroke like symptoms.    • other     Patient states been using graft for about two weeks now for dialysis everything going good.        Dear Britney Martinez MD       HPI  I had the pleasure of seeing your patient Barb Connors in the office today.    As you recall, Barb Connors is a 83 y.o. right-handed female who you are currently following for chronic kidney disease.  She did have left upper extremity AV graft on 12/7/18.  She has had some trouble recently at dialysis with cannulation of the graft.  She had 3 or 4 times which were successful and then one or 2 times which caused extravasation and swelling of the arm.  She did undergo PermCath insertion on February 13, 2019.  She reports they have been using her graft without difficulty over the past 2 weeks.      Review of Systems   Constitutional: Negative.    HENT: Negative.    Eyes: Negative.    Respiratory: Negative.         Home oxygen   Cardiovascular: Positive for leg swelling.   Gastrointestinal: Negative.    Endocrine: Negative.    Genitourinary: Negative.    Musculoskeletal: Negative.    Skin: Negative.    Allergic/Immunologic: Negative.    Neurological: Positive for numbness (tingling to left hand).   Hematological: Negative.    Psychiatric/Behavioral: Negative.        /68 (BP Location: Right arm, Patient Position: Sitting, Cuff Size: Adult)   Pulse 58   Ht 170.2 cm (67\")   Wt 99.3 kg (219 lb)   LMP  (LMP Unknown)   SpO2 96%   BMI 34.30 kg/m²   Physical Exam   Constitutional: She is oriented to person, place, and time. She appears well-developed and well-nourished. No distress.   HENT:   Head: Normocephalic and atraumatic.   Mouth/Throat: Oropharynx is clear and moist.   Eyes: " Pupils are equal, round, and reactive to light. No scleral icterus.   Neck: Normal range of motion. Neck supple. No JVD present. Carotid bruit is not present. No thyromegaly present.   Cardiovascular: Normal rate, regular rhythm, S2 normal, normal heart sounds, intact distal pulses and normal pulses. Exam reveals no gallop and no friction rub.   No murmur heard.  Pulses:       Carotid pulses are 2+ on the right side, and 2+ on the left side.       Femoral pulses are 2+ on the right side, and 2+ on the left side.       Popliteal pulses are 2+ on the right side, and 2+ on the left side.        Dorsalis pedis pulses are 2+ on the right side, and 2+ on the left side.        Posterior tibial pulses are 2+ on the right side, and 2+ on the left side.   Doppler signals left upper extremity  +thrill to LUE AV graft, ecchymosis  Right Permcath removed   Pulmonary/Chest: Effort normal and breath sounds normal.   Home oxygen   Abdominal: Soft. Normal aorta and bowel sounds are normal. She exhibits no distension, no abdominal bruit and no mass. There is no hepatosplenomegaly. There is no tenderness.   Musculoskeletal: Normal range of motion. She exhibits edema (BLE).   Lymphadenopathy:     She has no cervical adenopathy.   Neurological: She is alert and oriented to person, place, and time. She has normal strength. No cranial nerve deficit or sensory deficit.   Skin: Skin is warm, dry and intact. She is not diaphoretic.   Psychiatric: She has a normal mood and affect. Her behavior is normal. Judgment and thought content normal.   Nursing note and vitals reviewed.      Patient Active Problem List   Diagnosis   • Acute pancreatitis without infection or necrosis   • CKD (chronic kidney disease) stage 4, GFR 15-29 ml/min (CMS/[facility])   • Abnormal CT scan, kidney, no mass lesion to suggest renal malignancy per MRI abdomen   • Chronic atrial fibrillation on chronic anticoagulation    • Sleep apnea   • COPD (chronic obstructive pulmonary  disease); not in exacerbation; O2 dependent   • Hypertension   • A-fib (CMS/HCC)   • Malfunction of arteriovenous graft (CMS/HCC)   • Preop testing         ICD-10-CM ICD-9-CM   1. CKD (chronic kidney disease) stage 4, GFR 15-29 ml/min (CMS/HCC) N18.4 585.4         Plan: After thoroughly evaluating Barb Connors, I believe the best course of action is to discontinue her Permcath.  After the patient was correctly identified, the patient was placed in the supine position on the exam table.  The stitches of the catheter were removed.  The catheter was easily removed completely intact and direct pressure was held over the right internal jugular vein for hemostasis for 15 minutes.  Sterile dressings were applied.  The patient tolerated the procedure well.   Currently, her graft appears to be widely patent with a nice thrill noted.  We will see her back in 6 months for continued graft surveillance.   I did discuss vascular risk factors as they pertain to the progression of vascular disease including controlling hypertension.  The patient can continue taking their current medication regimen as previously planned.  This was all discussed in full with complete understanding.    Thank you for allowing me to participate in the care of your patient.  Please do not hesitate with any questions or concerns.  I will keep you aware of any further encounters with Barb Connors.        Sincerely yours,         MADHURI Gillespie

## 2019-04-10 ENCOUNTER — ANTI-COAG VISIT (OUTPATIENT)
Dept: INTERNAL MEDICINE | Age: 84
End: 2019-04-10

## 2019-04-10 LAB — INR BLD: 2.9

## 2019-04-10 NOTE — PROGRESS NOTES
HOME MONITORING REPORT    INR today:   Results for orders placed or performed in visit on 04/10/19   Protime-INR   Result Value Ref Range    INR 2.90        INR Goal: 2.0-3.0    Dosing Plan  As of 4/10/2019    TTR:   71.8 % (11.2 mo)   Full warfarin instructions:   4/10: 0.5 mg; Otherwise 0.5 mg every Tue, Thu, Sat; 1 mg all other days              PLAN:Patient left a voicemail stating INR was 2.9. She requested that we leave a voicemail if she doesn't answer bc she has dialysis today. Pt\"s INR was 3.0 last week and she heldx x1 dose. Advised pt to take 1/2 tablet tonight, then resume usual dose and recheck in one week. Also advised pt to call office if she has any questions or concerns.

## 2019-04-12 ENCOUNTER — ANTI-COAG VISIT (OUTPATIENT)
Dept: INTERNAL MEDICINE | Age: 84
End: 2019-04-12
Payer: MEDICARE

## 2019-04-12 DIAGNOSIS — I48.20 CHRONIC ATRIAL FIBRILLATION (HCC): ICD-10-CM

## 2019-04-12 PROCEDURE — 93793 ANTICOAG MGMT PT WARFARIN: CPT | Performed by: INTERNAL MEDICINE

## 2019-04-16 ENCOUNTER — ANTI-COAG VISIT (OUTPATIENT)
Dept: INTERNAL MEDICINE | Age: 84
End: 2019-04-16

## 2019-04-16 LAB — INR BLD: 2.9

## 2019-04-18 ENCOUNTER — ANTI-COAG VISIT (OUTPATIENT)
Dept: INTERNAL MEDICINE | Age: 84
End: 2019-04-18
Payer: MEDICARE

## 2019-04-18 DIAGNOSIS — I48.20 CHRONIC ATRIAL FIBRILLATION (HCC): ICD-10-CM

## 2019-04-18 LAB — INR BLD: 2.9

## 2019-04-18 PROCEDURE — 93793 ANTICOAG MGMT PT WARFARIN: CPT | Performed by: INTERNAL MEDICINE

## 2019-04-18 NOTE — PROGRESS NOTES
[facility]es INR result today was a 2.9  Per verbal from , Pt to to hold coumadin tonight and resume current dose tomorrow. Check in one week. Called Je Cortes and advised of instructions. Pt verbalized understanding.

## 2019-04-23 ENCOUNTER — ANTI-COAG VISIT (OUTPATIENT)
Dept: INTERNAL MEDICINE | Age: 84
End: 2019-04-23
Payer: MEDICARE

## 2019-04-23 DIAGNOSIS — I48.20 CHRONIC ATRIAL FIBRILLATION (HCC): ICD-10-CM

## 2019-04-23 LAB — INR BLD: 1.8

## 2019-04-23 PROCEDURE — 93793 ANTICOAG MGMT PT WARFARIN: CPT | Performed by: INTERNAL MEDICINE

## 2019-04-30 ENCOUNTER — ANTI-COAG VISIT (OUTPATIENT)
Dept: INTERNAL MEDICINE | Age: 84
End: 2019-04-30
Payer: MEDICARE

## 2019-04-30 DIAGNOSIS — I48.20 CHRONIC ATRIAL FIBRILLATION (HCC): ICD-10-CM

## 2019-04-30 LAB — INR BLD: 1.7

## 2019-04-30 PROCEDURE — 93793 ANTICOAG MGMT PT WARFARIN: CPT | Performed by: INTERNAL MEDICINE

## 2019-04-30 NOTE — PROGRESS NOTES
HOME MONITORING REPORT    INR today:   Results for orders placed or performed in visit on 04/30/19   Protime-INR   Result Value Ref Range    INR 1.70        INR Goal: 2.0-3.0    Dosing Plan  As of 4/30/2019    TTR:   71.1 % (11.9 mo)   Full warfarin instructions:   0.5 mg every Thu, Sat; 1 mg all other days              PLAN: Patient thick again this week, advised pt to change back to .5mg on Thurs and Sat, 1mg all other days and recheck in one week. Patient voiced understanding.

## 2019-05-08 ENCOUNTER — ANTI-COAG VISIT (OUTPATIENT)
Dept: INTERNAL MEDICINE | Age: 84
End: 2019-05-08

## 2019-05-08 LAB — INR BLD: 1.7

## 2019-05-08 NOTE — PROGRESS NOTES
HOME MONITORING REPORT    INR today:   Results for orders placed or performed in visit on 05/08/19   Protime-INR   Result Value Ref Range    INR 1.70        INR Goal: 2.0-3.0    Dosing Plan  As of 5/8/2019    TTR:   69.6 % (1 y)   Full warfarin instructions:   0.5 mg every Sat; 1 mg all other days            Patient called in her INR and said that at dialysis they have been encouraging her to eat more broccoli, celery, salad, etc.  She thinks this maybe why she has been running thick. Advised patient to take 2mg today, then change to 1mg daily, except .5mg on Sat and to continue eating the greens the way she has been. Patient will recheck next week.

## 2019-05-14 ENCOUNTER — ANTI-COAG VISIT (OUTPATIENT)
Dept: INTERNAL MEDICINE | Age: 84
End: 2019-05-14

## 2019-05-14 LAB — INR BLD: 2.1

## 2019-05-15 ENCOUNTER — APPOINTMENT (OUTPATIENT)
Dept: GENERAL RADIOLOGY | Age: 84
DRG: 291 | End: 2019-05-15
Payer: MEDICARE

## 2019-05-15 ENCOUNTER — APPOINTMENT (OUTPATIENT)
Dept: CT IMAGING | Age: 84
DRG: 291 | End: 2019-05-15
Payer: MEDICARE

## 2019-05-15 ENCOUNTER — HOSPITAL ENCOUNTER (INPATIENT)
Age: 84
LOS: 3 days | Discharge: HOME | DRG: 291 | End: 2019-05-18
Attending: EMERGENCY MEDICINE | Admitting: INTERNAL MEDICINE
Payer: MEDICARE

## 2019-05-15 DIAGNOSIS — J81.0 ACUTE PULMONARY EDEMA (HCC): ICD-10-CM

## 2019-05-15 DIAGNOSIS — J96.11 CHRONIC RESPIRATORY FAILURE WITH HYPOXIA, ON HOME O2 THERAPY (HCC): Primary | ICD-10-CM

## 2019-05-15 DIAGNOSIS — Z99.2 ESRD ON DIALYSIS (HCC): ICD-10-CM

## 2019-05-15 DIAGNOSIS — I48.20 CHRONIC ATRIAL FIBRILLATION (HCC): ICD-10-CM

## 2019-05-15 DIAGNOSIS — Z99.81 CHRONIC RESPIRATORY FAILURE WITH HYPOXIA, ON HOME O2 THERAPY (HCC): Primary | ICD-10-CM

## 2019-05-15 DIAGNOSIS — N18.6 ESRD ON DIALYSIS (HCC): ICD-10-CM

## 2019-05-15 DIAGNOSIS — Z79.01 CHRONIC ANTICOAGULATION: ICD-10-CM

## 2019-05-15 DIAGNOSIS — R07.9 RIGHT-SIDED CHEST PAIN: ICD-10-CM

## 2019-05-15 PROBLEM — I50.33 ACUTE ON CHRONIC DIASTOLIC HEART FAILURE (HCC): Status: RESOLVED | Noted: 2019-05-15 | Resolved: 2019-05-15

## 2019-05-15 PROBLEM — E88.09 HYPOALBUMINEMIA: Status: ACTIVE | Noted: 2019-05-15

## 2019-05-15 PROBLEM — I50.43 CHF (CONGESTIVE HEART FAILURE), NYHA CLASS I, ACUTE ON CHRONIC, COMBINED (HCC): Status: RESOLVED | Noted: 2019-05-15 | Resolved: 2019-05-15

## 2019-05-15 PROBLEM — I50.43 CHF (CONGESTIVE HEART FAILURE), NYHA CLASS I, ACUTE ON CHRONIC, COMBINED (HCC): Status: ACTIVE | Noted: 2019-05-15

## 2019-05-15 LAB
ALBUMIN SERPL-MCNC: 3.3 G/DL (ref 3.5–5.2)
ALP BLD-CCNC: 86 U/L (ref 35–104)
ALT SERPL-CCNC: 7 U/L (ref 5–33)
ANION GAP SERPL CALCULATED.3IONS-SCNC: 12 MMOL/L (ref 7–19)
APTT: 69.2 SEC (ref 26–36.2)
AST SERPL-CCNC: 22 U/L (ref 5–32)
BASE EXCESS ARTERIAL: 8.1 MMOL/L (ref -2–2)
BASOPHILS ABSOLUTE: 0.1 K/UL (ref 0–0.2)
BASOPHILS RELATIVE PERCENT: 0.8 % (ref 0–1)
BILIRUB SERPL-MCNC: 1.2 MG/DL (ref 0.2–1.2)
BUN BLDV-MCNC: 18 MG/DL (ref 8–23)
CALCIUM SERPL-MCNC: 9.1 MG/DL (ref 8.8–10.2)
CARBOXYHEMOGLOBIN ARTERIAL: 2.4 % (ref 0–5)
CHLORIDE BLD-SCNC: 97 MMOL/L (ref 98–111)
CO2: 33 MMOL/L (ref 22–29)
CREAT SERPL-MCNC: 3 MG/DL (ref 0.5–0.9)
EKG P AXIS: -34 DEGREES
EKG P-R INTERVAL: 176 MS
EKG Q-T INTERVAL: 424 MS
EKG QRS DURATION: 154 MS
EKG QTC CALCULATION (BAZETT): 460 MS
EKG T AXIS: 27 DEGREES
EOSINOPHILS ABSOLUTE: 0.2 K/UL (ref 0–0.6)
EOSINOPHILS RELATIVE PERCENT: 2.4 % (ref 0–5)
GFR NON-AFRICAN AMERICAN: 15
GLUCOSE BLD-MCNC: 93 MG/DL (ref 74–109)
HCO3 ARTERIAL: 33.9 MMOL/L (ref 22–26)
HCT VFR BLD CALC: 42.2 % (ref 37–47)
HEMOGLOBIN, ART, EXTENDED: 12.7 G/DL (ref 12–16)
HEMOGLOBIN: 12.5 G/DL (ref 12–16)
INR BLD: 2.19 (ref 0.88–1.18)
LACTIC ACID: 1.6 MMOL/L (ref 0.5–1.9)
LYMPHOCYTES ABSOLUTE: 0.9 K/UL (ref 1.1–4.5)
LYMPHOCYTES RELATIVE PERCENT: 13.5 % (ref 20–40)
MCH RBC QN AUTO: 28.6 PG (ref 27–31)
MCHC RBC AUTO-ENTMCNC: 29.6 G/DL (ref 33–37)
MCV RBC AUTO: 96.6 FL (ref 81–99)
METHEMOGLOBIN ARTERIAL: 1.2 %
MONOCYTES ABSOLUTE: 0.5 K/UL (ref 0–0.9)
MONOCYTES RELATIVE PERCENT: 7.8 % (ref 0–10)
NEUTROPHILS ABSOLUTE: 4.7 K/UL (ref 1.5–7.5)
NEUTROPHILS RELATIVE PERCENT: 75.2 % (ref 50–65)
O2 CONTENT ARTERIAL: 15.2 ML/DL
O2 SAT, ARTERIAL: 85.3 %
O2 THERAPY: ABNORMAL
PCO2 ARTERIAL: 51 MMHG (ref 35–45)
PDW BLD-RTO: 17.7 % (ref 11.5–14.5)
PH ARTERIAL: 7.43 (ref 7.35–7.45)
PLATELET # BLD: 75 K/UL (ref 130–400)
PMV BLD AUTO: 13.5 FL (ref 9.4–12.3)
PO2 ARTERIAL: 48 MMHG (ref 80–100)
POTASSIUM REFLEX MAGNESIUM: 4.2 MMOL/L (ref 3.5–5)
POTASSIUM, WHOLE BLOOD: 4
PRO-BNP: ABNORMAL PG/ML (ref 0–1800)
PROTHROMBIN TIME: 23.6 SEC (ref 12–14.6)
RAPID INFLUENZA  B AGN: NEGATIVE
RAPID INFLUENZA A AGN: NEGATIVE
RBC # BLD: 4.37 M/UL (ref 4.2–5.4)
SODIUM BLD-SCNC: 142 MMOL/L (ref 136–145)
TOTAL PROTEIN: 7.7 G/DL (ref 6.6–8.7)
TROPONIN: <0.01 NG/ML (ref 0–0.03)
TROPONIN: <0.01 NG/ML (ref 0–0.03)
WBC # BLD: 6.3 K/UL (ref 4.8–10.8)

## 2019-05-15 PROCEDURE — 82803 BLOOD GASES ANY COMBINATION: CPT

## 2019-05-15 PROCEDURE — 36415 COLL VENOUS BLD VENIPUNCTURE: CPT

## 2019-05-15 PROCEDURE — 99222 1ST HOSP IP/OBS MODERATE 55: CPT | Performed by: FAMILY MEDICINE

## 2019-05-15 PROCEDURE — 85730 THROMBOPLASTIN TIME PARTIAL: CPT

## 2019-05-15 PROCEDURE — 80053 COMPREHEN METABOLIC PANEL: CPT

## 2019-05-15 PROCEDURE — 2140000000 HC CCU INTERMEDIATE R&B

## 2019-05-15 PROCEDURE — 96374 THER/PROPH/DIAG INJ IV PUSH: CPT

## 2019-05-15 PROCEDURE — 85025 COMPLETE CBC W/AUTO DIFF WBC: CPT

## 2019-05-15 PROCEDURE — 99285 EMERGENCY DEPT VISIT HI MDM: CPT | Performed by: EMERGENCY MEDICINE

## 2019-05-15 PROCEDURE — 6360000002 HC RX W HCPCS: Performed by: EMERGENCY MEDICINE

## 2019-05-15 PROCEDURE — 6360000004 HC RX CONTRAST MEDICATION: Performed by: EMERGENCY MEDICINE

## 2019-05-15 PROCEDURE — 6370000000 HC RX 637 (ALT 250 FOR IP): Performed by: FAMILY MEDICINE

## 2019-05-15 PROCEDURE — 2500000003 HC RX 250 WO HCPCS: Performed by: FAMILY MEDICINE

## 2019-05-15 PROCEDURE — 84484 ASSAY OF TROPONIN QUANT: CPT

## 2019-05-15 PROCEDURE — 87804 INFLUENZA ASSAY W/OPTIC: CPT

## 2019-05-15 PROCEDURE — 83880 ASSAY OF NATRIURETIC PEPTIDE: CPT

## 2019-05-15 PROCEDURE — 96375 TX/PRO/DX INJ NEW DRUG ADDON: CPT

## 2019-05-15 PROCEDURE — 96376 TX/PRO/DX INJ SAME DRUG ADON: CPT

## 2019-05-15 PROCEDURE — 84132 ASSAY OF SERUM POTASSIUM: CPT

## 2019-05-15 PROCEDURE — 99285 EMERGENCY DEPT VISIT HI MDM: CPT

## 2019-05-15 PROCEDURE — 2580000003 HC RX 258: Performed by: FAMILY MEDICINE

## 2019-05-15 PROCEDURE — 71275 CT ANGIOGRAPHY CHEST: CPT

## 2019-05-15 PROCEDURE — 6360000002 HC RX W HCPCS: Performed by: FAMILY MEDICINE

## 2019-05-15 PROCEDURE — 93005 ELECTROCARDIOGRAM TRACING: CPT

## 2019-05-15 PROCEDURE — 83605 ASSAY OF LACTIC ACID: CPT

## 2019-05-15 PROCEDURE — 36600 WITHDRAWAL OF ARTERIAL BLOOD: CPT

## 2019-05-15 PROCEDURE — 85610 PROTHROMBIN TIME: CPT

## 2019-05-15 PROCEDURE — 71045 X-RAY EXAM CHEST 1 VIEW: CPT

## 2019-05-15 RX ORDER — ALLOPURINOL 100 MG/1
100 TABLET ORAL DAILY
Status: DISCONTINUED | OUTPATIENT
Start: 2019-05-16 | End: 2019-05-18 | Stop reason: HOSPADM

## 2019-05-15 RX ORDER — LOSARTAN POTASSIUM 50 MG/1
50 TABLET ORAL DAILY
Status: DISCONTINUED | OUTPATIENT
Start: 2019-05-16 | End: 2019-05-18 | Stop reason: HOSPADM

## 2019-05-15 RX ORDER — CHOLECALCIFEROL (VITAMIN D3) 125 MCG
500 CAPSULE ORAL DAILY
Status: DISCONTINUED | OUTPATIENT
Start: 2019-05-16 | End: 2019-05-18 | Stop reason: HOSPADM

## 2019-05-15 RX ORDER — ONDANSETRON 2 MG/ML
4 INJECTION INTRAMUSCULAR; INTRAVENOUS ONCE
Status: COMPLETED | OUTPATIENT
Start: 2019-05-15 | End: 2019-05-15

## 2019-05-15 RX ORDER — CALCITRIOL 0.25 UG/1
0.25 CAPSULE, LIQUID FILLED ORAL DAILY
Status: DISCONTINUED | OUTPATIENT
Start: 2019-05-16 | End: 2019-05-18 | Stop reason: HOSPADM

## 2019-05-15 RX ORDER — LEVOTHYROXINE SODIUM 0.1 MG/1
200 TABLET ORAL DAILY
Status: DISCONTINUED | OUTPATIENT
Start: 2019-05-15 | End: 2019-05-16 | Stop reason: SDUPTHER

## 2019-05-15 RX ORDER — AMLODIPINE BESYLATE 10 MG/1
10 TABLET ORAL DAILY
Status: DISCONTINUED | OUTPATIENT
Start: 2019-05-16 | End: 2019-05-18 | Stop reason: HOSPADM

## 2019-05-15 RX ORDER — HYDROCODONE BITARTRATE AND ACETAMINOPHEN 5; 325 MG/1; MG/1
1 TABLET ORAL EVERY 4 HOURS PRN
Status: DISCONTINUED | OUTPATIENT
Start: 2019-05-15 | End: 2019-05-18 | Stop reason: HOSPADM

## 2019-05-15 RX ORDER — NITROGLYCERIN 0.4 MG/1
0.4 TABLET SUBLINGUAL EVERY 5 MIN PRN
Status: DISCONTINUED | OUTPATIENT
Start: 2019-05-15 | End: 2019-05-18 | Stop reason: HOSPADM

## 2019-05-15 RX ORDER — ONDANSETRON 2 MG/ML
4 INJECTION INTRAMUSCULAR; INTRAVENOUS EVERY 6 HOURS PRN
Status: DISCONTINUED | OUTPATIENT
Start: 2019-05-15 | End: 2019-05-18 | Stop reason: HOSPADM

## 2019-05-15 RX ORDER — BUMETANIDE 0.25 MG/ML
1 INJECTION, SOLUTION INTRAMUSCULAR; INTRAVENOUS 2 TIMES DAILY
Status: DISCONTINUED | OUTPATIENT
Start: 2019-05-15 | End: 2019-05-18 | Stop reason: HOSPADM

## 2019-05-15 RX ORDER — MORPHINE SULFATE 2 MG/ML
2 INJECTION, SOLUTION INTRAMUSCULAR; INTRAVENOUS EVERY 4 HOURS PRN
Status: DISCONTINUED | OUTPATIENT
Start: 2019-05-15 | End: 2019-05-18 | Stop reason: HOSPADM

## 2019-05-15 RX ORDER — FENTANYL CITRATE 50 UG/ML
50 INJECTION, SOLUTION INTRAMUSCULAR; INTRAVENOUS
Status: DISCONTINUED | OUTPATIENT
Start: 2019-05-15 | End: 2019-05-18 | Stop reason: HOSPADM

## 2019-05-15 RX ORDER — FENTANYL CITRATE 50 UG/ML
50 INJECTION, SOLUTION INTRAMUSCULAR; INTRAVENOUS
Status: COMPLETED | OUTPATIENT
Start: 2019-05-15 | End: 2019-05-15

## 2019-05-15 RX ORDER — HEPARIN SODIUM 5000 [USP'U]/ML
5000 INJECTION, SOLUTION INTRAVENOUS; SUBCUTANEOUS EVERY 8 HOURS SCHEDULED
Status: DISCONTINUED | OUTPATIENT
Start: 2019-05-15 | End: 2019-05-15

## 2019-05-15 RX ORDER — SODIUM CHLORIDE 0.9 % (FLUSH) 0.9 %
10 SYRINGE (ML) INJECTION PRN
Status: DISCONTINUED | OUTPATIENT
Start: 2019-05-15 | End: 2019-05-18 | Stop reason: HOSPADM

## 2019-05-15 RX ORDER — METOPROLOL TARTRATE 50 MG/1
50 TABLET, FILM COATED ORAL DAILY
Status: DISCONTINUED | OUTPATIENT
Start: 2019-05-16 | End: 2019-05-18 | Stop reason: HOSPADM

## 2019-05-15 RX ORDER — SODIUM CHLORIDE 0.9 % (FLUSH) 0.9 %
10 SYRINGE (ML) INJECTION EVERY 12 HOURS SCHEDULED
Status: DISCONTINUED | OUTPATIENT
Start: 2019-05-15 | End: 2019-05-18 | Stop reason: HOSPADM

## 2019-05-15 RX ORDER — WARFARIN SODIUM 1 MG/1
1 TABLET ORAL DAILY
Status: DISCONTINUED | OUTPATIENT
Start: 2019-05-15 | End: 2019-05-15 | Stop reason: DRUGHIGH

## 2019-05-15 RX ORDER — WARFARIN SODIUM 1 MG/1
1 TABLET ORAL
Status: COMPLETED | OUTPATIENT
Start: 2019-05-15 | End: 2019-05-15

## 2019-05-15 RX ADMIN — FENTANYL CITRATE 50 MCG: 50 INJECTION, SOLUTION INTRAMUSCULAR; INTRAVENOUS at 16:16

## 2019-05-15 RX ADMIN — FENTANYL CITRATE 50 MCG: 50 INJECTION, SOLUTION INTRAMUSCULAR; INTRAVENOUS at 19:35

## 2019-05-15 RX ADMIN — WARFARIN SODIUM 1 MG: 1 TABLET ORAL at 21:50

## 2019-05-15 RX ADMIN — BUMETANIDE 1 MG: 0.25 INJECTION INTRAMUSCULAR; INTRAVENOUS at 21:49

## 2019-05-15 RX ADMIN — ONDANSETRON 4 MG: 2 INJECTION INTRAMUSCULAR; INTRAVENOUS at 19:35

## 2019-05-15 RX ADMIN — ONDANSETRON 4 MG: 2 INJECTION INTRAMUSCULAR; INTRAVENOUS at 15:19

## 2019-05-15 RX ADMIN — Medication 10 ML: at 21:50

## 2019-05-15 RX ADMIN — FENTANYL CITRATE 50 MCG: 50 INJECTION, SOLUTION INTRAMUSCULAR; INTRAVENOUS at 15:19

## 2019-05-15 RX ADMIN — IOPAMIDOL 90 ML: 755 INJECTION, SOLUTION INTRAVENOUS at 15:25

## 2019-05-15 RX ADMIN — MORPHINE SULFATE 2 MG: 2 INJECTION, SOLUTION INTRAMUSCULAR; INTRAVENOUS at 21:50

## 2019-05-15 ASSESSMENT — ENCOUNTER SYMPTOMS
ABDOMINAL PAIN: 0
RHINORRHEA: 1
FACIAL SWELLING: 0
VOICE CHANGE: 0
CONSTIPATION: 0
DIARRHEA: 0
SORE THROAT: 0
NAUSEA: 0
BLOOD IN STOOL: 0
SINUS PRESSURE: 0
CHOKING: 0
APNEA: 0
EYE DISCHARGE: 0
SHORTNESS OF BREATH: 1

## 2019-05-15 ASSESSMENT — PAIN SCALES - GENERAL
PAINLEVEL_OUTOF10: 5
PAINLEVEL_OUTOF10: 3
PAINLEVEL_OUTOF10: 7
PAINLEVEL_OUTOF10: 7

## 2019-05-15 NOTE — ED NOTES
Dashawn Candelario returned the call from Nephrology @ [facility] Eli Mendoza  05/15/19 [phone]

## 2019-05-15 NOTE — ED PROVIDER NOTES
[facility] EMERGENCY DEPT  eMERGENCY dEPARTMENT eNCOUnter      Pt Name: Justin Nunes  MRN: [id number]  [place] 1935  Date of evaluation: 5/15/2019  Provider: MD Kay Hobbs       Chief Complaint   Patient presents with    Chest Pain    Shortness of Breath         HISTORY OF PRESENT ILLNESS   (Location/Symptom, Timing/Onset,Context/Setting, Quality, Duration, Modifying Factors, Severity)  Note limiting factors. Justin Nunes is a 81 y.o. female who presents to the emergency department evaluation of chest pain. Shortness of breath. 67-year-old female awoke with sharp pleuritic right-sided chest pain around 1 AM this morning. She denies new cough. No fever or chills. Complains that her nose has been running. Hurts to take a deep breath. She is a hemodialysis patient and she did not have a treatment Friday she ran Monday well and today she ran for about an hour before she was referred here because of her chest symptoms. No history of heart attack but she has a history of atrial fibrillation she is on warfarin. She states her INR was 2.1 yesterday. No history of DVT or PE blood clot. No history of fall or trauma or injury. The history is provided by the patient. NursingNotes were reviewed. REVIEW OF SYSTEMS    (2-9 systems for level 4, 10 or more for level 5)     Review of Systems   Constitutional: Negative for chills and fever. HENT: Positive for congestion and rhinorrhea. Negative for drooling, facial swelling, nosebleeds, sinus pressure, sore throat and voice change. Eyes: Negative for discharge. Respiratory: Positive for shortness of breath. Negative for apnea and choking. Cardiovascular: Positive for chest pain. Negative for leg swelling. Gastrointestinal: Negative for abdominal pain, blood in stool, constipation, diarrhea and nausea. Genitourinary: Negative for enuresis. Musculoskeletal: Negative for joint swelling.    Skin: Negative for rash and x 2    CHOLECYSTECTOMY      COLONOSCOPY      Last one     EYE SURGERY      Cataracts bilateral    FRACTURE SURGERY      Collar bone as child    HYSTERECTOMY      SKIN BIOPSY      on breast and back - benign         CURRENT MEDICATIONS       Previous Medications    ALLOPURINOL (ZYLOPRIM) 100 MG TABLET    TAKE 1 TABLET DAILY    AMLODIPINE (NORVASC) 10 MG TABLET    Take 10 mg by mouth daily    AZITHROMYCIN (ZITHROMAX) 250 MG TABLET    500mg on day 1 followed by 250mg on days 2 - 5    BIPAP MACHINE MISC    by Does not apply route nightly    BUMETANIDE (BUMEX) 1 MG TABLET    TAKE 1 TABLET TWICE A DAY    CALCITRIOL (ROCALTROL) 0.25 MCG CAPSULE    TAKE 1 CAPSULE DAILY    HYDROCODONE-ACETAMINOPHEN (NORCO) 5-325 MG PER TABLET    Take 1-2 tablets by mouth. LEVOTHYROXINE SODIUM (SYNTHROID PO)    Take 0.2 mg by mouth daily Indications: Patient takes 2 tablets on ,  and . Other days, she only takes 1 tab Take 2 tablets of 0.2 on     LOSARTAN (COZAAR) 100 MG TABLET    TAKE 1/2 TABLET DAILY    METOPROLOL TARTRATE (LOPRESSOR) 50 MG TABLET    TAKE 1 TABLET DAILY    ONDANSETRON (ZOFRAN) 4 MG TABLET    Take 1 tablet by mouth 3 times daily as needed for Nausea or Vomiting    VITAMIN B-12 (CYANOCOBALAMIN) 500 MCG TABLET    Take 500 mcg by mouth daily    WARFARIN (COUMADIN) 1 MG TABLET    Take 1 mg by mouth       ALLERGIES     Patient has no known allergies.     FAMILY HISTORY       Family History   Problem Relation Age of Onset    Heart Failure Mother         , 56   Angy Fisher Hypertension Mother         hypertension    Other Mother         CKD    Cancer Sister             Heart Failure Father         , Yary Ishachirag COPD Father     Coronary Art Dis Father     Hypertension Other         sibling    Coronary Art Dis Other     Breast Cancer Other         sibling          SOCIAL HISTORY       Social History     Socioeconomic History    Marital status:      Spouse name: Not on file    Number of children: Not on file    Years of education: Not on file    Highest education level: Not on file   Occupational History    Occupation: Retired    Social Needs    Financial resource strain: Not on file    Food insecurity:     Worry: Not on file     Inability: Not on file   [place] needs:     Medical: Not on file     Non-medical: Not on file   Tobacco Use    Smoking status: Former Smoker     Packs/day: 1.00     Years: 50.00     Pack years: 50.00     Types: Cigarettes     Last attempt to quit: 2013     Years since quittin.9    Smokeless tobacco: Never Used   Substance and Sexual Activity    Alcohol use: No     Alcohol/week: 0.0 oz    Drug use: No    Sexual activity: Not on file   Lifestyle    Physical activity:     Days per week: Not on file     Minutes per session: Not on file    Stress: Not on file   Relationships    Social connections:     Talks on phone: Not on file     Gets together: Not on file     Attends [employer] service: Not on file     Active member of club or organization: Not on file     Attends meetings of clubs or organizations: Not on file     Relationship status: Not on file    Intimate partner violence:     Fear of current or ex partner: Not on file     Emotionally abused: Not on file     Physically abused: Not on file     Forced sexual activity: Not on file   Other Topics Concern    Not on file   Social History Narrative    Not on file       SCREENINGS             PHYSICAL EXAM    (up to 7 for level 4, 8 or more for level 5)     ED Triage Vitals [05/15/19 1340]   BP Temp Temp Source Pulse Resp SpO2 Height Weight   -- 99 °F (37.2 °C) Temporal 86 24 (!) 88 % -- --       Physical Exam   Constitutional: She is oriented to person, place, and time. She appears well-developed and well-nourished. No distress. HENT:   Head: Normocephalic and atraumatic.    Right Ear: External ear normal.   Left Ear: External ear normal. Nose: Nose normal.   Mouth/Throat: Oropharynx is clear and moist.   Eyes: Pupils are equal, round, and reactive to light. Conjunctivae and EOM are normal. No scleral icterus. Neck: Normal range of motion. Neck supple. Cardiovascular: Normal rate, normal heart sounds and intact distal pulses. An irregularly irregular rhythm present. Pulmonary/Chest: Effort normal. No respiratory distress. She has rales (bibasalor). Abdominal: Soft. Bowel sounds are normal. There is no tenderness. Musculoskeletal: Normal range of motion. She exhibits no tenderness or deformity. Neurological: She is alert and oriented to person, place, and time. No cranial nerve deficit. Skin: Skin is warm and dry. She is not diaphoretic. Psychiatric: She has a normal mood and affect. Her behavior is normal. Thought content normal.   Nursing note and vitals reviewed. DIAGNOSTIC RESULTS     EKG: All EKG's are interpreted by the Emergency Department Physician who either signs or Co-signs this chart in the absence of a cardiologist.    Atrial fibrillation heart rate in the 70s. RADIOLOGY:   Non-plain film images such as CT, Ultrasound and MRI are read by the radiologist. Plainradiographic images are visualized and preliminarily interpreted by the emergency physician with the below findings:    Reviewed the images and results. Interpretation per the Radiologist below, if available at the time of this note:    CTA PULMONARY W CONTRAST   Final Result   No evidence of a pulmonary embolism. Severe atheromatous changes of the thoracic aorta with a mild   aneurysmal dilatation. No dissection. Small pericardial effusion. Moderate bilateral pleural effusion. Bilateral lower lobar compression atelectasis. Moderate persistent pulmonary vascular congestion. The above findings are recorded on a digital voice clip in PACS. Signed by Dr Ina Betancourt on 5/15/2019 3:56 PM      XR CHEST PORTABLE   Final Result   1.  Moderate cardiomegaly suspicious for small associated posterior   pleural effusions. This most likely is congestive failure. .   Signed by Dr Ant Gonzalez on 5/15/2019 2:50 PM            ED BEDSIDE ULTRASOUND:   Performed by ED Physician - none    LABS:  Labs Reviewed   CBC WITH AUTO DIFFERENTIAL - Abnormal; Notable for the following components:       Result Value    MCHC 29.6 (*)     RDW 17.7 (*)     Platelets 75 (*)     MPV 13.5 (*)     Neutrophils % 75.2 (*)     Lymphocytes % 13.5 (*)     Lymphocytes # 0.9 (*)     All other components within normal limits   COMPREHENSIVE METABOLIC PANEL W/ REFLEX TO MG FOR LOW K - Abnormal; Notable for the following components:    Chloride 97 (*)     CO2 33 (*)     CREATININE 3.0 (*)     GFR Non- 15 (*)     Alb 3.3 (*)     All other components within normal limits   BRAIN NATRIURETIC PEPTIDE - Abnormal; Notable for the following components:    Pro-BNP 11,303 (*)     All other components within normal limits   BLOOD GAS, ARTERIAL - Abnormal; Notable for the following components:    pCO2, Arterial 51.0 (*)     pO2, Arterial 48.0 (*)     HCO3, Arterial 33.9 (*)     Base Excess, Arterial 8.1 (*)     O2 Sat, Arterial 85.3 (*)     All other components within normal limits    Narrative:     CALL  Manning  ONIEL Sal RN, 05/15/2019 14:16, by Patricia Natarajan - Abnormal; Notable for the following components:    Protime 23.6 (*)     INR 2.19 (*)     All other components within normal limits   APTT - Abnormal; Notable for the following components:    aPTT 69.2 (*)     All other components within normal limits    Narrative:     CALL  Manning  [facility] tel. ,  Coag results called to and read back by shawna morley er, 05/15/2019 15:48, by  2 [facility] A/B ANTIGENS   TROPONIN   LACTIC ACID, PLASMA   POTASSIUM, WHOLE BLOOD    Narrative:     [address] [place], [place] [address]ONIEL, ONIEL, 05/15/2019 14:16, by [address] [place] [address],[address]

## 2019-05-15 NOTE — PROGRESS NOTES
Blood Gas, Arterial [[id number]] (Abnormal) Collected: 05/15/19 1414     Specimen: Blood gases Updated: 05/15/19 1416      pH, Arterial 7.430      pCO2, Arterial 51.0 mmHg       pO2, Arterial 48.0 mmHg       HCO3, Arterial 33.9 mmol/L       Base Excess, Arterial 8.1 mmol/L       Hemoglobin, Art, Extended 12.7 g/dL       O2 Sat, Arterial 85.3 %       Carboxyhgb, Arterial 2.4 %       Methemoglobin, Arterial 1.2 %       O2 Content, Arterial 15.2 mL/dL       O2 Therapy Unknown     Narrative:       CALL  ONIEL Parisi RN, 05/15/2019 14:16, by Albaro Silva     Potassium, Whole Blood [[id number]] Collected: 05/15/19 1414     AT+, RR = 20, R rad, O2 @ 2 lpm(NC) Updated: 05/15/19 1416      Potassium, Whole Blood 4.0     Narrative:       CALL  ONIEL Parisi RN, 05/15/2019 14:16, by Albaro Silva

## 2019-05-15 NOTE — ED NOTES
[facility]  05/15/19 [phone]

## 2019-05-16 PROBLEM — Z99.81 CHRONIC RESPIRATORY FAILURE WITH HYPOXIA, ON HOME O2 THERAPY (HCC): Status: ACTIVE | Noted: 2017-05-29

## 2019-05-16 LAB
D DIMER: 2.18 UG/ML FEU (ref 0–0.48)
EKG P AXIS: NORMAL DEGREES
EKG P AXIS: NORMAL DEGREES
EKG P-R INTERVAL: NORMAL MS
EKG P-R INTERVAL: NORMAL MS
EKG Q-T INTERVAL: 426 MS
EKG Q-T INTERVAL: 428 MS
EKG QRS DURATION: 132 MS
EKG QRS DURATION: 136 MS
EKG QTC CALCULATION (BAZETT): 441 MS
EKG QTC CALCULATION (BAZETT): 463 MS
EKG T AXIS: 45 DEGREES
EKG T AXIS: 50 DEGREES
INR BLD: 2.75 (ref 0.88–1.18)
LV EF: 67 %
LVEF MODALITY: NORMAL
PROTHROMBIN TIME: 28.3 SEC (ref 12–14.6)
SEDIMENTATION RATE, ERYTHROCYTE: 19 MM/HR (ref 0–25)
TSH SERPL DL<=0.05 MIU/L-ACNC: 0.03 UIU/ML (ref 0.27–4.2)

## 2019-05-16 PROCEDURE — 6360000002 HC RX W HCPCS: Performed by: FAMILY MEDICINE

## 2019-05-16 PROCEDURE — 85379 FIBRIN DEGRADATION QUANT: CPT

## 2019-05-16 PROCEDURE — 99222 1ST HOSP IP/OBS MODERATE 55: CPT | Performed by: INTERNAL MEDICINE

## 2019-05-16 PROCEDURE — 84443 ASSAY THYROID STIM HORMONE: CPT

## 2019-05-16 PROCEDURE — 6370000000 HC RX 637 (ALT 250 FOR IP): Performed by: FAMILY MEDICINE

## 2019-05-16 PROCEDURE — 85652 RBC SED RATE AUTOMATED: CPT

## 2019-05-16 PROCEDURE — 93306 TTE W/DOPPLER COMPLETE: CPT

## 2019-05-16 PROCEDURE — 85610 PROTHROMBIN TIME: CPT

## 2019-05-16 PROCEDURE — 99232 SBSQ HOSP IP/OBS MODERATE 35: CPT | Performed by: HOSPITALIST

## 2019-05-16 PROCEDURE — 36415 COLL VENOUS BLD VENIPUNCTURE: CPT

## 2019-05-16 PROCEDURE — 93005 ELECTROCARDIOGRAM TRACING: CPT

## 2019-05-16 PROCEDURE — 2500000003 HC RX 250 WO HCPCS: Performed by: FAMILY MEDICINE

## 2019-05-16 PROCEDURE — 2140000000 HC CCU INTERMEDIATE R&B

## 2019-05-16 PROCEDURE — 2580000003 HC RX 258: Performed by: FAMILY MEDICINE

## 2019-05-16 RX ORDER — WARFARIN SODIUM 1 MG/1
0.5 TABLET ORAL
Status: COMPLETED | OUTPATIENT
Start: 2019-05-16 | End: 2019-05-16

## 2019-05-16 RX ORDER — LEVOTHYROXINE SODIUM 0.1 MG/1
200 TABLET ORAL
Status: DISCONTINUED | OUTPATIENT
Start: 2019-05-16 | End: 2019-05-18 | Stop reason: HOSPADM

## 2019-05-16 RX ORDER — LEVOTHYROXINE SODIUM 0.1 MG/1
400 TABLET ORAL
Status: DISCONTINUED | OUTPATIENT
Start: 2019-05-17 | End: 2019-05-18 | Stop reason: HOSPADM

## 2019-05-16 RX ADMIN — LOSARTAN POTASSIUM 50 MG: 50 TABLET ORAL at 17:55

## 2019-05-16 RX ADMIN — HYDROCODONE BITARTRATE AND ACETAMINOPHEN 1 TABLET: 5; 325 TABLET ORAL at 12:17

## 2019-05-16 RX ADMIN — Medication 10 ML: at 09:21

## 2019-05-16 RX ADMIN — LEVOTHYROXINE SODIUM 200 MCG: 100 TABLET ORAL at 09:25

## 2019-05-16 RX ADMIN — BUMETANIDE 1 MG: 0.25 INJECTION INTRAMUSCULAR; INTRAVENOUS at 19:39

## 2019-05-16 RX ADMIN — METOPROLOL TARTRATE 50 MG: 50 TABLET ORAL at 17:54

## 2019-05-16 RX ADMIN — ALLOPURINOL 100 MG: 100 TABLET ORAL at 09:20

## 2019-05-16 RX ADMIN — AMLODIPINE BESYLATE 10 MG: 10 TABLET ORAL at 17:55

## 2019-05-16 RX ADMIN — HYDROCODONE BITARTRATE AND ACETAMINOPHEN 1 TABLET: 5; 325 TABLET ORAL at 19:39

## 2019-05-16 RX ADMIN — MORPHINE SULFATE 2 MG: 2 INJECTION, SOLUTION INTRAMUSCULAR; INTRAVENOUS at 09:29

## 2019-05-16 RX ADMIN — WARFARIN SODIUM 0.5 MG: 1 TABLET ORAL at 17:56

## 2019-05-16 RX ADMIN — Medication 10 ML: at 19:39

## 2019-05-16 RX ADMIN — MORPHINE SULFATE 2 MG: 2 INJECTION, SOLUTION INTRAMUSCULAR; INTRAVENOUS at 04:34

## 2019-05-16 RX ADMIN — BUMETANIDE 1 MG: 0.25 INJECTION INTRAMUSCULAR; INTRAVENOUS at 09:20

## 2019-05-16 RX ADMIN — CALCITRIOL 0.25 MCG: 0.25 CAPSULE ORAL at 09:20

## 2019-05-16 ASSESSMENT — PAIN SCALES - GENERAL
PAINLEVEL_OUTOF10: 10
PAINLEVEL_OUTOF10: 7
PAINLEVEL_OUTOF10: 6
PAINLEVEL_OUTOF10: 8
PAINLEVEL_OUTOF10: 8
PAINLEVEL_OUTOF10: 3
PAINLEVEL_OUTOF10: 0
PAINLEVEL_OUTOF10: 5

## 2019-05-16 ASSESSMENT — PAIN DESCRIPTION - ORIENTATION
ORIENTATION: RIGHT

## 2019-05-16 ASSESSMENT — ENCOUNTER SYMPTOMS
NAUSEA: 0
DIARRHEA: 0
SHORTNESS OF BREATH: 0
VOMITING: 0
RESPIRATORY NEGATIVE: 1
GASTROINTESTINAL NEGATIVE: 1
EYES NEGATIVE: 1

## 2019-05-16 ASSESSMENT — PAIN DESCRIPTION - LOCATION
LOCATION: CHEST

## 2019-05-16 ASSESSMENT — PAIN DESCRIPTION - PAIN TYPE
TYPE: CHRONIC PAIN

## 2019-05-16 NOTE — PROGRESS NOTES
Repositioned at this time and noticed approximately 10 ml urine in suction container from pure wick. Continue to monitor.  Electronically signed by Kalli Molina RN on 5/16/2019 at 4:58 AM

## 2019-05-16 NOTE — CONSULTS
[facility]  Cardiology Consult      Requesting MD:  Seymour Denis MD   Admit Status:  Inpatient [101]       History obtained from:   [] Patient  [] Other (specify):     Patient:  Dave Phillips  [id number]     Chief Complaint:   Chief Complaint   Patient presents with    Chest Pain    Shortness of Breath       HPI: Ms. Makayla Edwards is a 81 y.o. female with a history of end-stage renal disease on maintenance hemodialysis admitted after transfer from the dialysis center complaints of sharp pain in her right chest worse with a deep breath. Denied significant dyspnea nausea or diaphoresis. She had missed her dialysis appointment on Friday, May 10 due to staph having difficulty accessing her fistula and left after one or dialysis today due to worsening pain. History of congestive heart failure. Denies exertional chest discomfort or limiting dyspnea. All troponin levels negative. CT of the chest no evidence of pulmonary embolism small pericardial effusion moderate bilateral pleural effusions moderate persistent pulmonary vascular congestion. Review of Systems:  Review of Systems   Constitutional: Negative. Negative for chills, fever and unexpected weight change. HENT: Negative. Eyes: Negative. Respiratory: Negative. Negative for shortness of breath. Cardiovascular: Negative. Negative for chest pain. Gastrointestinal: Negative. Negative for diarrhea, nausea and vomiting. Endocrine: Negative. Genitourinary: Negative. Musculoskeletal: Negative. Skin: Negative. Neurological: Negative. All other systems reviewed and are negative.       Cardiac Specific Data:  Specialty Problems        Cardiology Problems    Left ventricular dysfunction        Paroxysmal atrial fibrillation (HCC)        Essential hypertension        Chronic atrial fibrillation [facility])              Past Medical History:  Past Medical History:   Diagnosis Date    Anemia     B12 deficiency     BiPAP (biphasic positive airway pressure) dependence     15cm/11cm    Blood circulation, collateral     Ankles stay cold all time x 6 months    CAD (coronary artery disease)     Atrial Fib x years    CHF (congestive heart failure) (HCC)     CHF (congestive heart failure), NYHA class I, acute on chronic, combined ([facility] [address] 5/15/2019    Chronic diastolic heart failure (HCC)     Chronic kidney disease (CKD)     Stage 4 renal disease Sees Dr. Ronnell Navas COPD (chronic obstructive pulmonary disease) ([facility])     on 2.5 L oxygen at home x 5 months    Depression     Hemodialysis patient [facility])     Has graft for fistula left arm - No dialysis yet    History of blood transfusion      When had     Hyperglycemia     Hyperlipidemia     High cholesterol x years    Hyperparathyroidism ([facility] [address]     Hypertension     x years    Hyperuricemia     Hypoxemia     Insomnia     Kyphosis     Lumbago     disk degeneration    Lumbar canal stenosis     Lumbar radiculopathy     Nonischemic cardiomyopathy (HCC)     Obstructive sleep apnea     Initial PS; AHI:  81.2 per PSG, 2014    Osteoarthritis     Palliative care patient 2019    Paroxysmal atrial fibrillation (HCC)     Periodic limb movement disorder     Pica     Psychiatric problem     Depression    Renal artery stenosis ([facility])     Restrictive lung disease     Sleep apnea     Thyroid disease     Hypothyroid x years    Vitamin D deficiency         Past Surgical History:  Past Surgical History:   Procedure Laterality Date    APPENDECTOMY      With hysterectomy    CARDIAC CATHETERIZATION  9/24/15  [address]    EF 60%     SECTION      x 2    CHOLECYSTECTOMY      COLONOSCOPY      Last one     EYE SURGERY      Cataracts bilateral    FRACTURE SURGERY      Collar bone as child    HYSTERECTOMY      SKIN BIOPSY      on breast and back - benign       Past Family History:  Family History   Problem Relation Age of Onset    Heart Failure Mother         , 56   [facility] Hypertension Mother         hypertension    Other Mother         CKD    Cancer Sister             Heart Failure Father         , 65    COPD Father     Coronary Art Dis Father     Hypertension Other         sibling    Coronary Art Dis Other     Breast Cancer Other         sibling       Past Social History:  Social History     Socioeconomic History    Marital status:      Spouse name: Not on file    Number of children: Not on file    Years of education: Not on file    Highest education level: Not on file   Occupational History    Occupation: Retired    Social Needs    Financial resource strain: Not on file    Food insecurity:     Worry: Not on file     Inability: Not on file    Transportation needs:     Medical: Not on file     Non-medical: Not on file   Tobacco Use    Smoking status: Former Smoker     Packs/day: 1.00     Years: 50.00     Pack years: 50.00     Types: Cigarettes     Last attempt to quit: 2013     Years since quittin.9    Smokeless tobacco: Never Used   Substance and Sexual Activity    Alcohol use: No     Alcohol/week: 0.0 oz    Drug use: No    Sexual activity: Not on file   Lifestyle    Physical activity:     Days per week: Not on file     Minutes per session: Not on file    Stress: Not on file   Relationships    Social connections:     Talks on phone: Not on file     Gets together: Not on file     Attends [employer] service: Not on file     Active member of club or organization: Not on file     Attends meetings of clubs or organizations: Not on file     Relationship status: Not on file    Intimate partner violence:     Fear of current or ex partner: Not on file     Emotionally abused: Not on file     Physically abused: Not on file     Forced sexual activity: Not on file   Other Topics Concern    Not on file   Social History Narrative    Previously  now     Lives by herself day on Sun Tue Thu Sat    [START ON 5/17/2019] levothyroxine  400 mcg Oral Once per day on Mon Wed Fri    allopurinol  100 mg Oral Daily    amLODIPine  10 mg Oral Daily    calcitRIOL  0.25 mcg Oral Daily    losartan  50 mg Oral Daily    metoprolol tartrate  50 mg Oral Daily    vitamin B-12  500 mcg Oral Daily    bumetanide  1 mg Intravenous BID    sodium chloride flush  10 mL Intravenous 2 times per day    warfarin (COUMADIN) daily dosing (placeholder)   Other RX Placeholder       Current Infused Meds:      Physical Exam:  Vitals:    05/16/19 0744   BP:    Pulse: 64   Resp:    Temp:    SpO2:      No intake or output data in the 24 hours ending 05/16/19 0945  Estimated body mass index is 34.46 kg/m² as calculated from the following:    Height as of 3/19/19: 5' 7\" (1.702 m). Weight as of 3/19/19: 220 lb (99.8 kg). Physical Exam   Constitutional: She is oriented to person, place, and time. She appears well-developed and well-nourished. No distress. HENT:   Head: Normocephalic and atraumatic. Eyes: Pupils are equal, round, and reactive to light. EOM are normal. No scleral icterus. Neck: Normal range of motion. Neck supple. No JVD present. Carotid bruit is not present. No tracheal deviation present. No thyromegaly present. No carotid bruits auscultated   Cardiovascular: Normal rate, regular rhythm and normal heart sounds. Exam reveals no gallop and no friction rub. No murmur heard. Pulmonary/Chest: Effort normal and breath sounds normal. No stridor. No respiratory distress. She has no wheezes. She has no rales. She exhibits no tenderness. Abdominal: Soft. Bowel sounds are normal. She exhibits no distension and no mass. There is no tenderness. There is no rebound and no guarding. No hernia. Musculoskeletal: She exhibits no edema. Lymphadenopathy:     She has no cervical adenopathy. Neurological: She is alert and oriented to person, place, and time.  No cranial nerve deficit or sensory deficit. She exhibits normal muscle tone. Coordination normal.   Skin: Skin is warm and dry. She is not diaphoretic. Psychiatric: She has a normal mood and affect. Her behavior is normal. Judgment and thought content normal.   Vitals reviewed. Labs:  Recent Labs     05/15/19  1408   WBC 6.3   HGB 12.5   PLT 75*       Recent Labs     05/15/19  1408 05/15/19  1414     --    K 4.2 4.0   CL 97*  --    CO2 33*  --    BUN 18  --    CREATININE 3.0*  --    LABGLOM 15*  --    CALCIUM 9.1  --        CK, CKMB, Troponin: @LABRCNT (CKTOTAL:3, CKMB:3, TROPONINI:3)@    Last 3 BNP:  No results for input(s): BNP in the last 72 hours. IMAGING:  Xr Chest Portable    Result Date: 5/15/2019  EXAMINATION: XR CHEST PORTABLE 5/15/2019 2:49 PM XR CHEST PORTABLE 5/15/2019 1:00 PM HISTORY:  Short of breath  COMPARISON: March 7, 2019. FINDINGS: Mild increase in density is noted projecting over the lung bases. This is suspicious for small posterior pleural effusions. . Cardiac silhouettes moderately enlarged. This is unchanged. The osseous structures and surrounding soft tissues demonstrate no acute abnormality. 1. Moderate cardiomegaly suspicious for small associated posterior pleural effusions. This most likely is congestive failure. . Signed by Dr Sebastian Quick on 5/15/2019 2:50 PM    Cta Pulmonary W Contrast    Result Date: 5/15/2019  Examination. CTA PULMONARY W CONTRAST History: The patient complains of pleuritic chest pain. The patient is on dialysis. DLP: 578 mGycm. CT angiography of the chest is performed after intravenous contrast enhancement. The images are acquired in axial plane with subsequent reconstruction in coronal and sagittal plane. The comparison is made with the previous study dated 3/7/2019. There is good opacification of the pulmonary arteries and the branches bilaterally. No filling defects in the opacified pulmonary arterial bed is seen. There are severe atheromatous changes of the thoracic aorta. respiratory failure with hypoxia and hypercapnia  7. Diastolic dysfunction  8. Sleep apnea on BiPAP  9. COPD  10. Echocardiogram 1/20/19 small pericardial effusion no evidence of tamponade   11. Cardiac catheterization 9/24/15 normal left ventricular function 9 obstructive coronary disease  12. Cardiomegaly on chest x-ray      Recommendations:  1.  Recommend echocardiogram limited follow-up no evidence of myocardial infarction and chest pain is not consistent with ischemic pain suggest symptomatic treatment

## 2019-05-16 NOTE — PROGRESS NOTES
Nutrition Education    Type and Reason for Visit:   education for CHF    At time of visit pt was NPO. No height or weight available. So unable to do a complete nutritional assessment. Shall ask for ht and weight.       Electronically signed by Aleta Boxer, MS, RD, LD on 5/16/19 at 2:13 PM    Contact Number: [phone]

## 2019-05-16 NOTE — PROGRESS NOTES
Edis Vital arrived to room #356 via stretcher from ED accompanied with staff and son. Condition stable. .   Presented with:Complaints of chest pain  Mental Status: Patient is oriented, alert, thought processes intact and able to concentrate and follow conversation. Vitals:    05/15/19 2013   BP: 131/65   Pulse: 71   Resp: 20   Temp: 98.6 °F (37 °C)   SpO2: 90%     Patient safety contract and falls prevention contract reviewed with patient Yes. Oriented Patient to room. Call light within reach. Yes.   Needs, issues or concerns expressed at this time: no.      Electronically signed by Bina Birch RN on 5/15/2019 at 11:25 PM

## 2019-05-16 NOTE — PROGRESS NOTES
Clinical Pharmacy Note    Warfarin consult follow-up    Recent Labs     05/16/19  1017   INR 2.75*     Recent Labs     05/15/19  1408   HGB 12.5   HCT 42.2   PLT 75*       Current warfarin drug-drug interactions:  Synthroid        Date INR Warfarin Dose   05/15/19 2.19 1 mg   05/16/19  2.75  0.5 mg                                             Notes:                   Give Coumadin 0.5mg x 1 dose tonight. Daily PT/INR until stable within therapeutic range.      BETY BASHIR PHARM D, 5/16/2019, 1:44 PM

## 2019-05-16 NOTE — PROGRESS NOTES
she is [place] and [facility] [employer]. Patient/Family Discussion:    Pt has a son, and grandchildren. Plan:    Plans to return home. Patients goal, what they hope for:    Goal is to do for self, says son does a lot for her. Provided spiritual care with sustaining presence, nurtured hope, and prayer. Pt expressed gratitude for spiritual care.         Electronically signed by Chandra Faustin on 5/16/2019 at 1:18 PM

## 2019-05-16 NOTE — PROGRESS NOTES
Clinical Pharmacy Note    Joanna Erickson is a 81 y.o. female for whom pharmacy has been asked to manage warfarin therapy.      Reason for Admission: chest pain    Consulting Physician: Dr. Wilfredo Adams  Warfarin dose prior to admission: 0.5 mg on Sat, 1 mg all other days  Warfarin indication: atrial fibrillation  Target INR range: 2-3   Outpatient warfarin provider: Dr. Sharron Putnam    Past Medical History:   Diagnosis Date    Anemia     B12 deficiency     BiPAP (biphasic positive airway pressure) dependence     15cm/11cm    Blood circulation, collateral     Ankles stay cold all time x 6 months    CAD (coronary artery disease)     Atrial Fib x years    CHF (congestive heart failure) (HCC)     CHF (congestive heart failure), NYHA class I, acute on chronic, combined ([facility] [address] 5/15/2019    Chronic diastolic heart failure (HCC)     Chronic kidney disease (CKD)     Stage 4 renal disease Sees Dr. Jimy Millard    COPD (chronic obstructive pulmonary disease) ([facility] [address]     on 2.5 L oxygen at home x 5 months    Depression     Hemodialysis patient [facility])     Has graft for fistula left arm - No dialysis yet    History of blood transfusion      When had     Hyperglycemia     Hyperlipidemia     High cholesterol x years    Hyperparathyroidism ([address]     Hypertension     x years    Hyperuricemia     Hypoxemia     Insomnia     Kyphosis     Lumbago     disk degeneration    Lumbar canal stenosis     Lumbar radiculopathy     Nonischemic cardiomyopathy ([address]     Obstructive sleep apnea     Initial PS; AHI:  81.2 per PSG, 2014    Osteoarthritis     Palliative care patient 2019    Paroxysmal atrial fibrillation (HCC)     Periodic limb movement disorder     Pica     Psychiatric problem     Depression    Renal artery stenosis (HCC)     Restrictive lung disease     Sleep apnea     Thyroid disease     Hypothyroid x years    Vitamin D deficiency                 Recent Labs     05/15/19  1510   INR 2.19*     Recent Labs 05/15/19  1408   HGB 12.5   HCT 42.2   PLT 75*       Current warfarin drug-drug interactions:  none        Date INR Warfarin Dose   05/15/19 2.19 1 mg                                   Will give warfarin 1 mg tonight. Daily PT/INR until stable within therapeutic range. Thank you for the consult.      Electronically signed by Aly Watts [facility] on 5/15/2019 at 9:21 PM

## 2019-05-16 NOTE — PROGRESS NOTES
[facility]      Patient:  Lyric Connors  YOB: 1935  Date of Service: 5/16/2019  MRN: [id number]   Acct: [de-identified]   Primary Care Physician: Laisha Wang MD  Advance Directive: Full Code  Admit Date: 5/15/2019       Hospital Day: 1    Chief Complaint:   Chief Complaint   Patient presents with    Chest Pain    Shortness of Breath       Subjective: having some right sided chest pain     Objective:   VITALS:  BP (!) 150/67   Pulse 80   Temp 98.7 °F (37.1 °C) (Temporal)   Resp 20   SpO2 91%   24HR INTAKE/OUTPUT:      Intake/Output Summary (Last 24 hours) at 5/16/2019 1313  Last data filed at 5/16/2019 1234  Gross per 24 hour   Intake 180 ml   Output --   Net 180 ml     Constitutional: Oriented to person, place, and time. Well-developed and well-nourished. No distress. HENT:    Head: Normocephalic and atraumatic. Mouth/Throat: Oropharynx is clear and moist. No oropharyngeal exudate. Eyes: Conjunctivae and EOM are normal. Pupils are equal, round, and reactive to light. No scleral icterus. Neck: Normal range of motion. Neck supple. No JVD present. No tracheal deviation present. No thyromegaly present. Cardiovascular: Normal rate, regular rhythm and normal heart sounds. Exam reveals no gallop and no friction rub. Pulmonary/Chest: Effort normal and breath sounds normal. No stridor. No respiratory distress. No wheezes. No rales. Abdominal: Soft. Bowel sounds are normal. No distension and no mass. There is no tenderness. There is no rebound and no guarding. Musculoskeletal: Normal range of motion. There is no edema or tenderness. Extremities: No edema  Neurological: Alert and oriented to person, place, and time. No cranial nerve deficit. Skin: Skin is warm and dry. No rash noted. Not diaphoretic. No erythema. No pallor. Psychiatric: Normal mood and affect.  Behavior is normal. Judgment and thought content normal.     Medications:      levothyroxine  200 mcg Oral Once per day on Sun Tue Thu Sat    [START ON 5/17/2019] levothyroxine  400 mcg Oral Once per day on Mon Wed Fri    allopurinol  100 mg Oral Daily    amLODIPine  10 mg Oral Daily    calcitRIOL  0.25 mcg Oral Daily    losartan  50 mg Oral Daily    metoprolol tartrate  50 mg Oral Daily    vitamin B-12  500 mcg Oral Daily    bumetanide  1 mg Intravenous BID    sodium chloride flush  10 mL Intravenous 2 times per day    warfarin (COUMADIN) daily dosing (placeholder)   Other RX Placeholder     HYDROcodone 5 mg - acetaminophen, fentanNYL, sodium chloride flush, magnesium hydroxide, ondansetron, nitroGLYCERIN, morphine  DIET RENAL; Cardiac         Lab and other Data:     Recent Labs     05/15/19  1408   WBC 6.3   HGB 12.5   PLT 75*     Recent Labs     05/15/19  1408 05/15/19  1414     --    K 4.2 4.0   CL 97*  --    CO2 33*  --    BUN 18  --    CREATININE 3.0*  --    GLUCOSE 93  --      Recent Labs     05/15/19  1408   AST 22   ALT 7   BILITOT 1.2   ALKPHOS 86     Troponin T:   Recent Labs     05/15/19  1408 05/15/19  1752   TROPONINI <0.01 <0.01     Pro-BNP: No results for input(s): BNP in the last 72 hours. INR:   Recent Labs     05/14/19 05/15/19  1510 05/16/19  1017   INR 2.10 2.19* 2.75*     ABGs:   Lab Results   Component Value Date    PHART 7.430 05/15/2019    PO2ART 48.0 05/15/2019    IBC1AIA 51.0 05/15/2019     UA:No results for input(s): NITRITE, COLORU, PHUR, LABCAST, WBCUA, RBCUA, MUCUS, TRICHOMONAS, YEAST, BACTERIA, CLARITYU, SPECGRAV, LEUKOCYTESUR, UROBILINOGEN, BILIRUBINUR, BLOODU, GLUCOSEU, AMORPHOUS in the last 72 hours. Invalid input(s): KETONESU    Imaging:   CTA PULMONARY W CONTRAST   Final Result   No evidence of a pulmonary embolism. Severe atheromatous changes of the thoracic aorta with a mild   aneurysmal dilatation. No dissection. Small pericardial effusion. Moderate bilateral pleural effusion. Bilateral lower lobar compression atelectasis.    Moderate persistent pulmonary (Resolved):    Acute on chronic diastolic heart failure (HCC)  Active Problems:    Paroxysmal atrial fibrillation (HCC)    Thrombocytopenia (HCC)    ESRD on dialysis ([facility] [address]    Hypochloremia    Acute on chronic respiratory failure with hypoxia and hypercapnia (HCC)    Chest pain    Hypoalbuminemia      Plan:   - bilateral pleural effusions likely from missed HD sessions, where patient went to session but HD could not be performed, renal consult  - atypical chest pain, f/u echo results, trend trops  - cont home meds  - trend labs    DVT Prophylaxis: coumadin      Estefania Doll MD  Hospitalist Service  5/16/2019  1:13 PM

## 2019-05-16 NOTE — PROGRESS NOTES
4 Eyes Skin Assessment    Ernesto Grace is being assessed upon: Admission    I agree that IIgor, along with Morena Carvajal RN(either 2 RN's or 1 LPN and 1 RN) have performed a thorough Head to Toe Skin Assessment on the patient. ALL assessment sites listed below have been assessed. Areas assessed by both nurses:     [x]   Head, Face, and Ears   [x]   Shoulders, Back, and Chest  [x]   Arms, Elbows, and Hands   [x]   Coccyx, Sacrum, and Ischium  [x]   Legs, Feet, and Heels    Does the Patient have Skin Breakdown? No    Karlos Prevention initiated: No  Wound Care Orders initiated: No    WOC nurse consulted for Pressure Injury (Stage 3,4, Unstageable, DTI, NWPT, and Complex wounds) and New or Established Ostomies: No    Large bruise, ecchymotic area to right lower arm from IV stick in the ED.       Primary Nurse eSignature: Igor Butler RN on 5/15/2019 at 11:27 PM      Co-Signer eSignature: Electronically signed by Nathalie Pena RN on 5/16/19 at 5:20 AM

## 2019-05-16 NOTE — PROGRESS NOTES
In and out wan done for urine specimen. #16 wan inserted without problem with immediate return of clear yellow urine in scant amount not enough at this time for specimen. Left catheter in and taped to leg to allow enough urine to collect in bag. Produces urine slowly being a dialysis patient. Continue to monitor.    Electronically signed by Toshia Cannon RN on 5/15/2019 at 11:32 PM

## 2019-05-16 NOTE — PROGRESS NOTES
60 ml urine obtained for urine specimen and sent to lab. Gupta removed. Pure Wick applied due to incontinence and receiving Bumex IV and keeping accurate I&O. Lab called and would not use urine sent due to over the time limit for keeping urine. At this time unable to obtain urine specimen due to dialysis patient and produces small amount of urine over long period of time.   Electronically signed by Nancy Bermudez RN on 5/16/2019 at 4:55 AM

## 2019-05-16 NOTE — CONSULTS
Nephrology ([address] [facility]) Consult Note      Patient:  Shaw Bhatti  YOB: 1935  Date of Service: 5/16/2019  MRN: [id number]   Acct: [de-identified]   Primary Care Physician: Dilshad Pereira MD  Advance Directive: Full Code  Admit Date: 5/15/2019       Hospital Day: 1  Referring Provider: Yuniel Mcgovern MD    Patient independently seen and examined, Chart, Consults, Notes, Operative notes, Labs, Cardiology, and Radiology studies reviewed as able. Subjective:  Shaw Bhatti is a 81 y.o. female  whom we were consulted for end-stage renal disease. Patient started dialysis in January 2019 and then receiving dialysis Monday Wednesday Friday at the [address] [place] [address] dialysis clinic. Family present notes that had issues with successful cannulation of her dialysis access which has limited her treatment times. She was admitted with complaints of right-sided chest pain and shortness of air. Today, she denies shortness of air, nausea or vomiting. She's had continued right-sided chest pain evaluated by cardiology. Allergies:  Patient has no known allergies.     Medicines:  Current Facility-Administered Medications   Medication Dose Route Frequency Provider Last Rate Last Dose    levothyroxine (SYNTHROID) tablet 200 mcg  200 mcg Oral Once per day on Sun Tue Thu Sat Jinger Hamburg, DO   200 mcg at 05/16/19 9177    [START ON 5/17/2019] levothyroxine (SYNTHROID) tablet 400 mcg  400 mcg Oral Once per day on Mon Wed Fri Jinger Hamburg, DO        allopurinol (ZYLOPRIM) tablet 100 mg  100 mg Oral Daily Ernesto Manning, DO   100 mg at 05/16/19 0920    amLODIPine (NORVASC) tablet 10 mg  10 mg Oral Daily Jinger Hamburg, DO        calcitRIOL (ROCALTROL) capsule 0.25 mcg  0.25 mcg Oral Daily Jinger Hamburg, DO   0.25 mcg at 05/16/19 0920    losartan (COZAAR) tablet 50 mg  50 mg Oral Daily Ernesto Manning, DO        metoprolol tartrate (LOPRESSOR) tablet 50 mg  50 mg Oral Daily Jinger Hamburg, DO        vitamin B-12 (CYANOCOBALAMIN) tablet 500 mcg  500 mcg Oral Daily Donelda Veliz, DO        bumetanide [facility]) injection 1 mg  1 mg Intravenous BID Donelda Veliz, DO   1 mg at 19 0920    HYDROcodone-acetaminophen (NORCO) 5-325 MG per tablet 1 tablet  1 tablet Oral Q4H PRN Donelda Veliz, DO   1 tablet at 19 1217    fentaNYL (SUBLIMAZE) injection 50 mcg  50 mcg Intravenous Q15 Min PRN Tenzin Nevarez MD   50 mcg at 05/15/19 1935    sodium chloride flush 0.9 % injection 10 mL  10 mL Intravenous 2 times per day Donelda Veliz, DO   10 mL at 19 7866    sodium chloride flush 0.9 % injection 10 mL  10 mL Intravenous PRN Andrew Manning, DO        magnesium hydroxide (MILK OF MAGNESIA) 400 MG/5ML suspension 30 mL  30 mL Oral Daily PRN Donelda Veliz, DO        ondansetron [facility]) injection 4 mg  4 mg Intravenous Q6H PRN Donelda Veliz, DO        nitroGLYCERIN (NITROSTAT) SL tablet 0.4 mg  0.4 mg Sublingual Q5 Min PRN Donelda Veliz, DO        morphine (PF) injection 2 mg  2 mg Intravenous Q4H PRN Donelda Veliz, DO   2 mg at 19 5021    warfarin (COUMADIN) daily dosing (placeholder)   Other RX Placeholder Donelda Veliz, DO           Past Medical History:  Past Medical History:   Diagnosis Date    Anemia     B12 deficiency     BiPAP (biphasic positive airway pressure) dependence     15cm/11cm    Blood circulation, collateral     Ankles stay cold all time x 6 months    CAD (coronary artery disease)     Atrial Fib x years    CHF (congestive heart failure) (HCC)     CHF (congestive heart failure), NYHA class I, acute on chronic, combined ([facility] 5/15/2019    Chronic diastolic heart failure (HCC)     Chronic kidney disease (CKD)     Stage 4 renal disease Sees Dr. Aurelio Verde    COPD (chronic obstructive pulmonary disease) ([facility]     on 2.5 L oxygen at home x 5 months    Depression     Hemodialysis patient [facility])     Has graft for fistula left arm - No dialysis yet    History of blood transfusion      When had     Hyperglycemia     Hyperlipidemia     High cholesterol x years    Hyperparathyroidism ([facility]     Hypertension     x years    Hyperuricemia     Hypoxemia     Insomnia     Kyphosis     Lumbago     disk degeneration    Lumbar canal stenosis     Lumbar radiculopathy     Nonischemic cardiomyopathy (HCC)     Obstructive sleep apnea     Initial PS; AHI:  81.2 per PSG, 2014    Osteoarthritis     Palliative care patient 2019    Paroxysmal atrial fibrillation (HCC)     Periodic limb movement disorder     Pica     Psychiatric problem     Depression    Renal artery stenosis (HCC)     Restrictive lung disease     Sleep apnea     Thyroid disease     Hypothyroid x years    Vitamin D deficiency        Past Surgical History:  Past Surgical History:   Procedure Laterality Date    APPENDECTOMY      With hysterectomy    CARDIAC CATHETERIZATION  9/24/15  [email or web address]    EF 60%     SECTION      x 2    CHOLECYSTECTOMY      COLONOSCOPY      Last one     EYE SURGERY      Cataracts bilateral    FRACTURE SURGERY      Collar bone as child    HYSTERECTOMY      SKIN BIOPSY      on breast and back - benign       Family History  Family History   Problem Relation Age of Onset    Heart Failure Mother         , 56   [facility] Hypertension Mother         hypertension    Other Mother         CKD    Cancer Sister             Heart Failure Father         , 65    COPD Father     Coronary Art Dis Father     Hypertension Other         sibling    Coronary Art Dis Other     Breast Cancer Other         sibling       Social History  Social History     Socioeconomic History    Marital status:      Spouse name: Not on file    Number of children: Not on file    Years of education: Not on file    Highest education level: Not on file   Occupational History    Occupation: Retired    Social Needs    Financial resource strain: Not on file    Food insecurity:     Worry: Not on file     Inability: Not on file    Transportation needs:     Medical: Not on file     Non-medical: Not on file   Tobacco Use    Smoking status: Former Smoker     Packs/day: 1.00     Years: 50.00     Pack years: 50.00     Types: Cigarettes     Last attempt to quit: 2013     Years since quittin.9    Smokeless tobacco: Never Used   Substance and Sexual Activity    Alcohol use: No     Alcohol/week: 0.0 oz    Drug use: No    Sexual activity: Not on file   Lifestyle    Physical activity:     Days per week: Not on file     Minutes per session: Not on file    Stress: Not on file   Relationships    Social connections:     Talks on phone: Not on file     Gets together: Not on file     Attends Confucianist service: Not on file     Active member of club or organization: Not on file     Attends meetings of clubs or organizations: Not on file     Relationship status: Not on file    Intimate partner violence:     Fear of current or ex partner: Not on file     Emotionally abused: Not on file     Physically abused: Not on file     Forced sexual activity: Not on file   Other Topics Concern    Not on file   Social History Narrative    Previously  now     Lives by herself    Walks unassisted    Not presently driving    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    Education high school    [address]    Previously smoked quit 8-10 years ago denies alcohol consumption or substance usage         Review of Systems:  History obtained from chart review and the patient  General ROS: No fever or chills  Respiratory ROS: No cough, +shortness of breath, wheezing  Cardiovascular ROS: No chest pain or palpitations  Gastrointestinal ROS: No abdominal pain or melena  Genito-Urinary ROS: No dysuria or hematuria  Musculoskeletal ROS: No joint pain or swelling   14 point ROS reviewed with the patient and negative except as noted above and in the HPI unless unable to obtain.     Objective:  Patient Vitals for the past 24 hrs:   BP Temp Temp src Pulse Resp SpO2   05/16/19 1030 (!) 150/67 98.7 °F (37.1 °C) Temporal 80 20 91 %   05/16/19 0744 -- -- -- 64 -- --   05/16/19 0650 (!) 124/55 98.3 °F (36.8 °C) -- 61 16 91 %   05/16/19 0257 124/62 98.5 °F (36.9 °C) Temporal 77 18 90 %   05/15/19 2349 (!) 122/50 98.1 °F (36.7 °C) Temporal 63 18 94 %   05/15/19 2013 131/65 98.6 °F (37 °C) Temporal 71 20 90 %   05/15/19 1700 116/62 -- -- 80 17 92 %   05/15/19 1600 112/60 -- -- 84 18 93 %   05/15/19 1500 (!) 110/56 -- -- 82 20 92 %   05/15/19 1400 (!) 104/53 -- -- 84 22 91 %   05/15/19 1340 -- 99 °F (37.2 °C) Temporal 86 24 (!) 88 %       Intake/Output Summary (Last 24 hours) at 5/16/2019 1337  Last data filed at 5/16/2019 1234  Gross per 24 hour   Intake 180 ml   Output --   Net 180 ml     General: awake/alert   Chest:  clear to auscultation bilaterally without respiratory distress  CVS: regular rate and rhythm  Abdominal: soft, nontender, normal bowel sounds  Extremities: no cyanosis or edema  Skin: warm and dry without rash      Labs:  BMP:   Recent Labs     05/15/19  1408 05/15/19  1414     --    K 4.2 4.0   CL 97*  --    CO2 33*  --    BUN 18  --    CREATININE 3.0*  --    CALCIUM 9.1  --      CBC:   Recent Labs     05/15/19  1408   WBC 6.3   HGB 12.5   HCT 42.2   MCV 96.6   PLT 75*     LIVER PROFILE:   Recent Labs     05/15/19  1408   AST 22   ALT 7   BILITOT 1.2   ALKPHOS 86     PT/INR:   Recent Labs     05/14/19 05/15/19  1510 05/16/19  1017   PROTIME  --  23.6* 28.3*   INR 2.10 2.19* 2.75*     APTT:   Recent Labs     05/15/19  1510   APTT 69.2*     BNP:  No results for input(s): BNP in the last 72 hours. Ionized Calcium:No results for input(s): IONCA in the last 72 hours. Magnesium:No results for input(s): MG in the last 72 hours. Phosphorus:No results for input(s): PHOS in the last 72 hours. HgbA1C: No results for input(s): LABA1C in the last 72 hours.   Hepatic:   Recent Labs     05/15/19  1408   ALKPHOS 86   ALT 7 persistent mild ectasia of the ascending thoracic aorta. No change in the previous study. No dissection. Moderate dilatation of the main pulmonary artery and right and left pulmonary arteries are seen suggesting pulmonary arterial hypertension. This is a small pericardial effusion which is stable since the previous study. The RV/LV ratio is less than 1. There is no reflux into the inferior vena cava are hepatic veins This bibasal pleural effusion, more on the right side. There is bibasilar compression atelectasis. There is persistent moderate pulmonary vascular congestion. There is an apparent small nodular density in the right lower lobe adjacent to the upper margin of the pleural fluid, image #24 in axial plane which was not noted in the previous study. It measures 7 mm in diameter. The thyroid gland is not visualized. There is no axillary lymphadenopathy. There is moderate lobulation and nodularity of the liver. The spleen is normal. There is a peripherally calcified pear shaped low-density mass adjacent and anterior to the medial margin of the spleen which may be in the area of the left adrenal gland. However this area is incompletely evaluated. It measures 3.7 x 2.6 cm. No evidence of a pulmonary embolism. Severe atheromatous changes of the thoracic aorta with a mild aneurysmal dilatation. No dissection. Small pericardial effusion. Moderate bilateral pleural effusion. Bilateral lower lobar compression atelectasis. Moderate persistent pulmonary vascular congestion. The above findings are recorded on a digital voice clip in PACS.  Signed by Dr Rubén Reyna on 5/15/2019 3:56 PM       Assessment   End-stage renal disease  Paroxysmal atrial fibrillation   Acute on chronic hypoxemic respiratory failure   Acute on chronic diastolic congestive heart failure  Hypertension  Anemia CKD  Secondary Hyperparathyroidism      Plan:  Discussed with patient, family, nursing is on plan for dialysis in the morning and may require additional treatment 5/18 as well   Encouraged patient and family to ensure that they have adequate rescheduling for another dialysis treatment and notify management if there continues to be any issues with missed treatments that are not rescheduled  Hemodialysis Monday Wednesday Friday per routine schedule with ultrafiltration as tolerated  Monitor labs      Thank you for the consult, we appreciate the opportunity to provide care to your patients. Feel free to contact me if I can be of any further assistance.       Jamila Alaniz MD  05/16/19  1:37 PM

## 2019-05-16 NOTE — CARE COORDINATION
[address] Transitions Interview     2019    Patient: Mark Cho Patient : 1935   MRN: [id number]    RARS: Readmission Risk Score: 21         Spoke with: Mark Cho        Readmission Risk  Patient Active Problem List   Diagnosis    Diastolic dysfunction    Bradycardia by electrocardiogram    Left ventricular dysfunction    Paroxysmal atrial fibrillation (HCC)    Obstructive sleep apnea    Periodic limb movement disorder    Hypoxemia    BiPAP (biphasic positive airway pressure) dependence    COPD exacerbation (HCC)    Chronic respiratory failure with hypoxia ([address]    Essential hypertension    CKD stage 4 secondary to hypertension ([address]    Acute pancreatitis    Abnormal CAT scan    Chronic atrial fibrillation ([address]    Encounter for current long-term use of anticoagulants    Acute renal failure superimposed on stage 4 chronic kidney disease ([address]    Renal failure    Palliative care patient    Thrombocytopenia (HCC)    Volume overload    ESRD on dialysis ([address]    Macrocytic anemia    Coagulopathy (HCC)    Hypokalemia    Hypochloremia    Left shoulder pain    Acute on chronic respiratory failure with hypoxia and hypercapnia (HCC)    Tendinopathy of left rotator cuff    Chest pain    Hypoalbuminemia       Inpatient Assessment  Care Transitions Summary    Care Transitions Inpatient Review  Medication Review  Do you have all of your prescriptions and are they filled?:  Yes   Are you able to afford your medications?:  Yes  How often do you have difficulty taking your medications?:  I always take them as prescribed.   Housing Review  Who do you live with?:  Alone  Are you an active caregiver in your home?:  No  Social Support  Do you have a ?:  No  Do you have a [address] [place] [address]?:  No  Durable Medical Equipment  Patient DME:  Shower chair, Wheelchair, Walker  Patient Home Equipment:  CPAP, Oxygen  Functional Review  Ability to seek help/take action for Emergent/Urgent situations i.e. fire, crime, inclement weather or health crisis.:  Needs Assistance  Ability handle personal hygiene needs (bathing/dressing/grooming):  Needs Assistance  Ability to manage medications:  Needs Assistance  Ability to prepare food:  Needs Assistance  Ability to maintain home (clean home, laundry):  Dependent  Ability to drive and/or has transportation:  Dependent  Ability to do shopping:  Dependent  Ability to manage finances:  Needs Assistance  Is patient able to live independently?:  Yes  Hearing and Vision  Visual Impairment:  Reading glasses  Hearing Impairment:  None  Care Transitions Interventions         Follow Up: Met at bedside with patient and discussed CTC process. Contact information given. Patient is agreeable with appropriate follow up as indicated after discharge. Patient is actually known to me from previous admissions to the hospital and CTC follow. She lives at home alone, but her adult son lives close by and assists with ADLs and other needs on a daily basis. She goes to dialysis at the [address] [place] [address] and Mondays, Wednesdays, Fridays. She denied any issues with obtaining meds, supplies, etc at this. She does have some DME in the home. Denied any needs for anything different at this time. Will follow along while here and after discharge as indicated. Future Appointments   Date Time Provider Keren Godinez   7/29/2019 11:15 AM MD TALIA Arreola [facility]   3/24/2020 11:30 AM Ashwin Garcia MD [facility]       Health Maintenance  There are no preventive care reminders to display for this patient.     Amrit Salguero RN

## 2019-05-17 LAB
ANION GAP SERPL CALCULATED.3IONS-SCNC: 9 MMOL/L (ref 7–19)
BUN BLDV-MCNC: 16 MG/DL (ref 8–23)
CALCIUM SERPL-MCNC: 9 MG/DL (ref 8.8–10.2)
CHLORIDE BLD-SCNC: 95 MMOL/L (ref 98–111)
CO2: 33 MMOL/L (ref 22–29)
CREAT SERPL-MCNC: 2.7 MG/DL (ref 0.5–0.9)
GFR NON-AFRICAN AMERICAN: 17
GLUCOSE BLD-MCNC: 99 MG/DL (ref 74–109)
HCT VFR BLD CALC: 40.5 % (ref 37–47)
HEMOGLOBIN: 12 G/DL (ref 12–16)
INR BLD: 2.81 (ref 0.88–1.18)
MAGNESIUM: 2 MG/DL (ref 1.6–2.4)
MCH RBC QN AUTO: 29.1 PG (ref 27–31)
MCHC RBC AUTO-ENTMCNC: 29.6 G/DL (ref 33–37)
MCV RBC AUTO: 98.3 FL (ref 81–99)
PARATHYROID HORMONE INTACT: 62.7 PG/ML (ref 15–65)
PDW BLD-RTO: 17 % (ref 11.5–14.5)
PHOSPHORUS: 3.3 MG/DL (ref 2.5–4.5)
PLATELET # BLD: 97 K/UL (ref 130–400)
PMV BLD AUTO: 12.8 FL (ref 9.4–12.3)
POTASSIUM SERPL-SCNC: 4.3 MMOL/L (ref 3.5–5)
PRO-BNP: ABNORMAL PG/ML (ref 0–1800)
PROTHROMBIN TIME: 28.8 SEC (ref 12–14.6)
RBC # BLD: 4.12 M/UL (ref 4.2–5.4)
SODIUM BLD-SCNC: 137 MMOL/L (ref 136–145)
URIC ACID, SERUM: 2.1 MG/DL (ref 2.4–5.7)
VITAMIN D 25-HYDROXY: 40.4 NG/ML
WBC # BLD: 6.3 K/UL (ref 4.8–10.8)

## 2019-05-17 PROCEDURE — 80048 BASIC METABOLIC PNL TOTAL CA: CPT

## 2019-05-17 PROCEDURE — 6360000002 HC RX W HCPCS: Performed by: FAMILY MEDICINE

## 2019-05-17 PROCEDURE — 2700000000 HC OXYGEN THERAPY PER DAY

## 2019-05-17 PROCEDURE — 2140000000 HC CCU INTERMEDIATE R&B

## 2019-05-17 PROCEDURE — 84100 ASSAY OF PHOSPHORUS: CPT

## 2019-05-17 PROCEDURE — 5A1D70Z PERFORMANCE OF URINARY FILTRATION, INTERMITTENT, LESS THAN 6 HOURS PER DAY: ICD-10-PCS | Performed by: INTERNAL MEDICINE

## 2019-05-17 PROCEDURE — 84550 ASSAY OF BLOOD/URIC ACID: CPT

## 2019-05-17 PROCEDURE — 83970 ASSAY OF PARATHORMONE: CPT

## 2019-05-17 PROCEDURE — 83880 ASSAY OF NATRIURETIC PEPTIDE: CPT

## 2019-05-17 PROCEDURE — 6370000000 HC RX 637 (ALT 250 FOR IP): Performed by: FAMILY MEDICINE

## 2019-05-17 PROCEDURE — 82306 VITAMIN D 25 HYDROXY: CPT

## 2019-05-17 PROCEDURE — 6370000000 HC RX 637 (ALT 250 FOR IP): Performed by: HOSPITALIST

## 2019-05-17 PROCEDURE — 85610 PROTHROMBIN TIME: CPT

## 2019-05-17 PROCEDURE — 93970 EXTREMITY STUDY: CPT

## 2019-05-17 PROCEDURE — 99232 SBSQ HOSP IP/OBS MODERATE 35: CPT | Performed by: HOSPITALIST

## 2019-05-17 PROCEDURE — 8010000000 HC HEMODIALYSIS ACUTE INPT

## 2019-05-17 PROCEDURE — 36415 COLL VENOUS BLD VENIPUNCTURE: CPT

## 2019-05-17 PROCEDURE — 83735 ASSAY OF MAGNESIUM: CPT

## 2019-05-17 PROCEDURE — 2580000003 HC RX 258: Performed by: FAMILY MEDICINE

## 2019-05-17 PROCEDURE — 85027 COMPLETE CBC AUTOMATED: CPT

## 2019-05-17 RX ORDER — WARFARIN SODIUM 1 MG/1
0.5 TABLET ORAL
Status: COMPLETED | OUTPATIENT
Start: 2019-05-17 | End: 2019-05-17

## 2019-05-17 RX ADMIN — Medication 10 ML: at 13:12

## 2019-05-17 RX ADMIN — HYDROCODONE BITARTRATE AND ACETAMINOPHEN 1 TABLET: 5; 325 TABLET ORAL at 06:27

## 2019-05-17 RX ADMIN — HYDROCODONE BITARTRATE AND ACETAMINOPHEN 1 TABLET: 5; 325 TABLET ORAL at 18:16

## 2019-05-17 RX ADMIN — ALLOPURINOL 100 MG: 100 TABLET ORAL at 13:08

## 2019-05-17 RX ADMIN — MORPHINE SULFATE 2 MG: 2 INJECTION, SOLUTION INTRAMUSCULAR; INTRAVENOUS at 16:15

## 2019-05-17 RX ADMIN — WARFARIN SODIUM 0.5 MG: 1 TABLET ORAL at 18:15

## 2019-05-17 RX ADMIN — LEVOTHYROXINE SODIUM 400 MCG: 100 TABLET ORAL at 05:36

## 2019-05-17 RX ADMIN — METOPROLOL TARTRATE 50 MG: 50 TABLET ORAL at 13:08

## 2019-05-17 RX ADMIN — LOSARTAN POTASSIUM 50 MG: 50 TABLET ORAL at 13:07

## 2019-05-17 RX ADMIN — HYDROCODONE BITARTRATE AND ACETAMINOPHEN 1 TABLET: 5; 325 TABLET ORAL at 12:26

## 2019-05-17 ASSESSMENT — PAIN SCALES - GENERAL
PAINLEVEL_OUTOF10: 8
PAINLEVEL_OUTOF10: 5
PAINLEVEL_OUTOF10: 0
PAINLEVEL_OUTOF10: 0
PAINLEVEL_OUTOF10: 9
PAINLEVEL_OUTOF10: 0
PAINLEVEL_OUTOF10: 1
PAINLEVEL_OUTOF10: 7

## 2019-05-17 NOTE — PLAN OF CARE
Nutrition Problem: Increased nutrient needs  Intervention: Food and/or Nutrient Delivery: Continue current diet  Nutritional Goals: Pt intakes will improve to 75% or more of meals.

## 2019-05-17 NOTE — PROGRESS NOTES
Cardiology Daily Note Hima Gallo MD      Patient:  Suhail Mijares  [id number]    Patient Active Problem List    Diagnosis Date Noted    Chest pain 05/15/2019     Priority: Low    Hypoalbuminemia 05/15/2019     Priority: Low    Tendinopathy of left rotator cuff 03/09/2019     Priority: Low    ESRD on dialysis ([address] 03/08/2019     Priority: Low    Macrocytic anemia 03/08/2019     Priority: Low    Coagulopathy ([address] 03/08/2019     Priority: Low    Hypokalemia 03/08/2019     Priority: Low    Hypochloremia 03/08/2019     Priority: Low    Left shoulder pain 03/08/2019     Priority: Low    Acute on chronic respiratory failure with hypoxia and hypercapnia (HCC) 03/08/2019     Priority: Low    Volume overload 03/07/2019     Priority: Low    Thrombocytopenia ([address] 01/26/2019     Priority: Low    Acute renal failure superimposed on stage 4 chronic kidney disease ([address] 01/20/2019     Priority: Low    Renal failure      Priority: Low    Encounter for current long-term use of anticoagulants 04/30/2018     Priority: Low    Palliative care patient 01/21/2018     Priority: Low    Acute pancreatitis 07/19/2017     Priority: Low    Abnormal CAT scan 07/19/2017     Priority: Low    Chronic atrial fibrillation ([address] 07/19/2017     Priority: Low    COPD exacerbation ([address] 05/29/2017     Priority: Low    Chronic respiratory failure with hypoxia, on home O2 therapy ([address] 05/29/2017     Priority: Low    Essential hypertension 05/29/2017     Priority: Low    CKD stage 4 secondary to hypertension ([address] 05/29/2017     Priority: Low    Obstructive sleep apnea      Priority: Low    Periodic limb movement disorder      Priority: Low    Hypoxemia      Priority: Low    BiPAP (biphasic positive airway pressure) dependence      Priority: Low    Diastolic dysfunction 59/77/5762     Priority: Low    Bradycardia by electrocardiogram 08/25/2015     Priority: Low    Left ventricular dysfunction 08/25/2015 Priority: Low    Paroxysmal atrial fibrillation ([facility] [address] [facility] 08/25/2015     Priority: Low       Admit Date:  5/15/2019    Admission Problem List: Present on Admission:   (Resolved) Acute on chronic diastolic heart failure (HCC)   Chest pain   Paroxysmal atrial fibrillation ([facility]   Hypochloremia   ESRD on dialysis ([facility] [address]   Hypoalbuminemia   Thrombocytopenia (HCC)   Acute on chronic respiratory failure with hypoxia and hypercapnia (HCC)   Chronic respiratory failure with hypoxia, on home O2 therapy [facility])      Cardiac Specific Data:  Specialty Problems        Cardiology Problems    Left ventricular dysfunction        Paroxysmal atrial fibrillation (HCC)        Essential hypertension        Chronic atrial fibrillation (HCC)              Subjective:  Ms. Ya More seen today resting comfortably. Right-sided chest pain diminishing. All troponin levels are negative. D-dimer elevated 2.18.    Objective:   BP (!) 112/52   Pulse 61   Temp 98.3 °F (36.8 °C) (Temporal)   Resp 18   Ht 5' 7\" (1.702 m)   Wt 184 lb 3.2 oz (83.6 kg)   SpO2 (!) 89%   BMI 28.85 kg/m²       Intake/Output Summary (Last 24 hours) at 5/17/2019 0746  Last data filed at 5/17/2019 0418  Gross per 24 hour   Intake 380 ml   Output 150 ml   Net 230 ml       Prior to Admission medications    Medication Sig Start Date End Date Taking? Authorizing Provider   azithromycin (ZITHROMAX) 250 MG tablet 500mg on day 1 followed by 250mg on days 2 - 5 3/28/19  Yes ZELALEM Rosenberg   HYDROcodone-acetaminophen (NORCO) 5-325 MG per tablet Take 1-2 tablets by mouth.  2/13/19  Yes Historical Provider, MD   warfarin (COUMADIN) 1 MG tablet Take 1 mg by mouth 7/18/17  Yes Historical Provider, MD   ondansetron (ZOFRAN) 4 MG tablet Take 1 tablet by mouth 3 times daily as needed for Nausea or Vomiting 1/10/19  Yes ZELALEM Rosenberg   calcitRIOL (ROCALTROL) 0.25 MCG capsule TAKE 1 CAPSULE DAILY 10/15/18  Yes Artemio Gregory MD   bumetanide (BUMEX) 1 MG tablet TAKE 1 --    K 4.2 4.0   CL 97*  --    CO2 33*  --    BUN 18  --    CREATININE 3.0*  --      LIVER PROFILE:   Recent Labs     05/15/19  1408   AST 22   ALT 7   BILITOT 1.2   ALKPHOS 86     PT/INR:   Recent Labs     05/15/19  1510 05/16/19  1017   PROTIME 23.6* 28.3*   INR 2.19* 2.75*     APTT:   Recent Labs     05/15/19  1510   APTT 69.2*     BNP:  No results for input(s): BNP in the last 72 hours. CK, CKMB, Troponin: @LABRCNT (CKTOTAL:3, CKMB:3, TROPONINI:3)@    IMAGING:  Xr Chest Portable    Result Date: 5/15/2019  EXAMINATION: XR CHEST PORTABLE 5/15/2019 2:49 PM XR CHEST PORTABLE 5/15/2019 1:00 PM HISTORY:  Short of breath  COMPARISON: March 7, 2019. FINDINGS: Mild increase in density is noted projecting over the lung bases. This is suspicious for small posterior pleural effusions. . Cardiac silhouettes moderately enlarged. This is unchanged. The osseous structures and surrounding soft tissues demonstrate no acute abnormality. 1. Moderate cardiomegaly suspicious for small associated posterior pleural effusions. This most likely is congestive failure. . Signed by Dr Mervin Shaw on 5/15/2019 2:50 PM    Cta Pulmonary W Contrast    Result Date: 5/15/2019  Examination. CTA PULMONARY W CONTRAST History: The patient complains of pleuritic chest pain. The patient is on dialysis. DLP: 578 mGycm. CT angiography of the chest is performed after intravenous contrast enhancement. The images are acquired in axial plane with subsequent reconstruction in coronal and sagittal plane. The comparison is made with the previous study dated 3/7/2019. There is good opacification of the pulmonary arteries and the branches bilaterally. No filling defects in the opacified pulmonary arterial bed is seen. There are severe atheromatous changes of the thoracic aorta. The persistent mild ectasia of the ascending thoracic aorta. No change in the previous study. No dissection.  Moderate dilatation of the main pulmonary artery and right and left pulmonary arteries are seen suggesting pulmonary arterial hypertension. This is a small pericardial effusion which is stable since the previous study. The RV/LV ratio is less than 1. There is no reflux into the inferior vena cava are hepatic veins This bibasal pleural effusion, more on the right side. There is bibasilar compression atelectasis. There is persistent moderate pulmonary vascular congestion. There is an apparent small nodular density in the right lower lobe adjacent to the upper margin of the pleural fluid, image #24 in axial plane which was not noted in the previous study. It measures 7 mm in diameter. The thyroid gland is not visualized. There is no axillary lymphadenopathy. There is moderate lobulation and nodularity of the liver. The spleen is normal. There is a peripherally calcified pear shaped low-density mass adjacent and anterior to the medial margin of the spleen which may be in the area of the left adrenal gland. However this area is incompletely evaluated. It measures 3.7 x 2.6 cm. No evidence of a pulmonary embolism. Severe atheromatous changes of the thoracic aorta with a mild aneurysmal dilatation. No dissection. Small pericardial effusion. Moderate bilateral pleural effusion. Bilateral lower lobar compression atelectasis. Moderate persistent pulmonary vascular congestion. The above findings are recorded on a digital voice clip in PACS. Signed by Dr Ina Betancourt on 5/15/2019 3:56 PM        Assessment:  1. Atypical right-sided pleuritic chest pain elevated d-dimer 2.18  2. CTA of the chest no evidence of pulmonary embolism small pericardial effusion bilateral pleural effusions  3. End-stage renal disease on maintenance hemodialysis  4. No typical exertional angina  5. Paroxysmal atrial fibrillation  6. History of acute and chronic respiratory failure with hypoxia and hypercapnia  7. Diastolic dysfunction  8. Sleep apnea on BiPAP  9.  COPD  10. Echocardiogram 1/20/19 small pericardial effusion no evidence of tamponade   11. Cardiac catheterization 9/24/15 normal left ventricular function 9 obstructive coronary disease  12. Cardiomegaly on chest x-ray          Plan:  1. Continue current medical treatment cardiac status stable. With an elevated d-dimer there is a possibility of a small pulmonary embolism with a negative CTA of the chest would check venous Doppler ultrasound.     Jeffrey Jeffries MD 5/17/2019 7:46 AM

## 2019-05-17 NOTE — PROGRESS NOTES
Pt received medication and is resting. No complaints at this time. Will continue to monitor.  Electronically signed by Bertram Brown RN on 5/16/2019 at 7:47 PM

## 2019-05-17 NOTE — PROGRESS NOTES
29.9 Overweight    Nutrition Interventions:   Continue current diet  Continued Inpatient Monitoring    Nutrition Evaluation:   · Evaluation: Goals set   · Goals: Pt intakes will improve to 75% or more of meals.     · Monitoring: Meal Intake, Weight, Pertinent Labs      Electronically signed by Win Azar MS, RD, LD on 5/17/19 at 2:05 PM

## 2019-05-17 NOTE — PROGRESS NOTES
Nephrology ([facility]) Consult Note      Patient:  Shaw Bhatti  YOB: 1935  Date of Service: 5/17/2019  MRN: [id number]   Acct: [de-identified]   Primary Care Physician: Dilshad Pereira MD  Advance Directive: Full Code  Admit Date: 5/15/2019       Hospital Day: 2  Referring Provider: Yuniel Mcgovern MD    Patient independently seen and examined, Chart, Consults, Notes, Operative notes, Labs, Cardiology, and Radiology studies reviewed as able. Subjective:  Shaw Bhatti is a 81 y.o. female  whom we were consulted for end-stage renal disease. Patient started dialysis in January 2019 and then receiving dialysis Monday Wednesday Friday at the [address] [place] [address] dialysis clinic. Family present notes that had issues with successful cannulation of her dialysis access which has limited her treatment times. She was admitted with complaints of right-sided chest pain and shortness of air. Today, she denies shortness of air, nausea or vomiting. She's had continued right-sided chest pain evaluated by cardiology. No other overnight events    Dialysis   Pt was seen on RRT  Modality: Hemodialysis  Access: Arterial Venous Graft  Location: left upper  QB: 300  QD: 500  UF: 910      Allergies:  Patient has no known allergies.     Medicines:  Current Facility-Administered Medications   Medication Dose Route Frequency Provider Last Rate Last Dose    levothyroxine (SYNTHROID) tablet 200 mcg  200 mcg Oral Once per day on Sun Tue Thu Sat [facility], DO   200 mcg at 05/16/19 9410    levothyroxine (SYNTHROID) tablet 400 mcg  400 mcg Oral Once per day on Mon Wed Fri [facility]   400 mcg at 05/17/19 0536    allopurinol (ZYLOPRIM) tablet 100 mg  100 mg Oral Daily Ernesto Manning, DO   100 mg at 05/16/19 0920    amLODIPine (NORVASC) tablet 10 mg  10 mg Oral Daily [facility], DO   10 mg at 05/16/19 1757    calcitRIOL (ROCALTROL) capsule 0.25 mcg  0.25 mcg Oral Daily Ernesto Manning, DO   0.25 mcg at 05/16/19 0920    losartan (COZAAR) tablet 50 mg  50 mg Oral Daily Hall[place] Manning, DO   50 mg at 05/16/19 1755    metoprolol tartrate (LOPRESSOR) tablet 50 mg  50 mg Oral Daily Hallwood Manning, DO   50 mg at 05/16/19 1754    vitamin B-12 (CYANOCOBALAMIN) tablet 500 mcg  500 mcg Oral Daily Sueellen Milder, DO        bumetanide (BUMEX) injection 1 mg  1 mg Intravenous BID Sueellen Milder, DO   1 mg at 05/16/19 1939    HYDROcodone-acetaminophen (NORCO) 5-325 MG per tablet 1 tablet  1 tablet Oral Q4H PRN Sueellen Milder, DO   1 tablet at 05/17/19 8918    fentaNYL (SUBLIMAZE) injection 50 mcg  50 mcg Intravenous Q15 Min PRN Gio Lopez MD   50 mcg at 05/15/19 1935    sodium chloride flush 0.9 % injection 10 mL  10 mL Intravenous 2 times per day Sueellen Milder, DO   10 mL at 05/16/19 1939    sodium chloride flush 0.9 % injection 10 mL  10 mL Intravenous PRN [facility], DO        magnesium hydroxide (MILK OF MAGNESIA) 400 MG/5ML suspension 30 mL  30 mL Oral Daily PRN Sueellen Milder, DO        ondansetron [facility] injection 4 mg  4 mg Intravenous Q6H PRN Sueellen Milder, DO        nitroGLYCERIN (NITROSTAT) SL tablet 0.4 mg  0.4 mg Sublingual Q5 Min PRN Sueellen Milder, DO        morphine (PF) injection 2 mg  2 mg Intravenous Q4H PRN Sueellen Milder, DO   2 mg at 05/16/19 3703    warfarin (COUMADIN) daily dosing (placeholder)   Other RX Placeholder Sueellen Milder, DO           Past Medical History:  Past Medical History:   Diagnosis Date    Anemia     B12 deficiency     BiPAP (biphasic positive airway pressure) dependence     15cm/11cm    Blood circulation, collateral     Ankles stay cold all time x 6 months    CAD (coronary artery disease)     Atrial Fib x years    CHF (congestive heart failure) (HCC)     CHF (congestive heart failure), NYHA class I, acute on chronic, combined ([facility] [address] 5/15/2019    Chronic diastolic heart failure (HCC)     Chronic kidney disease (CKD)     Stage 4 renal disease Sees Dr. Rahel Gomez    COPD (chronic obstructive pulmonary disease) ([address]     on 2.5 L oxygen at home x 5 months    Depression     Hemodialysis patient [facility])     Has graft for fistula left arm - No dialysis yet    History of blood transfusion      When had     Hyperglycemia     Hyperlipidemia     High cholesterol x years    Hyperparathyroidism ([address]     Hypertension     x years    Hyperuricemia     Hypoxemia     Insomnia     Kyphosis     Lumbago     disk degeneration    Lumbar canal stenosis     Lumbar radiculopathy     Nonischemic cardiomyopathy ([address]     Obstructive sleep apnea     Initial PS; AHI:  81.2 per PSG, 2014    Osteoarthritis     Palliative care patient 2019    Paroxysmal atrial fibrillation (HCC)     Periodic limb movement disorder     Pica     Psychiatric problem     Depression    Renal artery stenosis ([facility]     Restrictive lung disease     Sleep apnea     Thyroid disease     Hypothyroid x years    Vitamin D deficiency        Past Surgical History:  Past Surgical History:   Procedure Laterality Date    APPENDECTOMY      With hysterectomy    CARDIAC CATHETERIZATION  9/24/15  [address]    EF 60%     SECTION      x 2    CHOLECYSTECTOMY      COLONOSCOPY      Last one     EYE SURGERY      Cataracts bilateral    FRACTURE SURGERY      Collar bone as child    HYSTERECTOMY      SKIN BIOPSY      on breast and back - benign       Family History  Family History   Problem Relation Age of Onset    Heart Failure Mother         , 56   [place] Hypertension Mother         hypertension    Other Mother         CKD    Cancer Sister             Heart Failure Father         , 65    COPD Father     Coronary Art Dis Father     Hypertension Other         sibling    Coronary Art Dis Other     Breast Cancer Other         sibling       Social History  Social History     Socioeconomic History    Marital status:      Spouse name: Not on file    Number of children: Not on file    Years of education: Not on file    Highest education level: Not on file   Occupational History    Occupation: Retired    Social Needs    Financial resource strain: Not on file    Food insecurity:     Worry: Not on file     Inability: Not on file   [employer] needs:     Medical: Not on file     Non-medical: Not on file   Tobacco Use    Smoking status: Former Smoker     Packs/day: 1.00     Years: 50.00     Pack years: 50.00     Types: Cigarettes     Last attempt to quit: 2013     Years since quittin.9    Smokeless tobacco: Never Used   Substance and Sexual Activity    Alcohol use: No     Alcohol/week: 0.0 oz    Drug use: No    Sexual activity: Not on file   Lifestyle    Physical activity:     Days per week: Not on file     Minutes per session: Not on file    Stress: Not on file   Relationships    Social connections:     Talks on phone: Not on file     Gets together: Not on file     Attends Ep[employer] service: Not on file     Active member of club or organization: Not on file     Attends meetings of clubs or organizations: Not on file     Relationship status: Not on file    Intimate partner violence:     Fear of current or ex partner: Not on file     Emotionally abused: Not on file     Physically abused: Not on file     Forced sexual activity: Not on file   Other Topics Concern    Not on file   Social History Narrative    Previously  now     Lives by herself    Walks unassisted    Not presently driving    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 HgbA1C: No results for input(s): LABA1C in the last 72 hours. Hepatic:   Recent Labs     05/15/19  1408   ALKPHOS 86   ALT 7   AST 22   PROT 7.7   BILITOT 1.2   LABALBU 3.3*     Lactic Acid:   Recent Labs     05/15/19  1450   LACTA 1.6     Troponin: No results for input(s): CKTOTAL, CKMB, TROPONINT in the last 72 hours. ABGs: No results for input(s): PH, PCO2, PO2, HCO3, O2SAT in the last 72 hours. CRP:  No results for input(s): CRP in the last 72 hours. Sed Rate:    Recent Labs     05/16/19  1022   SEDRATE 19         Cultures:   No results for input(s): CULTURE in the last 72 hours. No results for input(s): BC, Neo Figures in the last 72 hours. No results for input(s): CXSURG in the last 72 hours. Radiology reports as per the Radiologist  Radiology: Xr Chest Portable    Result Date: 5/15/2019  EXAMINATION: XR CHEST PORTABLE 5/15/2019 2:49 PM XR CHEST PORTABLE 5/15/2019 1:00 PM HISTORY:  Short of breath  COMPARISON: March 7, 2019. FINDINGS: Mild increase in density is noted projecting over the lung bases. This is suspicious for small posterior pleural effusions. . Cardiac silhouettes moderately enlarged. This is unchanged. The osseous structures and surrounding soft tissues demonstrate no acute abnormality. 1. Moderate cardiomegaly suspicious for small associated posterior pleural effusions. This most likely is congestive failure. . Signed by Dr Awa Bernal on 5/15/2019 2:50 PM    Cta Pulmonary W Contrast    Result Date: 5/15/2019  Examination. CTA PULMONARY W CONTRAST History: The patient complains of pleuritic chest pain. The patient is on dialysis. DLP: 578 mGycm. CT angiography of the chest is performed after intravenous contrast enhancement. The images are acquired in axial plane with subsequent reconstruction in coronal and sagittal plane. The comparison is made with the previous study dated 3/7/2019.  There is good opacification of the pulmonary arteries and the branches bilaterally. No filling defects in the opacified pulmonary arterial bed is seen. There are severe atheromatous changes of the thoracic aorta. The persistent mild ectasia of the ascending thoracic aorta. No change in the previous study. No dissection. Moderate dilatation of the main pulmonary artery and right and left pulmonary arteries are seen suggesting pulmonary arterial hypertension. This is a small pericardial effusion which is stable since the previous study. The RV/LV ratio is less than 1. There is no reflux into the inferior vena cava are hepatic veins This bibasal pleural effusion, more on the right side. There is bibasilar compression atelectasis. There is persistent moderate pulmonary vascular congestion. There is an apparent small nodular density in the right lower lobe adjacent to the upper margin of the pleural fluid, image #24 in axial plane which was not noted in the previous study. It measures 7 mm in diameter. The thyroid gland is not visualized. There is no axillary lymphadenopathy. There is moderate lobulation and nodularity of the liver. The spleen is normal. There is a peripherally calcified pear shaped low-density mass adjacent and anterior to the medial margin of the spleen which may be in the area of the left adrenal gland. However this area is incompletely evaluated. It measures 3.7 x 2.6 cm. No evidence of a pulmonary embolism. Severe atheromatous changes of the thoracic aorta with a mild aneurysmal dilatation. No dissection. Small pericardial effusion. Moderate bilateral pleural effusion. Bilateral lower lobar compression atelectasis. Moderate persistent pulmonary vascular congestion. The above findings are recorded on a digital voice clip in PACS.  Signed by Dr Vanesa Swenson on 5/15/2019 3:56 PM       Assessment   End-stage renal disease  Paroxysmal atrial fibrillation   Acute on chronic hypoxemic respiratory failure   Acute on chronic diastolic congestive heart

## 2019-05-17 NOTE — PROGRESS NOTES
[facility]      Patient:  Edis Vital  YOB: 1935  Date of Service: 5/17/2019  MRN: [id number]   Acct: [de-identified]   Primary Care Physician: Debra Herzog MD  Advance Directive: Full Code  Admit Date: 5/15/2019       Hospital Day: 2    Chief Complaint:   Chief Complaint   Patient presents with    Chest Pain    Shortness of Breath       Subjective: feeling better    Objective:   VITALS:  BP (!) 112/52   Pulse 61   Temp 98.3 °F (36.8 °C) (Temporal)   Resp 18   Ht 5' 7\" (1.702 m)   Wt 184 lb 3.2 oz (83.6 kg)   SpO2 (!) 89%   BMI 28.85 kg/m²   24HR INTAKE/OUTPUT:      Intake/Output Summary (Last 24 hours) at 5/17/2019 1112  Last data filed at 5/17/2019 0418  Gross per 24 hour   Intake 380 ml   Output 150 ml   Net 230 ml     Constitutional: Oriented to person, place, and time. Well-developed and well-nourished. No distress. HENT:    Head: Normocephalic and atraumatic. Mouth/Throat: Oropharynx is clear and moist. No oropharyngeal exudate. Eyes: Conjunctivae and EOM are normal. Pupils are equal, round, and reactive to light. No scleral icterus. Neck: Normal range of motion. Neck supple. No JVD present. No tracheal deviation present. No thyromegaly present. Cardiovascular: Normal rate, regular rhythm and normal heart sounds. Exam reveals no gallop and no friction rub. Pulmonary/Chest: Effort normal and breath sounds normal. No stridor. No respiratory distress. No wheezes. No rales. Abdominal: Soft. Bowel sounds are normal. No distension and no mass. There is no tenderness. There is no rebound and no guarding. Musculoskeletal: Normal range of motion. There is no edema or tenderness. Extremities: No edema  Neurological: Alert and oriented to person, place, and time. No cranial nerve deficit. Skin: Skin is warm and dry. No rash noted. Not diaphoretic. No erythema. No pallor. Psychiatric: Normal mood and affect.  Behavior is normal. Judgment and thought content normal.     Medications:      levothyroxine  200 mcg Oral Once per day on Sun Tue Thu Sat    levothyroxine  400 mcg Oral Once per day on Mon Wed Fri    allopurinol  100 mg Oral Daily    amLODIPine  10 mg Oral Daily    calcitRIOL  0.25 mcg Oral Daily    losartan  50 mg Oral Daily    metoprolol tartrate  50 mg Oral Daily    vitamin B-12  500 mcg Oral Daily    bumetanide  1 mg Intravenous BID    sodium chloride flush  10 mL Intravenous 2 times per day    warfarin (COUMADIN) daily dosing (placeholder)   Other RX Placeholder     HYDROcodone 5 mg - acetaminophen, fentanNYL, sodium chloride flush, magnesium hydroxide, ondansetron, nitroGLYCERIN, morphine  DIET RENAL; Cardiac         Lab and other Data:     Recent Labs     05/15/19  1408   WBC 6.3   HGB 12.5   PLT 75*     Recent Labs     05/15/19  1408 05/15/19  1414     --    K 4.2 4.0   CL 97*  --    CO2 33*  --    BUN 18  --    CREATININE 3.0*  --    GLUCOSE 93  --      Recent Labs     05/15/19  1408   AST 22   ALT 7   BILITOT 1.2   ALKPHOS 86     Troponin T:   Recent Labs     05/15/19  1408 05/15/19  1752   TROPONINI <0.01 <0.01     Pro-BNP: No results for input(s): BNP in the last 72 hours. INR:   Recent Labs     05/15/19  1510 05/16/19  1017   INR 2.19* 2.75*     ABGs:   Lab Results   Component Value Date    PHART 7.430 05/15/2019    PO2ART 48.0 05/15/2019    ZYG6SUD 51.0 05/15/2019     UA:No results for input(s): NITRITE, COLORU, PHUR, LABCAST, WBCUA, RBCUA, MUCUS, TRICHOMONAS, YEAST, BACTERIA, CLARITYU, SPECGRAV, LEUKOCYTESUR, UROBILINOGEN, BILIRUBINUR, BLOODU, GLUCOSEU, AMORPHOUS in the last 72 hours. Invalid input(s): KETONESU    Imaging:   CTA PULMONARY W CONTRAST   Final Result   No evidence of a pulmonary embolism. Severe atheromatous changes of the thoracic aorta with a mild   aneurysmal dilatation. No dissection. Small pericardial effusion. Moderate bilateral pleural effusion. Bilateral lower lobar compression atelectasis. Moderate persistent pulmonary vascular congestion. The above findings are recorded on a digital voice clip in PACS. Signed by Dr Asha Siddiqi on 5/15/2019 3:56 PM      XR CHEST PORTABLE   Final Result   1. Moderate cardiomegaly suspicious for small associated posterior   pleural effusions. This most likely is congestive failure. .   Signed by Dr Ant Gonzalez on 5/15/2019 2:50 PM      VL Extremity Venous Bilateral    (Results Pending)     Micro:   Blood Culture, Routine   Date Value Ref Range Status   01/21/2019 No growth after 5 days of incubation.   Final   , No components found for: LABURINE  Patient Active Problem List    Diagnosis Date Noted    Chest pain 05/15/2019    Hypoalbuminemia 05/15/2019    Tendinopathy of left rotator cuff 03/09/2019    ESRD on dialysis ([address] 03/08/2019    Macrocytic anemia 03/08/2019    Coagulopathy ([address] 03/08/2019    Hypokalemia 03/08/2019    Hypochloremia 03/08/2019    Left shoulder pain 03/08/2019    Acute on chronic respiratory failure with hypoxia and hypercapnia (HCC) 03/08/2019    Volume overload 03/07/2019    Thrombocytopenia ([address] 01/26/2019    Acute renal failure superimposed on stage 4 chronic kidney disease ([address] 01/20/2019    Renal failure     Encounter for current long-term use of anticoagulants 04/30/2018    Palliative care patient 01/21/2018    Acute pancreatitis 07/19/2017    Abnormal CAT scan 07/19/2017    Chronic atrial fibrillation ([address] 07/19/2017    COPD exacerbation ([address] 05/29/2017    Chronic respiratory failure with hypoxia, on home O2 therapy ([address] 05/29/2017    Essential hypertension 05/29/2017    CKD stage 4 secondary to hypertension ([address] 05/29/2017    Obstructive sleep apnea     Periodic limb movement disorder     Hypoxemia     BiPAP (biphasic positive airway pressure) dependence      72ND/90RI      Diastolic dysfunction 25/94/6726    Bradycardia by electrocardiogram 08/25/2015    Left ventricular dysfunction 08/25/2015    Paroxysmal atrial fibrillation (HCC) 08/25/2015       Assessment/plan:   Principal Problem (Resolved):    Acute on chronic diastolic heart failure (HCC)  Active Problems:    Paroxysmal atrial fibrillation (HCC)    Chronic respiratory failure with hypoxia, on home O2 therapy (HCC)    Thrombocytopenia (HCC)    ESRD on dialysis ([facility]    Hypochloremia    Acute on chronic respiratory failure with hypoxia and hypercapnia (HCC)    Chest pain    Hypoalbuminemia      Plan:   - getting HD today likely will need tmrw too, bilateral pleural effusions likely from missed HD sessions, renal following  - atypical chest pain,  echo results reviewede  - cont home meds  - trend labs    DVT Prophylaxis: coumadin      Madelyn Macdonald MD  Hospitalist Service  5/17/2019  11:12 AM

## 2019-05-17 NOTE — PROGRESS NOTES
PRELIMINARY REPORT    NO DVT OR SVT NOTED IN BILATERAL LOWER EXTREMITIES AT THIS TIME    PENDING FINAL REPORT

## 2019-05-18 VITALS
DIASTOLIC BLOOD PRESSURE: 70 MMHG | HEIGHT: 67 IN | SYSTOLIC BLOOD PRESSURE: 108 MMHG | WEIGHT: 183.4 LBS | HEART RATE: 58 BPM | RESPIRATION RATE: 20 BRPM | OXYGEN SATURATION: 92 % | TEMPERATURE: 97 F | BODY MASS INDEX: 28.79 KG/M2

## 2019-05-18 LAB
ANION GAP SERPL CALCULATED.3IONS-SCNC: 8 MMOL/L (ref 7–19)
BUN BLDV-MCNC: 23 MG/DL (ref 8–23)
CALCIUM SERPL-MCNC: 8.5 MG/DL (ref 8.8–10.2)
CHLORIDE BLD-SCNC: 97 MMOL/L (ref 98–111)
CO2: 30 MMOL/L (ref 22–29)
CREAT SERPL-MCNC: 3.3 MG/DL (ref 0.5–0.9)
GFR NON-AFRICAN AMERICAN: 13
GLUCOSE BLD-MCNC: 99 MG/DL (ref 74–109)
HCT VFR BLD CALC: 36.6 % (ref 37–47)
HEMOGLOBIN: 10.8 G/DL (ref 12–16)
INR BLD: 3.02 (ref 0.88–1.18)
MAGNESIUM: 2 MG/DL (ref 1.6–2.4)
MCH RBC QN AUTO: 29.1 PG (ref 27–31)
MCHC RBC AUTO-ENTMCNC: 29.5 G/DL (ref 33–37)
MCV RBC AUTO: 98.7 FL (ref 81–99)
PDW BLD-RTO: 17.1 % (ref 11.5–14.5)
PLATELET # BLD: 85 K/UL (ref 130–400)
PMV BLD AUTO: 13.4 FL (ref 9.4–12.3)
POTASSIUM SERPL-SCNC: 4.5 MMOL/L (ref 3.5–5)
PROTHROMBIN TIME: 30.5 SEC (ref 12–14.6)
RBC # BLD: 3.71 M/UL (ref 4.2–5.4)
SODIUM BLD-SCNC: 135 MMOL/L (ref 136–145)
WBC # BLD: 5.3 K/UL (ref 4.8–10.8)

## 2019-05-18 PROCEDURE — 8010000000 HC HEMODIALYSIS ACUTE INPT

## 2019-05-18 PROCEDURE — 6370000000 HC RX 637 (ALT 250 FOR IP): Performed by: FAMILY MEDICINE

## 2019-05-18 PROCEDURE — 83735 ASSAY OF MAGNESIUM: CPT

## 2019-05-18 PROCEDURE — 85027 COMPLETE CBC AUTOMATED: CPT

## 2019-05-18 PROCEDURE — 36415 COLL VENOUS BLD VENIPUNCTURE: CPT

## 2019-05-18 PROCEDURE — 2580000003 HC RX 258: Performed by: FAMILY MEDICINE

## 2019-05-18 PROCEDURE — 80048 BASIC METABOLIC PNL TOTAL CA: CPT

## 2019-05-18 PROCEDURE — 2700000000 HC OXYGEN THERAPY PER DAY

## 2019-05-18 PROCEDURE — 2500000003 HC RX 250 WO HCPCS: Performed by: FAMILY MEDICINE

## 2019-05-18 PROCEDURE — 85610 PROTHROMBIN TIME: CPT

## 2019-05-18 PROCEDURE — 99238 HOSP IP/OBS DSCHRG MGMT 30/<: CPT | Performed by: INTERNAL MEDICINE

## 2019-05-18 RX ADMIN — Medication 10 ML: at 09:31

## 2019-05-18 RX ADMIN — ALLOPURINOL 100 MG: 100 TABLET ORAL at 09:31

## 2019-05-18 RX ADMIN — LEVOTHYROXINE SODIUM 100 MCG: 100 TABLET ORAL at 09:31

## 2019-05-18 RX ADMIN — HYDROCODONE BITARTRATE AND ACETAMINOPHEN 1 TABLET: 5; 325 TABLET ORAL at 08:01

## 2019-05-18 RX ADMIN — BUMETANIDE 1 MG: 0.25 INJECTION INTRAMUSCULAR; INTRAVENOUS at 09:32

## 2019-05-18 ASSESSMENT — PAIN SCALES - GENERAL: PAINLEVEL_OUTOF10: 7

## 2019-05-18 NOTE — PROGRESS NOTES
I went into Pt's room to give night time medications. Pt had Bi-pap on and started yelling. Pt pulled off her Bi-pap, I explained that I had her night time medications. Pt stated she didn't want anything, pt said that she asked for pain medication 4 hours ago. I tried to explain to pt that I had just offered her pain medicine and she declined. Pt stated she wasn't taking anything I had to give her and pt told me I needed to leave her room. \"

## 2019-05-18 NOTE — DISCHARGE SUMMARY
Hospitalist Discharge Summary    Riley Dooley  :  1935  MRN:  [id number]    Admit date:  5/15/2019  Discharge date:  2019    Admitting Physician:  Skinny Nieves DO  Primary Care Physician:  Raz Coto MD    Discharge Diagnoses:  Principal Problem:    Acute on chronic respiratory failure with hypoxia and hypercapnia (HCC)  Active Problems:    Paroxysmal atrial fibrillation (HCC)    Chronic respiratory failure with hypoxia, on home O2 therapy (HCC)    Thrombocytopenia (HCC)    ESRD on dialysis ([address]    Hypochloremia    Chest pain    Hypoalbuminemia  Resolved Problems:    Acute on chronic diastolic heart failure [facility])      Hospital Course:     81-year-old female with end-stage renal disease, chronic dialysis presented with worsening shortness of breath and chest pain associated with deep inspiration. Patient and family state her dialysis clinic was unable to access her fistula and therefore she was sent home without any dialysis. Shortness of breath that worsened during that time. Encephali presented to the emergency room. Patient's chest pain is clearly reproducible with palpation. She does state that the chest pain is improved but still has some right-sided chest pain with very deep inspiration. Regards to the patient's chronic comorbidities, no medication adjustments needed. Patient was continued on her home regimen. Patient will receive dialysis this afternoon and will be discharged home in stable condition after dialysis. Nephrology followed patient during her hospital stay and managed her hemodialysis. All questions and concerns addressed.     Discharge Medications:       Ozzie Leon   Home Medication Instructions STI:[id number]    Printed on:19 1000   Medication Information                      allopurinol (ZYLOPRIM) 100 MG tablet  TAKE 1 TABLET DAILY             amLODIPine (NORVASC) 10 MG tablet  Take 10 mg by mouth daily             BiPAP Machine MISC  by Does not apply route nightly             bumetanide (BUMEX) 1 MG tablet  TAKE 1 TABLET TWICE A DAY             calcitRIOL (ROCALTROL) 0.25 MCG capsule  TAKE 1 CAPSULE DAILY             HYDROcodone-acetaminophen (NORCO) 5-325 MG per tablet  Take 1-2 tablets by mouth. Levothyroxine Sodium (SYNTHROID PO)  Take 0.2 mg by mouth daily Indications: Patient takes 2 tablets on Mondays, Wednesdays and Fridays. Other days, she only takes 1 tab Take 2 tablets of 0.2 on Wednesdays             losartan (COZAAR) 100 MG tablet  TAKE 1/2 TABLET DAILY             metoprolol tartrate (LOPRESSOR) 50 MG tablet  TAKE 1 TABLET DAILY             ondansetron (ZOFRAN) 4 MG tablet  Take 1 tablet by mouth 3 times daily as needed for Nausea or Vomiting             vitamin B-12 (CYANOCOBALAMIN) 500 MCG tablet  Take 500 mcg by mouth daily             warfarin (COUMADIN) 1 MG tablet  Take 1 mg by mouth                 Consults:  Nephrology     Significant Diagnostic Studies:    Narrative   Examination. CTA PULMONARY W CONTRAST   History: The patient complains of pleuritic chest pain. The patient is   on dialysis. DLP: 578 mGycm. CT angiography of the chest is performed after intravenous contrast   enhancement. The images are acquired in axial plane with subsequent   reconstruction in coronal and sagittal plane. The comparison is made with the previous study dated 3/7/2019. There is good opacification of the pulmonary arteries and the branches   bilaterally. No filling defects in the opacified pulmonary arterial   bed is seen. There are severe atheromatous changes of the thoracic aorta. The   persistent mild ectasia of the ascending thoracic aorta. No change in   the previous study. No dissection. Moderate dilatation of the main pulmonary artery and right and left   pulmonary arteries are seen suggesting pulmonary arterial   hypertension. This is a small pericardial effusion which is stable since the   previous study.    The RV/LV ratio is less than 1. There is no reflux into the inferior   vena cava are hepatic veins   This bibasal pleural effusion, more on the right side. There is bibasilar compression atelectasis. There is persistent moderate pulmonary vascular congestion. There is   an apparent small nodular density in the right lower lobe adjacent to   the upper margin of the pleural fluid, image #24 in axial plane which   was not noted in the previous study. It measures 7 mm in diameter. The thyroid gland is not visualized. There is no axillary lymphadenopathy. There is moderate lobulation and nodularity of the liver. The spleen   is normal.   There is a peripherally calcified pear shaped low-density mass   adjacent and anterior to the medial margin of the spleen which may be   in the area of the left adrenal gland. However this area is   incompletely evaluated. It measures 3.7 x 2.6 cm.       Impression   No evidence of a pulmonary embolism. Severe atheromatous changes of the thoracic aorta with a mild   aneurysmal dilatation. No dissection. Small pericardial effusion. Moderate bilateral pleural effusion. Bilateral lower lobar compression atelectasis. Moderate persistent pulmonary vascular congestion. The above findings are recorded on a digital voice clip in PACS. Signed by Dr Elizabeth Ochoa on 5/15/2019 3:56 PM           Disposition:   Discharge in stable condition to home. See MAR. See all discharge orders. Follow up with Bertha Gusman MD in 1-2 weeks. Keep all previously scheduled follow-up appointments.         Signed:  Ray Beal DO  5/18/2019, 10:00 AM

## 2019-05-18 NOTE — PROGRESS NOTES
Nephrology ([facility]) Consult Note      Patient:  Luna Portillo  YOB: 1935  Date of Service: 5/18/2019  MRN: [id number]   Acct: [de-identified]   Primary Care Physician: Hugo Dos Santos MD  Advance Directive: [facility]  Admit Date: 5/15/2019       Hospital Day: 3  Referring Provider: Sancho Simon DO    Patient independently seen and examined, Chart, Consults, Notes, Operative notes, Labs, Cardiology, and Radiology studies reviewed as able. Subjective:  Luna Portillo is a 81 y.o. female  whom we were consulted for end-stage renal disease. Patient started dialysis in January 2019 and then receiving dialysis Monday Wednesday Friday at the [address] [place] [address] dialysis clinic. Family present notes that had issues with successful cannulation of her dialysis access which has limited her treatment times. She was admitted with complaints of right-sided chest pain and shortness of air. Today, she denies shortness of air, nausea or vomiting. No other overnight events. Excited for discharge today. Dialysis   Pt was seen on RRT  Modality: Hemodialysis  Access: Arterial Venous Graft  Location: left upper  QB: 400  QD: 600  UF: 830      Allergies:  Patient has no known allergies.     Medicines:  Current Facility-Administered Medications   Medication Dose Route Frequency Provider Last Rate Last Dose    levothyroxine (SYNTHROID) tablet 200 mcg  200 mcg Oral Once per day on Sun Tue Thu Sat Cody Slot, DO   100 mcg at 05/18/19 6707    levothyroxine (SYNTHROID) tablet 400 mcg  400 mcg Oral Once per day on Mon Wed Fri Cody Slot, DO   400 mcg at 05/17/19 0536    allopurinol (ZYLOPRIM) tablet 100 mg  100 mg Oral Daily [facility], DO   100 mg at 05/18/19 0931    amLODIPine (NORVASC) tablet 10 mg  10 mg Oral Daily Cody Slot, DO   Stopped at 05/18/19 9556    calcitRIOL (ROCALTROL) capsule 0.25 mcg  0.25 mcg Oral Daily Cody Slot, DO   Stopped at 05/17/19 1312    losartan (COZAAR) tablet 50 mg 50 mg Oral Daily Regulo Cash, DO   Stopped at 05/18/19 0930    metoprolol tartrate (LOPRESSOR) tablet 50 mg  50 mg Oral Daily Regulo Cash, DO   Stopped at 05/18/19 0931    vitamin B-12 (CYANOCOBALAMIN) tablet 500 mcg  500 mcg Oral Daily Regulo Cash, DO        bumetanide (BUMEX) injection 1 mg  1 mg Intravenous BID Regulo Cash, DO   1 mg at 05/18/19 0932    HYDROcodone-acetaminophen (NORCO) 5-325 MG per tablet 1 tablet  1 tablet Oral Q4H PRN Regulo Cash, DO   1 tablet at 05/18/19 0801    fentaNYL (SUBLIMAZE) injection 50 mcg  50 mcg Intravenous Q15 Min PRN Yung Daniels MD   50 mcg at 05/15/19 1935    sodium chloride flush 0.9 % injection 10 mL  10 mL Intravenous 2 times per day Regulo Cash, DO   10 mL at 05/18/19 0931    sodium chloride flush 0.9 % injection 10 mL  10 mL Intravenous PRN Gentry Manning,         magnesium hydroxide (MILK OF MAGNESIA) 400 MG/5ML suspension 30 mL  30 mL Oral Daily PRN Regulo Cash, DO        ondansetron [facility]) injection 4 mg  4 mg Intravenous Q6H PRN Regulo Cash, DO        nitroGLYCERIN (NITROSTAT) SL tablet 0.4 mg  0.4 mg Sublingual Q5 Min PRN Regulo Cash, DO        morphine (PF) injection 2 mg  2 mg Intravenous Q4H PRN Regulo Cash, DO   2 mg at 05/17/19 1615    warfarin (COUMADIN) daily dosing (placeholder)   Other RX Placeholder Regulo Cash DO           Past Medical History:  Past Medical History:   Diagnosis Date    Anemia     B12 deficiency     BiPAP (biphasic positive airway pressure) dependence     15cm/11cm    Blood circulation, collateral     Ankles stay cold all time x 6 months    CAD (coronary artery disease)     Atrial Fib x years    CHF (congestive heart failure) (HCC)     CHF (congestive heart failure), NYHA class I, acute on chronic, combined ([facility] [address] 5/15/2019    Chronic diastolic heart failure (HCC)     Chronic kidney disease (CKD)     Stage 4 renal disease Sees Dr. Linda Warner    COPD (chronic obstructive pulmonary disease) (HCC)     on 2.5 L oxygen at home x 5 months  Depression     Hemodialysis patient [facility])     Has graft for fistula left arm - No dialysis yet    History of blood transfusion      When had     Hyperglycemia     Hyperlipidemia     High cholesterol x years    Hyperparathyroidism ([address]     Hypertension     x years    Hyperuricemia     Hypoxemia     Insomnia     Kyphosis     Lumbago     disk degeneration    Lumbar canal stenosis     Lumbar radiculopathy     Nonischemic cardiomyopathy (HCC)     Obstructive sleep apnea     Initial PS; AHI:  81.2 per PSG, 2014    Osteoarthritis     Palliative care patient 2019    Paroxysmal atrial fibrillation (HCC)     Periodic limb movement disorder     Pica     Psychiatric problem     Depression    Renal artery stenosis ([address]     Restrictive lung disease     Sleep apnea     Thyroid disease     Hypothyroid x years    Vitamin D deficiency        Past Surgical History:  Past Surgical History:   Procedure Laterality Date    APPENDECTOMY      With hysterectomy    CARDIAC CATHETERIZATION  9/24/15  [address]    EF 60%     SECTION      x 2    CHOLECYSTECTOMY      COLONOSCOPY      Last one     EYE SURGERY      Cataracts bilateral    FRACTURE SURGERY      Collar bone as child    HYSTERECTOMY      SKIN BIOPSY      on breast and back - benign       Family History  Family History   Problem Relation Age of Onset    Heart Failure Mother         , 56   Krish Drain Hypertension Mother         hypertension    Other Mother         CKD    Cancer Sister             Heart Failure Father         , 65    COPD Father     Coronary Art Dis Father     Hypertension Other         sibling    Coronary Art Dis Other     Breast Cancer Other         sibling       Social History  Social History     Socioeconomic History    Marital status:      Spouse name: Not on file    Number of children: Not on file    Years of education: Not on file    Highest education level: Not on file   Occupational History    Occupation: Retired    Social Needs    Financial resource strain: Not on file    Food insecurity:     Worry: Not on file     Inability: Not on file   Flattr needs:     Medical: Not on file     Non-medical: Not on file   Tobacco Use    Smoking status: Former Smoker     Packs/day: 1.00     Years: 50.00     Pack years: 50.00     Types: Cigarettes     Last attempt to quit: 2013     Years since quittin.9    Smokeless tobacco: Never Used   Substance and Sexual Activity    Alcohol use: No     Alcohol/week: 0.0 oz    Drug use: No    Sexual activity: Not on file   Lifestyle    Physical activity:     Days per week: Not on file     Minutes per session: Not on file    Stress: Not on file   Relationships    Social connections:     Talks on phone: Not on file     Gets together: Not on file     Attends Church service: Not on file     Active member of club or organization: Not on file     Attends meetings of clubs or organizations: Not on file     Relationship status: Not on file    Intimate partner violence:     Fear of current or ex partner: Not on file     Emotionally abused: Not on file     Physically abused: Not on file     Forced sexual activity: Not on file   Other Topics Concern    Not on file   Social History Narrative    Previously  now     Lives by herself    Walks unassisted    Not presently driving    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    Education high school    Hinduism lou[employer]    Previously smoked quit 8-10 years ago denies alcohol consumption or substance usage         Review of Systems:  History obtained from chart review and the patient  General ROS: No fever or chills  Respiratory ROS: No cough, +shortness of breath, wheezing  Cardiovascular ROS: No chest pain or palpitations  Gastrointestinal ROS: No abdominal pain or melena  Genito-Urinary ROS: No dysuria or hematuria  Musculoskeletal ROS: No joint pain or swelling   14 point ROS reviewed with the patient and negative except as noted above and in the HPI unless unable to obtain. Objective:  Patient Vitals for the past 24 hrs:   BP Temp Temp src Pulse Resp SpO2 Height Weight   05/18/19 0952 118/65 97 °F (36.1 °C) Temporal 53 20 92 % -- --   05/18/19 0707 (!) 118/58 97 °F (36.1 °C) -- 70 18 90 % -- --   05/18/19 0400 -- -- -- -- -- -- -- 183 lb 6.4 oz (83.2 kg)   05/18/19 0053 (!) 121/48 98 °F (36.7 °C) Temporal 69 18 (!) 89 % -- --   05/17/19 1946 (!) 114/38 98.2 °F (36.8 °C) Temporal 66 16 (!) 88 % -- --   05/17/19 1400 -- -- -- -- -- -- 5' 7\" (1.702 m) --       Intake/Output Summary (Last 24 hours) at 5/18/2019 1318  Last data filed at 5/18/2019 0845  Gross per 24 hour   Intake 320 ml   Output --   Net 320 ml     General: awake/alert   Chest:  clear to auscultation bilaterally without respiratory distress  CVS: regular rate and rhythm  Abdominal: soft, nontender, normal bowel sounds  Extremities: no cyanosis or edema  Skin: warm and dry without rash      Labs:  BMP:   Recent Labs     05/15/19  1408 05/15/19  1414 05/17/19  1605 05/18/19  0057     --  137 135*   K 4.2 4.0 4.3 4.5   CL 97*  --  95* 97*   CO2 33*  --  33* 30*   PHOS  --   --  3.3  --    BUN 18  --  16 23   CREATININE 3.0*  --  2.7* 3.3*   CALCIUM 9.1  --  9.0 8.5*     CBC:   Recent Labs     05/15/19  1408 05/17/19  1605 05/18/19  0057   WBC 6.3 6.3 5.3   HGB 12.5 12.0 10.8*   HCT 42.2 40.5 36.6*   MCV 96.6 98.3 98.7   PLT 75* 97* 85*     LIVER PROFILE:   Recent Labs     05/15/19  1408   AST 22   ALT 7   BILITOT 1.2   ALKPHOS 86     PT/INR:   Recent Labs     05/16/19  1017 05/17/19  1605 05/18/19  0057   PROTIME 28.3* 28.8* 30.5*   INR 2.75* 2.81* 3.02*     APTT:   Recent Labs     05/15/19  1510   APTT 69.2*     BNP:  No results for input(s): BNP in the last 72 hours.   Ionized Calcium:No results for input(s): IONCA in the last 72 hours.  Magnesium:  Recent Labs     05/17/19  1605 05/18/19  0057   MG 2.0 2.0     Phosphorus:  Recent Labs     05/17/19  1605   PHOS 3.3     HgbA1C: No results for input(s): LABA1C in the last 72 hours. Hepatic:   Recent Labs     05/15/19  1408   ALKPHOS 86   ALT 7   AST 22   PROT 7.7   BILITOT 1.2   LABALBU 3.3*     Lactic Acid:   Recent Labs     05/15/19  1450   LACTA 1.6     Troponin: No results for input(s): CKTOTAL, CKMB, TROPONINT in the last 72 hours. ABGs: No results for input(s): PH, PCO2, PO2, HCO3, O2SAT in the last 72 hours. CRP:  No results for input(s): CRP in the last 72 hours. Sed Rate:    Recent Labs     05/16/19  1022   SEDRATE 19         Cultures:   No results for input(s): CULTURE in the last 72 hours. No results for input(s): BC, [place] in the last 72 hours. No results for input(s): CXSURG in the last 72 hours. Radiology reports as per the Radiologist  Radiology: Xr Chest Portable    Result Date: 5/15/2019  EXAMINATION: XR CHEST PORTABLE 5/15/2019 2:49 PM XR CHEST PORTABLE 5/15/2019 1:00 PM HISTORY:  Short of breath  COMPARISON: March 7, 2019. FINDINGS: Mild increase in density is noted projecting over the lung bases. This is suspicious for small posterior pleural effusions. . Cardiac silhouettes moderately enlarged. This is unchanged. The osseous structures and surrounding soft tissues demonstrate no acute abnormality. 1. Moderate cardiomegaly suspicious for small associated posterior pleural effusions. This most likely is congestive failure. . Signed by Dr Kodak Ivan on 5/15/2019 2:50 PM    Cta Pulmonary W Contrast    Result Date: 5/15/2019  Examination. CTA PULMONARY W CONTRAST History: The patient complains of pleuritic chest pain. The patient is on dialysis. DLP: 578 mGycm. CT angiography of the chest is performed after intravenous contrast enhancement. The images are acquired in axial plane with subsequent reconstruction in coronal and sagittal plane.  The comparison is made with the previous study dated 3/7/2019. There is good opacification of the pulmonary arteries and the branches bilaterally. No filling defects in the opacified pulmonary arterial bed is seen. There are severe atheromatous changes of the thoracic aorta. The persistent mild ectasia of the ascending thoracic aorta. No change in the previous study. No dissection. Moderate dilatation of the main pulmonary artery and right and left pulmonary arteries are seen suggesting pulmonary arterial hypertension. This is a small pericardial effusion which is stable since the previous study. The RV/LV ratio is less than 1. There is no reflux into the inferior vena cava are hepatic veins This bibasal pleural effusion, more on the right side. There is bibasilar compression atelectasis. There is persistent moderate pulmonary vascular congestion. There is an apparent small nodular density in the right lower lobe adjacent to the upper margin of the pleural fluid, image #24 in axial plane which was not noted in the previous study. It measures 7 mm in diameter. The thyroid gland is not visualized. There is no axillary lymphadenopathy. There is moderate lobulation and nodularity of the liver. The spleen is normal. There is a peripherally calcified pear shaped low-density mass adjacent and anterior to the medial margin of the spleen which may be in the area of the left adrenal gland. However this area is incompletely evaluated. It measures 3.7 x 2.6 cm. No evidence of a pulmonary embolism. Severe atheromatous changes of the thoracic aorta with a mild aneurysmal dilatation. No dissection. Small pericardial effusion. Moderate bilateral pleural effusion. Bilateral lower lobar compression atelectasis. Moderate persistent pulmonary vascular congestion. The above findings are recorded on a digital voice clip in PACS.  Signed by Dr Vanesa Swenson on 5/15/2019 3:56 PM       Assessment   End-stage renal disease  Paroxysmal atrial fibrillation Acute on chronic hypoxemic respiratory failure   Acute on chronic diastolic congestive heart failure  Hypertension  Anemia CKD  Secondary Hyperparathyroidism      Plan:  Successfully able to access AVG today  Will will be okay for discharge after additional dialysis treatment today  Encouraged patient and family to ensure that they have adequate rescheduling for another dialysis treatment and notify management if there continues to be any issues with missed treatments that are not rescheduled  Hemodialysis Monday Wednesday Friday per routine schedule with additional dialysis treatment 5/18 for makeup with ultrafiltration as tolerated  Monitor labs          Esperanza Lindsay MD  05/18/19  1:18 PM

## 2019-05-20 ENCOUNTER — CARE COORDINATION (OUTPATIENT)
Dept: CASE MANAGEMENT | Age: 84
End: 2019-05-20

## 2019-05-20 NOTE — CARE COORDINATION
[facility] Transitions Initial Follow Up Call    Call within 2 business days of discharge: Yes    Patient: Shawna Rondon Patient : 1935   MRN: [id number]    Discharge Date: 19 RARS: Readmission Risk Score: 27      Last Discharge [facility]       Complaint Diagnosis Description Type Department Provider    5/15/19 Chest Pain; Shortness of Breath Chronic respiratory failure with hypoxia, on home O2 therapy ([facility] [address] . .. ED to Hosp-Admission (Discharged) (ADMITTED) MANJULA Beal, ; Keron Oconnell. .. Spoke with: N/A    Facility: [place] [address]    Non-face-to-face services provided:  Reviewed encounter information for continuity of care prior to follow up phone call - chart notes, consults, progress notes, test results, med list, appointments, AVS, other information. Care Transitions 24 Hour Call    Do you have all of your prescriptions and are they filled?:  Yes  Patient D[facility]:  [id number] chair, Wheelchair, Walker  Patient Home Equipment:  CPAP, Oxygen  Do you have support at home?:  Alone  Are you an active caregiver in your home?:  No  Care Transitions Interventions         Follow Up: Attempted to make contact with patient for an initial follow up call post discharge from the hospital without success. Left a message regarding intent of call and call back information. Will try again my next business day.         Future Appointments   Date Time Provider Keren Godinez   2019 11:30 AM CORDELL Jacobson NP [facility]   2019 11:15 AM MD TALIA Jones [facility]   3/24/2020 11:30 AM Holley Dixon MD [facility]       Ghislaine Omer RN

## 2019-05-21 ENCOUNTER — CARE COORDINATION (OUTPATIENT)
Dept: CASE MANAGEMENT | Age: 84
End: 2019-05-21

## 2019-05-21 ENCOUNTER — ANTI-COAG VISIT (OUTPATIENT)
Dept: INTERNAL MEDICINE | Age: 84
End: 2019-05-21

## 2019-05-21 LAB — INR BLD: 2.2

## 2019-05-21 NOTE — CARE COORDINATION
[facility] Transitions Initial Follow Up Call    Call within 2 business days of discharge: Yes    Patient: Merlyn Dotyis Patient : 1935   MRN: [id number]   Discharge Date: 19 RARS: Readmission Risk Score: 27      Last Discharge [facility]       Complaint Diagnosis Description Type Department Provider    5/15/19 Chest Pain; Shortness of Breath Chronic respiratory failure with hypoxia, on home O2 therapy ([facility] [address] . .. ED to Hosp-Admission (Discharged) (ADMITTED) ZHEN Beal, ; Ritu Mccarthy. .. [address] [place] [address]    Non-face-to-face services provided:  Reviewed encounter information for continuity of care prior to follow up phone call - chart notes, consults, progress notes, test results, med list, appointments, AVS, other information. Care Transitions 24 Hour Call    Schedule Follow Up Appointment with PCP:  Completed  Do you have any ongoing symptoms?:  No  Do you have a copy of your discharge instructions?:  Yes  Do you have all of your prescriptions and are they filled?:  Yes  Have you been contacted by a [facility] Pharmacist?:  No  Have you scheduled your follow up appointment?:  Yes  How are you going to get to your appointment?:  Car - family or friend to transport  Were you discharged with any Home Care or Post Acute Services:  No  Post Acute Services:  [address] [place] [address]  Patient DME:  Shower chair, Wheelchair, Walker  Patient Home Equipment:  CPAP, Oxygen  Do you have support at home?:  Alone  Do you feel like you have everything you need to keep you well at home?:  Yes  Are you an active caregiver in your home?:  No  Care Transitions Interventions         Follow Up: Placed a call to the patient for an initial follow up call. She reported that she is doing well. She said she had dialysis yesterday and that went well. She is eating well. She is getting up and about and is tolerating activity well.

## 2019-05-24 ENCOUNTER — CARE COORDINATION (OUTPATIENT)
Dept: CASE MANAGEMENT | Age: 84
End: 2019-05-24

## 2019-05-28 ENCOUNTER — ANTI-COAG VISIT (OUTPATIENT)
Dept: INTERNAL MEDICINE | Age: 84
End: 2019-05-28

## 2019-05-28 LAB — INR BLD: 2

## 2019-05-28 NOTE — PROGRESS NOTES
HOME MONITORING REPORT    INR today:   Results for orders placed or performed in visit on 05/28/19   Protime-INR   Result Value Ref Range    INR 2.00        INR Goal: 2.0-3.0    Dosing Plan  As of 5/28/2019    TTR:   69.5 % (1 y)   Full warfarin instructions:   0.5 mg every Sat; 1 mg all other days              PLAN:PATIENT NOTIFIED TO  CONTINUE CURRENT DOSE AND RECHECK IN ONE WEEK.

## 2019-05-29 ENCOUNTER — TELEPHONE (OUTPATIENT)
Dept: INTERNAL MEDICINE | Age: 84
End: 2019-05-29

## 2019-05-29 ENCOUNTER — CARE COORDINATION (OUTPATIENT)
Dept: CASE MANAGEMENT | Age: 84
End: 2019-05-29

## 2019-05-29 NOTE — CARE COORDINATION
[facility] Transitions Follow Up Call    2019    Patient: Rosas Pinto  Patient : 1935   MRN: [id number]    Discharge Date: 19 RARS: Readmission Risk Score: [address] Transitions Subsequent and Final Call    Subsequent and Final Calls  Are you currently active with any services?:  Home Health  Care Transitions Interventions  Other Interventions: Follow Up: Attempted to make contact with patient for a routine follow up call without success. Left a message regarding intent of call and call back information. Will try again at a later time.       Future Appointments   Date Time Provider Keren Godinez   2019 11:30 AM CORDELL Tomlin NP [facility]   2019 11:15 AM Ghislaine Rodriges MD [facility]   3/24/2020 11:30 AM Dino Melvin MD [facility]       Michael Phipps RN

## 2019-05-30 ENCOUNTER — CARE COORDINATION (OUTPATIENT)
Dept: CASE MANAGEMENT | Age: 84
End: 2019-05-30

## 2019-05-30 DIAGNOSIS — Z99.81 CHRONIC RESPIRATORY FAILURE WITH HYPOXIA, ON HOME O2 THERAPY (HCC): Primary | ICD-10-CM

## 2019-05-30 DIAGNOSIS — J96.11 CHRONIC RESPIRATORY FAILURE WITH HYPOXIA, ON HOME O2 THERAPY (HCC): Primary | ICD-10-CM

## 2019-05-30 PROCEDURE — 1111F DSCHRG MED/CURRENT MED MERGE: CPT | Performed by: INTERNAL MEDICINE

## 2019-05-30 NOTE — CARE COORDINATION
[facility] Transitions Follow Up Call    2019    Patient: David Salazar  Patient : 1935   MRN: [id number]   Discharge Date: 19 RARS: Readmission Risk Score: 32         Spoke with: Vernon Hall Transitions Subsequent and Final Call    Schedule Follow Up Appointment with PCP:  Completed  Subsequent and Final Calls  Do you have any ongoing symptoms?:  No  Have your medications changed?:  No  Do you have any questions related to your medications?:  No  Do you currently have any active services?:  No  Are you currently active with any services?:  Home Health  Do you have any needs or concerns that I can assist you with?:  No  Identified Barriers:  None  Care Transitions Interventions  Other Interventions: Follow Up: Placed a call to the patient for a follow up call. She reported that she is doing better. She said that she is getting a little stronger, getting strength back. She is having some issues after dialysis though with blood pressure getting low. She said they had added an extra 30 minutes to her treatment so they can pull off a little more fluid. She is concerned that it may be too much. She gets weak, dizzy, lightheaded at times. She always checks her blood pressure before she takes her meds after she gets home. Sometimes it is too low and she does not take it. She is losing weight she said and thinks some of it is more than just the fluid issue. Said she is eating fair to good. Stated that food doesn't taste right to her anymore, but she still eats fairly decent. Did encourage her to talk with the dietician at dialysis and ask about nutritional supplements for renal failure patients. She said she would. She reads a lot and she said that keeps her alert. Pleasant today. Talkative. Reported that she does not want to be a burden to her son. They are very close. She is afraid she is taking him away from his family.   Encouraged her to allow

## 2019-06-04 ENCOUNTER — ANTI-COAG VISIT (OUTPATIENT)
Dept: INTERNAL MEDICINE | Age: 84
End: 2019-06-04

## 2019-06-04 ENCOUNTER — OFFICE VISIT (OUTPATIENT)
Dept: INTERNAL MEDICINE | Age: 84
End: 2019-06-04
Payer: MEDICARE

## 2019-06-04 VITALS
HEART RATE: 62 BPM | DIASTOLIC BLOOD PRESSURE: 40 MMHG | OXYGEN SATURATION: 96 % | TEMPERATURE: 97.6 F | SYSTOLIC BLOOD PRESSURE: 100 MMHG

## 2019-06-04 DIAGNOSIS — I48.20 CHRONIC ATRIAL FIBRILLATION (HCC): ICD-10-CM

## 2019-06-04 DIAGNOSIS — N18.6 ESRD ON DIALYSIS (HCC): ICD-10-CM

## 2019-06-04 DIAGNOSIS — I51.89 DIASTOLIC DYSFUNCTION: ICD-10-CM

## 2019-06-04 DIAGNOSIS — D68.9 COAGULOPATHY (HCC): ICD-10-CM

## 2019-06-04 DIAGNOSIS — R06.02 SOB (SHORTNESS OF BREATH): ICD-10-CM

## 2019-06-04 DIAGNOSIS — Z86.39: ICD-10-CM

## 2019-06-04 DIAGNOSIS — Z99.2 ESRD ON DIALYSIS (HCC): ICD-10-CM

## 2019-06-04 DIAGNOSIS — J96.11 CHRONIC RESPIRATORY FAILURE WITH HYPOXIA, ON HOME O2 THERAPY (HCC): Primary | ICD-10-CM

## 2019-06-04 DIAGNOSIS — Z99.81 CHRONIC RESPIRATORY FAILURE WITH HYPOXIA, ON HOME O2 THERAPY (HCC): Primary | ICD-10-CM

## 2019-06-04 LAB — INR BLD: 1.7

## 2019-06-04 PROCEDURE — 1090F PRES/ABSN URINE INCON ASSESS: CPT | Performed by: PHYSICIAN ASSISTANT

## 2019-06-04 PROCEDURE — 1123F ACP DISCUSS/DSCN MKR DOCD: CPT | Performed by: PHYSICIAN ASSISTANT

## 2019-06-04 PROCEDURE — 1111F DSCHRG MED/CURRENT MED MERGE: CPT | Performed by: PHYSICIAN ASSISTANT

## 2019-06-04 PROCEDURE — 4040F PNEUMOC VAC/ADMIN/RCVD: CPT | Performed by: PHYSICIAN ASSISTANT

## 2019-06-04 PROCEDURE — 1036F TOBACCO NON-USER: CPT | Performed by: PHYSICIAN ASSISTANT

## 2019-06-04 PROCEDURE — G8427 DOCREV CUR MEDS BY ELIG CLIN: HCPCS | Performed by: PHYSICIAN ASSISTANT

## 2019-06-04 PROCEDURE — G8417 CALC BMI ABV UP PARAM F/U: HCPCS | Performed by: PHYSICIAN ASSISTANT

## 2019-06-04 PROCEDURE — 99214 OFFICE O/P EST MOD 30 MIN: CPT | Performed by: PHYSICIAN ASSISTANT

## 2019-06-04 PROCEDURE — G8400 PT W/DXA NO RESULTS DOC: HCPCS | Performed by: PHYSICIAN ASSISTANT

## 2019-06-04 ASSESSMENT — PATIENT HEALTH QUESTIONNAIRE - PHQ9
SUM OF ALL RESPONSES TO PHQ9 QUESTIONS 1 & 2: 2
1. LITTLE INTEREST OR PLEASURE IN DOING THINGS: 1
2. FEELING DOWN, DEPRESSED OR HOPELESS: 1
SUM OF ALL RESPONSES TO PHQ QUESTIONS 1-9: 2
SUM OF ALL RESPONSES TO PHQ QUESTIONS 1-9: 2

## 2019-06-04 ASSESSMENT — ENCOUNTER SYMPTOMS
ABDOMINAL PAIN: 0
EYE REDNESS: 0
VOMITING: 0
NAUSEA: 0
PHOTOPHOBIA: 0
SORE THROAT: 0
CHEST TIGHTNESS: 0
COUGH: 0
CONSTIPATION: 0
RHINORRHEA: 0
EYE PAIN: 0
BACK PAIN: 1
DIARRHEA: 0
COLOR CHANGE: 0
SHORTNESS OF BREATH: 1
SINUS PRESSURE: 0
WHEEZING: 0

## 2019-06-04 NOTE — PROGRESS NOTES
[facility] INTERNAL MEDICINE  [address] [facility]  [address] [place] [address]  Dept: [phone]  Dept Fax: [phone]: [phone]      [facility] INTERNAL MEDICINE  OFFICE NOTE      Chief Complaint   Patient presents with    [facility] 81[facility] who is seen for hospital follow-up. Patient was admitted to the hospital on 15 May 2019 and discharged on 18 May 2019. Patient was admitted for acute on chronic respiratory failure with hypoxia and hypercapnia. Patient has end-stage renal disease and is on chronic dialysis at time of admission patient was having increase in shortness of breath and chest pains with deep breaths. Patient's family stated that the day prior patient was not able to do dialysis due to problems with access to the fistula and she started getting some shortness of breath afterwards. Patient went to the emergency room and then was admitted. Patient family stated that they did dialysis in the hospital and took off about 5 L of fluid and patient started feeling better and the chest pain resolved and patient's breathing improved. Patient was then discharged. Patient states has been doing pretty well. Patient is on chronic oxygen. Patient states she is breathing a lot better and not really having any Chest pains. Patient's eating drinking well. Patient continues to do dialysis 3 days a week. Patient denies any fever. Patient denies any other complaints this time.   Active Ambulatory Problems     Diagnosis Date Noted    Diastolic dysfunction [id number]/6663    Bradycardia by electrocardiogram 08/25/2015    Left ventricular dysfunction 08/25/2015    Paroxysmal atrial fibrillation ([address] 08/25/2015    Obstructive sleep apnea     Periodic limb movement disorder     Hypoxemia     BiPAP (biphasic positive airway pressure) dependence     COPD exacerbation ([facility]) 05/29/2017    Chronic respiratory failure with hypoxia, on home O2 therapy ([address] 05/29/2017    Essential hypertension 05/29/2017    CKD stage 4 secondary to hypertension ([address] 05/29/2017    Acute pancreatitis 07/19/2017    Abnormal CAT scan 07/19/2017    Chronic atrial fibrillation ([address] 07/19/2017    Encounter for current long-term use of anticoagulants 04/30/2018    Acute renal failure superimposed on stage 4 chronic kidney disease ([address] 01/20/2019    Renal failure     Palliative care patient 01/21/2018    Thrombocytopenia ([address] 01/26/2019    Volume overload 03/07/2019    ESRD on dialysis ([address] 03/08/2019    Macrocytic anemia 03/08/2019    Coagulopathy ([address] 03/08/2019    Hypokalemia 03/08/2019    Hypochloremia 03/08/2019    Left shoulder pain 03/08/2019    Acute on chronic respiratory failure with hypoxia and hypercapnia (HCC) 03/08/2019    Tendinopathy of left rotator cuff 03/09/2019    Chest pain 05/15/2019    Hypoalbuminemia 05/15/2019     Resolved Ambulatory Problems     Diagnosis Date Noted    SOB (shortness of breath) 08/25/2015    Acute on chronic diastolic heart failure ([address] 05/15/2019     Past Medical History:   Diagnosis Date    Anemia     B12 deficiency     BiPAP (biphasic positive airway pressure) dependence     Blood circulation, collateral     CAD (coronary artery disease)     CHF (congestive heart failure) (HCC)     CHF (congestive heart failure), NYHA class I, acute on chronic, combined ([address] 5/15/2019    Chronic diastolic heart failure (HCC)     Chronic kidney disease (CKD)     COPD (chronic obstructive pulmonary disease) ([address]     Depression     Hemodialysis patient ([address]     History of blood transfusion     Hyperglycemia     Hyperlipidemia     Hyperparathyroidism ([address]     Hypertension     Hyperuricemia     Hypoxemia     Insomnia     Kyphosis     Lumbago     Lumbar canal stenosis     Lumbar radiculopathy     Nonischemic cardiomyopathy (HCC)     Obstructive sleep apnea     Osteoarthritis     Palliative care patient 2019    Paroxysmal atrial fibrillation (HCC)     Periodic limb movement disorder     Pica     Psychiatric problem     Renal artery stenosis (HCC)     Restrictive lung disease     Sleep apnea     Thyroid disease     Vitamin D deficiency        Past Medical History:   Diagnosis Date    Anemia     B12 deficiency     BiPAP (biphasic positive airway pressure) dependence     15cm/11cm    Blood circulation, collateral     Ankles stay cold all time x 6 months    CAD (coronary artery disease)     Atrial Fib x years    CHF (congestive heart failure) (HCC)     CHF (congestive heart failure), NYHA class I, acute on chronic, combined ([facility] 5/15/2019    Chronic diastolic heart failure (HCC)     Chronic kidney disease (CKD)     Stage 4 renal disease Sees Dr. Morales Causey    COPD (chronic obstructive pulmonary disease) ([facility])     on 2.5 L oxygen at home x 5 months    Depression     Hemodialysis patient [facility])     Has graft for fistula left arm - No dialysis yet    History of blood transfusion      When had     Hyperglycemia     Hyperlipidemia     High cholesterol x years    Hyperparathyroidism ([facility]     Hypertension     x years    Hyperuricemia     Hypoxemia     Insomnia     Kyphosis     Lumbago     disk degeneration    Lumbar canal stenosis     Lumbar radiculopathy     Nonischemic cardiomyopathy (HCC)     Obstructive sleep apnea     Initial PS; AHI:  81.2 per PSG, 2014    Osteoarthritis     Palliative care patient 2019    Paroxysmal atrial fibrillation (HCC)     Periodic limb movement disorder     Pica     Psychiatric problem     Depression    Renal artery stenosis (HCC)     Restrictive lung disease     Sleep apnea     Thyroid disease     Hypothyroid x years    Vitamin D deficiency        Prior to Visit Medications    Medication Sig Taking?  Authorizing Provider   HYDROcodone-acetaminophen [facility]) [phone] MG per tablet Take 1-2 tablets by mouth. Yes Historical Provider, MD   warfarin (COUMADIN) 1 MG tablet Take 1 mg by mouth Indications: one tablet daily except one half tablet on   Yes Historical Provider, MD   ondansetron (ZOFRAN) 4 MG tablet Take 1 tablet by mouth 3 times daily as needed for Nausea or Vomiting Yes ZELALEM Faye   calcitRIOL (ROCALTROL) 0.25 MCG capsule TAKE 1 CAPSULE DAILY Yes Tayler Dobson MD   bumetanide (BUMEX) 1 MG tablet TAKE 1 TABLET TWICE A DAY  Patient taking differently: TAKE 1 tablet once a day Yes Tayler Dobson MD   losartan (COZAAR) 100 MG tablet TAKE 1/2 TABLET DAILY Yes Tayler Dobson MD   allopurinol (ZYLOPRIM) 100 MG tablet TAKE 1 TABLET DAILY Yes Tayler Dobson MD   metoprolol tartrate (LOPRESSOR) 50 MG tablet TAKE 1 TABLET DAILY Yes Tayler Dobson MD   BiPAP Machine MISC by Does not apply route nightly Yes Historical Provider, MD   vitamin B-12 (CYANOCOBALAMIN) 500 MCG tablet Take 500 mcg by mouth daily Yes Historical Provider, MD   amLODIPine (NORVASC) 10 MG tablet Take 10 mg by mouth daily Yes Historical Provider, MD   Levothyroxine Sodium (SYNTHROID PO) Take 0.2 mg by mouth daily Indications: Patient takes 2 tablets on ,  and .   Other days, she only takes 1 tab Take 2 tablets of 0.2 on  Yes Historical Provider, MD       Past Surgical History:   Procedure Laterality Date    APPENDECTOMY      With hysterectomy    CARDIAC [facility]HETERIZATION  [facility]  [facility]    EF 60%     SECTION      x 2    CHOLECYSTECTOMY      COLONOSCOPY      Last one     EYE SURGERY      Cataracts bilateral    FRACTURE SURGERY      Collar bone as child    HYSTERECTOMY      SKIN BIOPSY      on breast and back - benign       Family History   Problem Relation Age of Onset    Heart Failure Mother         , 56   Delphine Topete Hypertension Mother         hypertension    Other Mother         CKD    Cancer Sister            Delphine Topete Heart Failure Father         , 65    COPD Father     Coronary Art Dis Father     Hypertension Other         sibling    Coronary Art Dis Other     Breast Cancer Other         sibling       No Known Allergies    Social History     Socioeconomic History    Marital status:      Spouse name: Not on file    Number of children: Not on file    Years of education: Not on file    Highest education level: Not on file   Occupational History    Occupation: Retired    Social Needs    Financial resource strain: Not on file    Food insecurity:     Worry: Not on file     Inability: Not on file    Transportation needs:     Medical: Not on file     Non-medical: Not on file   Tobacco Use    Smoking status: Former Smoker     Packs/day: 1.00     Years: 50.00     Pack years: 50.00     Types: Cigarettes     Last attempt to quit: 2013     Years since quittin.0    Smokeless tobacco: Never Used   Substance and Sexual Activity    Alcohol use: No     Alcohol/week: 0.0 oz    Drug use: No    Sexual activity: Not on file   Lifestyle    Physical activity:     Days per week: Not on file     Minutes per session: Not on file    Stress: Not on file   Relationships    Social connections:     Talks on phone: Not on file     Gets together: Not on file     Attends Jain service: Not on file     Active member of club or organization: Not on file     Attends meetings of clubs or organizations: Not on file     Relationship status: Not on file    Intimate partner violence:     Fear of current or ex partner: Not on file     Emotionally abused: Not on file     Physically abused: Not on file     Forced sexual activity: Not on file   Other Topics Concern    Not on file   Social History Narrative    Previously  now     Lives by herself    Walks unassisted    Not presently driving    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 HENT: Negative for congestion, ear pain, postnasal drip, rhinorrhea, sinus pressure, sneezing and sore throat. Eyes: Negative for photophobia, pain and redness. Respiratory: Positive for shortness of breath (on chronic oxygen no worse than baselin). Negative for cough, chest tightness and wheezing. Cardiovascular: Negative for chest pain, palpitations and leg swelling. Gastrointestinal: Negative for abdominal pain, constipation, diarrhea, nausea and vomiting. Genitourinary: Negative for dysuria, frequency, hematuria and urgency. Musculoskeletal: Positive for back pain and gait problem (patient is in wheelchair. ). Negative for arthralgias, joint swelling and myalgias. Skin: Negative for color change, pallor and wound. Neurological: Negative for dizziness, facial asymmetry, speech difficulty, weakness and light-headedness. Hematological: Negative for adenopathy. Does not bruise/bleed easily. Psychiatric/Behavioral: Negative for confusion. The patient is not nervous/anxious. Current Outpatient Medications   Medication Sig Dispense Refill    HYDROcodone-acetaminophen (NORCO) 5-325 MG per tablet Take 1-2 tablets by mouth.       warfarin (COUMADIN) 1 MG tablet Take 1 mg by mouth Indications: one tablet daily except one half tablet on Saturdays       ondansetron (ZOFRAN) 4 MG tablet Take 1 tablet by mouth 3 times daily as needed for Nausea or Vomiting 30 tablet 0    calcitRIOL (ROCALTROL) 0.25 MCG capsule TAKE 1 CAPSULE DAILY 90 capsule 3    bumetanide (BUMEX) 1 MG tablet TAKE 1 TABLET TWICE A DAY (Patient taking differently: TAKE 1 tablet once a day) 180 tablet 3    losartan (COZAAR) 100 MG tablet TAKE 1/2 TABLET DAILY 45 tablet 3    allopurinol (ZYLOPRIM) 100 MG tablet TAKE 1 TABLET DAILY 90 tablet 3    metoprolol tartrate (LOPRESSOR) 50 MG tablet TAKE 1 TABLET DAILY 90 tablet 3    BiPAP Machine MISC by Does not apply route nightly      vitamin B-12 (CYANOCOBALAMIN) 500 MCG tablet Take 500 mcg by mouth daily      amLODIPine (NORVASC) 10 MG tablet Take 10 mg by mouth daily      Levothyroxine Sodium (SYNTHROID PO) Take 0.2 mg by mouth daily Indications: Patient takes 2 tablets on Mondays, Wednesdays and Fridays. Other days, she only takes 1 tab Take 2 tablets of 0.2 on Wednesdays       No current facility-administered medications for this visit. BP (!) 100/40   Pulse 62   Temp 97.6 °F (36.4 °C)   SpO2 96%     PHYSICAL EXAM  Physical Exam   Constitutional: Vital signs are normal. She appears well-developed and well-nourished. HENT:   Head: Normocephalic and atraumatic. Right Ear: External ear normal.   Left Ear: External ear normal.   Nose: Nose normal.   Eyes: Pupils are equal, round, and reactive to light. Right eye exhibits no discharge. Left eye exhibits no discharge. Neck: Normal range of motion. No tracheal deviation present. Cardiovascular: Normal rate, regular rhythm and normal heart sounds. Exam reveals no gallop and no friction rub. No murmur heard. Pulmonary/Chest: Effort normal and breath sounds normal. No respiratory distress. She has no wheezes. She has no rales. She exhibits no tenderness. Abdominal: Soft. There is no tenderness. There is no rebound and no guarding. Musculoskeletal: Normal range of motion. She exhibits no edema, tenderness or deformity. Lymphadenopathy:     She has no cervical adenopathy. Neurological: She is alert. She has normal reflexes. Skin: Skin is warm, dry and intact. No lesion and no rash noted. No erythema. Psychiatric: She has a normal mood and affect. Her mood appears not anxious. She does not exhibit a depressed mood. Nursing note and vitals reviewed.               ASSESSMENT      ICD-10-CM 1. Chronic respiratory failure with hypoxia, on home O2 therapy (HCC) J96.11     Z99.81    2. ESRD on dialysis ([facility] N18.6     Z99.2    3. Chronic atrial fibrillation (HCC) I48.2    4. Hx of fluid overload Z86.39    5. SOB (shortness of breath) R06.02    6. Coagulopathy ([facility] D68.9    7. Diastolic dysfunction T99.9        PLAN  Patient states ever since they alexa the fluid off of her pulses in the hospital with dialysis the chest pain had resolved and she is breathing a lot better. Patient continues to do dialysis 3 days a week. Patient does have a history of chronic A. fib. Patient had her INR checked today and they're going to increase her Coumadin up to 2 mg nightly go back to her regular dose her INR was 1.7 today. Patient states eating and drinking normally. Patient denies any other complaints at this time. Patient has follow-up appointment with Dr. Mando Walters on 29 July 2019. Patient follow-up sooner as needed. Return if symptoms worsen or fail to improve. All questions answered. Patient voices understanding and agrees to plans along with risks and benefits of plan. Patient is instructed to continue prior medications, diet, and exercise plans as instructed. Patient agrees to follow up as instructions, sooner if needed, or to go to ER if condition becomes emergent. Additional Instructions: As always, patient is advised to bring in medication bottles in order to correctly reconcile with our current list.      José Joyce PA-C    EMR Dragon/transcription disclaimer: Much of this encounter note is electronic transcription/translation of spoken language to printed text. The electronictranslation of spoken language may be erroneous, or at times, nonsensical words or phrases may be inadvertently transcribed.  Although I have reviewed the note for such errors, some may still exist.

## 2019-06-05 NOTE — PROGRESS NOTES
PA assessment and plan reviewed.  Agree with findings  Electronically signed by Solomon Alexander MD on 6/5/2019 at 5:55 AM

## 2019-06-06 ENCOUNTER — CARE COORDINATION (OUTPATIENT)
Dept: CASE MANAGEMENT | Age: 84
End: 2019-06-06

## 2019-06-11 ENCOUNTER — ANTI-COAG VISIT (OUTPATIENT)
Dept: PRIMARY CARE CLINIC | Age: 84
End: 2019-06-11

## 2019-06-11 LAB — INR BLD: 1.7

## 2019-06-11 NOTE — PROGRESS NOTES
HOME MONITORING REPORT    INR today:   Results for orders placed or performed in visit on 06/11/19   Protime-INR   Result Value Ref Range    INR 1.70        INR Goal: 2.0-3.0    Dosing Plan  As of 6/11/2019    TTR:   67.1 % (1.1 y)   Full warfarin instructions:   6/11: 2 mg; Otherwise 1 mg every day              PLAN: Patient called in with her weekly INR. It was 1.7. She states she checked it twice this morning since it was the same as last week. Patient states her diet hasn't changed and she hasn't missed any doses. Advised pt to take 2mg tonight, then change to 1mg daily and recheck in one week. Patient voiced understanding.

## 2019-06-17 RX ORDER — METOPROLOL TARTRATE 50 MG/1
TABLET, FILM COATED ORAL
Qty: 90 TABLET | Refills: 3 | Status: SHIPPED | OUTPATIENT
Start: 2019-06-17 | End: 2019-08-13 | Stop reason: SDUPTHER

## 2019-06-17 NOTE — TELEPHONE ENCOUNTER
Requested Prescriptions     Pending Prescriptions Disp Refills    metoprolol tartrate (LOPRESSOR) 50 MG tablet [Pharmacy Med Name: METOPROLOL TAB TAR 50MG] 90 tablet 3     Sig: TAKE 1 TABLET DAILY

## 2019-06-18 ENCOUNTER — ANTI-COAG VISIT (OUTPATIENT)
Dept: INTERNAL MEDICINE | Age: 84
End: 2019-06-18

## 2019-06-18 ENCOUNTER — ANTI-COAG VISIT (OUTPATIENT)
Dept: INTERNAL MEDICINE | Age: 84
End: 2019-06-18
Payer: MEDICARE

## 2019-06-18 DIAGNOSIS — I48.20 CHRONIC ATRIAL FIBRILLATION (HCC): ICD-10-CM

## 2019-06-18 LAB
INR BLD: 1.9
INR BLD: 1.9

## 2019-06-18 PROCEDURE — 93793 ANTICOAG MGMT PT WARFARIN: CPT | Performed by: INTERNAL MEDICINE

## 2019-06-18 NOTE — PROGRESS NOTES
HOME MONITORING REPORT    INR today:   Results for orders placed or performed in visit on 06/18/19   Protime-INR   Result Value Ref Range    INR 1.90        INR Goal: 2.0-3.0    Dosing Plan  As of 6/18/2019    TTR:   65.9 % (1.1 y)   Full warfarin instructions:   1 mg every day              PLAN: Patient advised to take 2mg tonight, then resume current dose and recheck in one week.

## 2019-06-18 NOTE — PROGRESS NOTES
HOME MONITORING REPORT    INR today:   Results for orders placed or performed in visit on 06/18/19   Protime-INR   Result Value Ref Range    INR 1.90        INR Goal: 2.0-3.0    Dosing Plan  As of 6/18/2019    TTR:   65.9 % (1.1 y)   Full warfarin instructions:   6/18: 2 mg; Otherwise 1 mg every day              PLAN:PATIENT NOTIFIED TO  Take 2mg tonight and then resume current dose tomorrow.

## 2019-06-25 ENCOUNTER — ANTI-COAG VISIT (OUTPATIENT)
Dept: INTERNAL MEDICINE | Age: 84
End: 2019-06-25
Payer: MEDICARE

## 2019-06-25 DIAGNOSIS — I48.20 CHRONIC ATRIAL FIBRILLATION (HCC): ICD-10-CM

## 2019-06-25 LAB — INR BLD: 2.5

## 2019-06-25 PROCEDURE — 93793 ANTICOAG MGMT PT WARFARIN: CPT | Performed by: INTERNAL MEDICINE

## 2019-06-25 NOTE — PROGRESS NOTES
HOME MONITORING REPORT    INR today:   Results for orders placed or performed in visit on 06/25/19   Protime-INR   Result Value Ref Range    INR 2.50        INR Goal: 2.0-3.0    Dosing Plan  As of 6/25/2019    TTR:   66.2 % (1.1 y)   Full warfarin instructions:   1 mg every day              PLAN: Patient advised to continue 1mg daily and recheck in one week.

## 2019-07-02 ENCOUNTER — ANTI-COAG VISIT (OUTPATIENT)
Dept: INTERNAL MEDICINE | Age: 84
End: 2019-07-02

## 2019-07-02 LAB — INR BLD: 2

## 2019-07-05 RX ORDER — ALLOPURINOL 100 MG/1
TABLET ORAL
Qty: 90 TABLET | Refills: 3 | Status: SHIPPED | OUTPATIENT
Start: 2019-07-05 | End: 2019-08-13 | Stop reason: SDUPTHER

## 2019-07-09 ENCOUNTER — ANTI-COAG VISIT (OUTPATIENT)
Dept: INTERNAL MEDICINE | Age: 84
End: 2019-07-09
Payer: MEDICARE

## 2019-07-09 DIAGNOSIS — D68.9 COAGULOPATHY (HCC): ICD-10-CM

## 2019-07-09 LAB — INR BLD: 1.7

## 2019-07-09 PROCEDURE — 93793 ANTICOAG MGMT PT WARFARIN: CPT | Performed by: INTERNAL MEDICINE

## 2019-07-16 ENCOUNTER — ANTI-COAG VISIT (OUTPATIENT)
Dept: INTERNAL MEDICINE | Age: 84
End: 2019-07-16

## 2019-07-16 LAB — INR BLD: 1.6

## 2019-07-23 ENCOUNTER — ANTI-COAG VISIT (OUTPATIENT)
Dept: INTERNAL MEDICINE | Age: 84
End: 2019-07-23
Payer: MEDICARE

## 2019-07-23 DIAGNOSIS — I48.20 CHRONIC ATRIAL FIBRILLATION (HCC): ICD-10-CM

## 2019-07-23 LAB — INR BLD: 1.7

## 2019-07-23 PROCEDURE — 93793 ANTICOAG MGMT PT WARFARIN: CPT | Performed by: INTERNAL MEDICINE

## 2019-07-23 NOTE — PROGRESS NOTES
HOME MONITORING REPORT    INR today:   Results for orders placed or performed in visit on 07/23/19   Protime-INR   Result Value Ref Range    INR 1.70        INR Goal: 2.0-3.0    Dosing Plan  As of 7/23/2019    TTR:   63.5 % (1.2 y)   Full warfarin instructions:   2 mg every Tue, Thu; 1 mg all other days              PLAN: Patient reports that she stopped drinking the Ensures last week. Advised patient to take 3mg tonight, then resume current dose and recheck next Tues. Patient voiced understanding.

## 2019-07-30 ENCOUNTER — ANTI-COAG VISIT (OUTPATIENT)
Dept: INTERNAL MEDICINE | Age: 84
End: 2019-07-30
Payer: MEDICARE

## 2019-07-30 DIAGNOSIS — I48.20 CHRONIC ATRIAL FIBRILLATION (HCC): ICD-10-CM

## 2019-07-30 LAB — INR BLD: 2.1

## 2019-07-30 PROCEDURE — 93793 ANTICOAG MGMT PT WARFARIN: CPT | Performed by: INTERNAL MEDICINE

## 2019-07-30 NOTE — PROGRESS NOTES
HOME MONITORING REPORT    INR today:   Results for orders placed or performed in visit on 07/30/19   Protime-INR   Result Value Ref Range    INR 2.10        INR Goal: 2.0-3.0    Dosing Plan  As of 7/30/2019    TTR:   62.9 % (1.2 y)   Full warfarin instructions:   2 mg every Tue, Thu; 1 mg all other days              PLAN: Patient notifed to continue current dose and recheck in one week. Patient isn't drinking Ensures but was told to eat protein bars at dialysis to increase her protein for 3 to 4. She is eating Pure Protein, I pulled up the nutrition label and it doesn't look like they contain Vitamin K. Will continue to monitor patient's INR and adjust accordingly.

## 2019-08-07 ENCOUNTER — ANTI-COAG VISIT (OUTPATIENT)
Dept: INTERNAL MEDICINE | Age: 84
End: 2019-08-07
Payer: MEDICARE

## 2019-08-07 DIAGNOSIS — I48.20 CHRONIC ATRIAL FIBRILLATION (HCC): ICD-10-CM

## 2019-08-07 LAB — INR BLD: 1.9

## 2019-08-07 PROCEDURE — 93793 ANTICOAG MGMT PT WARFARIN: CPT | Performed by: INTERNAL MEDICINE

## 2019-08-13 ENCOUNTER — ANTI-COAG VISIT (OUTPATIENT)
Dept: INTERNAL MEDICINE | Age: 84
End: 2019-08-13
Payer: MEDICARE

## 2019-08-13 ENCOUNTER — OFFICE VISIT (OUTPATIENT)
Dept: VASCULAR SURGERY | Age: 84
End: 2019-08-13
Payer: MEDICARE

## 2019-08-13 VITALS
OXYGEN SATURATION: 96 % | HEART RATE: 54 BPM | TEMPERATURE: 99.2 F | SYSTOLIC BLOOD PRESSURE: 122 MMHG | DIASTOLIC BLOOD PRESSURE: 64 MMHG

## 2019-08-13 DIAGNOSIS — Z99.2 END STAGE RENAL FAILURE ON DIALYSIS (HCC): ICD-10-CM

## 2019-08-13 DIAGNOSIS — N18.6 END STAGE RENAL FAILURE ON DIALYSIS (HCC): ICD-10-CM

## 2019-08-13 DIAGNOSIS — I48.20 CHRONIC ATRIAL FIBRILLATION (HCC): ICD-10-CM

## 2019-08-13 LAB — INR BLD: 2.1

## 2019-08-13 PROCEDURE — G8400 PT W/DXA NO RESULTS DOC: HCPCS | Performed by: PHYSICIAN ASSISTANT

## 2019-08-13 PROCEDURE — G8417 CALC BMI ABV UP PARAM F/U: HCPCS | Performed by: PHYSICIAN ASSISTANT

## 2019-08-13 PROCEDURE — 4040F PNEUMOC VAC/ADMIN/RCVD: CPT | Performed by: PHYSICIAN ASSISTANT

## 2019-08-13 PROCEDURE — 1090F PRES/ABSN URINE INCON ASSESS: CPT | Performed by: PHYSICIAN ASSISTANT

## 2019-08-13 PROCEDURE — 1036F TOBACCO NON-USER: CPT | Performed by: PHYSICIAN ASSISTANT

## 2019-08-13 PROCEDURE — 1123F ACP DISCUSS/DSCN MKR DOCD: CPT | Performed by: PHYSICIAN ASSISTANT

## 2019-08-13 PROCEDURE — 93793 ANTICOAG MGMT PT WARFARIN: CPT | Performed by: INTERNAL MEDICINE

## 2019-08-13 PROCEDURE — 99214 OFFICE O/P EST MOD 30 MIN: CPT | Performed by: PHYSICIAN ASSISTANT

## 2019-08-13 PROCEDURE — G8427 DOCREV CUR MEDS BY ELIG CLIN: HCPCS | Performed by: PHYSICIAN ASSISTANT

## 2019-08-20 ENCOUNTER — ANTI-COAG VISIT (OUTPATIENT)
Dept: INTERNAL MEDICINE | Age: 84
End: 2019-08-20
Payer: MEDICARE

## 2019-08-20 DIAGNOSIS — Z79.01 ENCOUNTER FOR CURRENT LONG-TERM USE OF ANTICOAGULANTS: ICD-10-CM

## 2019-08-20 LAB — INR BLD: 2.1

## 2019-08-20 PROCEDURE — 93793 ANTICOAG MGMT PT WARFARIN: CPT | Performed by: INTERNAL MEDICINE

## 2019-08-21 ENCOUNTER — PREP FOR PROCEDURE (OUTPATIENT)
Dept: VASCULAR SURGERY | Age: 84
End: 2019-08-21

## 2019-08-21 RX ORDER — SODIUM CHLORIDE 9 MG/ML
INJECTION, SOLUTION INTRAVENOUS CONTINUOUS
Status: CANCELLED | OUTPATIENT
Start: 2019-08-21

## 2019-08-21 RX ORDER — CLONIDINE HYDROCHLORIDE 0.1 MG/1
0.1 TABLET ORAL PRN
Status: CANCELLED | OUTPATIENT
Start: 2019-08-21

## 2019-08-21 RX ORDER — SODIUM CHLORIDE 0.9 % (FLUSH) 0.9 %
10 SYRINGE (ML) INJECTION PRN
Status: CANCELLED | OUTPATIENT
Start: 2019-08-21

## 2019-08-21 RX ORDER — ONDANSETRON 2 MG/ML
4 INJECTION INTRAMUSCULAR; INTRAVENOUS EVERY 6 HOURS PRN
Status: CANCELLED | OUTPATIENT
Start: 2019-08-21

## 2019-08-21 RX ORDER — ASPIRIN 81 MG/1
81 TABLET ORAL ONCE
Status: CANCELLED | OUTPATIENT
Start: 2019-08-21 | End: 2019-08-21

## 2019-08-21 NOTE — H&P (VIEW-ONLY)
Paroxysmal atrial fibrillation (HCC) 08/25/2015     Current Outpatient Medications   Medication Sig Dispense Refill    OXYGEN Inhale 3 L into the lungs      warfarin (COUMADIN) 1 MG tablet Take 1 mg by mouth Indications: one tablet daily except one half tablet on Saturdays       ondansetron (ZOFRAN) 4 MG tablet Take 1 tablet by mouth 3 times daily as needed for Nausea or Vomiting 30 tablet 0    bumetanide (BUMEX) 1 MG tablet TAKE 1 TABLET TWICE A DAY (Patient taking differently: TAKE 1 tablet once a day) 180 tablet 3    losartan (COZAAR) 100 MG tablet TAKE 1/2 TABLET DAILY 45 tablet 3    allopurinol (ZYLOPRIM) 100 MG tablet TAKE 1 TABLET DAILY 90 tablet 3    metoprolol tartrate (LOPRESSOR) 50 MG tablet TAKE 1 TABLET DAILY 90 tablet 3    BiPAP Machine MISC by Does not apply route nightly      amLODIPine (NORVASC) 10 MG tablet Take 10 mg by mouth daily      Levothyroxine Sodium (SYNTHROID PO) Take 0.2 mg by mouth daily Indications: Patient takes 2 tablets on Mondays, Wednesdays and Fridays. Other days, she only takes 1 tab Take 2 tablets of 0.2 on Wednesdays       No current facility-administered medications for this visit. Allergies: Patient has no known allergies.   Past Medical History:   Diagnosis Date    Anemia     B12 deficiency     BiPAP (biphasic positive airway pressure) dependence     15cm/11cm    Blood circulation, collateral     Ankles stay cold all time x 6 months    CAD (coronary artery disease)     Atrial Fib x years    CHF (congestive heart failure) (HCC)     CHF (congestive heart failure), NYHA class I, acute on chronic, combined ([facility] 5/15/2019    Chronic diastolic heart failure (HCC)     Chronic kidney disease (CKD)     Stage 4 renal disease Sees Dr. Bhargavi Fernandez    COPD [facility]chronic obstructive pulmonary disease) [facility]     on 2.5 L oxygen at home x 5 months    Depression     Hemodialysis patient [facility])     Has graft for fistula left arm - No dialysis yet    History of blood transfusion     1955 When had     Hyperglycemia     Hyperlipidemia     High cholesterol x years    Hyperparathyroidism ([address]     Hypertension     x years    Hyperuricemia     Hypoxemia     Insomnia     Kyphosis     Lumbago     disk degeneration    Lumbar canal stenosis     Lumbar radiculopathy     Nonischemic cardiomyopathy (HCC)     Obstructive sleep apnea     Initial PS; AHI:  81.2 per PSG, 2014    Osteoarthritis     Palliative care patient 2019    Paroxysmal atrial fibrillation (HCC)     Periodic limb movement disorder     Pica     Psychiatric problem     Depression    Renal artery stenosis ([address]     Restrictive lung disease     Sleep apnea     Thyroid disease     Hypothyroid x years    Vitamin D deficiency      Past Surgical History:   Procedure Laterality Date    APPENDECTOMY      With hysterectomy    CARDIAC CATHETERIZATION  9/24/15  [address] [employer]    EF 60%     SECTION      x 2    CHOLECYSTECTOMY      COLONOSCOPY      Last one     EYE SURGERY      Cataracts bilateral    FRACTURE SURGERY      Collar bone as child    HYSTERECTOMY      SKIN BIOPSY      on breast and back - benign     Family History   Problem Relation Age of Onset    Heart Failure Mother         , 56   Matson Hypertension Mother         hypertension    Other Mother         CKD    Cancer Sister             Heart Failure Father         , 65    COPD Father     Coronary Art Dis Father     Hypertension Other         sibling    Coronary Art Dis Other     Breast Cancer Other         sibling     Social History     Tobacco Use    Smoking status: Former Smoker     Packs/day: 1.00     Years: 50.00     Pack years: 50.00     Types: Cigarettes     Last attempt to quit: 2013     Years since quittin.2    Smokeless tobacco: Never Used   Substance Use Topics    Alcohol use: No     Alcohol/week: 0.0 standard drinks       Old records have been obtained from the

## 2019-08-21 NOTE — PROGRESS NOTES
Paroxysmal atrial fibrillation (HCC) 08/25/2015     Current Outpatient Medications   Medication Sig Dispense Refill    OXYGEN Inhale 3 L into the lungs      warfarin (COUMADIN) 1 MG tablet Take 1 mg by mouth Indications: one tablet daily except one half tablet on Saturdays       ondansetron (ZOFRAN) 4 MG tablet Take 1 tablet by mouth 3 times daily as needed for Nausea or Vomiting 30 tablet 0    bumetanide (BUMEX) 1 MG tablet TAKE 1 TABLET TWICE A DAY (Patient taking differently: TAKE 1 tablet once a day) 180 tablet 3    losartan (COZAAR) 100 MG tablet TAKE 1/2 TABLET DAILY 45 tablet 3    allopurinol (ZYLOPRIM) 100 MG tablet TAKE 1 TABLET DAILY 90 tablet 3    metoprolol tartrate (LOPRESSOR) 50 MG tablet TAKE 1 TABLET DAILY 90 tablet 3    BiPAP Machine MISC by Does not apply route nightly      amLODIPine (NORVASC) 10 MG tablet Take 10 mg by mouth daily      Levothyroxine Sodium (SYNTHROID PO) Take 0.2 mg by mouth daily Indications: Patient takes 2 tablets on Mondays, Wednesdays and Fridays. Other days, she only takes 1 tab Take 2 tablets of 0.2 on Wednesdays       No current facility-administered medications for this visit. Allergies: Patient has no known allergies.   Past Medical History:   Diagnosis Date    Anemia     B12 deficiency     BiPAP (biphasic positive airway pressure) dependence     15cm/11cm    Blood circulation, collateral     Ankles stay cold all time x 6 months    CAD (coronary artery disease)     Atrial Fib x years    CHF (congestive heart failure) (HCC)     CHF (congestive heart failure), NYHA class I, acute on chronic, combined ([facility] 5/15/2019    Chronic diastolic heart failure (HCC)     Chronic kidney disease (CKD)     Stage 4 renal disease Sees Dr. Jalen Campos    COPD (chronic obstructive pulmonary disease) ([facility])     on 2.5 L oxygen at home x 5 months    Depression     Hemodialysis patient [facility])     Has graft for fistula left arm - No dialysis yet    History of blood palpation bilaterally. No cyanosis, clubbing, or significant edema. No signs atheroembolic event. Palmar arch intact bilaterally. Graft LUE bruising and swelling noted. Painful to touch. Graft is pulsatile and bruit noted. Pulmonary - effort appears normal.  No respiratory distress. Lungs - Breath sounds normal. No wheezes or rales. GI - Abdomen - soft, non tender, bowel sounds X 4 quadrants. No guarding or rebound tenderness. No distension or palpable mass. Genitourinary - deferred. Musculoskeletal - ROM appears normal.  No significant edema. Neurologic - alert and oriented X 3. Physiologic. Skin - warm, dry, and intact. No rash, erythema, or pallor. Psychiatric - mood, affect, and behavior appear normal.  Judgment and thought processes appear normal.    Options have been discussed with the patient including continued medical management vs. proceeding with Fistulogram possible angioplasty/stent. Patient has opted to proceed with Fistulogram possible angioplasty/stent . Risks have been discussed with the patient including but not limited to MI, death, CVA, bleed, nerve injury, steal, and infection. Assessment    1.  End stage renal failure on dialysis [facility])          Plan      Proceed with Fistulogram possible angioplasty/stent

## 2019-08-22 ENCOUNTER — HOSPITAL ENCOUNTER (OUTPATIENT)
Dept: INTERVENTIONAL RADIOLOGY/VASCULAR | Age: 84
Discharge: HOME | End: 2019-08-22
Payer: MEDICARE

## 2019-08-22 VITALS
HEIGHT: 67 IN | RESPIRATION RATE: 22 BRPM | HEART RATE: 60 BPM | OXYGEN SATURATION: 97 % | TEMPERATURE: 98.1 F | BODY MASS INDEX: 28.72 KG/M2 | DIASTOLIC BLOOD PRESSURE: 47 MMHG | SYSTOLIC BLOOD PRESSURE: 131 MMHG

## 2019-08-22 DIAGNOSIS — Z99.2 ESRD ON DIALYSIS (HCC): ICD-10-CM

## 2019-08-22 DIAGNOSIS — N18.6 ESRD ON DIALYSIS (HCC): ICD-10-CM

## 2019-08-22 PROBLEM — E87.70 VOLUME OVERLOAD: Status: RESOLVED | Noted: 2019-03-07 | Resolved: 2019-08-22

## 2019-08-22 PROBLEM — I12.9 CKD STAGE 4 SECONDARY TO HYPERTENSION (HCC): Status: RESOLVED | Noted: 2017-05-29 | Resolved: 2019-08-22

## 2019-08-22 PROBLEM — K85.90 ACUTE PANCREATITIS: Status: RESOLVED | Noted: 2017-07-19 | Resolved: 2019-08-22

## 2019-08-22 PROBLEM — N17.9 ACUTE RENAL FAILURE SUPERIMPOSED ON STAGE 4 CHRONIC KIDNEY DISEASE (HCC): Status: RESOLVED | Noted: 2019-01-20 | Resolved: 2019-08-22

## 2019-08-22 PROBLEM — N18.4 CKD STAGE 4 SECONDARY TO HYPERTENSION (HCC): Status: RESOLVED | Noted: 2017-05-29 | Resolved: 2019-08-22

## 2019-08-22 PROBLEM — N18.4 ACUTE RENAL FAILURE SUPERIMPOSED ON STAGE 4 CHRONIC KIDNEY DISEASE (HCC): Status: RESOLVED | Noted: 2019-01-20 | Resolved: 2019-08-22

## 2019-08-22 PROCEDURE — 6360000002 HC RX W HCPCS: Performed by: SURGERY

## 2019-08-22 PROCEDURE — 6360000002 HC RX W HCPCS: Performed by: PHYSICIAN ASSISTANT

## 2019-08-22 PROCEDURE — 99152 MOD SED SAME PHYS/QHP 5/>YRS: CPT | Performed by: SURGERY

## 2019-08-22 PROCEDURE — 36901 INTRO CATH DIALYSIS CIRCUIT: CPT | Performed by: SURGERY

## 2019-08-22 PROCEDURE — 2500000003 HC RX 250 WO HCPCS: Performed by: SURGERY

## 2019-08-22 PROCEDURE — 99153 MOD SED SAME PHYS/QHP EA: CPT | Performed by: SURGERY

## 2019-08-22 PROCEDURE — 2580000003 HC RX 258: Performed by: PHYSICIAN ASSISTANT

## 2019-08-22 PROCEDURE — C1894 INTRO/SHEATH, NON-LASER: HCPCS

## 2019-08-22 PROCEDURE — 6360000004 HC RX CONTRAST MEDICATION: Performed by: SURGERY

## 2019-08-22 RX ORDER — CEFAZOLIN SODIUM 1 G/50ML
1 INJECTION, SOLUTION INTRAVENOUS
Status: COMPLETED | OUTPATIENT
Start: 2019-08-22 | End: 2019-08-22

## 2019-08-22 RX ORDER — SODIUM CHLORIDE 0.9 % (FLUSH) 0.9 %
10 SYRINGE (ML) INJECTION PRN
Status: DISCONTINUED | OUTPATIENT
Start: 2019-08-22 | End: 2019-08-24 | Stop reason: HOSPADM

## 2019-08-22 RX ORDER — ONDANSETRON 2 MG/ML
4 INJECTION INTRAMUSCULAR; INTRAVENOUS EVERY 8 HOURS PRN
Status: DISCONTINUED | OUTPATIENT
Start: 2019-08-22 | End: 2019-08-24 | Stop reason: HOSPADM

## 2019-08-22 RX ORDER — HEPARIN SODIUM 5000 [USP'U]/ML
INJECTION, SOLUTION INTRAVENOUS; SUBCUTANEOUS
Status: COMPLETED | OUTPATIENT
Start: 2019-08-22 | End: 2019-08-22

## 2019-08-22 RX ORDER — FENTANYL CITRATE 50 UG/ML
INJECTION, SOLUTION INTRAMUSCULAR; INTRAVENOUS
Status: COMPLETED | OUTPATIENT
Start: 2019-08-22 | End: 2019-08-22

## 2019-08-22 RX ORDER — ACETAMINOPHEN 325 MG/1
650 TABLET ORAL EVERY 4 HOURS PRN
Status: DISCONTINUED | OUTPATIENT
Start: 2019-08-22 | End: 2019-08-24 | Stop reason: HOSPADM

## 2019-08-22 RX ORDER — ONDANSETRON 2 MG/ML
4 INJECTION INTRAMUSCULAR; INTRAVENOUS EVERY 6 HOURS PRN
Status: DISCONTINUED | OUTPATIENT
Start: 2019-08-22 | End: 2019-08-24 | Stop reason: HOSPADM

## 2019-08-22 RX ORDER — CLONIDINE HYDROCHLORIDE 0.1 MG/1
0.1 TABLET ORAL PRN
Status: DISCONTINUED | OUTPATIENT
Start: 2019-08-22 | End: 2019-08-24 | Stop reason: HOSPADM

## 2019-08-22 RX ORDER — ASPIRIN 81 MG/1
81 TABLET ORAL ONCE
Status: DISCONTINUED | OUTPATIENT
Start: 2019-08-22 | End: 2019-08-24 | Stop reason: HOSPADM

## 2019-08-22 RX ORDER — MIDAZOLAM HYDROCHLORIDE 1 MG/ML
INJECTION INTRAMUSCULAR; INTRAVENOUS
Status: COMPLETED | OUTPATIENT
Start: 2019-08-22 | End: 2019-08-22

## 2019-08-22 RX ORDER — HYDROCODONE BITARTRATE AND ACETAMINOPHEN 5; 325 MG/1; MG/1
1 TABLET ORAL EVERY 4 HOURS PRN
Status: DISCONTINUED | OUTPATIENT
Start: 2019-08-22 | End: 2019-08-24 | Stop reason: HOSPADM

## 2019-08-22 RX ORDER — SODIUM CHLORIDE 9 MG/ML
INJECTION, SOLUTION INTRAVENOUS CONTINUOUS
Status: DISCONTINUED | OUTPATIENT
Start: 2019-08-22 | End: 2019-08-24 | Stop reason: HOSPADM

## 2019-08-22 RX ORDER — LIDOCAINE HYDROCHLORIDE 20 MG/ML
INJECTION, SOLUTION INFILTRATION; PERINEURAL
Status: COMPLETED | OUTPATIENT
Start: 2019-08-22 | End: 2019-08-22

## 2019-08-22 RX ORDER — HYDROCODONE BITARTRATE AND ACETAMINOPHEN 5; 325 MG/1; MG/1
2 TABLET ORAL EVERY 4 HOURS PRN
Status: DISCONTINUED | OUTPATIENT
Start: 2019-08-22 | End: 2019-08-24 | Stop reason: HOSPADM

## 2019-08-22 RX ADMIN — CEFAZOLIN SODIUM 1 G: 1 INJECTION, SOLUTION INTRAVENOUS at 09:22

## 2019-08-22 RX ADMIN — IOPAMIDOL 15 ML: 612 INJECTION, SOLUTION INTRATHECAL at 09:42

## 2019-08-22 RX ADMIN — FENTANYL CITRATE 25 MCG: 50 INJECTION INTRAMUSCULAR; INTRAVENOUS at 09:30

## 2019-08-22 RX ADMIN — SODIUM CHLORIDE: 9 INJECTION, SOLUTION INTRAVENOUS at 07:45

## 2019-08-22 RX ADMIN — MIDAZOLAM 0.5 MG: 1 INJECTION INTRAMUSCULAR; INTRAVENOUS at 09:29

## 2019-08-22 RX ADMIN — HEPARIN SODIUM 5000 UNITS: 5000 INJECTION, SOLUTION INTRAVENOUS; SUBCUTANEOUS at 09:30

## 2019-08-22 RX ADMIN — LIDOCAINE HYDROCHLORIDE 5 ML: 20 INJECTION, SOLUTION INFILTRATION; PERINEURAL at 09:37

## 2019-08-22 NOTE — PROGRESS NOTES
Pt discharged. IV dc'd. Pt and son verbalized understanding of discharge instructions. All belongings with patient. Pt left via wheelchair with son.

## 2019-08-22 NOTE — OP NOTE
Preprocedure diagnosis:  1. End-stage renal disease on hemodialysis  2. Painful left brachial artery to axillary vein arteriovenous graft    Post procedure diagnosis: Same    Procedure:  1. Left upper extremity arteriovenous graft angiograms including venography to the superior vena cava    Surgeon: Praneeth Dyer MD    Anesthesia: Intravenous/moderate sedation plus local  Estimated blood loss: Less than 50 mL    Findings:  1. There is a patent left brachial artery to axillary vein arteriovenous graft. The arterial and venous anastomoses appear to be fairly widely patent. The graft itself is fairly widely patent. Its somewhat small only around 5 to 6 mm in diameter. The left axillary vein, subclavian vein, innominate vein and superior vena cava are all fairly widely patent. Procedure in detail:    After the patient was consented and given intravenous antibiotics, she was brought to angiography and placed on the angiography suite table supine position. Intravenous/moderate sedation was administered in the patient's left arm and hand were prepped and draped in usual sterile procedure. I was personally responsible for the administration of moderate sedation services during the procedure performed and I confirm requirements described in CPT section on moderate sedation were followed. Pulse oximetry, heart rate, and blood pressure were continuously monitored by an independent trained observer who had no other duties during the procedure. The drugs utilized were 0.5 mg intravenous Versed and 25 mcg intravenous fentanyl (please see nursing log for details). The total face-to-face time was 24 minutes. Skin and subcutaneous tissues in the distal upper arm were anesthetized with lidocaine. Micropuncture needle was used to cannulate the graft. Seldinger technique was utilized to place a 4 [place] glide sheath.     Left upper extremity arteriovenous graft angiograms including venography to the superior vena

## 2019-08-27 ENCOUNTER — ANTI-COAG VISIT (OUTPATIENT)
Dept: INTERNAL MEDICINE | Age: 84
End: 2019-08-27
Payer: MEDICARE

## 2019-08-27 DIAGNOSIS — I48.20 CHRONIC ATRIAL FIBRILLATION (HCC): ICD-10-CM

## 2019-08-27 LAB — INR BLD: 2

## 2019-08-27 PROCEDURE — 93793 ANTICOAG MGMT PT WARFARIN: CPT | Performed by: INTERNAL MEDICINE

## 2019-09-03 ENCOUNTER — ANTI-COAG VISIT (OUTPATIENT)
Dept: INTERNAL MEDICINE | Age: 84
End: 2019-09-03
Payer: MEDICARE

## 2019-09-03 DIAGNOSIS — I48.20 CHRONIC ATRIAL FIBRILLATION (HCC): ICD-10-CM

## 2019-09-03 LAB — INR BLD: 2.4

## 2019-09-03 PROCEDURE — 93793 ANTICOAG MGMT PT WARFARIN: CPT | Performed by: INTERNAL MEDICINE

## 2019-09-03 RX ORDER — BUMETANIDE 1 MG/1
TABLET ORAL
Qty: 180 TABLET | Refills: 3 | Status: SHIPPED | OUTPATIENT
Start: 2019-09-03 | End: 2019-09-06

## 2019-09-03 RX ORDER — LOSARTAN POTASSIUM 100 MG/1
TABLET ORAL
Qty: 45 TABLET | Refills: 3 | Status: SHIPPED | OUTPATIENT
Start: 2019-09-03 | End: 2020-01-10 | Stop reason: SDUPTHER

## 2019-09-04 ENCOUNTER — TELEPHONE (OUTPATIENT)
Dept: VASCULAR SURGERY | Age: 84
End: 2019-09-04

## 2019-09-04 DIAGNOSIS — Z01.818 PRE-OP TESTING: Primary | ICD-10-CM

## 2019-09-05 NOTE — TELEPHONE ENCOUNTER
I called and spoke with the patient. I went over the instructions with her. She will have her son do these. I have pended the script to the pharmacy.

## 2019-09-06 ENCOUNTER — HOSPITAL ENCOUNTER (OUTPATIENT)
Dept: PREADMISSION TESTING | Age: 84
Discharge: HOME | End: 2019-09-10
Payer: MEDICARE

## 2019-09-06 ENCOUNTER — HOSPITAL ENCOUNTER (OUTPATIENT)
Dept: GENERAL RADIOLOGY | Age: 84
Discharge: HOME | End: 2019-09-06
Payer: MEDICARE

## 2019-09-06 VITALS — HEIGHT: 67 IN | WEIGHT: 169.75 LBS | BODY MASS INDEX: 26.64 KG/M2

## 2019-09-06 DIAGNOSIS — Z01.818 PRE-OP TESTING: ICD-10-CM

## 2019-09-06 PROCEDURE — 87081 CULTURE SCREEN ONLY: CPT

## 2019-09-06 PROCEDURE — 71046 X-RAY EXAM CHEST 2 VIEWS: CPT

## 2019-09-06 PROCEDURE — 93005 ELECTROCARDIOGRAM TRACING: CPT

## 2019-09-06 RX ORDER — BUMETANIDE 1 MG/1
1 TABLET ORAL DAILY
COMMUNITY
End: 2020-01-10 | Stop reason: SDUPTHER

## 2019-09-06 RX ORDER — ALLOPURINOL 100 MG/1
100 TABLET ORAL DAILY
COMMUNITY
End: 2020-01-10 | Stop reason: SDUPTHER

## 2019-09-07 LAB
MRSA CULTURE ONLY: ABNORMAL
ORGANISM: ABNORMAL

## 2019-09-08 LAB
EKG P AXIS: NORMAL DEGREES
EKG P-R INTERVAL: NORMAL MS
EKG Q-T INTERVAL: 464 MS
EKG QRS DURATION: 138 MS
EKG QTC CALCULATION (BAZETT): 465 MS
EKG T AXIS: 56 DEGREES

## 2019-09-09 ENCOUNTER — PREP FOR PROCEDURE (OUTPATIENT)
Dept: VASCULAR SURGERY | Age: 84
End: 2019-09-09

## 2019-09-09 RX ORDER — ASPIRIN 81 MG/1
81 TABLET ORAL ONCE
Status: CANCELLED | OUTPATIENT
Start: 2019-09-09 | End: 2019-09-09

## 2019-09-09 RX ORDER — SODIUM CHLORIDE 0.9 % (FLUSH) 0.9 %
10 SYRINGE (ML) INJECTION EVERY 12 HOURS SCHEDULED
Status: CANCELLED | OUTPATIENT
Start: 2019-09-09

## 2019-09-09 RX ORDER — SODIUM CHLORIDE 0.9 % (FLUSH) 0.9 %
10 SYRINGE (ML) INJECTION PRN
Status: CANCELLED | OUTPATIENT
Start: 2019-09-09

## 2019-09-09 NOTE — PROGRESS NOTES
Paroxysmal atrial fibrillation (HCC) 08/25/2015     Current Outpatient Medications   Medication Sig Dispense Refill    OXYGEN Inhale 3 L into the lungs      warfarin (COUMADIN) 1 MG tablet Take 1 mg by mouth Indications: one tablet daily except one half tablet on Saturdays       ondansetron (ZOFRAN) 4 MG tablet Take 1 tablet by mouth 3 times daily as needed for Nausea or Vomiting 30 tablet 0    bumetanide (BUMEX) 1 MG tablet TAKE 1 TABLET TWICE A DAY (Patient taking differently: TAKE 1 tablet once a day) 180 tablet 3    losartan (COZAAR) 100 MG tablet TAKE 1/2 TABLET DAILY 45 tablet 3    allopurinol (ZYLOPRIM) 100 MG tablet TAKE 1 TABLET DAILY 90 tablet 3    metoprolol tartrate (LOPRESSOR) 50 MG tablet TAKE 1 TABLET DAILY 90 tablet 3    BiPAP Machine MISC by Does not apply route nightly      amLODIPine (NORVASC) 10 MG tablet Take 10 mg by mouth daily      Levothyroxine Sodium (SYNTHROID PO) Take 0.2 mg by mouth daily Indications: Patient takes 2 tablets on Mondays, Wednesdays and Fridays. Other days, she only takes 1 tab Take 2 tablets of 0.2 on Wednesdays       No current facility-administered medications for this visit. Allergies: Patient has no known allergies.   Past Medical History:   Diagnosis Date    Anemia     B12 deficiency     BiPAP (biphasic positive airway pressure) dependence     15cm/11cm    Blood circulation, collateral     Ankles stay cold all time x 6 months    CAD (coronary artery disease)     Atrial Fib x years    CHF (congestive heart failure) (HCC)     CHF (congestive heart failure), NYHA class I, acute on chronic, combined ([facility] 5/15/2019    Chronic diastolic heart failure (HCC)     Chronic kidney disease (CKD)     Stage 4 renal disease Sees Dr. Jacek Alexandra    COPD (chronic obstructive pulmonary disease) ([facility])     on 2.5 L oxygen at home x 5 months    Depression     Hemodialysis patient [facility])     Has graft for fistula left arm - No dialysis yet    History of blood

## 2019-09-09 NOTE — PROGRESS NOTES
Patient Care Team:  Bertha Graham MD as PCP - General (Family Medicine)  Bertha Graham MD as PCP - [facility] Empaneled Provider      History and Physical    Mrs. Kiya Yung is a 82 yo female who has a history of ESRD on chronic HD. She underwent a placement of a LUE AV graft on 12/7/18 by Dr. Juan Alfredo. This became so painful to cannulate and run on dialysis. She comes in today for evaluation of her graft and possible options.      Tawanda Godinez is a 81 y.o. female with the following history reviewed and recorded in [employer]:  Patient Active Problem List    Diagnosis Date Noted    End stage renal failure on dialysis ([address] 08/13/2019    Chest pain 05/15/2019    Hypoalbuminemia 05/15/2019    Tendinopathy of left rotator cuff 03/09/2019    ESRD on dialysis ([address] 03/08/2019    Macrocytic anemia 03/08/2019    Coagulopathy ([address] 03/08/2019    Hypokalemia 03/08/2019    Hypochloremia 03/08/2019    Left shoulder pain 03/08/2019    Acute on chronic respiratory failure with hypoxia and hypercapnia (HCC) 03/08/2019    Volume overload 03/07/2019    Thrombocytopenia ([address] 01/26/2019    Acute renal failure superimposed on stage 4 chronic kidney disease ([address] 01/20/2019    Renal failure     Encounter for current long-term use of anticoagulants 04/30/2018    Palliative care patient 01/21/2018    Acute pancreatitis 07/19/2017    Abnormal CAT scan 07/19/2017    Chronic atrial fibrillation ([address] 07/19/2017    COPD exacerbation ([address] 05/29/2017    Chronic respiratory failure with hypoxia, on home O2 therapy ([address] 05/29/2017    Essential hypertension 05/29/2017    CKD stage 4 secondary to hypertension ([address] 05/29/2017    Obstructive sleep apnea     Periodic limb movement disorder     Hypoxemia     BiPAP (biphasic positive airway pressure) dependence      [id number]      Diastolic dysfunction 06/59/3610    Bradycardia by electrocardiogram 08/25/2015    Left ventricular dysfunction 08/25/2015   

## 2019-09-09 NOTE — H&P (VIEW-ONLY)
Benedetto Meigs, PA-C   Physician Assistant   Physician Assistant Medical   Progress Notes   Signed   Encounter Date:  8/13/2019               Signed                Patient Care Team:  Lori Houser MD as PCP - General (Family Medicine)  Lori Houser MD as PCP - [facility] Empaneled Provider        History and Physical     Mrs. Paramjit Quigley is a 82 yo female who has a history of ESRD on chronic HD. She underwent a placement of a LUE AV graft on 12/7/18 by Dr. Sarita Brooke. This became so painful to cannulate and run on dialysis.  She comes in today for evaluation of her graft and possible options.      Savannah Mera is a 81 y.o. female with the following history reviewed and recorded in [employer]:        Patient Active Problem List     Diagnosis Date Noted    End stage renal failure on dialysis ([address] 08/13/2019    Chest pain 05/15/2019    Hypoalbuminemia 05/15/2019    Tendinopathy of left rotator cuff 03/09/2019    ESRD on dialysis ([address] 03/08/2019    Macrocytic anemia 03/08/2019    Coagulopathy ([address] 03/08/2019    Hypokalemia 03/08/2019    Hypochloremia 03/08/2019    Left shoulder pain 03/08/2019    Acute on chronic respiratory failure with hypoxia and hypercapnia (HCC) 03/08/2019    Volume overload 03/07/2019    Thrombocytopenia ([address] 01/26/2019    Acute renal failure superimposed on stage 4 chronic kidney disease ([address] 01/20/2019    Renal failure      Encounter for current long-term use of anticoagulants 04/30/2018    Palliative care patient 01/21/2018    Acute pancreatitis 07/19/2017    Abnormal CAT scan 07/19/2017    Chronic atrial fibrillation ([address] 07/19/2017    COPD exacerbation ([address] 05/29/2017    Chronic respiratory failure with hypoxia, on home O2 therapy ([address] 05/29/2017    Essential hypertension 05/29/2017    CKD stage 4 secondary to hypertension ([address] 05/29/2017    Obstructive sleep apnea      Periodic limb movement disorder      Hypoxemia      BiPAP (biphasic positive airway     Packs/day: 1.00       Years: 50.00       Pack years: 50.00       Types: Cigarettes       Last attempt to quit: 2013       Years since quittin.2    Smokeless tobacco: Never Used   Substance Use Topics    Alcohol use: No       Alcohol/week: 0.0 standard drinks         Old records have been obtained from the referring provider. These records have been reviewed and summarized.        Review of Systems     Constitutional - no significant activity change, appetite change, or unexpected weight change. No fever or chills. No diaphoresis or significant fatigue. HENT - no significant rhinorrhea or epistaxis. No tinnitus or significant hearing loss. Eyes - no sudden vision change or amaurosis. Respiratory - no significant shortness of breath, wheezing, or stridor. No apnea, cough, or chest tightness associated with shortness of breath. Cardiovascular - no chest pain, syncope, or significant dizziness. No palpitations or significant leg swelling. No claudication. Gastrointestinal - no abdominal swelling or pain. No blood in stool. No severe constipation, diarrhea, nausea, or vomiting. Genitourinary - No dysuria, frequency, or urgency. No flank pain or hematuria. Musculoskeletal - no back pain, gait disturbance, or myalgia. Skin - no color change, rash, pallor, or new wound. Neurologic - no dizziness, facial asymmetry, or light headedness. No seizures. No speech difficulty or lateralizing weakness. Hematologic - no easy bruising or excessive bleeding. Psychiatric - no severe anxiety or nervousness. No confusion. All other review of systems are negative.     Physical Exam     /64 (Site: Right Upper Arm)   Pulse 54   Temp 99.2 °F (37.3 °C)   SpO2 96%      Constitutional - well developed, well nourished. No diaphoresis or acute distress. HENT - head normocephalic. Right external ear canal appears normal.  Left external ear canal appears normal.  Septum appears midline.   Eyes - conjunctiva normal.  EOMS normal.  No exudate. No icterus. Neck- ROM appears normal, no tracheal deviation. Cardiovascular - Regular rate and rhythm. Heart sounds are normal.  No murmur, rub, or gallop. Carotid pulses are 2+ to palpation bilaterally without bruit. Extremities - Radial and brachial pulses are 2+ to palpation bilaterally. No cyanosis, clubbing, or significant edema. No signs atheroembolic event. Palmar arch intact bilaterally. Graft LUE bruising and swelling noted. Painful to touch. Graft is pulsatile and bruit noted. Pulmonary - effort appears normal.  No respiratory distress. Lungs - Breath sounds normal. No wheezes or rales. GI - Abdomen - soft, non tender, bowel sounds X 4 quadrants. No guarding or rebound tenderness. No distension or palpable mass. Genitourinary - deferred. Musculoskeletal - ROM appears normal.  No significant edema. Neurologic - alert and oriented X 3. Physiologic. Skin - warm, dry, and intact. No rash, erythema, or pallor. Psychiatric - mood, affect, and behavior appear normal.  Judgment and thought processes appear normal.     Options have been discussed with the patient including continued medical management vs. proceeding with Fistulogram possible angioplasty/stent. Patient has opted to proceed with Fistulogram possible angioplasty/stent . Risks have been discussed with the patient including but not limited to MI, death, CVA, bleed, nerve injury, steal, and infection.        Assessment     1.  End stage renal failure on dialysis [facility])             Plan        Proceed with left upper extremity av graft angiograms/venograms,  possible angioplasty/stent              Addendum: 8/22/19  Left upper extremity av graft angiograms/venograms,  possible angioplasty/stent   Findings:  1.  There is a patent left brachial artery to axillary vein arteriovenous graft.  The arterial and venous anastomoses appear to be fairly widely patent.  The graft itself is

## 2019-09-10 ENCOUNTER — HOSPITAL ENCOUNTER (OUTPATIENT)
Age: 84
Setting detail: OUTPATIENT SURGERY
Discharge: HOME | End: 2019-09-10
Attending: SURGERY | Admitting: SURGERY
Payer: MEDICARE

## 2019-09-10 ENCOUNTER — APPOINTMENT (OUTPATIENT)
Dept: INTERVENTIONAL RADIOLOGY/VASCULAR | Age: 84
End: 2019-09-10
Attending: SURGERY
Payer: MEDICARE

## 2019-09-10 ENCOUNTER — ANESTHESIA EVENT (OUTPATIENT)
Dept: OPERATING ROOM | Age: 84
End: 2019-09-10
Payer: MEDICARE

## 2019-09-10 ENCOUNTER — ANESTHESIA (OUTPATIENT)
Dept: OPERATING ROOM | Age: 84
End: 2019-09-10
Payer: MEDICARE

## 2019-09-10 VITALS
SYSTOLIC BLOOD PRESSURE: 146 MMHG | RESPIRATION RATE: 2 BRPM | TEMPERATURE: 97 F | OXYGEN SATURATION: 91 % | DIASTOLIC BLOOD PRESSURE: 69 MMHG

## 2019-09-10 VITALS
TEMPERATURE: 97.9 F | BODY MASS INDEX: 26.53 KG/M2 | OXYGEN SATURATION: 94 % | WEIGHT: 169 LBS | DIASTOLIC BLOOD PRESSURE: 12 MMHG | HEART RATE: 57 BPM | HEIGHT: 67 IN | SYSTOLIC BLOOD PRESSURE: 105 MMHG | RESPIRATION RATE: 16 BRPM

## 2019-09-10 DIAGNOSIS — N18.6 ESRD ON DIALYSIS (HCC): Primary | ICD-10-CM

## 2019-09-10 DIAGNOSIS — Z99.2 ESRD ON DIALYSIS (HCC): Primary | ICD-10-CM

## 2019-09-10 PROBLEM — T82.590D AV GRAFT MALFUNCTION, SUBSEQUENT ENCOUNTER: Status: ACTIVE | Noted: 2019-09-10

## 2019-09-10 LAB
ANION GAP SERPL CALCULATED.3IONS-SCNC: 14 MMOL/L (ref 7–19)
APTT: 41.3 SEC (ref 26–36.2)
BASOPHILS ABSOLUTE: 0.1 K/UL (ref 0–0.2)
BASOPHILS RELATIVE PERCENT: 1.1 % (ref 0–1)
BUN BLDV-MCNC: 52 MG/DL (ref 8–23)
CALCIUM SERPL-MCNC: 10 MG/DL (ref 8.8–10.2)
CHLORIDE BLD-SCNC: 98 MMOL/L (ref 98–111)
CO2: 28 MMOL/L (ref 22–29)
CREAT SERPL-MCNC: 5.7 MG/DL (ref 0.5–0.9)
EOSINOPHILS ABSOLUTE: 0.4 K/UL (ref 0–0.6)
EOSINOPHILS RELATIVE PERCENT: 7.2 % (ref 0–5)
GFR NON-AFRICAN AMERICAN: 7
GLUCOSE BLD-MCNC: 101 MG/DL (ref 74–109)
HCT VFR BLD CALC: 36.9 % (ref 37–47)
HEMOGLOBIN: 11.4 G/DL (ref 12–16)
IMMATURE GRANULOCYTES #: 0 K/UL
INR BLD: 2.39 (ref 0.88–1.18)
LYMPHOCYTES ABSOLUTE: 1.4 K/UL (ref 1.1–4.5)
LYMPHOCYTES RELATIVE PERCENT: 24.2 % (ref 20–40)
MCH RBC QN AUTO: 30.6 PG (ref 27–31)
MCHC RBC AUTO-ENTMCNC: 30.9 G/DL (ref 33–37)
MCV RBC AUTO: 98.9 FL (ref 81–99)
MONOCYTES ABSOLUTE: 0.5 K/UL (ref 0–0.9)
MONOCYTES RELATIVE PERCENT: 8.4 % (ref 0–10)
NEUTROPHILS ABSOLUTE: 3.3 K/UL (ref 1.5–7.5)
NEUTROPHILS RELATIVE PERCENT: 58.7 % (ref 50–65)
PDW BLD-RTO: 16.2 % (ref 11.5–14.5)
PLATELET # BLD: 104 K/UL (ref 130–400)
PMV BLD AUTO: 12.8 FL (ref 9.4–12.3)
POTASSIUM REFLEX MAGNESIUM: 4.6 MMOL/L (ref 3.5–4.9)
PROTHROMBIN TIME: 25.3 SEC (ref 12–14.6)
RBC # BLD: 3.73 M/UL (ref 4.2–5.4)
SODIUM BLD-SCNC: 140 MMOL/L (ref 136–145)
WBC # BLD: 5.6 K/UL (ref 4.8–10.8)

## 2019-09-10 PROCEDURE — 80048 BASIC METABOLIC PNL TOTAL CA: CPT

## 2019-09-10 PROCEDURE — 85025 COMPLETE CBC W/AUTO DIFF WBC: CPT

## 2019-09-10 PROCEDURE — 6370000000 HC RX 637 (ALT 250 FOR IP): Performed by: SURGERY

## 2019-09-10 PROCEDURE — 3700000001 HC ADD 15 MINUTES (ANESTHESIA): Performed by: SURGERY

## 2019-09-10 PROCEDURE — 2580000003 HC RX 258: Performed by: SURGERY

## 2019-09-10 PROCEDURE — 3700000000 HC ANESTHESIA ATTENDED CARE: Performed by: SURGERY

## 2019-09-10 PROCEDURE — C1768 GRAFT, VASCULAR: HCPCS | Performed by: SURGERY

## 2019-09-10 PROCEDURE — 7100000010 HC PHASE II RECOVERY - FIRST 15 MIN: Performed by: SURGERY

## 2019-09-10 PROCEDURE — 2709999900 HC NON-CHARGEABLE SUPPLY: Performed by: SURGERY

## 2019-09-10 PROCEDURE — 85730 THROMBOPLASTIN TIME PARTIAL: CPT

## 2019-09-10 PROCEDURE — 2580000003 HC RX 258: Performed by: NURSE ANESTHETIST, CERTIFIED REGISTERED

## 2019-09-10 PROCEDURE — 6360000002 HC RX W HCPCS: Performed by: NURSE ANESTHETIST, CERTIFIED REGISTERED

## 2019-09-10 PROCEDURE — 36415 COLL VENOUS BLD VENIPUNCTURE: CPT

## 2019-09-10 PROCEDURE — 3600000004 HC SURGERY LEVEL 4 BASE: Performed by: SURGERY

## 2019-09-10 PROCEDURE — 7100000011 HC PHASE II RECOVERY - ADDTL 15 MIN: Performed by: SURGERY

## 2019-09-10 PROCEDURE — 2580000003 HC RX 258: Performed by: ANESTHESIOLOGY

## 2019-09-10 PROCEDURE — C1769 GUIDE WIRE: HCPCS | Performed by: SURGERY

## 2019-09-10 PROCEDURE — 36832 AV FISTULA REVISION OPEN: CPT | Performed by: SURGERY

## 2019-09-10 PROCEDURE — 7100000000 HC PACU RECOVERY - FIRST 15 MIN: Performed by: SURGERY

## 2019-09-10 PROCEDURE — 85610 PROTHROMBIN TIME: CPT

## 2019-09-10 PROCEDURE — 3600000014 HC SURGERY LEVEL 4 ADDTL 15MIN: Performed by: SURGERY

## 2019-09-10 PROCEDURE — C1750 CATH, HEMODIALYSIS,LONG-TERM: HCPCS | Performed by: SURGERY

## 2019-09-10 PROCEDURE — 2500000003 HC RX 250 WO HCPCS: Performed by: NURSE ANESTHETIST, CERTIFIED REGISTERED

## 2019-09-10 PROCEDURE — 6360000002 HC RX W HCPCS: Performed by: SURGERY

## 2019-09-10 PROCEDURE — 77001 FLUOROGUIDE FOR VEIN DEVICE: CPT | Performed by: SURGERY

## 2019-09-10 PROCEDURE — 6360000002 HC RX W HCPCS: Performed by: PHYSICIAN ASSISTANT

## 2019-09-10 PROCEDURE — 36558 INSERT TUNNELED CV CATH: CPT | Performed by: SURGERY

## 2019-09-10 PROCEDURE — 7100000001 HC PACU RECOVERY - ADDTL 15 MIN: Performed by: SURGERY

## 2019-09-10 PROCEDURE — 6370000000 HC RX 637 (ALT 250 FOR IP): Performed by: PHYSICIAN ASSISTANT

## 2019-09-10 DEVICE — GRAFT VASC L40CM ID7MM BOV CAR ART CLLGN FOR FUNC HEMO DYLS: Type: IMPLANTABLE DEVICE | Site: ARM | Status: FUNCTIONAL

## 2019-09-10 RX ORDER — ONDANSETRON 2 MG/ML
INJECTION INTRAMUSCULAR; INTRAVENOUS PRN
Status: DISCONTINUED | OUTPATIENT
Start: 2019-09-10 | End: 2019-09-10 | Stop reason: SDUPTHER

## 2019-09-10 RX ORDER — ACETAMINOPHEN 325 MG/1
650 TABLET ORAL EVERY 4 HOURS PRN
Status: DISCONTINUED | OUTPATIENT
Start: 2019-09-10 | End: 2019-09-10 | Stop reason: HOSPADM

## 2019-09-10 RX ORDER — PROPOFOL 10 MG/ML
INJECTION, EMULSION INTRAVENOUS PRN
Status: DISCONTINUED | OUTPATIENT
Start: 2019-09-10 | End: 2019-09-10 | Stop reason: SDUPTHER

## 2019-09-10 RX ORDER — HEPARIN SODIUM 1000 [USP'U]/ML
INJECTION, SOLUTION INTRAVENOUS; SUBCUTANEOUS PRN
Status: DISCONTINUED | OUTPATIENT
Start: 2019-09-10 | End: 2019-09-10 | Stop reason: SDUPTHER

## 2019-09-10 RX ORDER — ROCURONIUM BROMIDE 10 MG/ML
INJECTION, SOLUTION INTRAVENOUS PRN
Status: DISCONTINUED | OUTPATIENT
Start: 2019-09-10 | End: 2019-09-10 | Stop reason: SDUPTHER

## 2019-09-10 RX ORDER — SODIUM CHLORIDE 0.9 % (FLUSH) 0.9 %
10 SYRINGE (ML) INJECTION EVERY 12 HOURS SCHEDULED
Status: DISCONTINUED | OUTPATIENT
Start: 2019-09-10 | End: 2019-09-10 | Stop reason: HOSPADM

## 2019-09-10 RX ORDER — ONDANSETRON 2 MG/ML
4 INJECTION INTRAMUSCULAR; INTRAVENOUS EVERY 8 HOURS PRN
Status: DISCONTINUED | OUTPATIENT
Start: 2019-09-10 | End: 2019-09-10 | Stop reason: HOSPADM

## 2019-09-10 RX ORDER — SODIUM CHLORIDE 450 MG/100ML
INJECTION, SOLUTION INTRAVENOUS CONTINUOUS
Status: DISCONTINUED | OUTPATIENT
Start: 2019-09-10 | End: 2019-09-10 | Stop reason: HOSPADM

## 2019-09-10 RX ORDER — SODIUM CHLORIDE 0.9 % (FLUSH) 0.9 %
10 SYRINGE (ML) INJECTION PRN
Status: DISCONTINUED | OUTPATIENT
Start: 2019-09-10 | End: 2019-09-10 | Stop reason: HOSPADM

## 2019-09-10 RX ORDER — SODIUM CHLORIDE 450 MG/100ML
INJECTION, SOLUTION INTRAVENOUS CONTINUOUS PRN
Status: DISCONTINUED | OUTPATIENT
Start: 2019-09-10 | End: 2019-09-10 | Stop reason: SDUPTHER

## 2019-09-10 RX ORDER — FENTANYL CITRATE 50 UG/ML
INJECTION, SOLUTION INTRAMUSCULAR; INTRAVENOUS PRN
Status: DISCONTINUED | OUTPATIENT
Start: 2019-09-10 | End: 2019-09-10 | Stop reason: SDUPTHER

## 2019-09-10 RX ORDER — HYDROCODONE BITARTRATE AND ACETAMINOPHEN 5; 325 MG/1; MG/1
2 TABLET ORAL EVERY 4 HOURS PRN
Status: DISCONTINUED | OUTPATIENT
Start: 2019-09-10 | End: 2019-09-10 | Stop reason: HOSPADM

## 2019-09-10 RX ORDER — LIDOCAINE HYDROCHLORIDE 10 MG/ML
INJECTION, SOLUTION EPIDURAL; INFILTRATION; INTRACAUDAL; PERINEURAL PRN
Status: DISCONTINUED | OUTPATIENT
Start: 2019-09-10 | End: 2019-09-10 | Stop reason: SDUPTHER

## 2019-09-10 RX ORDER — HYDROCODONE BITARTRATE AND ACETAMINOPHEN 5; 325 MG/1; MG/1
1 TABLET ORAL EVERY 4 HOURS PRN
Status: DISCONTINUED | OUTPATIENT
Start: 2019-09-10 | End: 2019-09-10 | Stop reason: HOSPADM

## 2019-09-10 RX ORDER — HYDROCODONE BITARTRATE AND ACETAMINOPHEN 5; 325 MG/1; MG/1
1 TABLET ORAL EVERY 8 HOURS PRN
Qty: 20 TABLET | Refills: 0 | Status: SHIPPED | OUTPATIENT
Start: 2019-09-10 | End: 2019-09-17

## 2019-09-10 RX ORDER — ASPIRIN 81 MG/1
81 TABLET ORAL ONCE
Status: COMPLETED | OUTPATIENT
Start: 2019-09-10 | End: 2019-09-10

## 2019-09-10 RX ADMIN — LIDOCAINE HYDROCHLORIDE 50 MG: 10 INJECTION, SOLUTION EPIDURAL; INFILTRATION; INTRACAUDAL; PERINEURAL at 10:07

## 2019-09-10 RX ADMIN — SUGAMMADEX 150 MG: 100 INJECTION, SOLUTION INTRAVENOUS at 11:44

## 2019-09-10 RX ADMIN — ONDANSETRON HYDROCHLORIDE 4 MG: 2 INJECTION, SOLUTION INTRAMUSCULAR; INTRAVENOUS at 11:33

## 2019-09-10 RX ADMIN — HEPARIN SODIUM 2000 UNITS: 1000 INJECTION, SOLUTION INTRAVENOUS; SUBCUTANEOUS at 10:44

## 2019-09-10 RX ADMIN — SODIUM CHLORIDE: 4.5 INJECTION, SOLUTION INTRAVENOUS at 10:02

## 2019-09-10 RX ADMIN — FENTANYL CITRATE 50 MCG: 50 INJECTION INTRAMUSCULAR; INTRAVENOUS at 10:30

## 2019-09-10 RX ADMIN — ASPIRIN 81 MG: 81 TABLET, COATED ORAL at 09:06

## 2019-09-10 RX ADMIN — ROCURONIUM BROMIDE 40 MG: 10 INJECTION INTRAVENOUS at 10:08

## 2019-09-10 RX ADMIN — Medication 1000 MG: at 09:05

## 2019-09-10 RX ADMIN — PROPOFOL 110 MG: 10 INJECTION, EMULSION INTRAVENOUS at 10:07

## 2019-09-10 RX ADMIN — SODIUM CHLORIDE: 4.5 INJECTION, SOLUTION INTRAVENOUS at 09:10

## 2019-09-10 RX ADMIN — FENTANYL CITRATE 50 MCG: 50 INJECTION INTRAMUSCULAR; INTRAVENOUS at 10:07

## 2019-09-10 RX ADMIN — HYDROCODONE BITARTRATE AND ACETAMINOPHEN 1 TABLET: 5; 325 TABLET ORAL at 13:28

## 2019-09-10 ASSESSMENT — PAIN SCALES - GENERAL
PAINLEVEL_OUTOF10: 6
PAINLEVEL_OUTOF10: 0
PAINLEVEL_OUTOF10: 0
PAINLEVEL_OUTOF10: 3
PAINLEVEL_OUTOF10: 0
PAINLEVEL_OUTOF10: 0

## 2019-09-10 ASSESSMENT — PAIN DESCRIPTION - DESCRIPTORS
DESCRIPTORS: ACHING
DESCRIPTORS: ACHING

## 2019-09-10 ASSESSMENT — PAIN DESCRIPTION - ORIENTATION
ORIENTATION: LEFT
ORIENTATION: RIGHT

## 2019-09-10 ASSESSMENT — PAIN DESCRIPTION - PAIN TYPE
TYPE: SURGICAL PAIN
TYPE: SURGICAL PAIN

## 2019-09-10 ASSESSMENT — PAIN - FUNCTIONAL ASSESSMENT: PAIN_FUNCTIONAL_ASSESSMENT: 0-10

## 2019-09-10 ASSESSMENT — PAIN DESCRIPTION - LOCATION
LOCATION: NECK
LOCATION: ARM;NECK

## 2019-09-10 ASSESSMENT — LIFESTYLE VARIABLES: SMOKING_STATUS: 0

## 2019-09-10 ASSESSMENT — PAIN DESCRIPTION - FREQUENCY: FREQUENCY: CONTINUOUS

## 2019-09-10 NOTE — ANESTHESIA PRE PROCEDURE
Last Rate Last Dose    sodium chloride flush 0.9 % injection 10 mL  10 mL Intravenous 2 times per day Cardinal Inna PA-C        sodium chloride flush 0.9 % injection 10 mL  10 mL Intravenous PRN Meghann Lomeli PA-C        vancomycin (VANCOCIN) 1 g in dextrose 5% 250 mL IVPB  1,000 mg Intravenous On Call to Adolph Isabel PA-C        aspirin EC tablet 81 mg  81 mg Oral Once Cardinal Inna PA-C           Allergies:  No Known Allergies    Problem List:    Patient Active Problem List   Diagnosis Code    Diastolic dysfunction Z09.01    Bradycardia by electrocardiogram R00.1    Left ventricular dysfunction I51.9    Paroxysmal atrial fibrillation ([facility]) I48.0    Obstructive sleep apnea G47.33    Periodic limb movement disorder G47.61    Hypoxemia R09.02    BiPAP (biphasic positive airway pressure) dependence Z99.89    COPD exacerbation ([facility]) J44.1    Chronic respiratory failure with hypoxia, on home O2 therapy ([facility]) J96.11, Z99.81    Essential hypertension I10    Abnormal CAT scan R93.89    Chronic atrial fibrillation ([facility] I48.2    Encounter for current long-term use of anticoagulants Z79.01    Renal failure N19    Palliative care patient Z51.5    Thrombocytopenia ([facility] D69.6    ESRD on dialysis ([facility] N18.6, Z99.2    Macrocytic anemia D53.9    Coagulopathy ([facility]) D68.9    Hypokalemia E87.6    Hypochloremia E87.8    Left shoulder pain M25.512    Acute on chronic respiratory failure with hypoxia and hypercapnia ([facility]) J96.21, J96.22    Tendinopathy of left rotator cuff M67.912    Chest pain R07.9    Hypoalbuminemia E88.09       Past Medical History:        Diagnosis Date    Anemia     B12 deficiency     BiPAP (biphasic positive airway pressure) dependence     15cm/11cm    Blood circulation, collateral     Ankles stay cold all time x 6 months    CAD (coronary artery disease)     Atrial Fib x years    CHF (congestive heart failure) ([facility])     CHF (congestive heart failure),

## 2019-09-10 NOTE — OP NOTE
graft was then cut distally and and and Artegraft to end PTFE anastomosis was performed 6-0 Prolene running suture. The anastomosis was checked and excellent hemostasis. Next, the PTFE graft was transected in the axillary incision and Artegraft was cut to length and then an end graft to end PTFE anastomosis was created with 6-0 Prolene running suture. Prior to completing this anastomosis, standard flushing techniques were used to remove any debris from the native vessels and graft and flow was restored. The patient has a nicely palpable thrill in the Artegraft. Hemostasis is excellent in both wounds. The wounds were closed in layers with 3-0 PDS subcutaneous sutures, 4-0 nylon interrupted vertical mattress sutures. Sterile dressings were placed. The patient's right neck and chest were prepped and draped in the usual sterile fashion. Skin and subcutaneous tissues over the internal jugular vein were anesthetized with licocaine. The right internal jugular vein was cannulated under ultrasound guidance with a micropuncture needle. Seldinger technique was utilized to place an 0.035 wire into the inferior vena cava under fluoroscopic visualization. The right neck and chest were anesthetized with lidocaine. An incision was made in the right neck over the wire with knife. A separate incision was made in the right chest with knife. The catheter was tunneled from the chest to the neck incision. Dilators were passed over the wire under fluoroscopic visualization. A dilator/tear-away sheath was placed over the wire under fluoroscopic visualization and the dilator and wire were removed. The catheter was placed through the tear-away sheath and down to the right atrium under fluoroscopic visualization. The tear-away sheath was removed and the catheter was withdrawn until the tips were in the superior vena cava/right atrial junction.       The right neck wound was closed in layers with 3-0 vicryl

## 2019-09-11 ENCOUNTER — ANTI-COAG VISIT (OUTPATIENT)
Dept: INTERNAL MEDICINE | Age: 84
End: 2019-09-11
Payer: MEDICARE

## 2019-09-11 DIAGNOSIS — I48.20 CHRONIC ATRIAL FIBRILLATION (HCC): ICD-10-CM

## 2019-09-11 LAB — INR BLD: 2.9

## 2019-09-11 PROCEDURE — 93793 ANTICOAG MGMT PT WARFARIN: CPT | Performed by: INTERNAL MEDICINE

## 2019-09-12 DIAGNOSIS — I10 ESSENTIAL HYPERTENSION: ICD-10-CM

## 2019-09-12 LAB
ALBUMIN SERPL-MCNC: 3.2 G/DL (ref 3.5–5.2)
ALP BLD-CCNC: 69 U/L (ref 35–104)
ALT SERPL-CCNC: 7 U/L (ref 5–33)
ANION GAP SERPL CALCULATED.3IONS-SCNC: 13 MMOL/L (ref 7–19)
AST SERPL-CCNC: 14 U/L (ref 5–32)
BASOPHILS ABSOLUTE: 0.1 K/UL (ref 0–0.2)
BASOPHILS RELATIVE PERCENT: 1.3 % (ref 0–1)
BILIRUB SERPL-MCNC: 0.8 MG/DL (ref 0.2–1.2)
BUN BLDV-MCNC: 19 MG/DL (ref 8–23)
CALCIUM SERPL-MCNC: 9.3 MG/DL (ref 8.8–10.2)
CHLORIDE BLD-SCNC: 95 MMOL/L (ref 98–111)
CHOLESTEROL, TOTAL: 112 MG/DL (ref 160–199)
CO2: 30 MMOL/L (ref 22–29)
CREAT SERPL-MCNC: 3.6 MG/DL (ref 0.5–0.9)
EOSINOPHILS ABSOLUTE: 0.3 K/UL (ref 0–0.6)
EOSINOPHILS RELATIVE PERCENT: 6.7 % (ref 0–5)
GFR NON-AFRICAN AMERICAN: 12
GLUCOSE BLD-MCNC: 83 MG/DL (ref 74–109)
HCT VFR BLD CALC: 34.9 % (ref 37–47)
HDLC SERPL-MCNC: 39 MG/DL (ref 65–121)
HEMOGLOBIN: 10.5 G/DL (ref 12–16)
IMMATURE GRANULOCYTES #: 0 K/UL
LDL CHOLESTEROL CALCULATED: 59 MG/DL
LYMPHOCYTES ABSOLUTE: 1.1 K/UL (ref 1.1–4.5)
LYMPHOCYTES RELATIVE PERCENT: 22.6 % (ref 20–40)
MCH RBC QN AUTO: 30.1 PG (ref 27–31)
MCHC RBC AUTO-ENTMCNC: 30.1 G/DL (ref 33–37)
MCV RBC AUTO: 100 FL (ref 81–99)
MONOCYTES ABSOLUTE: 0.5 K/UL (ref 0–0.9)
MONOCYTES RELATIVE PERCENT: 9.9 % (ref 0–10)
NEUTROPHILS ABSOLUTE: 2.8 K/UL (ref 1.5–7.5)
NEUTROPHILS RELATIVE PERCENT: 59.3 % (ref 50–65)
PDW BLD-RTO: 16.3 % (ref 11.5–14.5)
PLATELET # BLD: 71 K/UL (ref 130–400)
PMV BLD AUTO: 13.9 FL (ref 9.4–12.3)
POTASSIUM SERPL-SCNC: 4.4 MMOL/L (ref 3.5–5)
RBC # BLD: 3.49 M/UL (ref 4.2–5.4)
SODIUM BLD-SCNC: 138 MMOL/L (ref 136–145)
TOTAL PROTEIN: 7.2 G/DL (ref 6.6–8.7)
TRIGL SERPL-MCNC: 72 MG/DL (ref 0–149)
TSH SERPL DL<=0.05 MIU/L-ACNC: 0.02 UIU/ML (ref 0.27–4.2)
WBC # BLD: 4.7 K/UL (ref 4.8–10.8)

## 2019-09-13 ENCOUNTER — TELEPHONE (OUTPATIENT)
Dept: INTERNAL MEDICINE | Age: 84
End: 2019-09-13

## 2019-09-17 ENCOUNTER — ANTI-COAG VISIT (OUTPATIENT)
Dept: INTERNAL MEDICINE | Age: 84
End: 2019-09-17

## 2019-09-17 ENCOUNTER — ANTI-COAG VISIT (OUTPATIENT)
Dept: INTERNAL MEDICINE | Age: 84
End: 2019-09-17
Payer: MEDICARE

## 2019-09-17 DIAGNOSIS — I48.20 CHRONIC ATRIAL FIBRILLATION (HCC): ICD-10-CM

## 2019-09-17 LAB
INR BLD: 2.3
INR BLD: 2.3

## 2019-09-17 PROCEDURE — 93793 ANTICOAG MGMT PT WARFARIN: CPT | Performed by: INTERNAL MEDICINE

## 2019-09-17 NOTE — PROGRESS NOTES
HOME MONITORING REPORT    INR today:   Results for orders placed or performed in visit on 09/17/19   Protime-INR   Result Value Ref Range    INR 2.30        INR Goal: 2.0-3.0    Dosing Plan  As of 9/17/2019    TTR:   65.2 % (1.3 y)   Full warfarin instructions:   2 mg every Tue, Thu; 1 mg all other days              PLAN:PATIENT NOTIFIED TO  CONTINUE CURRENT DOSE AND RECHECK IN ONE WEEK.

## 2019-09-19 ENCOUNTER — OFFICE VISIT (OUTPATIENT)
Dept: INTERNAL MEDICINE | Age: 84
End: 2019-09-19
Payer: MEDICARE

## 2019-09-19 VITALS
SYSTOLIC BLOOD PRESSURE: 116 MMHG | RESPIRATION RATE: 22 BRPM | WEIGHT: 171.08 LBS | BODY MASS INDEX: 26.79 KG/M2 | OXYGEN SATURATION: 97 % | DIASTOLIC BLOOD PRESSURE: 44 MMHG | HEART RATE: 57 BPM

## 2019-09-19 DIAGNOSIS — I48.91 ATRIAL FIBRILLATION, UNSPECIFIED TYPE (HCC): ICD-10-CM

## 2019-09-19 DIAGNOSIS — E83.39 HYPERPHOSPHATEMIA: ICD-10-CM

## 2019-09-19 DIAGNOSIS — E03.9 HYPOTHYROIDISM, UNSPECIFIED TYPE: ICD-10-CM

## 2019-09-19 DIAGNOSIS — N18.9 CHRONIC KIDNEY DISEASE, UNSPECIFIED CKD STAGE: ICD-10-CM

## 2019-09-19 DIAGNOSIS — Z99.2 STAGE 5 CHRONIC KIDNEY DISEASE ON CHRONIC DIALYSIS (HCC): ICD-10-CM

## 2019-09-19 DIAGNOSIS — J44.9 CHRONIC OBSTRUCTIVE PULMONARY DISEASE, UNSPECIFIED COPD TYPE (HCC): ICD-10-CM

## 2019-09-19 DIAGNOSIS — Z00.00 ROUTINE HEALTH MAINTENANCE: Primary | ICD-10-CM

## 2019-09-19 DIAGNOSIS — N18.6 STAGE 5 CHRONIC KIDNEY DISEASE ON CHRONIC DIALYSIS (HCC): ICD-10-CM

## 2019-09-19 DIAGNOSIS — I10 ESSENTIAL HYPERTENSION: ICD-10-CM

## 2019-09-19 PROCEDURE — G0439 PPPS, SUBSEQ VISIT: HCPCS | Performed by: INTERNAL MEDICINE

## 2019-09-19 PROCEDURE — 4040F PNEUMOC VAC/ADMIN/RCVD: CPT | Performed by: INTERNAL MEDICINE

## 2019-09-19 PROCEDURE — 1123F ACP DISCUSS/DSCN MKR DOCD: CPT | Performed by: INTERNAL MEDICINE

## 2019-09-19 RX ORDER — LANTHANUM CARBONATE 1000 MG/1
1000 TABLET, CHEWABLE ORAL
COMMUNITY
End: 2020-03-19 | Stop reason: SDUPTHER

## 2019-09-19 RX ORDER — LEVOTHYROXINE SODIUM 0.2 MG/1
200 TABLET ORAL DAILY
Qty: 30 TABLET | Refills: 3 | Status: SHIPPED | OUTPATIENT
Start: 2019-09-19 | End: 2019-09-19

## 2019-09-19 RX ORDER — LEVOTHYROXINE SODIUM 0.2 MG/1
200 TABLET ORAL DAILY
Qty: 90 TABLET | Refills: 5 | Status: SHIPPED | OUTPATIENT
Start: 2019-09-19 | End: 2019-10-18 | Stop reason: SDUPTHER

## 2019-09-19 ASSESSMENT — PATIENT HEALTH QUESTIONNAIRE - PHQ9
SUM OF ALL RESPONSES TO PHQ QUESTIONS 1-9: 1
SUM OF ALL RESPONSES TO PHQ QUESTIONS 1-9: 1

## 2019-09-19 ASSESSMENT — LIFESTYLE VARIABLES: HOW OFTEN DO YOU HAVE A DRINK CONTAINING ALCOHOL: 0

## 2019-09-21 ASSESSMENT — ENCOUNTER SYMPTOMS
SHORTNESS OF BREATH: 1
SINUS PAIN: 0
PHOTOPHOBIA: 0
COUGH: 0
EYE REDNESS: 0
EYE PAIN: 0
RHINORRHEA: 0
CHEST TIGHTNESS: 0
DIARRHEA: 0
ABDOMINAL PAIN: 0
VOMITING: 0
NAUSEA: 0
SORE THROAT: 0
WHEEZING: 0
SINUS PRESSURE: 0
CONSTIPATION: 0
COLOR CHANGE: 0

## 2019-09-24 ENCOUNTER — OFFICE VISIT (OUTPATIENT)
Dept: VASCULAR SURGERY | Age: 84
End: 2019-09-24

## 2019-09-24 ENCOUNTER — ANTI-COAG VISIT (OUTPATIENT)
Dept: INTERNAL MEDICINE | Age: 84
End: 2019-09-24
Payer: MEDICARE

## 2019-09-24 VITALS
DIASTOLIC BLOOD PRESSURE: 60 MMHG | WEIGHT: 170.64 LBS | HEIGHT: 67 IN | RESPIRATION RATE: 18 BRPM | BODY MASS INDEX: 26.78 KG/M2 | SYSTOLIC BLOOD PRESSURE: 92 MMHG

## 2019-09-24 DIAGNOSIS — Z99.81 CHRONIC RESPIRATORY FAILURE WITH HYPOXIA, ON HOME O2 THERAPY (HCC): ICD-10-CM

## 2019-09-24 DIAGNOSIS — Z09 POSTOP CHECK: Primary | ICD-10-CM

## 2019-09-24 DIAGNOSIS — J96.11 CHRONIC RESPIRATORY FAILURE WITH HYPOXIA, ON HOME O2 THERAPY (HCC): ICD-10-CM

## 2019-09-24 LAB — INR BLD: 2.4

## 2019-09-24 PROCEDURE — 93793 ANTICOAG MGMT PT WARFARIN: CPT | Performed by: INTERNAL MEDICINE

## 2019-09-24 PROCEDURE — 99024 POSTOP FOLLOW-UP VISIT: CPT | Performed by: PHYSICIAN ASSISTANT

## 2019-09-24 NOTE — PROGRESS NOTES
HOME MONITORING REPORT    INR today:   Results for orders placed or performed in visit on 09/24/19   Protime-INR   Result Value Ref Range    INR 2.40        INR Goal: 2.0-3.0    Dosing Plan  As of 9/24/2019    TTR:   65.7 % (1.4 y)   Full warfarin instructions:   2 mg every Tue, Thu; 1 mg all other days              PLAN:PATIENT NOTIFIED TO  CONTINUE CURRENT DOSE AND RECHECK IN ONE WEEK.

## 2019-10-01 ENCOUNTER — ANTI-COAG VISIT (OUTPATIENT)
Dept: INTERNAL MEDICINE | Age: 84
End: 2019-10-01
Payer: MEDICARE

## 2019-10-01 DIAGNOSIS — I48.0 PAROXYSMAL ATRIAL FIBRILLATION (HCC): ICD-10-CM

## 2019-10-01 LAB — INR BLD: 2.7

## 2019-10-01 PROCEDURE — 93793 ANTICOAG MGMT PT WARFARIN: CPT | Performed by: PHYSICIAN ASSISTANT

## 2019-10-08 ENCOUNTER — ANTI-COAG VISIT (OUTPATIENT)
Dept: INTERNAL MEDICINE | Age: 84
End: 2019-10-08

## 2019-10-08 ENCOUNTER — ANTI-COAG VISIT (OUTPATIENT)
Dept: INTERNAL MEDICINE | Age: 84
End: 2019-10-08
Payer: MEDICARE

## 2019-10-08 DIAGNOSIS — I48.20 CHRONIC ATRIAL FIBRILLATION (HCC): ICD-10-CM

## 2019-10-08 LAB
INR BLD: 2.8
INR BLD: 2.8

## 2019-10-08 PROCEDURE — 93793 ANTICOAG MGMT PT WARFARIN: CPT | Performed by: INTERNAL MEDICINE

## 2019-10-15 ENCOUNTER — TELEPHONE (OUTPATIENT)
Dept: INTERNAL MEDICINE | Age: 84
End: 2019-10-15

## 2019-10-15 ENCOUNTER — ANTI-COAG VISIT (OUTPATIENT)
Dept: INTERNAL MEDICINE | Age: 84
End: 2019-10-15
Payer: MEDICARE

## 2019-10-15 DIAGNOSIS — I48.20 CHRONIC ATRIAL FIBRILLATION (HCC): ICD-10-CM

## 2019-10-15 LAB — INR BLD: 2.7

## 2019-10-15 PROCEDURE — 93793 ANTICOAG MGMT PT WARFARIN: CPT | Performed by: INTERNAL MEDICINE

## 2019-10-16 ENCOUNTER — ANTI-COAG VISIT (OUTPATIENT)
Dept: INTERNAL MEDICINE | Age: 84
End: 2019-10-16

## 2019-10-17 DIAGNOSIS — E03.9 HYPOTHYROIDISM, UNSPECIFIED TYPE: ICD-10-CM

## 2019-10-17 LAB
T4 TOTAL: 13 UG/DL (ref 4.5–11.7)
TSH SERPL DL<=0.05 MIU/L-ACNC: 0.03 UIU/ML (ref 0.27–4.2)

## 2019-10-18 DIAGNOSIS — E03.9 HYPOTHYROIDISM, UNSPECIFIED TYPE: Primary | ICD-10-CM

## 2019-10-18 RX ORDER — LEVOTHYROXINE SODIUM 0.15 MG/1
150 TABLET ORAL DAILY
Qty: 30 TABLET | Refills: 1 | Status: SHIPPED | OUTPATIENT
Start: 2019-10-18 | End: 2019-12-13 | Stop reason: SDUPTHER

## 2019-10-18 RX ORDER — LEVOTHYROXINE SODIUM 200 MCG
TABLET ORAL
Qty: 130 TABLET | Refills: 3 | OUTPATIENT
Start: 2019-10-18

## 2019-10-22 ENCOUNTER — ANTI-COAG VISIT (OUTPATIENT)
Dept: INTERNAL MEDICINE | Age: 84
End: 2019-10-22
Payer: MEDICARE

## 2019-10-22 DIAGNOSIS — I48.20 CHRONIC ATRIAL FIBRILLATION (HCC): ICD-10-CM

## 2019-10-22 LAB — INR BLD: 2.7

## 2019-10-22 PROCEDURE — 93793 ANTICOAG MGMT PT WARFARIN: CPT | Performed by: INTERNAL MEDICINE

## 2019-10-24 ENCOUNTER — TELEPHONE (OUTPATIENT)
Dept: INTERNAL MEDICINE | Age: 84
End: 2019-10-24

## 2019-10-29 ENCOUNTER — ANTI-COAG VISIT (OUTPATIENT)
Dept: INTERNAL MEDICINE | Age: 84
End: 2019-10-29

## 2019-10-29 ENCOUNTER — ANTI-COAG VISIT (OUTPATIENT)
Dept: INTERNAL MEDICINE | Age: 84
End: 2019-10-29
Payer: MEDICARE

## 2019-10-29 DIAGNOSIS — I48.20 CHRONIC ATRIAL FIBRILLATION (HCC): ICD-10-CM

## 2019-10-29 LAB
INR BLD: 2.2
INR BLD: 2.2

## 2019-10-29 PROCEDURE — 93793 ANTICOAG MGMT PT WARFARIN: CPT | Performed by: INTERNAL MEDICINE

## 2019-11-05 ENCOUNTER — ANTI-COAG VISIT (OUTPATIENT)
Dept: INTERNAL MEDICINE | Age: 84
End: 2019-11-05
Payer: MEDICARE

## 2019-11-05 DIAGNOSIS — I48.20 CHRONIC ATRIAL FIBRILLATION (HCC): ICD-10-CM

## 2019-11-05 LAB — INR BLD: 2.2

## 2019-11-05 PROCEDURE — 93793 ANTICOAG MGMT PT WARFARIN: CPT | Performed by: INTERNAL MEDICINE

## 2019-11-08 ENCOUNTER — TELEPHONE (OUTPATIENT)
Dept: INTERNAL MEDICINE | Age: 84
End: 2019-11-08

## 2019-11-08 DIAGNOSIS — N39.0 URINARY TRACT INFECTION WITHOUT HEMATURIA, SITE UNSPECIFIED: Primary | ICD-10-CM

## 2019-11-12 ENCOUNTER — ANTI-COAG VISIT (OUTPATIENT)
Dept: INTERNAL MEDICINE | Age: 84
End: 2019-11-12
Payer: MEDICARE

## 2019-11-12 DIAGNOSIS — I48.20 CHRONIC ATRIAL FIBRILLATION (HCC): ICD-10-CM

## 2019-11-12 LAB — INR BLD: 2.6

## 2019-11-12 PROCEDURE — 93793 ANTICOAG MGMT PT WARFARIN: CPT | Performed by: INTERNAL MEDICINE

## 2019-11-19 ENCOUNTER — ANTI-COAG VISIT (OUTPATIENT)
Dept: INTERNAL MEDICINE | Age: 84
End: 2019-11-19
Payer: MEDICARE

## 2019-11-19 DIAGNOSIS — I48.20 CHRONIC ATRIAL FIBRILLATION (HCC): ICD-10-CM

## 2019-11-19 LAB — INR BLD: 3.4

## 2019-11-19 PROCEDURE — 93793 ANTICOAG MGMT PT WARFARIN: CPT | Performed by: INTERNAL MEDICINE

## 2019-11-21 ENCOUNTER — TELEPHONE (OUTPATIENT)
Dept: INTERNAL MEDICINE | Age: 84
End: 2019-11-21

## 2019-11-21 DIAGNOSIS — E03.9 HYPOTHYROIDISM, UNSPECIFIED TYPE: Primary | ICD-10-CM

## 2019-11-21 DIAGNOSIS — E03.9 HYPOTHYROIDISM, UNSPECIFIED TYPE: ICD-10-CM

## 2019-11-21 LAB
T4 FREE: 1.78 NG/DL (ref 0.93–1.7)
TSH SERPL DL<=0.05 MIU/L-ACNC: 0.09 UIU/ML (ref 0.27–4.2)

## 2019-11-27 ENCOUNTER — ANTI-COAG VISIT (OUTPATIENT)
Dept: INTERNAL MEDICINE | Age: 84
End: 2019-11-27
Payer: MEDICARE

## 2019-11-27 DIAGNOSIS — I48.0 PAROXYSMAL ATRIAL FIBRILLATION (HCC): ICD-10-CM

## 2019-11-27 LAB — INR BLD: 2.3

## 2019-11-27 PROCEDURE — 93793 ANTICOAG MGMT PT WARFARIN: CPT | Performed by: INTERNAL MEDICINE

## 2019-12-03 ENCOUNTER — ANTI-COAG VISIT (OUTPATIENT)
Dept: INTERNAL MEDICINE | Age: 84
End: 2019-12-03
Payer: MEDICARE

## 2019-12-03 DIAGNOSIS — I48.20 CHRONIC ATRIAL FIBRILLATION (HCC): ICD-10-CM

## 2019-12-03 LAB — INR BLD: 2.3

## 2019-12-03 PROCEDURE — 93793 ANTICOAG MGMT PT WARFARIN: CPT | Performed by: INTERNAL MEDICINE

## 2019-12-09 ENCOUNTER — TELEPHONE (OUTPATIENT)
Dept: VASCULAR SURGERY | Age: 84
End: 2019-12-09

## 2019-12-10 ENCOUNTER — ANTI-COAG VISIT (OUTPATIENT)
Dept: INTERNAL MEDICINE | Age: 84
End: 2019-12-10

## 2019-12-10 ENCOUNTER — ANTI-COAG VISIT (OUTPATIENT)
Dept: INTERNAL MEDICINE | Age: 84
End: 2019-12-10
Payer: COMMERCIAL

## 2019-12-10 DIAGNOSIS — I48.20 CHRONIC ATRIAL FIBRILLATION (HCC): ICD-10-CM

## 2019-12-10 LAB
INR BLD: 2.9
INR BLD: 2.9

## 2019-12-10 PROCEDURE — 93793 ANTICOAG MGMT PT WARFARIN: CPT | Performed by: INTERNAL MEDICINE

## 2019-12-13 RX ORDER — LEVOTHYROXINE SODIUM 0.15 MG/1
150 TABLET ORAL DAILY
Qty: 30 TABLET | Refills: 1 | Status: SHIPPED | OUTPATIENT
Start: 2019-12-13 | End: 2019-12-19

## 2019-12-17 ENCOUNTER — ANTI-COAG VISIT (OUTPATIENT)
Dept: INTERNAL MEDICINE | Age: 84
End: 2019-12-17
Payer: COMMERCIAL

## 2019-12-17 ENCOUNTER — ANTI-COAG VISIT (OUTPATIENT)
Dept: INTERNAL MEDICINE | Age: 84
End: 2019-12-17

## 2019-12-17 DIAGNOSIS — I48.20 CHRONIC ATRIAL FIBRILLATION (HCC): ICD-10-CM

## 2019-12-17 LAB — INR BLD: 3.2

## 2019-12-17 PROCEDURE — 93793 ANTICOAG MGMT PT WARFARIN: CPT | Performed by: INTERNAL MEDICINE

## 2019-12-18 ENCOUNTER — PREP FOR PROCEDURE (OUTPATIENT)
Dept: VASCULAR SURGERY | Age: 84
End: 2019-12-18

## 2019-12-18 RX ORDER — SODIUM CHLORIDE 0.9 % (FLUSH) 0.9 %
10 SYRINGE (ML) INJECTION PRN
Status: CANCELLED | OUTPATIENT
Start: 2019-12-18

## 2019-12-18 RX ORDER — ONDANSETRON 2 MG/ML
4 INJECTION INTRAMUSCULAR; INTRAVENOUS EVERY 6 HOURS PRN
Status: CANCELLED | OUTPATIENT
Start: 2019-12-18

## 2019-12-18 RX ORDER — SODIUM CHLORIDE 9 MG/ML
INJECTION, SOLUTION INTRAVENOUS CONTINUOUS
Status: CANCELLED | OUTPATIENT
Start: 2019-12-18

## 2019-12-18 RX ORDER — ASPIRIN 81 MG/1
81 TABLET ORAL ONCE
Status: CANCELLED | OUTPATIENT
Start: 2019-12-18 | End: 2019-12-18

## 2019-12-19 ENCOUNTER — HOSPITAL ENCOUNTER (OUTPATIENT)
Dept: INTERVENTIONAL RADIOLOGY/VASCULAR | Age: 84
Discharge: HOME | End: 2019-12-19
Payer: MEDICARE

## 2019-12-19 VITALS
HEIGHT: 68 IN | OXYGEN SATURATION: 93 % | WEIGHT: 169.97 LBS | BODY MASS INDEX: 25.76 KG/M2 | SYSTOLIC BLOOD PRESSURE: 120 MMHG | HEART RATE: 54 BPM | RESPIRATION RATE: 17 BRPM | TEMPERATURE: 98.6 F | DIASTOLIC BLOOD PRESSURE: 58 MMHG

## 2019-12-19 DIAGNOSIS — T82.590D AV GRAFT MALFUNCTION, SUBSEQUENT ENCOUNTER: ICD-10-CM

## 2019-12-19 PROCEDURE — 6370000000 HC RX 637 (ALT 250 FOR IP): Performed by: PHYSICIAN ASSISTANT

## 2019-12-19 PROCEDURE — 6360000004 HC RX CONTRAST MEDICATION: Performed by: SURGERY

## 2019-12-19 PROCEDURE — C1894 INTRO/SHEATH, NON-LASER: HCPCS

## 2019-12-19 PROCEDURE — 10160 PNXR ASPIR ABSC HMTMA BULLA: CPT | Performed by: SURGERY

## 2019-12-19 PROCEDURE — 36901 INTRO CATH DIALYSIS CIRCUIT: CPT | Performed by: SURGERY

## 2019-12-19 PROCEDURE — 6360000002 HC RX W HCPCS: Performed by: PHYSICIAN ASSISTANT

## 2019-12-19 PROCEDURE — 6360000002 HC RX W HCPCS: Performed by: SURGERY

## 2019-12-19 PROCEDURE — 99152 MOD SED SAME PHYS/QHP 5/>YRS: CPT | Performed by: SURGERY

## 2019-12-19 RX ORDER — ONDANSETRON 2 MG/ML
4 INJECTION INTRAMUSCULAR; INTRAVENOUS EVERY 6 HOURS PRN
Status: DISCONTINUED | OUTPATIENT
Start: 2019-12-19 | End: 2019-12-19

## 2019-12-19 RX ORDER — SODIUM CHLORIDE 0.9 % (FLUSH) 0.9 %
10 SYRINGE (ML) INJECTION PRN
Status: DISCONTINUED | OUTPATIENT
Start: 2019-12-19 | End: 2019-12-21 | Stop reason: HOSPADM

## 2019-12-19 RX ORDER — ACETAMINOPHEN 325 MG/1
650 TABLET ORAL EVERY 4 HOURS PRN
Status: DISCONTINUED | OUTPATIENT
Start: 2019-12-19 | End: 2019-12-21 | Stop reason: HOSPADM

## 2019-12-19 RX ORDER — LEVOTHYROXINE SODIUM 0.15 MG/1
150 TABLET ORAL DAILY
Qty: 30 TABLET | Refills: 1 | Status: SHIPPED | OUTPATIENT
Start: 2019-12-19 | End: 2020-01-10 | Stop reason: SDUPTHER

## 2019-12-19 RX ORDER — ONDANSETRON 2 MG/ML
4 INJECTION INTRAMUSCULAR; INTRAVENOUS EVERY 8 HOURS PRN
Status: DISCONTINUED | OUTPATIENT
Start: 2019-12-19 | End: 2019-12-21 | Stop reason: HOSPADM

## 2019-12-19 RX ORDER — ONDANSETRON 2 MG/ML
4 INJECTION INTRAMUSCULAR; INTRAVENOUS EVERY 6 HOURS PRN
Status: DISCONTINUED | OUTPATIENT
Start: 2019-12-19 | End: 2019-12-19 | Stop reason: SDUPTHER

## 2019-12-19 RX ORDER — ASPIRIN 81 MG/1
81 TABLET ORAL ONCE
Status: COMPLETED | OUTPATIENT
Start: 2019-12-19 | End: 2019-12-19

## 2019-12-19 RX ORDER — HYDROCODONE BITARTRATE AND ACETAMINOPHEN 5; 325 MG/1; MG/1
2 TABLET ORAL EVERY 4 HOURS PRN
Status: DISCONTINUED | OUTPATIENT
Start: 2019-12-19 | End: 2019-12-21 | Stop reason: HOSPADM

## 2019-12-19 RX ORDER — MIDAZOLAM HYDROCHLORIDE 1 MG/ML
INJECTION INTRAMUSCULAR; INTRAVENOUS
Status: COMPLETED | OUTPATIENT
Start: 2019-12-19 | End: 2019-12-19

## 2019-12-19 RX ORDER — SODIUM CHLORIDE 9 MG/ML
INJECTION, SOLUTION INTRAVENOUS CONTINUOUS
Status: DISCONTINUED | OUTPATIENT
Start: 2019-12-19 | End: 2019-12-21 | Stop reason: HOSPADM

## 2019-12-19 RX ORDER — HYDROCODONE BITARTRATE AND ACETAMINOPHEN 5; 325 MG/1; MG/1
1 TABLET ORAL EVERY 4 HOURS PRN
Status: DISCONTINUED | OUTPATIENT
Start: 2019-12-19 | End: 2019-12-21 | Stop reason: HOSPADM

## 2019-12-19 RX ORDER — FENTANYL CITRATE 50 UG/ML
INJECTION, SOLUTION INTRAMUSCULAR; INTRAVENOUS
Status: COMPLETED | OUTPATIENT
Start: 2019-12-19 | End: 2019-12-19

## 2019-12-19 RX ADMIN — MIDAZOLAM 0.5 MG: 1 INJECTION INTRAMUSCULAR; INTRAVENOUS at 13:34

## 2019-12-19 RX ADMIN — ASPIRIN 81 MG: 81 TABLET, COATED ORAL at 09:57

## 2019-12-19 RX ADMIN — Medication 1000 MG: at 13:13

## 2019-12-19 RX ADMIN — FENTANYL CITRATE 25 MCG: 50 INJECTION INTRAMUSCULAR; INTRAVENOUS at 13:34

## 2019-12-19 RX ADMIN — IOPAMIDOL 30 ML: 612 INJECTION, SOLUTION INTRATHECAL at 13:47

## 2019-12-23 ENCOUNTER — ANTI-COAG VISIT (OUTPATIENT)
Dept: INTERNAL MEDICINE | Age: 84
End: 2019-12-23
Payer: COMMERCIAL

## 2019-12-23 DIAGNOSIS — I48.20 CHRONIC ATRIAL FIBRILLATION (HCC): ICD-10-CM

## 2019-12-23 LAB — INR BLD: 2.8

## 2019-12-23 PROCEDURE — 93793 ANTICOAG MGMT PT WARFARIN: CPT | Performed by: INTERNAL MEDICINE

## 2019-12-30 DIAGNOSIS — E03.9 HYPOTHYROIDISM, UNSPECIFIED TYPE: Primary | ICD-10-CM

## 2019-12-31 ENCOUNTER — ANTI-COAG VISIT (OUTPATIENT)
Dept: INTERNAL MEDICINE | Age: 84
End: 2019-12-31
Payer: COMMERCIAL

## 2019-12-31 DIAGNOSIS — I48.20 CHRONIC ATRIAL FIBRILLATION (HCC): ICD-10-CM

## 2019-12-31 LAB
INR BLD: 1.8
INR BLD: 1.8

## 2019-12-31 PROCEDURE — 93793 ANTICOAG MGMT PT WARFARIN: CPT | Performed by: INTERNAL MEDICINE

## 2020-01-03 ENCOUNTER — ANTI-COAG VISIT (OUTPATIENT)
Dept: INTERNAL MEDICINE | Age: 85
End: 2020-01-03
Payer: MEDICARE

## 2020-01-03 PROCEDURE — 93793 ANTICOAG MGMT PT WARFARIN: CPT | Performed by: INTERNAL MEDICINE

## 2020-01-07 ENCOUNTER — ANTI-COAG VISIT (OUTPATIENT)
Dept: INTERNAL MEDICINE | Age: 85
End: 2020-01-07
Payer: MEDICARE

## 2020-01-07 ENCOUNTER — TELEPHONE (OUTPATIENT)
Dept: VASCULAR SURGERY | Age: 85
End: 2020-01-07

## 2020-01-07 ENCOUNTER — OFFICE VISIT (OUTPATIENT)
Dept: VASCULAR SURGERY | Age: 85
End: 2020-01-07
Payer: MEDICARE

## 2020-01-07 VITALS
RESPIRATION RATE: 18 BRPM | TEMPERATURE: 96 F | OXYGEN SATURATION: 95 % | HEART RATE: 49 BPM | DIASTOLIC BLOOD PRESSURE: 40 MMHG | SYSTOLIC BLOOD PRESSURE: 110 MMHG

## 2020-01-07 LAB — INR BLD: 3.2

## 2020-01-07 PROCEDURE — 4040F PNEUMOC VAC/ADMIN/RCVD: CPT | Performed by: PHYSICIAN ASSISTANT

## 2020-01-07 PROCEDURE — 99212 OFFICE O/P EST SF 10 MIN: CPT | Performed by: PHYSICIAN ASSISTANT

## 2020-01-07 PROCEDURE — G8417 CALC BMI ABV UP PARAM F/U: HCPCS | Performed by: PHYSICIAN ASSISTANT

## 2020-01-07 PROCEDURE — 1123F ACP DISCUSS/DSCN MKR DOCD: CPT | Performed by: PHYSICIAN ASSISTANT

## 2020-01-07 PROCEDURE — G8427 DOCREV CUR MEDS BY ELIG CLIN: HCPCS | Performed by: PHYSICIAN ASSISTANT

## 2020-01-07 PROCEDURE — 1036F TOBACCO NON-USER: CPT | Performed by: PHYSICIAN ASSISTANT

## 2020-01-07 PROCEDURE — 93793 ANTICOAG MGMT PT WARFARIN: CPT | Performed by: INTERNAL MEDICINE

## 2020-01-07 PROCEDURE — G8400 PT W/DXA NO RESULTS DOC: HCPCS | Performed by: PHYSICIAN ASSISTANT

## 2020-01-07 PROCEDURE — 1090F PRES/ABSN URINE INCON ASSESS: CPT | Performed by: PHYSICIAN ASSISTANT

## 2020-01-07 PROCEDURE — G8484 FLU IMMUNIZE NO ADMIN: HCPCS | Performed by: PHYSICIAN ASSISTANT

## 2020-01-09 DIAGNOSIS — E03.9 HYPOTHYROIDISM, UNSPECIFIED TYPE: ICD-10-CM

## 2020-01-09 LAB — TSH SERPL DL<=0.05 MIU/L-ACNC: 0.1 UIU/ML (ref 0.27–4.2)

## 2020-01-10 RX ORDER — BUMETANIDE 1 MG/1
1 TABLET ORAL DAILY
Qty: 90 TABLET | Refills: 3 | Status: SHIPPED | OUTPATIENT
Start: 2020-01-10 | End: 2020-05-26 | Stop reason: SDUPTHER

## 2020-01-10 RX ORDER — AMLODIPINE BESYLATE 10 MG/1
10 TABLET ORAL DAILY
Qty: 90 TABLET | Refills: 3 | Status: SHIPPED | OUTPATIENT
Start: 2020-01-10 | End: 2020-03-19

## 2020-01-10 RX ORDER — ALLOPURINOL 100 MG/1
100 TABLET ORAL DAILY
Qty: 90 TABLET | Refills: 3 | Status: SHIPPED | OUTPATIENT
Start: 2020-01-10

## 2020-01-10 RX ORDER — LOSARTAN POTASSIUM 100 MG/1
TABLET ORAL
Qty: 45 TABLET | Refills: 3 | Status: SHIPPED | OUTPATIENT
Start: 2020-01-10 | End: 2020-05-26

## 2020-01-10 RX ORDER — WARFARIN SODIUM 1 MG/1
TABLET ORAL
Qty: 90 TABLET | Refills: 3 | Status: SHIPPED | OUTPATIENT
Start: 2020-01-10 | End: 2020-01-14

## 2020-01-10 RX ORDER — LEVOTHYROXINE SODIUM 0.15 MG/1
150 TABLET ORAL DAILY
Qty: 90 TABLET | Refills: 3 | Status: SHIPPED | OUTPATIENT
Start: 2020-01-10 | End: 2020-02-11 | Stop reason: DRUGHIGH

## 2020-01-10 RX ORDER — METOPROLOL TARTRATE 50 MG/1
TABLET, FILM COATED ORAL
Qty: 90 TABLET | Refills: 3 | Status: SHIPPED | OUTPATIENT
Start: 2020-01-10

## 2020-01-14 ENCOUNTER — ANTI-COAG VISIT (OUTPATIENT)
Dept: INTERNAL MEDICINE | Age: 85
End: 2020-01-14
Payer: MEDICARE

## 2020-01-14 LAB — INR BLD: 2.7

## 2020-01-14 PROCEDURE — 93793 ANTICOAG MGMT PT WARFARIN: CPT | Performed by: INTERNAL MEDICINE

## 2020-01-14 RX ORDER — WARFARIN SODIUM 1 MG/1
TABLET ORAL
Qty: 60 TABLET | Refills: 5 | Status: SHIPPED | OUTPATIENT
Start: 2020-01-14 | End: 2020-05-07 | Stop reason: SDUPTHER

## 2020-01-21 ENCOUNTER — ANTI-COAG VISIT (OUTPATIENT)
Dept: INTERNAL MEDICINE | Age: 85
End: 2020-01-21
Payer: MEDICARE

## 2020-01-21 LAB — INR BLD: 1.7

## 2020-01-21 PROCEDURE — 93793 ANTICOAG MGMT PT WARFARIN: CPT | Performed by: INTERNAL MEDICINE

## 2020-01-28 ENCOUNTER — ANTI-COAG VISIT (OUTPATIENT)
Dept: INTERNAL MEDICINE | Age: 85
End: 2020-01-28
Payer: MEDICARE

## 2020-01-28 LAB — INR BLD: 3.1

## 2020-01-28 PROCEDURE — 93793 ANTICOAG MGMT PT WARFARIN: CPT | Performed by: INTERNAL MEDICINE

## 2020-02-10 NOTE — TELEPHONE ENCOUNTER
Contacted patient who requested that I speak to her son.  Advised that procedure was scheduled for 12/7/2018 with an arrival time of 5:15 am.  Patient pre work is scheduled for 12/3/2018 with an arrival time of 2:15 pm.  Patient advised of location time and prep.  Patient is to hold Coumadin for 4 days prior to procedure.  Patient and son expressed understanding for all that was discussed.   
N/A

## 2020-02-11 ENCOUNTER — ANTI-COAG VISIT (OUTPATIENT)
Dept: INTERNAL MEDICINE | Age: 85
End: 2020-02-11
Payer: MEDICARE

## 2020-02-11 DIAGNOSIS — E03.9 HYPOTHYROIDISM, UNSPECIFIED TYPE: ICD-10-CM

## 2020-02-11 LAB
INR BLD: 2.5
TSH SERPL DL<=0.05 MIU/L-ACNC: 0.09 UIU/ML (ref 0.27–4.2)

## 2020-02-11 PROCEDURE — 93793 ANTICOAG MGMT PT WARFARIN: CPT | Performed by: INTERNAL MEDICINE

## 2020-02-11 RX ORDER — LEVOTHYROXINE SODIUM 0.12 MG/1
125 TABLET ORAL DAILY
Qty: 30 TABLET | Refills: 5 | Status: SHIPPED | OUTPATIENT
Start: 2020-02-11 | End: 2020-03-19 | Stop reason: SDUPTHER

## 2020-02-11 NOTE — PROGRESS NOTES
HOME MONITORING REPORT    INR today:   Results for orders placed or performed in visit on 02/11/20   Protime-INR   Result Value Ref Range    INR 2.50        INR Goal: 2.0-3.0    Dosing Plan  As of 2/11/2020    TTR:   69.0 % (1.7 y)   Full warfarin instructions:   2 mg every Thu; 1 mg all other days              PLAN:PATIENT NOTIFIED TO  CONTINUE CURRENT DOSE AND RECHECK IN ONE WEEK.

## 2020-02-18 ENCOUNTER — ANTI-COAG VISIT (OUTPATIENT)
Dept: INTERNAL MEDICINE | Age: 85
End: 2020-02-18
Payer: MEDICARE

## 2020-02-18 LAB — INR BLD: 1.8

## 2020-02-18 PROCEDURE — 93793 ANTICOAG MGMT PT WARFARIN: CPT | Performed by: INTERNAL MEDICINE

## 2020-02-25 ENCOUNTER — TELEPHONE (OUTPATIENT)
Dept: INTERNAL MEDICINE CLINIC | Age: 85
End: 2020-02-25

## 2020-02-25 ENCOUNTER — ANTI-COAG VISIT (OUTPATIENT)
Dept: INTERNAL MEDICINE | Age: 85
End: 2020-02-25
Payer: MEDICARE

## 2020-02-25 LAB — INR BLD: 1.7

## 2020-02-25 PROCEDURE — 93793 ANTICOAG MGMT PT WARFARIN: CPT | Performed by: INTERNAL MEDICINE

## 2020-02-25 NOTE — PROGRESS NOTES
HOME MONITORING REPORT    INR today:   Results for orders placed or performed in visit on 02/25/20   Protime-INR   Result Value Ref Range    INR 1.70        INR Goal: 2.0-3.0    Dosing Plan  As of 2/25/2020    TTR:   68.3 % (1.8 y)   Full warfarin instructions:   2 mg every Thu; 1 mg all other days            Patient denies change in diet or missing any doses. She took 2mg last tues as advised, but INR still dropped. See plan below. PLAN:  For this week only, take 2mg tues, thurs, sat, and 1mg all other days and recheck next Tues. I advised patient that I would be out of the office, to call Dr. Jimy Luevano with results. She voiced understanding.

## 2020-02-25 NOTE — TELEPHONE ENCOUNTER
[facility] got a call from this pt son Kailee Sylvester , he would like to get [facility] for her   She was in HD and was having bad pain in her arm, the HD nurse sent the pt to urgent care at the [place] [id number]. Was seen by José Luis Brito. Xray showed pinched nerve and he gave her some pain medication. Kailee Sylvester was wondering if she could have [facility] for PT and/or OT on that arm    Would you refer the pt to [facility] for the PT and OT   ?

## 2020-02-26 ENCOUNTER — TELEPHONE (OUTPATIENT)
Dept: INTERNAL MEDICINE | Age: 85
End: 2020-02-26

## 2020-02-26 ENCOUNTER — TELEPHONE (OUTPATIENT)
Dept: VASCULAR SURGERY | Age: 85
End: 2020-02-26

## 2020-02-26 NOTE — TELEPHONE ENCOUNTER
[phone] [facility] needs the records from the patient's visit to Urgent care the other day. They were told this would take 2 weeks. Cristy asked that I call and get these and scan them in to the chart. I had to call and leave a message with [facility] requesting records. We need the note and the x-ray. If they do not call us back or send records this week we will need to call again and maybe speak with someone.

## 2020-02-26 NOTE — TELEPHONE ENCOUNTER
Spoke to Elva about Jose Alexander not being able to finish treatments due to having pain. Patient is putting on some weight due to not being able to finish. Elva state they are using her perm-cath instead of graft due to the pain, with the perm-cath it is barely tolerable. Patient went to urgent care for a pinched nerve in her neck, dialysis was unsure if that could irritate anything making it painful for treatment. I spoke to Jorge about this issue and we put the patient on the schedule for 2/27/20 with juaquin and LONNIE. I told Elva and she is going to contact the son to make them aware of appt. So he can get her there.

## 2020-02-27 ENCOUNTER — OFFICE VISIT (OUTPATIENT)
Dept: VASCULAR SURGERY | Age: 85
End: 2020-02-27
Payer: MEDICARE

## 2020-02-27 ENCOUNTER — TELEPHONE (OUTPATIENT)
Dept: VASCULAR SURGERY | Age: 85
End: 2020-02-27

## 2020-02-27 VITALS
BODY MASS INDEX: 25.16 KG/M2 | HEIGHT: 68 IN | SYSTOLIC BLOOD PRESSURE: 98 MMHG | HEART RATE: 50 BPM | DIASTOLIC BLOOD PRESSURE: 62 MMHG | WEIGHT: 166 LBS | RESPIRATION RATE: 18 BRPM | OXYGEN SATURATION: 97 % | TEMPERATURE: 98 F

## 2020-02-27 PROCEDURE — 1036F TOBACCO NON-USER: CPT | Performed by: PHYSICIAN ASSISTANT

## 2020-02-27 PROCEDURE — 99213 OFFICE O/P EST LOW 20 MIN: CPT | Performed by: PHYSICIAN ASSISTANT

## 2020-02-27 PROCEDURE — G8428 CUR MEDS NOT DOCUMENT: HCPCS | Performed by: PHYSICIAN ASSISTANT

## 2020-02-27 PROCEDURE — G8400 PT W/DXA NO RESULTS DOC: HCPCS | Performed by: PHYSICIAN ASSISTANT

## 2020-02-27 PROCEDURE — G8417 CALC BMI ABV UP PARAM F/U: HCPCS | Performed by: PHYSICIAN ASSISTANT

## 2020-02-27 PROCEDURE — 1090F PRES/ABSN URINE INCON ASSESS: CPT | Performed by: PHYSICIAN ASSISTANT

## 2020-02-27 PROCEDURE — G8484 FLU IMMUNIZE NO ADMIN: HCPCS | Performed by: PHYSICIAN ASSISTANT

## 2020-02-27 PROCEDURE — 1123F ACP DISCUSS/DSCN MKR DOCD: CPT | Performed by: PHYSICIAN ASSISTANT

## 2020-02-27 PROCEDURE — 4040F PNEUMOC VAC/ADMIN/RCVD: CPT | Performed by: PHYSICIAN ASSISTANT

## 2020-02-27 NOTE — PROGRESS NOTES
electrocardiogram 2015    Left ventricular dysfunction 2015    Paroxysmal atrial fibrillation (HCC) 2015     Current Outpatient Medications   Medication Sig Dispense Refill    levothyroxine (SYNTHROID) 125 MCG tablet Take 1 tablet by mouth daily 30 tablet 5    warfarin (COUMADIN) 1 MG tablet TAKE 2 TABLETS DAILY AS DIRECTED. 60 tablet 5    metoprolol tartrate (LOPRESSOR) 50 MG tablet TAKE 1 TABLET DAILY 90 tablet 3    losartan (COZAAR) 100 MG tablet TAKE 1/2 TABLET DAILY 45 tablet 3    bumetanide (BUMEX) 1 MG tablet Take 1 tablet by mouth daily 90 tablet 3    amLODIPine (NORVASC) 10 MG tablet Take 1 tablet by mouth daily 90 tablet 3    allopurinol (ZYLOPRIM) 100 MG tablet Take 1 tablet by mouth daily 90 tablet 3    lanthanum (FOSRENOL) 1000 MG chewable tablet Take 1,000 mg by mouth daily      OXYGEN Inhale 3 L into the lungs       BiPAP Machine MISC by Does not apply route nightly       No current facility-administered medications for this visit. Allergies: Patient has no known allergies.   Past Medical History:   Diagnosis Date    Anemia     B12 deficiency     BiPAP (biphasic positive airway pressure) dependence     15cm/11cm    Blood circulation, collateral     Ankles stay cold all time x 6 months    CAD (coronary artery disease)     Atrial Fib x years    CHF (congestive heart failure) (HCC)     CHF (congestive heart failure), NYHA class I, acute on chronic, combined ([facility] [address] 5/15/2019    Chronic diastolic heart failure (HCC)     Chronic kidney disease (CKD)     Stage 4 renal disease Sees Dr. Sarah Restrepo COPD (chronic obstructive pulmonary disease) (HCC)     on 2.5 L oxygen at home x 5 months    Depression     Hemodialysis patient [facility])     Has graft for fistula left arm - No dialysis yet  MWF at [facility] of blood transfusion      When had     Hyperglycemia     Hyperlipidemia     High cholesterol x years    Hyperparathyroidism ([facility] [address]    

## 2020-02-28 ENCOUNTER — TELEPHONE (OUTPATIENT)
Dept: INTERNAL MEDICINE CLINIC | Age: 85
End: 2020-02-28

## 2020-02-28 NOTE — TELEPHONE ENCOUNTER
Kimmy aGge. Please see the below SBAR from the home care PT. Looks like patient is wanting to hold PT/OT services until after her procedure with Dr. Florencio Ordoñez. Just want you to know what is going on. Let me know if you have any questions or need us to do anything. Thank you! Situation: homecare referral for PT /OT from dr Danyell Rodriguez for L arm pain. also, pt of dr Rufina Koyanagi, regarding dialysis access. saw dr Molly Cain also regarding possible cervical issues     Background: pt goes to dialysis [facility] has had x-rays done by [facility].   follows with dr Neva Segura regarding dialysis access    Assessment: call to pt. to schedule PT admission 2/2/8/20. pt states just saw dr Ten Lopez this afternoon 2/27/20, just got home, and is scheduled for a procedure 3/5/20 for the L arm graft site due to concern that it is causing pt. pain possibly. Recommendation: discussed homecare assessment for therapy and pt states unsure at this time since having surgical procedure next week per dr Neva Segura, if should start therapy or wait until after procedure done to see what is going on.  pt asking to wait. hold therapy assessments, follow up after her surgical procedure 3/5/20 to see if needs therapy at that time. PT OT assessments on hold. will need follow up with dr Neva Segura after surgical procedure to see if ok to do therapy assessments.  pt agreeable

## 2020-03-02 ENCOUNTER — TELEPHONE (OUTPATIENT)
Dept: VASCULAR SURGERY | Age: 85
End: 2020-03-02

## 2020-03-03 ENCOUNTER — ANTI-COAG VISIT (OUTPATIENT)
Dept: INTERNAL MEDICINE | Age: 85
End: 2020-03-03
Payer: MEDICARE

## 2020-03-03 LAB — INR BLD: 2

## 2020-03-03 PROCEDURE — 93793 ANTICOAG MGMT PT WARFARIN: CPT | Performed by: INTERNAL MEDICINE

## 2020-03-05 ENCOUNTER — PREP FOR PROCEDURE (OUTPATIENT)
Dept: VASCULAR SURGERY | Age: 85
End: 2020-03-05

## 2020-03-05 RX ORDER — ONDANSETRON 2 MG/ML
4 INJECTION INTRAMUSCULAR; INTRAVENOUS EVERY 6 HOURS PRN
Status: CANCELLED | OUTPATIENT
Start: 2020-03-05

## 2020-03-05 RX ORDER — SODIUM CHLORIDE 9 MG/ML
INJECTION, SOLUTION INTRAVENOUS CONTINUOUS
Status: CANCELLED | OUTPATIENT
Start: 2020-03-05

## 2020-03-05 RX ORDER — ASPIRIN 81 MG/1
81 TABLET ORAL ONCE
Status: CANCELLED | OUTPATIENT
Start: 2020-03-05 | End: 2020-03-05

## 2020-03-05 RX ORDER — SODIUM CHLORIDE 0.9 % (FLUSH) 0.9 %
10 SYRINGE (ML) INJECTION PRN
Status: CANCELLED | OUTPATIENT
Start: 2020-03-05

## 2020-03-05 RX ORDER — CLONIDINE HYDROCHLORIDE 0.1 MG/1
0.1 TABLET ORAL PRN
Status: CANCELLED | OUTPATIENT
Start: 2020-03-05

## 2020-03-05 NOTE — H&P
Bijan Rogers PA-C   Physician Assistant   Physician Assistant Medical   Progress Notes   Signed   Encounter Date:  2/27/2020               Signed        Expand All Collapse All         Patient Care Team:  Huyen Gómez MD as PCP - General (Family Medicine)  Huyen Gómez MD as PCP - REHABILITATION [facility] Empaneled Provider        History and Physical     Mrs. Regine Castellano has a history of chronic kidney disease. She comes in today for follow up Fistulogram. She underwent 1.  Left upper extremity arteriovenous graft angiograms including venography to the superior vena cava 2.  Aspiration of liquefied portion of hematoma left axilla due to infiltration with attempted cannulation LUE AV graft. She comes in today with c/o pain from her left elbow that is constant and radiated into her hand which get worse with dialysis. The pain also radiates up into her upper arm and neck.  She comes in today for evaluation.      Marie Mathis is a 81 y.o. female with the following history reviewed and recorded in [email or web address]:        Patient Active Problem List     Diagnosis Date Noted    AV graft malfunction, subsequent encounter 09/10/2019    Chest pain 05/15/2019    Hypoalbuminemia 05/15/2019    Tendinopathy of left rotator cuff 03/09/2019    ESRD on dialysis ([address] 03/08/2019    Macrocytic anemia 03/08/2019    Coagulopathy ([address] 03/08/2019    Hypokalemia 03/08/2019    Hypochloremia 03/08/2019    Left shoulder pain 03/08/2019    Acute on chronic respiratory failure with hypoxia and hypercapnia (HCC) 03/08/2019    Thrombocytopenia ([address] 01/26/2019    Renal failure      Encounter for current long-term use of anticoagulants 04/30/2018    Palliative care patient 01/21/2018    Abnormal CAT scan 07/19/2017    Chronic atrial fibrillation 07/19/2017    COPD exacerbation ([address] 05/29/2017    Chronic respiratory failure with hypoxia, on home O2 therapy ([address] 05/29/2017    Essential hypertension 05/29/2017    Obstructive sleep at home x 5 months    Depression      Hemodialysis patient [facility])       Has graft for fistula left arm - No dialysis yet  MWF at [address] History of blood transfusion       1954 When had     Hyperglycemia      Hyperlipidemia       High cholesterol x years    Hyperparathyroidism ([address]      Hypertension       x years    Hyperuricemia      Hypoxemia      Insomnia      Kyphosis      Lumbago       disk degeneration    Lumbar canal stenosis      Lumbar radiculopathy      Nonischemic cardiomyopathy ([address]      Obstructive sleep apnea       Initial PS; AHI:  81.2 per PSG, 2014    Osteoarthritis      Palliative care patient 2019    Paroxysmal atrial fibrillation (HCC)      Periodic limb movement disorder      Pica      Psychiatric problem       Depression    Renal artery stenosis (HCC)      Restrictive lung disease      Sleep apnea      Thyroid disease       Hypothyroid x years    Vitamin D deficiency           Past Surgical History   Past Surgical History:   Procedure Laterality Date    APPENDECTOMY         With hysterectomy    CARDIAC CATHETERIZATION   9/24/15  [address]     EF 60%     SECTION         x 2    CHOLECYSTECTOMY        COLONOSCOPY         Last one     DIALYSIS FISTULA CREATION Left 9/10/2019     REVISION LEFT UPPER EXTREMITY AV ACCESS WITH INTERPOSITIONAL ARTEGRAFT performed by Cassie Thornton MD at [address]         Cataracts bilateral    FRACTURE SURGERY         Collar bone as child    HC DIALYSIS CATHETER Right 9/10/2019     INSERTION OF RIGHT INTERNAL JUGULAR HEMODIALYSIS CATHETER performed by Cassie Thornton MD at [address] [facility]         on breast and back - benign    VASCULAR SURGERY   2019     [facility]. Left upper extremity arteriovenous graft angiograms including venography to the superior vena cava.         Family History         Family History   Problem Relation Age of Onset    Heart Failure Mother           , 56   [facility] Hypertension Mother           hypertension    Other Mother           CKD    Cancer Sister              [facility] Father           , 65   [facility] COPD Father      Coronary Art Dis Father      Hypertension Other           sibling    Coronary Art Dis Other      Breast Cancer Other           sibling         Social History            Tobacco Use    Smoking status: Former Smoker       Packs/day: 1.00       Years: 50.00       Pack years: 50.00       Types: Cigarettes       Last attempt to quit: 2013       Years since quittin.7    Smokeless tobacco: Never Used   Substance Use Topics    Alcohol use: No       Alcohol/week: 0.0 standard drinks         Review of Systems     Constitutional - no significant activity change, appetite change, or unexpected weight change. No fever or chills. No diaphoresis or significant fatigue. HENT - no significant rhinorrhea or epistaxis. No tinnitus or significant hearing loss. Eyes - no sudden vision change or amaurosis. Respiratory - no significant shortness of breath, wheezing, or stridor. No apnea, cough, or chest tightness associated with shortness of breath. Cardiovascular - no chest pain, syncope, or significant dizziness. No palpitations or significant leg swelling. No claudication. (see HPI)  Gastrointestinal - no abdominal swelling or pain. No blood in stool. No severe constipation, diarrhea, nausea, or vomiting. Genitourinary - No dysuria, frequency, or urgency. No flank pain or hematuria. Musculoskeletal - no back pain, gait disturbance, or myalgia. Skin - no color change, rash, pallor, or new wound. Neurologic - no dizziness, facial asymmetry, or light headedness. No seizures. No speech difficulty or lateralizing weakness. Hematologic - no easy bruising or excessive bleeding. Psychiatric - no severe anxiety or nervousness. No confusion.   All other review of systems are negative.     Physical Exam     BP 98/62 (Site: Right Upper Arm)   Pulse 50   Temp 98 °F (36.7 °C) (Temporal)   Resp 18   Ht 5' 7.5\" (1.715 m)   Wt 166 lb (75.3 kg)   SpO2 97%   BMI 25.62 kg/m²      Constitutional - well developed, well nourished. No diaphoresis or acute distress. HENT - head normocephalic. Right external ear canal appears normal.  Left external ear canal appears normal.  Septum appears midline. Eyes - conjunctiva normal.  EOMS normal.  No exudate. No icterus. Neck- ROM appears normal, no tracheal deviation. Right IJ Permcath  Cardiovascular - Regular rate and rhythm. Heart sounds are normal.  No murmur, rub, or gallop. Carotid pulses are 2+ to palpation bilaterally without bruit. Extremities - Radial and ulnar pulses are 2+ to palpation bilaterally. No cyanosis, clubbing. There is some swelling and brusing noted LUE. Bruit and thrill noted over the graft. Hands feel slightly cool,  biswas arch left hand with 2+ DS. Pulmonary - effort appears normal.  No respiratory distress. Lungs - Breath sounds normal. No wheezes or rales. GI - Abdomen - soft, non tender, bowel sounds X 4 quadrants. No guarding or rebound tenderness. No distension or palpable mass. Genitourinary - deferred. Musculoskeletal - ROM appears normal.  No significant edema. Neurologic - alert and oriented X 3. Physiologic. Skin - warm, dry, and intact. No rash, erythema, or pallor. Psychiatric - mood, affect, and behavior appear normal.  Judgment and thought processes appear normal.        Assessment        1. ESRD on dialysis [facility])             Plan        We will have her unit hold on using her Graft for now (as they have infiltrated this at times)  Proceed with LUE angiography with possible angioplasty/stent;  Fistulogram possible angioplasty/stent                                        Office Visit on 2/27/2020          Detailed Report      Progress Notes Info     Author Note Status Last Update

## 2020-03-05 NOTE — H&P (VIEW-ONLY)
Kathy Montoya PA-C   Physician Assistant   Physician Assistant Medical   Progress Notes   Signed   Encounter Date:  2/27/2020               Signed        Expand All Collapse All         Patient Care Team:  Halina Turner MD as PCP - General (Family Medicine)  Halina Turner MD as PCP - REHABILITATION [facility] Empaneled Provider        History and Physical     Mrs. Bebe Hernandez has a history of chronic kidney disease. She comes in today for follow up Fistulogram. She underwent 1.  Left upper extremity arteriovenous graft angiograms including venography to the superior vena cava 2.  Aspiration of liquefied portion of hematoma left axilla due to infiltration with attempted cannulation LUE AV graft. She comes in today with c/o pain from her left elbow that is constant and radiated into her hand which get worse with dialysis. The pain also radiates up into her upper arm and neck.  She comes in today for evaluation.      Della Prince is a 81 y.o. female with the following history reviewed and recorded in [employer]:        Patient Active Problem List     Diagnosis Date Noted    AV graft malfunction, subsequent encounter 09/10/2019    Chest pain 05/15/2019    Hypoalbuminemia 05/15/2019    Tendinopathy of left rotator cuff 03/09/2019    ESRD on dialysis ([address] 03/08/2019    Macrocytic anemia 03/08/2019    Coagulopathy ([address] 03/08/2019    Hypokalemia 03/08/2019    Hypochloremia 03/08/2019    Left shoulder pain 03/08/2019    Acute on chronic respiratory failure with hypoxia and hypercapnia (HCC) 03/08/2019    Thrombocytopenia ([address] 01/26/2019    Renal failure      Encounter for current long-term use of anticoagulants 04/30/2018    Palliative care patient 01/21/2018    Abnormal CAT scan 07/19/2017    Chronic atrial fibrillation 07/19/2017    COPD exacerbation ([address] 05/29/2017    Chronic respiratory failure with hypoxia, on home O2 therapy ([address] 05/29/2017    Essential hypertension 05/29/2017    Obstructive sleep apnea      Periodic limb movement disorder      Hypoxemia      BiPAP (biphasic positive airway pressure) dependence         58TM/36SV       Diastolic dysfunction 86/50/9674    Bradycardia by electrocardiogram 08/25/2015    Left ventricular dysfunction 08/25/2015    Paroxysmal atrial fibrillation (HCC) 08/25/2015      Current Facility-Administered Medications          Current Outpatient Medications   Medication Sig Dispense Refill    levothyroxine (SYNTHROID) 125 MCG tablet Take 1 tablet by mouth daily 30 tablet 5    warfarin (COUMADIN) 1 MG tablet TAKE 2 TABLETS DAILY AS DIRECTED. 60 tablet 5    metoprolol tartrate (LOPRESSOR) 50 MG tablet TAKE 1 TABLET DAILY 90 tablet 3    losartan (COZAAR) 100 MG tablet TAKE 1/2 TABLET DAILY 45 tablet 3    bumetanide (BUMEX) 1 MG tablet Take 1 tablet by mouth daily 90 tablet 3    amLODIPine (NORVASC) 10 MG tablet Take 1 tablet by mouth daily 90 tablet 3    allopurinol (ZYLOPRIM) 100 MG tablet Take 1 tablet by mouth daily 90 tablet 3    lanthanum (FOSRENOL) 1000 MG chewable tablet Take 1,000 mg by mouth daily        OXYGEN Inhale 3 L into the lungs 24/7        BiPAP Machine MISC by Does not apply route nightly          No current facility-administered medications for this visit.          Allergies: Patient has no known allergies.   Past Medical History        Past Medical History:   Diagnosis Date    Anemia      B12 deficiency      BiPAP (biphasic positive airway pressure) dependence       15cm/11cm    Blood circulation, collateral       Ankles stay cold all time x 6 months    CAD (coronary artery disease)       Atrial Fib x years    CHF (congestive heart failure) ([facility]      CHF (congestive heart failure), NYHA class I, acute on chronic, combined ([facility] [address] 5/15/2019    Chronic diastolic heart failure (HCC)      Chronic kidney disease (CKD)       Stage 4 renal disease Sees Dr. Komal Gandhi    COPD (chronic obstructive pulmonary disease) ([facility])       on 2.5 L oxygen at home x 5 months    Depression      Hemodialysis patient [facility])       Has graft for fistula left arm - No dialysis yet  MWF at [address] History of blood transfusion       1954 When had     Hyperglycemia      Hyperlipidemia       High cholesterol x years    Hyperparathyroidism ([address]      Hypertension       x years    Hyperuricemia      Hypoxemia      Insomnia      Kyphosis      Lumbago       disk degeneration    Lumbar canal stenosis      Lumbar radiculopathy      Nonischemic cardiomyopathy ([address]      Obstructive sleep apnea       Initial PS; AHI:  81.2 per PSG, 2014    Osteoarthritis      Palliative care patient 2019    Paroxysmal atrial fibrillation (HCC)      Periodic limb movement disorder      Pica      Psychiatric problem       Depression    Renal artery stenosis (HCC)      Restrictive lung disease      Sleep apnea      Thyroid disease       Hypothyroid x years    Vitamin D deficiency           Past Surgical History   Past Surgical History:   Procedure Laterality Date    APPENDECTOMY         With hysterectomy    CARDIAC CATHETERIZATION   9/24/15  [facility]     EF 60%     SECTION         x 2    CHOLECYSTECTOMY        COLONOSCOPY         Last one     DIALYSIS FISTULA CREATION Left 9/10/2019     REVISION LEFT UPPER EXTREMITY AV ACCESS WITH INTERPOSITIONAL ARTEGRAFT performed by Augustine Solano MD at [address]         Cataracts bilateral    FRACTURE SURGERY         Collar bone as child    HC DIALYSIS CATHETER Right 9/10/2019     INSERTION OF RIGHT INTERNAL JUGULAR HEMODIALYSIS CATHETER performed by Augustine Solano MD at [address] [facility]         on breast and back - benign    VASCULAR SURGERY   2019     [facility]. Left upper extremity arteriovenous graft angiograms including venography to the superior vena cava.         Family History         Family History   Problem Relation Age of Onset    Heart Failure Mother           , 56   [de-identified] Hypertension Mother           hypertension    Other Mother           CKD    Cancer Sister              [de-identified] Heart Failure Father           , 65   [de-identified] COPD Father      Coronary Art Dis Father      Hypertension Other           sibling    Coronary Art Dis Other      Breast Cancer Other           sibling         Social History            Tobacco Use    Smoking status: Former Smoker       Packs/day: 1.00       Years: 50.00       Pack years: 50.00       Types: Cigarettes       Last attempt to quit: 2013       Years since quittin.7    Smokeless tobacco: Never Used   Substance Use Topics    Alcohol use: No       Alcohol/week: 0.0 standard drinks         Review of Systems     Constitutional - no significant activity change, appetite change, or unexpected weight change. No fever or chills. No diaphoresis or significant fatigue. HENT - no significant rhinorrhea or epistaxis. No tinnitus or significant hearing loss. Eyes - no sudden vision change or amaurosis. Respiratory - no significant shortness of breath, wheezing, or stridor. No apnea, cough, or chest tightness associated with shortness of breath. Cardiovascular - no chest pain, syncope, or significant dizziness. No palpitations or significant leg swelling. No claudication. (see HPI)  Gastrointestinal - no abdominal swelling or pain. No blood in stool. No severe constipation, diarrhea, nausea, or vomiting. Genitourinary - No dysuria, frequency, or urgency. No flank pain or hematuria. Musculoskeletal - no back pain, gait disturbance, or myalgia. Skin - no color change, rash, pallor, or new wound. Neurologic - no dizziness, facial asymmetry, or light headedness. No seizures. No speech difficulty or lateralizing weakness. Hematologic - no easy bruising or excessive bleeding. Psychiatric - no severe anxiety or nervousness. No confusion.   All other review of systems are negative.     Physical Exam     BP 98/62 (Site: Right Upper Arm)   Pulse 50   Temp 98 °F (36.7 °C) (Temporal)   Resp 18   Ht 5' 7.5\" (1.715 m)   Wt 166 lb (75.3 kg)   SpO2 97%   BMI 25.62 kg/m²      Constitutional - well developed, well nourished. No diaphoresis or acute distress. HENT - head normocephalic. Right external ear canal appears normal.  Left external ear canal appears normal.  Septum appears midline. Eyes - conjunctiva normal.  EOMS normal.  No exudate. No icterus. Neck- ROM appears normal, no tracheal deviation. Right IJ Permcath  Cardiovascular - Regular rate and rhythm. Heart sounds are normal.  No murmur, rub, or gallop. Carotid pulses are 2+ to palpation bilaterally without bruit. Extremities - Radial and ulnar pulses are 2+ to palpation bilaterally. No cyanosis, clubbing. There is some swelling and brusing noted LUE. Bruit and thrill noted over the graft. Hands feel slightly cool,  biswas arch left hand with 2+ DS. Pulmonary - effort appears normal.  No respiratory distress. Lungs - Breath sounds normal. No wheezes or rales. GI - Abdomen - soft, non tender, bowel sounds X 4 quadrants. No guarding or rebound tenderness. No distension or palpable mass. Genitourinary - deferred. Musculoskeletal - ROM appears normal.  No significant edema. Neurologic - alert and oriented X 3. Physiologic. Skin - warm, dry, and intact. No rash, erythema, or pallor. Psychiatric - mood, affect, and behavior appear normal.  Judgment and thought processes appear normal.        Assessment        1. ESRD on dialysis [facility])             Plan        We will have her unit hold on using her Graft for now (as they have infiltrated this at times)  Proceed with LUE angiography with possible angioplasty/stent;  Fistulogram possible angioplasty/stent                                        Office Visit on 2/27/2020          Detailed Report      Progress Notes Info     Author Note Status Last Update User   Rickie Lomeli PA-C Signed Rickie Lomeli PA-C   Last Update Date/Time: 2/27/2020  3:50 PM   Chart Review Routing History     No routing history on file.

## 2020-03-06 ENCOUNTER — HOSPITAL ENCOUNTER (OUTPATIENT)
Dept: INTERVENTIONAL RADIOLOGY/VASCULAR | Age: 85
Discharge: HOME | End: 2020-03-06
Payer: MEDICARE

## 2020-03-06 VITALS
DIASTOLIC BLOOD PRESSURE: 35 MMHG | HEIGHT: 68 IN | RESPIRATION RATE: 17 BRPM | WEIGHT: 166 LBS | TEMPERATURE: 98 F | BODY MASS INDEX: 25.16 KG/M2 | HEART RATE: 49 BPM | OXYGEN SATURATION: 100 % | SYSTOLIC BLOOD PRESSURE: 123 MMHG

## 2020-03-06 PROCEDURE — 2580000003 HC RX 258: Performed by: PHYSICIAN ASSISTANT

## 2020-03-06 PROCEDURE — 36901 INTRO CATH DIALYSIS CIRCUIT: CPT | Performed by: SURGERY

## 2020-03-06 PROCEDURE — 6370000000 HC RX 637 (ALT 250 FOR IP): Performed by: PHYSICIAN ASSISTANT

## 2020-03-06 PROCEDURE — 6360000004 HC RX CONTRAST MEDICATION: Performed by: SURGERY

## 2020-03-06 PROCEDURE — 2709999900 IR GUIDED INTRO CATH DIALYSIS CIRCUIT

## 2020-03-06 PROCEDURE — 6360000002 HC RX W HCPCS: Performed by: PHYSICIAN ASSISTANT

## 2020-03-06 PROCEDURE — 99152 MOD SED SAME PHYS/QHP 5/>YRS: CPT | Performed by: SURGERY

## 2020-03-06 PROCEDURE — 6360000002 HC RX W HCPCS: Performed by: SURGERY

## 2020-03-06 PROCEDURE — 36902 INTRO CATH DIALYSIS CIRCUIT: CPT | Performed by: SURGERY

## 2020-03-06 RX ORDER — CLONIDINE HYDROCHLORIDE 0.1 MG/1
0.1 TABLET ORAL PRN
Status: DISCONTINUED | OUTPATIENT
Start: 2020-03-06 | End: 2020-03-08 | Stop reason: HOSPADM

## 2020-03-06 RX ORDER — CEFAZOLIN SODIUM 1 G/50ML
1 INJECTION, SOLUTION INTRAVENOUS
Status: COMPLETED | OUTPATIENT
Start: 2020-03-06 | End: 2020-03-06

## 2020-03-06 RX ORDER — ONDANSETRON 2 MG/ML
4 INJECTION INTRAMUSCULAR; INTRAVENOUS EVERY 6 HOURS PRN
Status: DISCONTINUED | OUTPATIENT
Start: 2020-03-06 | End: 2020-03-08 | Stop reason: HOSPADM

## 2020-03-06 RX ORDER — HYDROCODONE BITARTRATE AND ACETAMINOPHEN 5; 325 MG/1; MG/1
1 TABLET ORAL EVERY 4 HOURS PRN
Status: DISCONTINUED | OUTPATIENT
Start: 2020-03-06 | End: 2020-03-08 | Stop reason: HOSPADM

## 2020-03-06 RX ORDER — ACETAMINOPHEN 325 MG/1
650 TABLET ORAL EVERY 4 HOURS PRN
Status: DISCONTINUED | OUTPATIENT
Start: 2020-03-06 | End: 2020-03-08 | Stop reason: HOSPADM

## 2020-03-06 RX ORDER — SODIUM CHLORIDE 9 MG/ML
INJECTION, SOLUTION INTRAVENOUS CONTINUOUS
Status: DISCONTINUED | OUTPATIENT
Start: 2020-03-06 | End: 2020-03-08 | Stop reason: HOSPADM

## 2020-03-06 RX ORDER — ONDANSETRON 2 MG/ML
4 INJECTION INTRAMUSCULAR; INTRAVENOUS EVERY 8 HOURS PRN
Status: DISCONTINUED | OUTPATIENT
Start: 2020-03-06 | End: 2020-03-08 | Stop reason: HOSPADM

## 2020-03-06 RX ORDER — HEPARIN SODIUM 5000 [USP'U]/ML
INJECTION, SOLUTION INTRAVENOUS; SUBCUTANEOUS
Status: COMPLETED | OUTPATIENT
Start: 2020-03-06 | End: 2020-03-06

## 2020-03-06 RX ORDER — MIDAZOLAM HYDROCHLORIDE 1 MG/ML
INJECTION INTRAMUSCULAR; INTRAVENOUS
Status: COMPLETED | OUTPATIENT
Start: 2020-03-06 | End: 2020-03-06

## 2020-03-06 RX ORDER — SODIUM CHLORIDE 0.9 % (FLUSH) 0.9 %
10 SYRINGE (ML) INJECTION PRN
Status: DISCONTINUED | OUTPATIENT
Start: 2020-03-06 | End: 2020-03-08 | Stop reason: HOSPADM

## 2020-03-06 RX ORDER — HYDROCODONE BITARTRATE AND ACETAMINOPHEN 5; 325 MG/1; MG/1
2 TABLET ORAL EVERY 4 HOURS PRN
Status: DISCONTINUED | OUTPATIENT
Start: 2020-03-06 | End: 2020-03-08 | Stop reason: HOSPADM

## 2020-03-06 RX ORDER — ASPIRIN 81 MG/1
81 TABLET ORAL ONCE
Status: COMPLETED | OUTPATIENT
Start: 2020-03-06 | End: 2020-03-06

## 2020-03-06 RX ORDER — FENTANYL CITRATE 50 UG/ML
INJECTION, SOLUTION INTRAMUSCULAR; INTRAVENOUS
Status: COMPLETED | OUTPATIENT
Start: 2020-03-06 | End: 2020-03-06

## 2020-03-06 RX ADMIN — CEFAZOLIN SODIUM 1 G: 1 INJECTION, SOLUTION INTRAVENOUS at 08:10

## 2020-03-06 RX ADMIN — HEPARIN SODIUM 5000 UNITS: 5000 INJECTION, SOLUTION INTRAVENOUS; SUBCUTANEOUS at 08:30

## 2020-03-06 RX ADMIN — MIDAZOLAM 0.5 MG: 1 INJECTION INTRAMUSCULAR; INTRAVENOUS at 08:39

## 2020-03-06 RX ADMIN — ASPIRIN 81 MG: 81 TABLET, COATED ORAL at 07:57

## 2020-03-06 RX ADMIN — SODIUM CHLORIDE: 9 INJECTION, SOLUTION INTRAVENOUS at 07:57

## 2020-03-06 RX ADMIN — FENTANYL CITRATE 25 MCG: 50 INJECTION INTRAMUSCULAR; INTRAVENOUS at 08:39

## 2020-03-06 RX ADMIN — IOPAMIDOL 40 ML: 612 INJECTION, SOLUTION INTRATHECAL at 08:54

## 2020-03-06 ASSESSMENT — PAIN DESCRIPTION - ORIENTATION: ORIENTATION: LEFT

## 2020-03-06 ASSESSMENT — PAIN DESCRIPTION - LOCATION: LOCATION: ARM

## 2020-03-06 ASSESSMENT — PAIN SCALES - GENERAL: PAINLEVEL_OUTOF10: 6

## 2020-03-06 ASSESSMENT — PAIN DESCRIPTION - PAIN TYPE: TYPE: CHRONIC PAIN

## 2020-03-06 NOTE — OP NOTE
Preprocedure diagnosis:  1. End-stage renal disease on hemodialysis  2. Left upper extremity arteriovenous graft malfunction with pain in the left upper extremity    Post procedure diagnosis: Same    Procedure:  1. Left upper extremity arteriovenous graft angiograms including venography to the superior vena cava  2. Balloon angioplasty left upper arm PTFE graft to AV graft anastomosis with 6 mm x 20 mm cutting balloon  3. Completion left upper extremity angiograms    Surgeon: Tiffanie Carrion MD    Anesthesia: Intravenous/moderate sedation plus local    Estimated blood loss: Less than 50 mL    Findings:  1. The patient has a patent left brachial artery to axillary vein arteriovenous graft. This is been revised 9/10/2019 where she had a graft to graft anastomosis and a new venous anastomosis. There is a moderate stenosis at the graft graft anastomosis. The arterial anastomosis is widely patent. The venous anastomosis is widely patent. 2.  There is some steal from the left upper extremity arteriovenous graft. The patient has fairly small brachial radial and ulnar arteries. However, she does have a palpable radial artery pulse at the wrist even without compression of the graft. Procedure in detail:    After the patient was consented and given intravenous antibiotics, she is brought to angiography and placed on the angiography suite table supine position. Intravenous/moderate sedation was administered the patient's left arm was prepped and draped in usual sterile procedure. Skin and subcutaneous tissues in the distal upper arm were anesthetized with lidocaine. Micropuncture needle was used to cannulate the graft without difficulty. Seldinger technique was utilized to place a 4 [place] glide sheath. Left upper extremity arteriovenous graft angiograms including venography to the superior vena cava were performed with the above findings.   This 4 [place] sheath was removed and pressure applied for

## 2020-03-09 ENCOUNTER — TELEPHONE (OUTPATIENT)
Dept: INTERNAL MEDICINE CLINIC | Age: 85
End: 2020-03-09

## 2020-03-10 ENCOUNTER — ANTI-COAG VISIT (OUTPATIENT)
Dept: INTERNAL MEDICINE | Age: 85
End: 2020-03-10
Payer: MEDICARE

## 2020-03-10 LAB — INR BLD: 1.5

## 2020-03-10 PROCEDURE — 93793 ANTICOAG MGMT PT WARFARIN: CPT | Performed by: INTERNAL MEDICINE

## 2020-03-12 DIAGNOSIS — Z99.2 STAGE 5 CHRONIC KIDNEY DISEASE ON CHRONIC DIALYSIS (HCC): ICD-10-CM

## 2020-03-12 DIAGNOSIS — N18.6 STAGE 5 CHRONIC KIDNEY DISEASE ON CHRONIC DIALYSIS (HCC): ICD-10-CM

## 2020-03-12 DIAGNOSIS — N39.0 URINARY TRACT INFECTION WITHOUT HEMATURIA, SITE UNSPECIFIED: ICD-10-CM

## 2020-03-12 DIAGNOSIS — E03.9 HYPOTHYROIDISM, UNSPECIFIED TYPE: ICD-10-CM

## 2020-03-12 LAB
ALBUMIN SERPL-MCNC: 3.4 G/DL (ref 3.5–5.2)
ALP BLD-CCNC: 55 U/L (ref 35–104)
ALT SERPL-CCNC: 6 U/L (ref 5–33)
ANION GAP SERPL CALCULATED.3IONS-SCNC: 15 MMOL/L (ref 7–19)
AST SERPL-CCNC: 16 U/L (ref 5–32)
BASOPHILS ABSOLUTE: 0.1 K/UL (ref 0–0.2)
BASOPHILS RELATIVE PERCENT: 1.4 % (ref 0–1)
BILIRUB SERPL-MCNC: 0.5 MG/DL (ref 0.2–1.2)
BUN BLDV-MCNC: 17 MG/DL (ref 8–23)
CALCIUM SERPL-MCNC: 9.3 MG/DL (ref 8.8–10.2)
CHLORIDE BLD-SCNC: 93 MMOL/L (ref 98–111)
CHOLESTEROL, TOTAL: 133 MG/DL (ref 160–199)
CO2: 30 MMOL/L (ref 22–29)
CREAT SERPL-MCNC: 4.6 MG/DL (ref 0.5–0.9)
EOSINOPHILS ABSOLUTE: 0.3 K/UL (ref 0–0.6)
EOSINOPHILS RELATIVE PERCENT: 5.2 % (ref 0–5)
GFR NON-AFRICAN AMERICAN: 9
GLUCOSE BLD-MCNC: 77 MG/DL (ref 74–109)
HCT VFR BLD CALC: 37.3 % (ref 37–47)
HDLC SERPL-MCNC: 56 MG/DL (ref 65–121)
HEMOGLOBIN: 11.5 G/DL (ref 12–16)
IMMATURE GRANULOCYTES #: 0 K/UL
LDL CHOLESTEROL CALCULATED: 63 MG/DL
LYMPHOCYTES ABSOLUTE: 1.6 K/UL (ref 1.1–4.5)
LYMPHOCYTES RELATIVE PERCENT: 33.3 % (ref 20–40)
MCH RBC QN AUTO: 31.8 PG (ref 27–31)
MCHC RBC AUTO-ENTMCNC: 30.8 G/DL (ref 33–37)
MCV RBC AUTO: 103 FL (ref 81–99)
MONOCYTES ABSOLUTE: 0.4 K/UL (ref 0–0.9)
MONOCYTES RELATIVE PERCENT: 8.7 % (ref 0–10)
NEUTROPHILS ABSOLUTE: 2.5 K/UL (ref 1.5–7.5)
NEUTROPHILS RELATIVE PERCENT: 51 % (ref 50–65)
PDW BLD-RTO: 15.6 % (ref 11.5–14.5)
PHOSPHORUS: 4.1 MG/DL (ref 2.5–4.5)
PLATELET # BLD: 49 K/UL (ref 130–400)
PMV BLD AUTO: 14.2 FL (ref 9.4–12.3)
POTASSIUM SERPL-SCNC: 4 MMOL/L (ref 3.5–5)
RBC # BLD: 3.62 M/UL (ref 4.2–5.4)
SODIUM BLD-SCNC: 138 MMOL/L (ref 136–145)
TOTAL PROTEIN: 6.9 G/DL (ref 6.6–8.7)
TRIGL SERPL-MCNC: 70 MG/DL (ref 0–149)
TSH SERPL DL<=0.05 MIU/L-ACNC: 2.26 UIU/ML (ref 0.27–4.2)
VITAMIN D 25-HYDROXY: 26.8 NG/ML
WBC # BLD: 4.8 K/UL (ref 4.8–10.8)

## 2020-03-17 ENCOUNTER — ANTI-COAG VISIT (OUTPATIENT)
Dept: INTERNAL MEDICINE | Age: 85
End: 2020-03-17
Payer: MEDICARE

## 2020-03-17 LAB — INR BLD: 1.3

## 2020-03-17 PROCEDURE — 93793 ANTICOAG MGMT PT WARFARIN: CPT | Performed by: INTERNAL MEDICINE

## 2020-03-17 NOTE — PROGRESS NOTES
HOME MONITORING REPORT    INR today:   Results for orders placed or performed in visit on 03/17/20   Protime-INR   Result Value Ref Range    INR 1.30        INR Goal: 2.0-3.0    Dosing Plan  As of 3/17/2020    TTR:   66.2 % (1.8 y)   Full warfarin instructions:   3/17: 3 mg; 3/18: 2 mg; Otherwise 1 mg every Mon, Wed, Fri; 2 mg all other days            Patient's blood got thicker, despite increasing dose last week. Pt denies missing any doses or eating greens. Patient has an appt with Dr. Jazlyn Butts. I advised her to bring her machine with her and we will check it while she is here also.       PLAN: TAKE 3MG TONIGHT AND 2MG TOMORROW, WILL RECHECK AT APPT WITH DR. Gabi Gerber

## 2020-03-19 ENCOUNTER — OFFICE VISIT (OUTPATIENT)
Dept: INTERNAL MEDICINE | Age: 85
End: 2020-03-19
Payer: MEDICARE

## 2020-03-19 VITALS
SYSTOLIC BLOOD PRESSURE: 98 MMHG | OXYGEN SATURATION: 93 % | DIASTOLIC BLOOD PRESSURE: 62 MMHG | WEIGHT: 166 LBS | HEART RATE: 74 BPM | HEIGHT: 67 IN | BODY MASS INDEX: 26.06 KG/M2

## 2020-03-19 PROBLEM — I50.33 ACUTE ON CHRONIC DIASTOLIC (CONGESTIVE) HEART FAILURE (HCC): Status: ACTIVE | Noted: 2019-05-15

## 2020-03-19 LAB
INTERNATIONAL NORMALIZATION RATIO, POC: 1.4
PROTHROMBIN TIME, POC: NORMAL

## 2020-03-19 PROCEDURE — 4040F PNEUMOC VAC/ADMIN/RCVD: CPT | Performed by: INTERNAL MEDICINE

## 2020-03-19 PROCEDURE — G8926 SPIRO NO PERF OR DOC: HCPCS | Performed by: INTERNAL MEDICINE

## 2020-03-19 PROCEDURE — 1036F TOBACCO NON-USER: CPT | Performed by: INTERNAL MEDICINE

## 2020-03-19 PROCEDURE — 85610 PROTHROMBIN TIME: CPT | Performed by: INTERNAL MEDICINE

## 2020-03-19 PROCEDURE — G8482 FLU IMMUNIZE ORDER/ADMIN: HCPCS | Performed by: INTERNAL MEDICINE

## 2020-03-19 PROCEDURE — G8427 DOCREV CUR MEDS BY ELIG CLIN: HCPCS | Performed by: INTERNAL MEDICINE

## 2020-03-19 PROCEDURE — 3023F SPIROM DOC REV: CPT | Performed by: INTERNAL MEDICINE

## 2020-03-19 PROCEDURE — G8400 PT W/DXA NO RESULTS DOC: HCPCS | Performed by: INTERNAL MEDICINE

## 2020-03-19 PROCEDURE — 1123F ACP DISCUSS/DSCN MKR DOCD: CPT | Performed by: INTERNAL MEDICINE

## 2020-03-19 PROCEDURE — 1090F PRES/ABSN URINE INCON ASSESS: CPT | Performed by: INTERNAL MEDICINE

## 2020-03-19 PROCEDURE — 99214 OFFICE O/P EST MOD 30 MIN: CPT | Performed by: INTERNAL MEDICINE

## 2020-03-19 PROCEDURE — G8417 CALC BMI ABV UP PARAM F/U: HCPCS | Performed by: INTERNAL MEDICINE

## 2020-03-19 RX ORDER — LEVOTHYROXINE SODIUM 0.12 MG/1
125 TABLET ORAL DAILY
Qty: 30 TABLET | Refills: 5 | Status: SHIPPED | OUTPATIENT
Start: 2020-03-19

## 2020-03-19 RX ORDER — LANTHANUM CARBONATE 1000 MG/1
1000 TABLET, CHEWABLE ORAL
Qty: 270 TABLET | Refills: 3 | Status: SHIPPED | OUTPATIENT
Start: 2020-03-19 | End: 2020-06-17

## 2020-03-19 ASSESSMENT — PATIENT HEALTH QUESTIONNAIRE - PHQ9
SUM OF ALL RESPONSES TO PHQ QUESTIONS 1-9: 0
2. FEELING DOWN, DEPRESSED OR HOPELESS: 0
SUM OF ALL RESPONSES TO PHQ9 QUESTIONS 1 & 2: 0
1. LITTLE INTEREST OR PLEASURE IN DOING THINGS: 0
SUM OF ALL RESPONSES TO PHQ QUESTIONS 1-9: 0

## 2020-03-19 ASSESSMENT — ENCOUNTER SYMPTOMS
NAUSEA: 0
RHINORRHEA: 0
COLOR CHANGE: 0
VOMITING: 0
CONSTIPATION: 0
COUGH: 0
ABDOMINAL PAIN: 0
EYE REDNESS: 0
SINUS PAIN: 0
SORE THROAT: 0
DIARRHEA: 0
SINUS PRESSURE: 0
EYE PAIN: 0
CHEST TIGHTNESS: 0
SHORTNESS OF BREATH: 1
PHOTOPHOBIA: 0
WHEEZING: 0

## 2020-03-19 NOTE — PROGRESS NOTES
metoprolol tartrate (LOPRESSOR) 50 MG tablet TAKE 1 TABLET DAILY (Patient taking differently: Take 25 mg by mouth nightly TAKES 1/2 AT NIGHT) 90 tablet 3    losartan (COZAAR) 100 MG tablet TAKE 1/2 TABLET DAILY (Patient taking differently: Take 50 mg by mouth nightly TAKES 1/2  MG TAB AT NIGHT) 45 tablet 3    bumetanide (BUMEX) 1 MG tablet Take 1 tablet by mouth daily 90 tablet 3    allopurinol (ZYLOPRIM) 100 MG tablet Take 1 tablet by mouth daily 90 tablet 3    OXYGEN Inhale 3 L into the lungs 24/7      BiPAP Machine MISC by Does not apply route nightly       No current facility-administered medications for this visit. Patient Active Problem List   Diagnosis    Diastolic dysfunction    Bradycardia by electrocardiogram    Left ventricular dysfunction    Paroxysmal atrial fibrillation (HCC)    Obstructive sleep apnea    Periodic limb movement disorder    Hypoxemia    BiPAP (biphasic positive airway pressure) dependence    COPD exacerbation (HCC)    Chronic respiratory failure with hypoxia, on home O2 therapy ([facility] [address]    Essential hypertension    Abnormal CAT scan    Chronic atrial fibrillation    Encounter for current long-term use of anticoagulants    Renal failure    Palliative care patient    Thrombocytopenia ([facility] [address]    ESRD on dialysis ([facility] [address]    Macrocytic anemia    Coagulopathy (HCC)    Hypokalemia    Hypochloremia    Left shoulder pain    Acute on chronic respiratory failure with hypoxia and hypercapnia (HCC)    Tendinopathy of left rotator cuff    Acute on chronic diastolic (congestive) heart failure (HCC)    Chest pain    Hypoalbuminemia    AV graft malfunction, subsequent encounter        Review of Systems   Constitutional: Negative for activity change, appetite change, chills, diaphoresis, fatigue and fever. HENT: Negative for congestion, ear pain, postnasal drip, rhinorrhea, sinus pressure, sinus pain, sneezing and sore throat.     Eyes: Negative for photophobia, pain and redness. Respiratory: Positive for shortness of breath. Negative for cough, chest tightness and wheezing. She has chronic shortness of breath secondary to her COPD   Cardiovascular: Negative for chest pain, palpitations and leg swelling. Gastrointestinal: Negative for abdominal pain, constipation, diarrhea, nausea and vomiting. Endocrine: Negative for cold intolerance, heat intolerance, polydipsia, polyphagia and polyuria. Genitourinary: Negative for dysuria, frequency, hematuria and urgency. Musculoskeletal: Negative for arthralgias, gait problem, joint swelling and myalgias. Skin: Negative for color change, pallor and wound. Allergic/Immunologic: Positive for environmental allergies. Neurological: Negative for dizziness, facial asymmetry, speech difficulty, weakness and light-headedness. Hematological: Negative for adenopathy. Does not bruise/bleed easily. Psychiatric/Behavioral: Negative for confusion. The patient is not nervous/anxious. BP 98/62   Pulse 74   Ht 5' 7\" (1.702 m)   Wt 166 lb (75.3 kg)   SpO2 93%   BMI 26.00 kg/m²   Physical Exam  Vitals signs and nursing note reviewed. Constitutional:       General: She is not in acute distress. Appearance: Normal appearance. She is well-developed and normal weight. She is not ill-appearing, toxic-appearing or diaphoretic. HENT:      Head: Normocephalic and atraumatic. Right Ear: Tympanic membrane, ear canal and external ear normal.      Left Ear: Tympanic membrane, ear canal and external ear normal.      Nose: Nose normal.      Mouth/Throat:      Mouth: Mucous membranes are moist.      Pharynx: Oropharynx is clear. No oropharyngeal exudate or posterior oropharyngeal erythema. Eyes:      General: No scleral icterus. Right eye: No discharge. Left eye: No discharge. Conjunctiva/sclera: Conjunctivae normal.      Pupils: Pupils are equal, round, and reactive to light.    Neck: Musculoskeletal: Normal range of motion and neck supple. No neck rigidity or muscular tenderness. Thyroid: No thyromegaly. Vascular: No carotid bruit or JVD. Trachea: No tracheal deviation. Cardiovascular:      Rate and Rhythm: Normal rate and regular rhythm. Pulses: Normal pulses. Heart sounds: Normal heart sounds. No murmur. No friction rub. No gallop. Pulmonary:      Effort: Pulmonary effort is normal. No respiratory distress. Breath sounds: Normal breath sounds. No stridor. No wheezing, rhonchi or rales. Chest:      Chest wall: No tenderness. Abdominal:      General: Bowel sounds are normal. There is no distension. Palpations: Abdomen is soft. There is no mass. Tenderness: There is no abdominal tenderness. There is no right CVA tenderness, left CVA tenderness, guarding or rebound. Hernia: No hernia is present. Musculoskeletal: Normal range of motion. General: No swelling, tenderness, deformity or signs of injury. Right lower leg: No edema. Left lower leg: No edema. Lymphadenopathy:      Cervical: No cervical adenopathy. Skin:     General: Skin is warm and dry. Capillary Refill: Capillary refill takes less than 2 seconds. Coloration: Skin is not jaundiced or pale. Findings: No bruising, erythema, lesion or rash. Neurological:      General: No focal deficit present. Mental Status: She is alert and oriented to person, place, and time. Mental status is at baseline. Cranial Nerves: No cranial nerve deficit. Sensory: No sensory deficit. Motor: No weakness. Coordination: Coordination normal.      Gait: Gait normal.      Deep Tendon Reflexes: Reflexes are normal and symmetric. Reflexes normal.   Psychiatric:         Mood and Affect: Mood normal.         Behavior: Behavior normal.         Thought Content:  Thought content normal.         Judgment: Judgment normal.         1. Chronic obstructive pulmonary disease, unspecified COPD type ([facility] [address]    2. Acute on chronic diastolic (congestive) heart failure (HCC)    3. Paroxysmal atrial fibrillation (HCC)    4. Coagulopathy ([address]    5. Hyperlipidemia, unspecified hyperlipidemia type     6. Chronic atrial fibrillation        ASSESSMENT/PLAN:    69-year-old woman here for follow-up    1. Hypertension: Blood pressure running a little low. We will discontinue her Norvasc    2. Congestive heart failure: Stable    3. Atrial fibrillation: Rate controlled. On current medication regimen    4. History of coagulopathy: She is on chronic Coumadin therapy. Check INR today. 5.  Hyperlipidemia: Lipids are close to goal    6. COPD: Stable      Desire Tam was seen today for 6 month follow-up. Diagnoses and all orders for this visit:    Chronic obstructive pulmonary disease, unspecified COPD type ([facility] [address]    Acute on chronic diastolic (congestive) heart failure ([facility] [address]  -     Phosphorus; Future    Paroxysmal atrial fibrillation (HCC)  -     CBC Auto Differential; Future  -     Comprehensive Metabolic Panel; Future  -     Lipid Panel; Future  -     TSH without Reflex; Future  -     POCT INR    Coagulopathy (HCC)    Hyperlipidemia, unspecified hyperlipidemia type   -     Lipid Panel; Future    Chronic atrial fibrillation    Other orders  -     levothyroxine (SYNTHROID) 125 MCG tablet; Take 1 tablet by mouth daily  -     lanthanum (FOSRENOL) 1000 MG chewable tablet; Take 1 tablet by mouth 3 times daily (with meals)          Return in about 6 months (around 9/19/2020), or medicare wellness.      Orders Placed This Encounter   Procedures    CBC Auto Differential     Fast 12 hours     Standing Status:   Future     Standing Expiration Date:   3/19/2021    Comprehensive Metabolic Panel     Fasting 12 hours     Standing Status:   Future     Standing Expiration Date:   3/19/2021    Lipid Panel     Standing Status:   Future     Standing Expiration Date:   3/19/2021     Order Specific Question:

## 2020-03-19 NOTE — PATIENT INSTRUCTIONS
with your pet, including petting, snuggling, being kissed or licked, and sharing food. If you must care for your pet or be around animals while you are sick, wash your hands before and after you interact with pets and wear a facemask. Call ahead before visiting your doctor  If you have a medical appointment, call the healthcare provider and tell them that you have or may have COVID-19. This will help the healthcare providers office take steps to keep other people from getting infected or exposed. Wear a facemask  You should wear a facemask when you are around other people (e.g., sharing a room or vehicle) or pets and before you enter a healthcare providers office. If you are not able to wear a facemask (for example, because it causes trouble breathing), then people who live with you should not stay in the same room with you, or they should wear a facemask if they enter your room. Cover your coughs and sneezes  Cover your mouth and nose with a tissue when you cough or sneeze. Throw used tissues in a lined trash can. Immediately wash your hands with soap and water for at least 20 seconds or, if soap and water are not available, clean your hands with an alcohol-based hand  that contains at least 60% alcohol. Clean your hands often  Wash your hands often with soap and water for at least 20 seconds, especially after blowing your nose, coughing, or sneezing; going to the bathroom; and before eating or preparing food. If soap and water are not readily available, use an alcohol-based hand  with at least 60% alcohol, covering all surfaces of your hands and rubbing them together until they feel dry. Soap and water are the best option if hands are visibly dirty. Avoid touching your eyes, nose, and mouth with unwashed hands. Avoid sharing personal household items  You should not share dishes, drinking glasses, cups, eating utensils, towels, or bedding with other people or pets in your home.  After using these items, they should be washed thoroughly with soap and water. Clean all high-touch surfaces everyday  High touch surfaces include counters, tabletops, doorknobs, bathroom fixtures, toilets, phones, keyboards, tablets, and bedside tables. Also, clean any surfaces that may have blood, stool, or body fluids on them. Use a household cleaning spray or wipe, according to the label instructions. Labels contain instructions for safe and effective use of the cleaning product including precautions you should take when applying the product, such as wearing gloves and making sure you have good ventilation during use of the product. Monitor your symptoms  Seek prompt medical attention if your illness is worsening (e.g., difficulty breathing). Before seeking care, call your healthcare provider and tell them that you have, or are being evaluated for, COVID-19. Put on a facemask before you enter the facility. These steps will help the healthcare providers office to keep other people in the office or waiting room from getting infected or exposed. Ask your healthcare provider to call the local or state health department. Persons who are placed under active monitoring or facilitated self-monitoring should follow instructions provided by their local health department or occupational health professionals, as appropriate. When working with your local health department check their available hours. If you have a medical emergency and need to call 911, notify the dispatch personnel that you have, or are being evaluated for COVID-19. If possible, put on a facemask before emergency medical services arrive. Discontinuing home isolation  Patients with confirmed COVID-19 should remain under home isolation precautions until the risk of secondary transmission to others is thought to be low.  The decision to discontinue home isolation precautions should be made on a case-by-case basis, in consultation with healthcare providers and state

## 2020-03-20 ENCOUNTER — TELEPHONE (OUTPATIENT)
Dept: INTERNAL MEDICINE | Age: 85
End: 2020-03-20

## 2020-03-20 ENCOUNTER — TELEPHONE (OUTPATIENT)
Dept: NEUROLOGY | Age: 85
End: 2020-03-20

## 2020-03-20 NOTE — TELEPHONE ENCOUNTER
We just stopped the Norvasc yesterrday. Give it another day or two to see if her BP stabilizes.  Monitor it through the weekend and call us with readings on MOnday

## 2020-03-20 NOTE — TELEPHONE ENCOUNTER
Called appointment reminder, left message on patient voice mail, asking patient if she is sick or has been around any one that is sick to please call and cancel , but if not , we will see her Tuesday.

## 2020-03-23 ENCOUNTER — TELEPHONE (OUTPATIENT)
Dept: INTERNAL MEDICINE | Age: 85
End: 2020-03-23

## 2020-03-23 NOTE — TELEPHONE ENCOUNTER
Marilou Whitley was notified. He will call us if her bottom number gets any lower or she has dizziness.

## 2020-03-24 ENCOUNTER — ANTI-COAG VISIT (OUTPATIENT)
Dept: INTERNAL MEDICINE | Age: 85
End: 2020-03-24
Payer: MEDICARE

## 2020-03-24 LAB — INR BLD: 2.1

## 2020-03-24 PROCEDURE — 93793 ANTICOAG MGMT PT WARFARIN: CPT | Performed by: INTERNAL MEDICINE

## 2020-03-24 NOTE — PROGRESS NOTES
HOME MONITORING REPORT    INR today:   Results for orders placed or performed in visit on 03/24/20   Protime-INR   Result Value Ref Range    INR 2.10        INR Goal: 2.0-3.0    Dosing Plan  As of 3/24/2020    TTR:   65.6 % (1.9 y)   Full warfarin instructions:   2 mg every Tue, Thu, Sat; 1 mg all other days              PLAN:PATIENT NOTIFIED TO  CONTINUE CURRENT DOSE AND RECHECK IN ONE WEEK.

## 2020-03-31 ENCOUNTER — ANTI-COAG VISIT (OUTPATIENT)
Dept: INTERNAL MEDICINE | Age: 85
End: 2020-03-31
Payer: MEDICARE

## 2020-03-31 LAB — INR BLD: 1.6

## 2020-03-31 PROCEDURE — 93793 ANTICOAG MGMT PT WARFARIN: CPT | Performed by: INTERNAL MEDICINE

## 2020-04-07 ENCOUNTER — ANTI-COAG VISIT (OUTPATIENT)
Dept: INTERNAL MEDICINE | Age: 85
End: 2020-04-07
Payer: MEDICARE

## 2020-04-07 LAB — INR BLD: 1.8

## 2020-04-07 PROCEDURE — 93793 ANTICOAG MGMT PT WARFARIN: CPT | Performed by: INTERNAL MEDICINE

## 2020-04-07 NOTE — PROGRESS NOTES
HOME MONITORING REPORT    INR today:   Results for orders placed or performed in visit on 04/07/20   Protime-INR   Result Value Ref Range    INR 1.80        INR Goal: 2.0-3.0    Dosing Plan  As of 4/7/2020    TTR:   64.5 % (1.9 y)   Full warfarin instructions:   4/7: 3 mg; Otherwise 1 mg every Mon, Wed, Fri; 2 mg all other days              PLAN:PATIENT NOTIFIED TO TAKE 3MG TONIGHT, THEN RESUME  CONTINUE CURRENT DOSE AND RECHECK IN ONE WEEK.

## 2020-04-14 ENCOUNTER — ANTI-COAG VISIT (OUTPATIENT)
Dept: INTERNAL MEDICINE | Age: 85
End: 2020-04-14
Payer: MEDICARE

## 2020-04-14 LAB — INR BLD: 2.2

## 2020-04-14 PROCEDURE — 93793 ANTICOAG MGMT PT WARFARIN: CPT | Performed by: INTERNAL MEDICINE

## 2020-04-21 ENCOUNTER — ANTI-COAG VISIT (OUTPATIENT)
Dept: INTERNAL MEDICINE | Age: 85
End: 2020-04-21
Payer: MEDICARE

## 2020-04-21 LAB — INR BLD: 1.9

## 2020-04-21 PROCEDURE — 93793 ANTICOAG MGMT PT WARFARIN: CPT | Performed by: INTERNAL MEDICINE

## 2020-04-21 NOTE — PROGRESS NOTES
HOME MONITORING REPORT    INR today:   Results for orders placed or performed in visit on 04/21/20   Protime-INR   Result Value Ref Range    INR 1.90        INR Goal: 2.0-3.0    Dosing Plan  As of 4/21/2020    TTR:   64.3 % (1.9 y)   Full warfarin instructions:   4/22: 2 mg; Otherwise 1 mg every Mon, Wed, Fri; 2 mg all other days              PLAN: Patient advised to take 2mg tomorrow, instead of 1mg, then resume current dose and recheck in one week. She voiced understanding.

## 2020-04-28 ENCOUNTER — ANTI-COAG VISIT (OUTPATIENT)
Dept: INTERNAL MEDICINE | Age: 85
End: 2020-04-28
Payer: MEDICARE

## 2020-04-28 LAB — INR BLD: 2.1

## 2020-04-28 PROCEDURE — 93793 ANTICOAG MGMT PT WARFARIN: CPT | Performed by: INTERNAL MEDICINE

## 2020-05-05 ENCOUNTER — ANTI-COAG VISIT (OUTPATIENT)
Dept: INTERNAL MEDICINE | Age: 85
End: 2020-05-05
Payer: MEDICARE

## 2020-05-05 LAB — INR BLD: 2.5

## 2020-05-05 PROCEDURE — 93793 ANTICOAG MGMT PT WARFARIN: CPT | Performed by: INTERNAL MEDICINE

## 2020-05-07 RX ORDER — WARFARIN SODIUM 1 MG/1
TABLET ORAL
Qty: 90 TABLET | Refills: 3 | Status: SHIPPED | OUTPATIENT
Start: 2020-05-07 | End: 2020-05-19 | Stop reason: SDUPTHER

## 2020-05-12 ENCOUNTER — ANTI-COAG VISIT (OUTPATIENT)
Dept: INTERNAL MEDICINE | Age: 85
End: 2020-05-12
Payer: MEDICARE

## 2020-05-12 LAB — INR BLD: 1.8

## 2020-05-12 PROCEDURE — 93793 ANTICOAG MGMT PT WARFARIN: CPT | Performed by: INTERNAL MEDICINE

## 2020-05-12 NOTE — PROGRESS NOTES
HOME MONITORING REPORT    INR today:   Results for orders placed or performed in visit on 05/12/20   Protime-INR   Result Value Ref Range    INR 1.80        INR Goal: 2.0-3.0    Dosing Plan  As of 5/12/2020    TTR:   64.6 % (2 y)   Full warfarin instructions:   5/12: 3 mg; Otherwise 1 mg every Mon, Fri; 2 mg all other days              PLAN: Patient advised to take 3mg tonight, then resume current dose and recheck in one week. Electronically signed by Lowell Marshall MD on 5/12/2020 at 10:18 AM  I have reviewed nursing plan for Coumadin management and agree with plan.

## 2020-05-19 ENCOUNTER — ANTI-COAG VISIT (OUTPATIENT)
Dept: INTERNAL MEDICINE | Age: 85
End: 2020-05-19
Payer: MEDICARE

## 2020-05-19 LAB — INR BLD: 2.2

## 2020-05-19 PROCEDURE — 93793 ANTICOAG MGMT PT WARFARIN: CPT | Performed by: INTERNAL MEDICINE

## 2020-05-19 RX ORDER — WARFARIN SODIUM 1 MG/1
TABLET ORAL
Qty: 90 TABLET | Refills: 3 | Status: SHIPPED | OUTPATIENT
Start: 2020-05-19

## 2020-05-20 ENCOUNTER — TELEPHONE (OUTPATIENT)
Dept: VASCULAR SURGERY | Age: 85
End: 2020-05-20

## 2020-05-26 ENCOUNTER — OFFICE VISIT (OUTPATIENT)
Dept: INTERNAL MEDICINE | Age: 85
End: 2020-05-26
Payer: MEDICARE

## 2020-05-26 ENCOUNTER — ANTI-COAG VISIT (OUTPATIENT)
Dept: INTERNAL MEDICINE | Age: 85
End: 2020-05-26
Payer: MEDICARE

## 2020-05-26 ENCOUNTER — TELEPHONE (OUTPATIENT)
Dept: INTERNAL MEDICINE | Age: 85
End: 2020-05-26

## 2020-05-26 VITALS — HEART RATE: 60 BPM | DIASTOLIC BLOOD PRESSURE: 60 MMHG | SYSTOLIC BLOOD PRESSURE: 120 MMHG

## 2020-05-26 DIAGNOSIS — N18.6 END STAGE RENAL DISEASE (HCC): ICD-10-CM

## 2020-05-26 LAB
ALBUMIN SERPL-MCNC: 3.1 G/DL (ref 3.5–5.2)
ALP BLD-CCNC: 85 U/L (ref 35–104)
ALT SERPL-CCNC: 10 U/L (ref 5–33)
ANION GAP SERPL CALCULATED.3IONS-SCNC: 15 MMOL/L (ref 7–19)
AST SERPL-CCNC: 17 U/L (ref 5–32)
BASOPHILS ABSOLUTE: 0.1 K/UL (ref 0–0.2)
BASOPHILS RELATIVE PERCENT: 1 % (ref 0–1)
BILIRUB SERPL-MCNC: 0.4 MG/DL (ref 0.2–1.2)
BUN BLDV-MCNC: 86 MG/DL (ref 8–23)
CALCIUM SERPL-MCNC: 8.9 MG/DL (ref 8.8–10.2)
CHLORIDE BLD-SCNC: 93 MMOL/L (ref 98–111)
CO2: 23 MMOL/L (ref 22–29)
CREAT SERPL-MCNC: 11.8 MG/DL (ref 0.5–0.9)
EOSINOPHILS ABSOLUTE: 0.2 K/UL (ref 0–0.6)
EOSINOPHILS RELATIVE PERCENT: 4.8 % (ref 0–5)
GFR NON-AFRICAN AMERICAN: 3
GLUCOSE BLD-MCNC: 83 MG/DL (ref 74–109)
HCT VFR BLD CALC: 32.9 % (ref 37–47)
HEMOGLOBIN: 10.7 G/DL (ref 12–16)
IMMATURE GRANULOCYTES #: 0 K/UL
INR BLD: 1.9
LYMPHOCYTES ABSOLUTE: 1.1 K/UL (ref 1.1–4.5)
LYMPHOCYTES RELATIVE PERCENT: 21.4 % (ref 20–40)
MAGNESIUM: 2.4 MG/DL (ref 1.6–2.4)
MCH RBC QN AUTO: 32.3 PG (ref 27–31)
MCHC RBC AUTO-ENTMCNC: 32.5 G/DL (ref 33–37)
MCV RBC AUTO: 99.4 FL (ref 81–99)
MONOCYTES ABSOLUTE: 0.5 K/UL (ref 0–0.9)
MONOCYTES RELATIVE PERCENT: 10.3 % (ref 0–10)
NEUTROPHILS ABSOLUTE: 3.1 K/UL (ref 1.5–7.5)
NEUTROPHILS RELATIVE PERCENT: 62.1 % (ref 50–65)
PDW BLD-RTO: 14.7 % (ref 11.5–14.5)
PHOSPHORUS: 5.2 MG/DL (ref 2.5–4.5)
PLATELET # BLD: 78 K/UL (ref 130–400)
PMV BLD AUTO: 14.2 FL (ref 9.4–12.3)
POTASSIUM SERPL-SCNC: 7.1 MMOL/L (ref 3.5–5)
RBC # BLD: 3.31 M/UL (ref 4.2–5.4)
SODIUM BLD-SCNC: 131 MMOL/L (ref 136–145)
TOTAL PROTEIN: 6.7 G/DL (ref 6.6–8.7)
WBC # BLD: 5.1 K/UL (ref 4.8–10.8)

## 2020-05-26 PROCEDURE — 4040F PNEUMOC VAC/ADMIN/RCVD: CPT | Performed by: INTERNAL MEDICINE

## 2020-05-26 PROCEDURE — G8400 PT W/DXA NO RESULTS DOC: HCPCS | Performed by: INTERNAL MEDICINE

## 2020-05-26 PROCEDURE — 1090F PRES/ABSN URINE INCON ASSESS: CPT | Performed by: INTERNAL MEDICINE

## 2020-05-26 PROCEDURE — G8427 DOCREV CUR MEDS BY ELIG CLIN: HCPCS | Performed by: INTERNAL MEDICINE

## 2020-05-26 PROCEDURE — 1036F TOBACCO NON-USER: CPT | Performed by: INTERNAL MEDICINE

## 2020-05-26 PROCEDURE — 99214 OFFICE O/P EST MOD 30 MIN: CPT | Performed by: INTERNAL MEDICINE

## 2020-05-26 PROCEDURE — 99497 ADVNCD CARE PLAN 30 MIN: CPT | Performed by: INTERNAL MEDICINE

## 2020-05-26 PROCEDURE — G8417 CALC BMI ABV UP PARAM F/U: HCPCS | Performed by: INTERNAL MEDICINE

## 2020-05-26 PROCEDURE — 1123F ACP DISCUSS/DSCN MKR DOCD: CPT | Performed by: INTERNAL MEDICINE

## 2020-05-26 PROCEDURE — 93793 ANTICOAG MGMT PT WARFARIN: CPT | Performed by: INTERNAL MEDICINE

## 2020-05-26 RX ORDER — SODIUM POLYSTYRENE SULFONATE 15 G/60ML
30 SUSPENSION ORAL; RECTAL ONCE
Qty: 1 BOTTLE | Refills: 0 | Status: SHIPPED | OUTPATIENT
Start: 2020-05-26 | End: 2020-05-26

## 2020-05-26 RX ORDER — BUMETANIDE 1 MG/1
1 TABLET ORAL 2 TIMES DAILY
Qty: 180 TABLET | Refills: 3
Start: 2020-05-26

## 2020-05-26 NOTE — PATIENT INSTRUCTIONS
instructions adapted under license by [facility] ([facility]). If you have questions about a medical condition or this instruction, always ask your healthcare professional. [address] any warranty or liability for your use of this information.

## 2020-05-26 NOTE — PROGRESS NOTES
months    CAD (coronary artery disease)     Atrial Fib x years    CHF (congestive heart failure) (HCC)     CHF (congestive heart failure), NYHA class I, acute on chronic, combined ([facility] 5/15/2019    Chronic diastolic heart failure (HCC)     Chronic kidney disease (CKD)     Stage 4 renal disease Sees Dr. Kiera Saldivar COPD (chronic obstructive pulmonary disease) ([facility]     on 2.5 L oxygen at home x 5 months    Depression     Hemodialysis patient [facility])     Has graft for fistula left arm - No dialysis yet  MWF at [place] [id number] of blood transfusion      When had     Hyperglycemia     Hyperlipidemia     High cholesterol x years    Hyperparathyroidism ([facility]     Hypertension     x years    Hyperuricemia     Hypoxemia     Insomnia     Kyphosis     Lumbago     disk degeneration    Lumbar canal stenosis     Lumbar radiculopathy     Nonischemic cardiomyopathy ([facility]     Obstructive sleep apnea     Initial PS; AHI:  81.2 per PSG, 2014    Osteoarthritis     Palliative care patient 2019    Paroxysmal atrial fibrillation (HCC)     Periodic limb movement disorder     Pica     Psychiatric problem     Depression    Renal artery stenosis (HCC)     Restrictive lung disease     Sleep apnea     Thyroid disease     Hypothyroid x years    Vitamin D deficiency       Past Surgical History:   Procedure Laterality Date    APPENDECTOMY      With hysterectomy    C[facility]  [facility]24[facility]15  [facility]    EF 60%     SECTION      x 2    CHOLECYSTECTOMY      COLONOSCOPY      Last one     DIALYSIS FISTULA CREATION Left 9/10/2019    REVISION LEFT UPPER EXTREMITY AV ACCESS WITH INTERPOSITIONAL ARTEGRAFT performed by Zaheer Castillo MD at [address] [place] [address],  [address] bilateral    FRACTURE SURGERY      Collar bone as child    HC DIALYSIS CATHETER Right 9/10/2019    INSERTION OF RIGHT INTERNAL JUGULAR HEMODIALYSIS CATHETER performed by Zaheer Castillo MD at [address] [place] [address] OR    HYSTERECTOMY  1980    SKIN BIOPSY      on breast and back - benign    VASCULAR SURGERY  2019    SJS. Left upper extremity arteriovenous graft angiograms including venography to the superior vena cava.  VASCULAR SURGERY  2020    [facility]. Left upper extremity arteriovenous graft angiograms including venography to the superior vena cava. Balloon angioplasty left upper arm PTFE graft to AV graft anastomosis with 6 mm x 20 mm cutting balloon. Completion left upper extremity angiograms.       Social History     Socioeconomic History    Marital status:      Spouse name: Not on file    Number of children: Not on file    Years of education: Not on file    Highest education level: Not on file   Occupational History    Occupation: Retired    Social Needs    Financial resource strain: Not on file    Food insecurity     Worry: Not on file     Inability: Not on file   [employer] needs     Medical: Not on file     Non-medical: Not on file   Tobacco Use    Smoking status: Former Smoker     Packs/day: 1.00     Years: 50.00     Pack years: 50.00     Types: Cigarettes     Last attempt to quit: 2013     Years since quittin.9    Smokeless tobacco: Never Used   Substance and Sexual Activity    Alcohol use: No     Alcohol/week: 0.0 standard drinks    Drug use: No    Sexual activity: Not on file   Lifestyle    Physical activity     Days per week: Not on file     Minutes per session: Not on file    Stress: Not on file   Relationships    Social connections     Talks on phone: Not on file     Gets together: Not on file     Attends Restoration service: Not on file     Active member of club or organization: Not on file     Attends meetings of clubs or organizations: Not on file     Relationship status: Not on file    Intimate partner violence     Fear of current or ex partner: Not on file     Emotionally abused: Not on file     Physically abused: Not on file     Forced sexual activity: Not on file   Other Topics Concern    Not on file   Social History Narrative    Previously  now     Lives by herself    Walks unassisted    Not presently driving    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    Education high school    [employer]    Previously smoked quit 8-10 years ago denies alcohol consumption or substance usage      Family History   Problem Relation Age of Onset    Heart Failure Mother         , 56    Hypertension Mother         hypertension    Other Mother         CKD    Cancer Sister             Heart Failure Father         , 65    COPD Father     Coronary Art Dis Father     Hypertension Other         sibling    Coronary Art Dis Other     Breast Cancer Other         sibling        Current Outpatient Medications   Medication Sig Dispense Refill    warfarin (COUMADIN) 1 MG tablet 1 mg every Mon, Fri; 2 mg all other days 90 tablet 3    levothyroxine (SYNTHROID) 125 MCG tablet Take 1 tablet by mouth daily 30 tablet 5    lanthanum (FOSRENOL) 1000 MG chewable tablet Take 1 tablet by mouth 3 times daily (with meals) 270 tablet 3    metoprolol tartrate (LOPRESSOR) 50 MG tablet TAKE 1 TABLET DAILY (Patient taking differently: Take 25 mg by mouth nightly TAKES 1/2 AT NIGHT) 90 tablet 3    bumetanide (BUMEX) 1 MG tablet Take 1 tablet by mouth daily 90 tablet 3    allopurinol (ZYLOPRIM) 100 MG tablet Take 1 tablet by mouth daily 90 tablet 3    OXYGEN Inhale 3 L into the lungs 24/7      BiPAP Machine MISC by Does not apply route nightly       No current facility-administered medications for this visit.          Patient Active Problem List   Diagnosis    Diastolic dysfunction    Bradycardia by electrocardiogram    Left ventricular dysfunction    Paroxysmal atrial fibrillation (HCC)    Obstructive sleep apnea    Periodic limb movement disorder    Hypoxemia    BiPAP (biphasic positive airway pressure) dependence    COPD exacerbation [facility]    Chronic respiratory failure with hypoxia, on home O2 therapy ([facility]    Essential hypertension    Abnormal CAT scan    Chronic atrial fibrillation    Encounter for current long-term use of anticoagulants    Renal failure    Palliative care patient    Thrombocytopenia ([facility]    ESRD on dialysis ([facility]    Macrocytic anemia    Coagulopathy (HCC)    Hypokalemia    Hypochloremia    Left shoulder pain    Acute on chronic respiratory failure with hypoxia and hypercapnia (HCC)    Tendinopathy of left rotator cuff    Acute on chronic diastolic (congestive) heart failure (HCC)    Chest pain    Hypoalbuminemia    AV graft malfunction, subsequent encounter        Review of Systems   Constitutional: Negative for activity change, appetite change, chills, diaphoresis, fatigue and fever. HENT: Negative for congestion, ear pain, postnasal drip, rhinorrhea, sinus pressure, sinus pain, sneezing and sore throat. Eyes: Negative for photophobia, pain and redness. Respiratory: Positive for shortness of breath. Negative for cough, chest tightness and wheezing. She has chronic shortness of breath secondary to her COPD   Cardiovascular: Negative for chest pain, palpitations and leg swelling. Gastrointestinal: Negative for abdominal pain, constipation, diarrhea, nausea and vomiting. Endocrine: Negative for cold intolerance, heat intolerance, polydipsia, polyphagia and polyuria. Genitourinary: Negative for dysuria, frequency, hematuria and urgency. Musculoskeletal: Negative for arthralgias, gait problem, joint swelling and myalgias. Skin: Negative for color change, pallor and wound. Allergic/Immunologic: Positive for environmental allergies. Neurological: Negative for dizziness, facial asymmetry, speech difficulty, weakness and light-headedness. Hematological: Negative for adenopathy. Does not bruise/bleed easily. Psychiatric/Behavioral: Negative for confusion.  The patient

## 2020-05-27 ASSESSMENT — ENCOUNTER SYMPTOMS
VOMITING: 0
NAUSEA: 0
COUGH: 0
RHINORRHEA: 0
COLOR CHANGE: 0
CHEST TIGHTNESS: 0
WHEEZING: 0
SHORTNESS OF BREATH: 1
ABDOMINAL PAIN: 0
EYE PAIN: 0
SINUS PRESSURE: 0
EYE REDNESS: 0
PHOTOPHOBIA: 0
SINUS PAIN: 0
CONSTIPATION: 0
DIARRHEA: 0
SORE THROAT: 0

## 2020-06-01 ENCOUNTER — OFFICE VISIT (OUTPATIENT)
Age: 85
End: 2020-06-01

## 2020-06-01 VITALS — HEART RATE: 83 BPM | OXYGEN SATURATION: 95 % | TEMPERATURE: 98.6 F

## 2020-06-01 NOTE — PATIENT INSTRUCTIONS
Preventing the Spread of Coronavirus Disease 2019 in Homes and Residential Communities   For the most recent information go to [employer] [email or web address]    Prevention steps for People with confirmed or suspected COVID-19 (including persons under investigation) who do not need to be hospitalized  and   People with confirmed COVID-19 who were hospitalized and determined to be medically stable to go home    Your healthcare provider and public health staff will evaluate whether you can be cared for at home. If it is determined that you do not need to be hospitalized and can be isolated at home, you will be monitored by staff from your local or state health department. You should follow the prevention steps below until a healthcare provider or local or state health department says you can return to your normal activities. Stay home except to get medical care  People who are mildly ill with COVID-19 are able to isolate at home during their illness. You should restrict activities outside your home, except for getting medical care. Do not go to work, school, or public areas. Avoid using public transportation, ride-sharing, or taxis. Separate yourself from other people and animals in your home  People: As much as possible, you should stay in a specific room and away from other people in your home. Also, you should use a separate bathroom, if available. Animals: You should restrict contact with pets and other animals while you are sick with COVID-19, just like you would around other people. Although there have not been reports of pets or other animals becoming sick with COVID-19, it is still recommended that people sick with COVID-19 limit contact with animals until more information is known about the virus. When possible, have another member of your household care for your animals while you are sick.  If you are sick with COVID-19, avoid contact with your pet, including departments. [employer] allows you to send messages to your doctor, view your test results, renew your prescriptions, schedule appointments, view visit notes, and more. How Do I Sign Up? 1. In your Internet browser, go to [email or web address]  2. Click on the Sign Up Now link in the Sign In box. You will see the New Member Sign Up page. 3. Enter your [employer] Access Code exactly as it appears below. You will not need to use this code after youve completed the sign-up process. If you do not sign up before the expiration date, you must request a new code. [employer] Access Code: Activation code not generated  Current [employer] Status: Patient Declined    4. Enter your Social Security Number ([id number]) and Date of Birth (mm/dd/yyyy) as indicated and click Submit. You will be taken to the next sign-up page. 5. Create a [employer] ID. This will be your [employer] login ID and cannot be changed, so think of one that is secure and easy to remember. 6. Create a [employer] password. You can change your password at any time. 7. Enter your Password Reset Question and Answer. This can be used at a later time if you forget your password. 8. Enter your e-mail address. You will receive e-mail notification when new information is available in [address] 9. Click Sign Up. You can now view your medical record. Additional Information  If you have questions, please contact the physician practice where you receive care. Remember, [employer] is NOT to be used for urgent needs. For medical emergencies, dial 911. For questions regarding your [employer] account call [phone]. If you have a clinical question, please call your doctor's office.

## 2020-06-02 ENCOUNTER — ANTI-COAG VISIT (OUTPATIENT)
Dept: INTERNAL MEDICINE | Age: 85
End: 2020-06-02
Payer: MEDICARE

## 2020-06-02 LAB
INR BLD: 2.9
REPORT: NORMAL
SARS-COV-2: NOT DETECTED
THIS TEST SENT TO: NORMAL

## 2020-06-02 PROCEDURE — 93793 ANTICOAG MGMT PT WARFARIN: CPT | Performed by: INTERNAL MEDICINE

## 2020-06-02 NOTE — PROGRESS NOTES
HOME MONITORING REPORT    INR today:   Results for orders placed or performed in visit on 06/02/20   Protime-INR   Result Value Ref Range    INR 2.90        INR Goal: 2.0-3.0    Dosing Plan  As of 6/2/2020    TTR:   64.7 % (2.1 y)   Full warfarin instructions:   6/2: Hold; Otherwise 1 mg every Mon; 2 mg all other days              PLAN: discussed with Dr. Pacheco Dumont, pt advised to hold tonight, then resume current dose and recheck in one week.      Electronically signed by Enma Sanchez MD on 6/2/2020 at 2:29 PM

## 2020-06-09 ENCOUNTER — HOSPITAL ENCOUNTER (EMERGENCY)
Age: 85
Discharge: HOME | End: 2020-06-09
Attending: EMERGENCY MEDICINE
Payer: MEDICARE

## 2020-06-09 VITALS
DIASTOLIC BLOOD PRESSURE: 56 MMHG | OXYGEN SATURATION: 100 % | WEIGHT: 166 LBS | BODY MASS INDEX: 26.06 KG/M2 | TEMPERATURE: 98.2 F | HEART RATE: 60 BPM | RESPIRATION RATE: 26 BRPM | SYSTOLIC BLOOD PRESSURE: 169 MMHG | HEIGHT: 67 IN

## 2020-06-09 LAB
INR BLD: 4.7 (ref 0.88–1.18)
PROTHROMBIN TIME: 46.1 SEC (ref 12–14.6)

## 2020-06-09 PROCEDURE — 85610 PROTHROMBIN TIME: CPT

## 2020-06-09 PROCEDURE — 6360000002 HC RX W HCPCS

## 2020-06-09 PROCEDURE — 96376 TX/PRO/DX INJ SAME DRUG ADON: CPT

## 2020-06-09 PROCEDURE — 6360000002 HC RX W HCPCS: Performed by: EMERGENCY MEDICINE

## 2020-06-09 PROCEDURE — 36415 COLL VENOUS BLD VENIPUNCTURE: CPT

## 2020-06-09 PROCEDURE — 99285 EMERGENCY DEPT VISIT HI MDM: CPT

## 2020-06-09 PROCEDURE — 96374 THER/PROPH/DIAG INJ IV PUSH: CPT

## 2020-06-09 RX ORDER — HYDROMORPHONE HYDROCHLORIDE 1 MG/ML
0.5 INJECTION, SOLUTION INTRAMUSCULAR; INTRAVENOUS; SUBCUTANEOUS ONCE
Status: COMPLETED | OUTPATIENT
Start: 2020-06-09 | End: 2020-06-09

## 2020-06-09 RX ORDER — HYDROCODONE BITARTRATE AND ACETAMINOPHEN 10; 325 MG/1; MG/1
1 TABLET ORAL EVERY 6 HOURS PRN
Qty: 12 TABLET | Refills: 0 | Status: SHIPPED | OUTPATIENT
Start: 2020-06-09 | End: 2020-06-12

## 2020-06-09 RX ADMIN — HYDROMORPHONE HYDROCHLORIDE 0.5 MG: 1 INJECTION, SOLUTION INTRAMUSCULAR; INTRAVENOUS; SUBCUTANEOUS at 11:01

## 2020-06-09 RX ADMIN — HYDROMORPHONE HYDROCHLORIDE 1 MG: 1 INJECTION, SOLUTION INTRAMUSCULAR; INTRAVENOUS; SUBCUTANEOUS at 13:26

## 2020-06-09 ASSESSMENT — PAIN SCALES - GENERAL
PAINLEVEL_OUTOF10: 5
PAINLEVEL_OUTOF10: 7
PAINLEVEL_OUTOF10: 3

## 2020-06-09 ASSESSMENT — ENCOUNTER SYMPTOMS
NAUSEA: 1
EYE PAIN: 0
ABDOMINAL PAIN: 1
COUGH: 0
SHORTNESS OF BREATH: 1
DIARRHEA: 0
VOMITING: 0

## 2020-06-10 ENCOUNTER — TELEPHONE (OUTPATIENT)
Dept: INTERNAL MEDICINE | Age: 85
End: 2020-06-10

## 2020-06-10 ENCOUNTER — ANTI-COAG VISIT (OUTPATIENT)
Dept: INTERNAL MEDICINE | Age: 85
End: 2020-06-10

## 2020-06-10 NOTE — PROGRESS NOTES
HOME MONITORING REPORT    INR today: No results found for this visit on 06/10/20. INR Goal: 2.0-3.0    Dosing Plan  As of 6/10/2020    TTR:   64.1 % (2.1 y)   Full warfarin instructions:   6/10: Hold; 6/11: Hold; Otherwise 1 mg every Mon; 2 mg all other days              PLAN: spoke with patient. I advised her to hold her Coumadin until Friday and recheck at that time. I discussed with her that Dr. Rafael Craig said she can stop the Coumadin if she wants to, since she is off of her dialysis. I let her know that it was totally up to her and that she can think about it and let us know. I told her not to take any Coumadin until at least Friday. She voiced understanding and is going to let her son know.

## 2020-06-11 ENCOUNTER — TELEPHONE (OUTPATIENT)
Dept: INTERNAL MEDICINE | Age: 85
End: 2020-06-11

## 2020-06-11 NOTE — TELEPHONE ENCOUNTER
Spoke with son, patient needs order to d/c home INR machine sent to [address]      Hospice will start checking her INR.  (they won't send machine back until hospice has been established and has checked her INR)    Fax order to : [phone]

## 2020-06-12 ENCOUNTER — ANTI-COAG VISIT (OUTPATIENT)
Dept: INTERNAL MEDICINE | Age: 85
End: 2020-06-12

## 2020-06-12 LAB — INR BLD: 5.5

## 2020-06-16 ENCOUNTER — ANTI-COAG VISIT (OUTPATIENT)
Dept: INTERNAL MEDICINE | Age: 85
End: 2020-06-16
Payer: MEDICARE

## 2020-06-16 LAB — INR BLD: 2.7

## 2020-06-16 PROCEDURE — 93793 ANTICOAG MGMT PT WARFARIN: CPT | Performed by: INTERNAL MEDICINE

## 2020-06-23 ENCOUNTER — ANTI-COAG VISIT (OUTPATIENT)
Dept: INTERNAL MEDICINE | Age: 85
End: 2020-06-23
Payer: MEDICARE

## 2020-06-23 LAB — INR BLD: 3.3

## 2020-06-23 PROCEDURE — 93793 ANTICOAG MGMT PT WARFARIN: CPT | Performed by: INTERNAL MEDICINE

## 2020-06-30 ENCOUNTER — ANTI-COAG VISIT (OUTPATIENT)
Dept: INTERNAL MEDICINE | Age: 85
End: 2020-06-30
Payer: MEDICARE

## 2020-06-30 LAB — INR BLD: 2.6

## 2020-06-30 PROCEDURE — 93793 ANTICOAG MGMT PT WARFARIN: CPT | Performed by: INTERNAL MEDICINE

## 2020-07-06 ENCOUNTER — TELEPHONE (OUTPATIENT)
Dept: INTERNAL MEDICINE | Age: 85
End: 2020-07-06

## 2020-07-06 ENCOUNTER — ANTI-COAG VISIT (OUTPATIENT)
Dept: INTERNAL MEDICINE | Age: 85
End: 2020-07-06
Payer: MEDICARE

## 2020-07-06 LAB — INR BLD: 8

## 2020-07-06 PROCEDURE — 93793 ANTICOAG MGMT PT WARFARIN: CPT | Performed by: INTERNAL MEDICINE

## 2020-07-06 RX ORDER — PHYTONADIONE 5 MG/1
5 TABLET ORAL ONCE
Qty: 1 TABLET | Refills: 3 | Status: SHIPPED | OUTPATIENT
Start: 2020-07-06 | End: 2020-07-06

## 2020-07-06 NOTE — TELEPHONE ENCOUNTER
Chaka Law with Hospice called and said [facility] doesn't care the Vitamin K b/c they have to order a huge quantity and it is expensive. They thought  carries it. I checked with  and they do not have any in stock. I checked with [facility] and they don't have any in stock. I called [facility] and they don't have any in stock. Please advise.

## 2020-07-08 ENCOUNTER — ANTI-COAG VISIT (OUTPATIENT)
Dept: INTERNAL MEDICINE | Age: 85
End: 2020-07-08
Payer: MEDICARE

## 2020-07-08 LAB — INR BLD: 8

## 2020-07-08 PROCEDURE — 93793 ANTICOAG MGMT PT WARFARIN: CPT | Performed by: INTERNAL MEDICINE

## 2020-07-08 NOTE — PROGRESS NOTES
Hospice INR     INR today:   Results for orders placed or performed in visit on 07/08/20   Protime-INR   Result Value Ref Range    INR 8.00        INR Goal: 2.0-3.0    Dosing Plan  As of 7/8/2020    TTR:   62.9 % (2.2 y)   Full warfarin instructions:   1 mg every day              PLAN:  Per Dr. Erasmo Capone: hold tonight and tomorrow, recheck Friday. [address] with hospice notified. Hospice to call Dr. Erasmo Capone on Friday with results. Vitamin K shortage. Pt refusing to eat greens, but family will continue to encourage them. She will drink ensures. To discuss stopping coumadin completely.

## 2020-07-10 ENCOUNTER — TELEPHONE (OUTPATIENT)
Dept: INTERNAL MEDICINE | Age: 85
End: 2020-07-10

## 2020-07-13 ENCOUNTER — TELEPHONE (OUTPATIENT)
Dept: INTERNAL MEDICINE | Age: 85
End: 2020-07-13

## 2020-09-17 ENCOUNTER — ANTI-COAG VISIT (OUTPATIENT)
Dept: PRIMARY CARE CLINIC | Age: 85
End: 2020-09-17

## 2020-09-17 PROBLEM — I48.20 CHRONIC ATRIAL FIBRILLATION (HCC): Status: RESOLVED | Noted: 2017-07-19 | Resolved: 2020-09-17

## 2020-10-06 ENCOUNTER — TELEPHONE (OUTPATIENT)
Dept: INTERNAL MEDICINE | Age: 85
End: 2020-10-06

## 2020-10-06 NOTE — TELEPHONE ENCOUNTER
Farhat Britt (pt son Called) and stated that someone from our office had called his mom. Pt passed away in July and there is not documentation of someone calling.

## 2024-01-18 NOTE — H&P
Hospital Medicine  History and Physical    Patient:  Rosas Pinto  MRN: [id number]    CHIEF COMPLAINT:  Chest pain    History Obtained From: patient, chart    PCP: Ghislaine Rodriges MD    HISTORY OF PRESENT ILLNESS:  81year old white female presents to the emergency department from outpatient dialysis center with primary complaint of sharp pain in her substernum to right chest, worse with deep inspiration, since waking this morning. Associated shortness of breath, no nausea or diaphoresis. The patient missed her dialysis appointment Friday, May 10 due to staff having difficulty accessing her fistula, then left after one hour of dialysis today due to worsening pain. She has a history of diastolic heart failure. She complains of congestion and rhinorrhea. Pain relieved by fentanyl in ED, no other relieving or exacerbating factors. REVIEW OF SYSTEMS:  14 systems reviewed and negative except as noted above.     Past Medical History:      Diagnosis Date    Anemia     B12 deficiency     BiPAP (biphasic positive airway pressure) dependence     15cm/11cm    Blood circulation, collateral     Ankles stay cold all time x 6 months    CAD (coronary artery disease)     Atrial Fib x years    CHF (congestive heart failure) (HCC)     CHF (congestive heart failure), NYHA class I, acute on chronic, combined ([address] 5/15/2019    Chronic diastolic heart failure (HCC)     Chronic kidney disease (CKD)     Stage 4 renal disease Sees Dr. Dana Shell COPD (chronic obstructive pulmonary disease) ([facility])     on 2.5 L oxygen at home x 5 months    Depression     Hemodialysis patient [facility])     Has graft for fistula left arm - No dialysis yet    History of blood transfusion      When had     Hyperglycemia     Hyperlipidemia     High cholesterol x years    Hyperparathyroidism ([address]     Hypertension     x years    Hyperuricemia     Hypoxemia     Insomnia     Kyphosis     Lumbago     disk degeneration    Lumbar canal stenosis     Lumbar radiculopathy     Nonischemic cardiomyopathy ([facility]     Obstructive sleep apnea     Initial PS; AHI:  81.2 per PSG, 2014    Osteoarthritis     Palliative care patient 2019    Paroxysmal atrial fibrillation (HCC)     Periodic limb movement disorder     Pica     Psychiatric problem     Depression    Renal artery stenosis (HCC)     Restrictive lung disease     Sleep apnea     Thyroid disease     Hypothyroid x years    Vitamin D deficiency        Past Surgical History:      Procedure Laterality Date    APPENDECTOMY      With hysterectomy    CARDIAC [facility]  [facility]  [facility]    EF 60%     SECTION      x 2    CHOLECYSTECTOMY      COLONOSCOPY      Last one     EYE SURGERY      Cataracts bilateral    FRACTURE SURGERY      Collar bone as child    HYSTERECTOMY      SKIN BIOPSY      on breast and back - benign       Medications Prior to Admission:    Prior to Admission medications    Medication Sig Start Date End Date Taking? Authorizing Provider   azithromycin (ZITHROMAX) 250 MG tablet 500mg on day 1 followed by 250mg on days 2 - 5 3/28/19  Yes ZELALEM Cuevas   HYDROcodone-acetaminophen (NORCO) 5-325 MG per tablet Take 1-2 tablets by mouth.  19  Yes Historical Provider, MD   warfarin (COUMADIN) 1 MG tablet Take 1 mg by mouth 17  Yes Historical Provider, MD   ondansetron (ZOFRAN) 4 MG tablet Take 1 tablet by mouth 3 times daily as needed for Nausea or Vomiting 1/10/19  Yes ZELALEM Cuevas   calcitRIOL (ROCALTROL) 0.25 MCG capsule TAKE 1 CAPSULE DAILY 10/15/18  Yes Guanaco Bailey MD   bumetanide (BUMEX) 1 MG tablet TAKE 1 TABLET TWICE A DAY  Patient taking differently: TAKE 1 tablet once a day 18  Yes Guanaco Bailey MD   losartan (COZAAR) 100 MG tablet TAKE 1/2 TABLET DAILY 18  Yes Guanaco Bailey MD   allopurinol (ZYLOPRIM) 100 MG tablet TAKE 1 TABLET DAILY 18  Yes Guanaco Bailey MD   metoprolol tartrate (LOPRESSOR) 50 MG tablet TAKE 1 TABLET DAILY 7/3/18  Yes Jeramy Keith MD   BiPAP Machine MISC by Does not apply route nightly   Yes Historical Provider, MD   vitamin B-12 (CYANOCOBALAMIN) 500 MCG tablet Take 500 mcg by mouth daily   Yes Historical Provider, MD   amLODIPine (NORVASC) 10 MG tablet Take 10 mg by mouth daily   Yes Historical Provider, MD   Levothyroxine Sodium (SYNTHROID PO) Take 0.2 mg by mouth daily Indications: Patient takes 2 tablets on ,  and . Other days, she only takes 1 tab Take 2 tablets of 0.2 on    Yes Historical Provider, MD       Allergies:  Patient has no known allergies. Social History:   TOBACCO:   reports that she quit smoking about 5 years ago. Her smoking use included cigarettes. She has a 50.00 pack-year smoking history. She has never used smokeless tobacco.  ETOH:   reports that she does not drink alcohol.     Family History:       Problem Relation Age of Onset    Heart Failure Mother         , 56   [place] Hypertension Mother         hypertension    Other Mother         CKD    Cancer Sister             Heart Failure Father         , 65    COPD Father     Coronary Art Dis Father     Hypertension Other         sibling    Coronary Art Dis Other     Breast Cancer Other         sibling           Physical Exam:    Vitals: /62   Pulse 80   Temp 99 °F (37.2 °C) (Temporal)   Resp 17   SpO2 92%   24HR INTAKE/OUTPUT:  No intake or output data in the 24 hours ending 05/15/19 1949  General appearance: alert and cooperative with exam  HEENT: atraumatic, eyes with clear conjunctiva and normal lids, pupils and irises normal, external ears and nose are normal, lips normal. Neck without masses, lympadenopathy, bruit, thyroid normal  Lungs: no increased work of breathing, diminished breath sounds bibasilar  Heart: irregularly irregular rhythm  Abdomen: soft, non-tender; bowel sounds normal; no masses,  no organomegaly  Extremities: edema 1+ bilaterally, modified Inocencio's negative  Neurologic: No focal neurologic deficits, normal sensation, alert and oriented, affect and mood appropriate. Skin: no rashes, nodules. CBC:   Recent Labs     05/15/19  1408   WBC 6.3   HGB 12.5   PLT 75*     BMP:    Recent Labs     05/15/19  1408 05/15/19  1414     --    K 4.2 4.0   CL 97*  --    CO2 33*  --    BUN 18  --    CREATININE 3.0*  --    GLUCOSE 93  --      Hepatic:   Recent Labs     05/15/19  1408   AST 22   ALT 7   BILITOT 1.2   ALKPHOS 86     Troponin T:   Recent Labs     05/15/19  1408 05/15/19  1752   TROPONINI <0.01 <0.01     ABGs: No results for input(s): PH, PCO2, PO2, HCO3, BE, O2SAT in the last 72 hours. INR:   Recent Labs     05/14/19 05/15/19  1510   INR 2.10 2.19*     Lactic Acid:   Recent Labs     05/15/19  1450   LACTA 1.6     -----------------------------------------------------------------  PA/lat CXR: moderate cardiomegaly, small pleural effusions    CTA chest: no pulmonary emboli, small pericardial effusion, small bilateral pleural effusions, congestion, bilateral compressive atelectasis    EKG: AF rate controlled, no acute ischemia/infarction    Assessment and Plan   Principal Problem (Resolved):    Acute on chronic diastolic heart failure (HCC)  Active Problems:    Paroxysmal atrial fibrillation (HCC)    Thrombocytopenia (HCC)    ESRD on dialysis ([facility]    Hypochloremia    Acute on chronic respiratory failure with hypoxia and hypercapnia (HCC)    Chest pain    Hypoalbuminemia    Admit to PCU. Cardiology consult. Chest pain is atypical and troponin is normal despite ESRD. Nephrology consult. Patient may benefit from makeup hemodialysis. Bumetanide 1 mg IV BID. Home medications reconciled.     Elida Andrea DO  Admitting Hospitalist 11

## 2024-10-16 NOTE — TELEPHONE ENCOUNTER
Spoke with Mr Connors letting him know to hold Ms Connors's coumadin until after her procedure as she is going to have a permacath put in. I advised Mr Connors he would be hearing from Miriam with surgical information as soon as she had it all scheduled. Mr Connors verbalized understanding to hold the coumadin until further notice.    chest wall non-tender, breathing is unlabored without accessory muscle use, normal breath sounds

## (undated) DEVICE — SUTURE ABSORBABLE MONOFILAMENT 3-0 SH 27 IN UD PDS + PDP416H

## (undated) DEVICE — Z INACTIVE USE 2535480 CLIP LIG M BLU TI HRT SHP WIRE HORZ 180 PER BX

## (undated) DEVICE — WIPE MEROCEL 3.625X3IN

## (undated) DEVICE — LP VESL MINI BLU PK/2

## (undated) DEVICE — PROXIMATE RH ROTATING HEAD SKIN STAPLERS (35 REGULAR) CONTAINS 35 STAINLESS STEEL STAPLES: Brand: PROXIMATE

## (undated) DEVICE — SUT MNCRYL 4/0 PS2 27IN UD MCP426H

## (undated) DEVICE — 3M™ STERI-STRIP™ REINFORCED ADHESIVE SKIN CLOSURES, R1547, 1/2 IN X 4 IN (12 MM X 100 MM), 6 STRIPS/ENVELOPE: Brand: 3M™ STERI-STRIP™

## (undated) DEVICE — GLV SURG SENSICARE W/ALOE PF LF 7.5 STRL

## (undated) DEVICE — INTENDED FOR TISSUE SEPARATION, AND OTHER PROCEDURES THAT REQUIRE A SHARP SURGICAL BLADE TO PUNCTURE OR CUT.: Brand: BARD-PARKER ® STAINLESS STEEL BLADES

## (undated) DEVICE — SUTURE VCRL SZ 3-0 L27IN ABSRB UD L26MM SH 1/2 CIR J416H

## (undated) DEVICE — SYR LUERLOK 20CC

## (undated) DEVICE — SUTURE VCRL SZ 3-0 L18IN ABSRB UD W/O NDL POLYGLACTIN 910 J110T

## (undated) DEVICE — SOLUTION IV 500ML 0.9% SOD CHL PH 5 INJ USP VIAFLX PLAS

## (undated) DEVICE — DRSNG SURESITE WNDW 4X4.5

## (undated) DEVICE — ANTIBACTERIAL UNDYED BRAIDED (POLYGLACTIN 910), SYNTHETIC ABSORBABLE SUTURE: Brand: COATED VICRYL

## (undated) DEVICE — MINOR CDS: Brand: MEDLINE INDUSTRIES, INC.

## (undated) DEVICE — CVR PROB GEN PURP W ISOSILK 6X48

## (undated) DEVICE — SPNG GZ STRL 2S 4X4 12PLY

## (undated) DEVICE — SYR SLP TP 10ML DISP

## (undated) DEVICE — SUTURE MCRYL SZ 4-0 L18IN ABSRB UD L19MM PS-2 3/8 CIR PRIM Y496G

## (undated) DEVICE — TOWEL,OR,DSP,ST,BLUE,DLX,4/PK,20PK/CS: Brand: MEDLINE

## (undated) DEVICE — ADHESIVE SKIN CLSR 0.7ML TOP DERMBND ADV

## (undated) DEVICE — 3M™ STERI-DRAPE™ INSTRUMENT POUCH 1018: Brand: STERI-DRAPE™

## (undated) DEVICE — SUTURE VCRL SZ 4-0 L18IN ABSRB UD VCRL POLYGLACTIN 910 COAT J109T

## (undated) DEVICE — SUTURE NONABSORBABLE MONOFILAMENT 4-0 PS-2 18 IN BLK ETHILON 1667G

## (undated) DEVICE — SURGICAL PROCEDURE PACK VASC LOURDES HOSP

## (undated) DEVICE — NDL HYPO PRECISIONGLIDE REG 25G 1 1/2

## (undated) DEVICE — COVER US PRB W15XL120CM W/ GEL RUBBERBAND TAPE STRP FLD GEN

## (undated) DEVICE — GLIDEPATH HEMODIALYSIS CATH, ST, DL 14.5 FR. 19CM: Brand: GLIDEPATH LONG-TERM HEMODIALYSIS CATHETER WITH PRELOADED STYLET

## (undated) DEVICE — TUBE ET 7.5MM NSL ORAL BASIC CUF INTMED MURPHY EYE RADPQ

## (undated) DEVICE — CLIP INT SM WIDE RED TI TRNSVRS GRV CHEVRON SHP W/ PRECIS

## (undated) DEVICE — EQUISTREAM XK HEMODIALYSIS CATH W/STYLET, ST, AIRGUARD,16 FR. 19CM: Brand: EQUISTREAM XK LONG-TERM HEMODIALYSIS CATHETER WITH PRELOADED STYLET

## (undated) DEVICE — BLADE LARYNSCP HNDL MAC 3 DISP CURAVIEW LED

## (undated) DEVICE — SCANLAN® VASCU-STATT® II SINGLE-USE BULLDOG CLAMP W/FIRMER CLAMPING PRESS - MAXI ANGLED 45°(ORANGE), CLAMPING PRESSURE 165-175 G (2/STERILE PKG): Brand: SCANLAN® VASCU-STATT® II SINGLE-USE BULLDOG CLAMP W/FIRMER CLAMPING PRESS

## (undated) DEVICE — PAD MINOR UNIVERSAL: Brand: MEDLINE INDUSTRIES, INC.

## (undated) DEVICE — CANN VESL ACRN TP 4MM

## (undated) DEVICE — SYR CONTRL LUERLOK 10CC

## (undated) DEVICE — BNDG ADHS CURAD FLX/FABRC 1X3IN STRL LF

## (undated) DEVICE — SYR PRECISIONGLIDE LL 5CC 20X1 1/2IN

## (undated) DEVICE — PAD MAJOR VASCULAR: Brand: MEDLINE INDUSTRIES, INC.

## (undated) DEVICE — 3M™ WARMING BLANKET, LOWER BODY, 10 PER CASE, 42568: Brand: BAIR HUGGER™

## (undated) DEVICE — SUT ETHLN 2/0 FS 18IN 664H

## (undated) DEVICE — SUTURE PERMAHAND SZ 2-0 L18IN NONABSORBABLE BLK L26MM FS 685G

## (undated) DEVICE — GLOVE SURG SZ 7 L12IN FNGR THK79MIL GRN LTX FREE

## (undated) DEVICE — CATHETER KIT 5 FR 21 GAX7 CM MICROINTRODUCER GUIDEWIRE STIFF

## (undated) DEVICE — SUTURE PROL SZ 6-0 L24IN NONABSORBABLE BLU L9.3MM BV-1 3/8 8805H

## (undated) DEVICE — PK TURNOVER RM ADV

## (undated) DEVICE — SUTURE ETHLN SZ 3-0 L18IN NONABSORBABLE BLK FS-1 L24MM 3/8 663H

## (undated) DEVICE — C-ARM: Brand: UNBRANDED

## (undated) DEVICE — GEL ULTRASND HI VISC 20G PACKET STRL

## (undated) DEVICE — SHEET,T,THYROID,STERILE: Brand: MEDLINE

## (undated) DEVICE — SUTURE ETHLN SZ 2-0 L30IN NONABSORBABLE BLK L36MM FSLX 3/8 1674H

## (undated) DEVICE — APPL CHLORAPREP W/TINT 26ML ORNG

## (undated) DEVICE — SPNG GZ 2S 2X2 8PLY STRL PK/2

## (undated) DEVICE — PROXIMATE RH ROTATING HEAD SKIN STAPLERS (35 WIDE) CONTAINS 35 STAINLESS STEEL STAPLES: Brand: PROXIMATE

## (undated) DEVICE — SUT PROLN 6/0 4/24IN BV1 MON BL M8805

## (undated) DEVICE — SUTURE PROL SZ 6-0 L30IN NONABSORBABLE BLU L9.3MM BV-1 3/8 M8709

## (undated) DEVICE — SUT PROLN 7/0 BV1 24IN 4PK M8702

## (undated) DEVICE — ST MIC/INTRO ACC SHRP/NDL TUNG/TP NITNL 5F 45CM 7CM

## (undated) DEVICE — SNAP KOVER: Brand: UNBRANDED

## (undated) DEVICE — SOLUTION IV IRRIG POUR BRL 0.9% SODIUM CHL 2F7124